# Patient Record
Sex: MALE | Race: BLACK OR AFRICAN AMERICAN | NOT HISPANIC OR LATINO | Employment: UNEMPLOYED | URBAN - METROPOLITAN AREA
[De-identification: names, ages, dates, MRNs, and addresses within clinical notes are randomized per-mention and may not be internally consistent; named-entity substitution may affect disease eponyms.]

---

## 2018-04-09 ENCOUNTER — HOSPITAL ENCOUNTER (EMERGENCY)
Facility: HOSPITAL | Age: 33
Discharge: HOME | End: 2018-04-09
Attending: EMERGENCY MEDICINE | Admitting: EMERGENCY MEDICINE
Payer: COMMERCIAL

## 2018-04-09 VITALS
WEIGHT: 201.5 LBS | TEMPERATURE: 97.5 F | SYSTOLIC BLOOD PRESSURE: 170 MMHG | DIASTOLIC BLOOD PRESSURE: 96 MMHG | HEART RATE: 76 BPM | OXYGEN SATURATION: 98 % | RESPIRATION RATE: 16 BRPM

## 2018-04-09 DIAGNOSIS — K08.89 PAIN, DENTAL: ICD-10-CM

## 2018-04-09 DIAGNOSIS — K02.9 DENTAL DECAY: Primary | ICD-10-CM

## 2018-04-09 PROCEDURE — 99282 EMERGENCY DEPT VISIT SF MDM: CPT

## 2018-04-09 RX ORDER — ACETAMINOPHEN AND CODEINE PHOSPHATE 300; 30 MG/1; MG/1
1 TABLET ORAL EVERY 6 HOURS PRN
Qty: 5 TABLET | Refills: 0 | Status: SHIPPED | OUTPATIENT
Start: 2018-04-09 | End: 2018-04-11

## 2018-04-09 RX ORDER — AMOXICILLIN 500 MG/1
500 CAPSULE ORAL 3 TIMES DAILY
Qty: 21 CAPSULE | Refills: 0 | Status: SHIPPED | OUTPATIENT
Start: 2018-04-09 | End: 2018-04-16

## 2018-04-09 NOTE — ED PROVIDER NOTES
History  Chief Complaint   Patient presents with    Dental Pain     Pt reports right lower sided dental pain that started 2 days ago  Pt took tylenol 3hrs ago and took motrin at 0000 without pain relief  This is a 35year old male who states is having right lower dental pain  He states he hasn't been to a dentist in a long time  States he has taken tylenol and motrin w/o relief  History provided by:  Patient and medical records   used: No        None       History reviewed  No pertinent past medical history  History reviewed  No pertinent surgical history  History reviewed  No pertinent family history  I have reviewed and agree with the history as documented  Social History   Substance Use Topics    Smoking status: Current Every Day Smoker    Smokeless tobacco: Not on file    Alcohol use No        Review of Systems   HENT: Positive for dental problem  All other systems reviewed and are negative  Physical Exam  ED Triage Vitals   Temperature Pulse Respirations Blood Pressure SpO2   04/09/18 0207 04/09/18 0207 04/09/18 0207 04/09/18 0207 04/09/18 0207   97 5 °F (36 4 °C) 74 18 (!) 178/113 98 %      Temp Source Heart Rate Source Patient Position - Orthostatic VS BP Location FiO2 (%)   04/09/18 0207 04/09/18 0207 04/09/18 0220 04/09/18 0207 --   Oral Monitor Sitting Left arm       Pain Score       --                  Orthostatic Vital Signs  Vitals:    04/09/18 0207 04/09/18 0220   BP: (!) 178/113 170/96   Pulse: 74 76   Patient Position - Orthostatic VS:  Sitting       Physical Exam   Constitutional: He appears well-developed and well-nourished  No distress  HENT:   Head: Normocephalic and atraumatic  Right Ear: External ear normal    Left Ear: External ear normal    Nose: Nose normal    Mouth/Throat: Oropharynx is clear and moist  No oropharyngeal exudate         Dental decay at #30  With part of the lateral side of the tooth missing   + mild edema of gum line   No facial swelling  Eyes: EOM are normal  Pupils are equal, round, and reactive to light  Neck: Normal range of motion  Neck supple  Musculoskeletal: Normal range of motion  Neurological: He is alert  Skin: Skin is warm and dry  Capillary refill takes less than 2 seconds  He is not diaphoretic  Psychiatric: He has a normal mood and affect  His behavior is normal  Judgment and thought content normal    Nursing note and vitals reviewed  ED Medications  Medications - No data to display    Diagnostic Studies  Results Reviewed     None                 No orders to display              Procedures  Procedures       Phone Contacts  ED Phone Contact    ED Course  ED Course                                MDM  Number of Diagnoses or Management Options  Dental decay:   Pain, dental:   Diagnosis management comments: Dental decay with tooth breakage    Amoxicillin  Tylenol #3  Motrin  See a dentist today    Pt verbalizes understanding of d/c instructions and follow up          Amount and/or Complexity of Data Reviewed  Review and summarize past medical records: yes      CritCare Time    Disposition  Final diagnoses:   Dental decay   Pain, dental     Time reflects when diagnosis was documented in both MDM as applicable and the Disposition within this note     Time User Action Codes Description Comment    4/9/2018  2:14 AM [place] Ink Add [K02 9] Dental decay     4/9/2018  2:15 AM [place] Ink Add [K08 89] Pain, dental       ED Disposition     None      Follow-up Information     Follow up With Specialties Details Why Contact Info Additional Information    [address]    [address] [id number] [phone]     [facility] 73 Adult and [address] [facility] 81 [id number]  [facility] 99    [address]  [facility]  [address] [facility] Oral Surgery   [address] Dental Oral Surgery   [address] [address]  [place] [address]  [address] Oral Surgery   [address] [address] [phone] [address] [facility] Emergency Department Emergency Medicine  If symptoms worsen [place]hin[place] [address]  [phone] [place] ED, [address] [facility] [place], [place], [address]        Patient's Medications   Discharge Prescriptions    ACETAMINOPHEN-CODEINE (TYLENOL #3) 300-30 MG PER TABLET    Take 1 tablet by mouth every 6 (six) hours as needed (severe pain) for up to 2 days       Start Date: 4/9/2018  End Date: 4/11/2018       Order Dose: 1 tablet       Quantity: 5 tablet    Refills: 0    AMOXICILLIN (AMOXIL) 500 MG CAPSULE    Take 1 capsule (500 mg total) by mouth 3 (three) times a day for 7 days       Start Date: 4/9/2018  End Date: 4/16/2018       Order Dose: 500 mg       Quantity: 21 capsule    Refills: 0     No discharge procedures on file      ED Provider  Electronically Signed by           Felipa Chávez  04/09/18 8736

## 2018-04-09 NOTE — DISCHARGE INSTRUCTIONS
You have a large part of your tooth missing due to dental decay  You have been prescribed Amoxicillin - take as prescribed  You have been prescribed tylenol with codeine for severe pain - do not drink alcohol or drive machinery with this medication  You are to call a dental clinic today for care  Dental Caries   WHAT YOU NEED TO KNOW:   Dental caries are also called cavities  Cavities are caused by bacteria  When the bacteria in tooth plaque (sticky film) mix with certain types of carbohydrate, this creates acid  The acid breaks down areas of enamel, which covers the outside of a tooth  This creates a small hole in the tooth called a cavity  DISCHARGE INSTRUCTIONS:   Return to the emergency department if:   · Your face, jaw, cheek, eye, or neck begin to swell  Contact your dentist if:   · You have a fever  · Your tooth pain gets worse  · You have questions or concerns about your condition or care  Follow up with your dentist as directed:  Write down your questions so you remember to ask them during your visits  Prevent dental caries:   · Brush your teeth at least 2 times a day with fluoride toothpaste  · Use dental floss to clean between your teeth at least once a day  · Rinse your mouth with water or mouthwash after meals and snacks  · Chew sugarless gum after meals and snacks  · See your dentist regularly for dental cleanings and oral exams  © 2017 [address] is for End User's use only and may not be sold, redistributed or otherwise used for commercial purposes  All illustrations and images included in CareNotes® are the copyrighted property of A D A M , Inc  or Tye Brantley  The above information is an  only  It is not intended as medical advice for individual conditions or treatments  Talk to your doctor, nurse or pharmacist before following any medical regimen to see if it is safe and effective for you      Toothache   WHAT YOU NEED TO KNOW:   A toothache is pain that is caused by irritation of the nerves in the center of your tooth  The irritation may be caused by several problems, such as a cavity, an infection, a cracked tooth, or gum disease  It is very important to follow up with your dentist so the cause of your toothache can be diagnosed and treated  This can help prevent more serious problems  DISCHARGE INSTRUCTIONS:   Medicines: You may  need any of the following:  · NSAIDs  decrease swelling and pain  This medicine can be bought with or without a doctor's order  This medicine can cause stomach bleeding or kidney problems in certain people  If you take blood thinner medicine, always ask your healthcare provider if NSAIDs are safe for you  Always read the medicine label and follow the directions on it before using this medicine  · Acetaminophen  decreases pain  It is available without a doctor's order  Ask how much to take and how often to take it  Follow directions  Acetaminophen can cause liver damage if not taken correctly  · Pain medicine  may be given as a pill or as medicine that you put directly on your tooth or gums  Do not wait until the pain is severe before you take this medicine  · Antibiotics  help fight or prevent an infection caused by bacteria  Take them as directed  · Take your medicine as directed  Contact your healthcare provider if you think your medicine is not helping or if you have side effects  Tell him of her if you are allergic to any medicine  Keep a list of the medicines, vitamins, and herbs you take  Include the amounts, and when and why you take them  Bring the list or the pill bottles to follow-up visits  Carry your medicine list with you in case of an emergency  Follow up with your dentist as directed: You may be referred to a dental surgeon  Write down your questions so you remember to ask them during your visits     Self-care:   · Rinse your mouth with warm salt water 4 times a day or as directed  · You may need to eat soft foods to help relieve pain caused by chewing  Contact your dentist if:   · You have questions or concerns about your condition or care  Return to the emergency department if:   · You have trouble breathing  · You have swelling in your face or neck  · You have a fever and chills  · You have trouble speaking or swallowing  · You have trouble opening or closing your mouth  © 2017 [address] Information is for End User's use only and may not be sold, redistributed or otherwise used for commercial purposes  All illustrations and images included in CareNotes® are the copyrighted property of A D A M , Inc  or Tye Brantley  The above information is an  only  It is not intended as medical advice for individual conditions or treatments  Talk to your doctor, nurse or pharmacist before following any medical regimen to see if it is safe and effective for you

## 2018-12-04 ENCOUNTER — HOSPITAL ENCOUNTER (EMERGENCY)
Facility: HOSPITAL | Age: 33
Discharge: HOME | End: 2018-12-04
Attending: EMERGENCY MEDICINE | Admitting: EMERGENCY MEDICINE
Payer: COMMERCIAL

## 2018-12-04 VITALS
OXYGEN SATURATION: 98 % | RESPIRATION RATE: 18 BRPM | HEART RATE: 99 BPM | WEIGHT: 183 LBS | TEMPERATURE: 98.1 F | DIASTOLIC BLOOD PRESSURE: 99 MMHG | SYSTOLIC BLOOD PRESSURE: 155 MMHG

## 2018-12-04 DIAGNOSIS — K08.89 DENTALGIA: Primary | ICD-10-CM

## 2018-12-04 PROCEDURE — 96372 THER/PROPH/DIAG INJ SC/IM: CPT

## 2018-12-04 PROCEDURE — 99282 EMERGENCY DEPT VISIT SF MDM: CPT

## 2018-12-04 RX ORDER — PENICILLIN V POTASSIUM 500 MG/1
500 TABLET ORAL 4 TIMES DAILY
Qty: 28 TABLET | Refills: 0 | Status: SHIPPED | OUTPATIENT
Start: 2018-12-04 | End: 2018-12-11

## 2018-12-04 RX ORDER — TRAMADOL HYDROCHLORIDE 50 MG/1
50 TABLET ORAL EVERY 6 HOURS PRN
Qty: 8 TABLET | Refills: 0 | Status: SHIPPED | OUTPATIENT
Start: 2018-12-04 | End: 2020-08-14 | Stop reason: HOSPADM

## 2018-12-04 RX ORDER — PENICILLIN V POTASSIUM 250 MG/1
500 TABLET ORAL ONCE
Status: COMPLETED | OUTPATIENT
Start: 2018-12-04 | End: 2018-12-04

## 2018-12-04 RX ORDER — NAPROXEN 500 MG/1
500 TABLET ORAL 2 TIMES DAILY WITH MEALS
Qty: 10 TABLET | Refills: 0 | Status: SHIPPED | OUTPATIENT
Start: 2018-12-04 | End: 2020-08-14 | Stop reason: HOSPADM

## 2018-12-04 RX ORDER — KETOROLAC TROMETHAMINE 30 MG/ML
15 INJECTION, SOLUTION INTRAMUSCULAR; INTRAVENOUS ONCE
Status: COMPLETED | OUTPATIENT
Start: 2018-12-04 | End: 2018-12-04

## 2018-12-04 RX ADMIN — KETOROLAC TROMETHAMINE 15 MG: 30 INJECTION, SOLUTION INTRAMUSCULAR at 00:45

## 2018-12-04 RX ADMIN — PENICILLIN V POTASIUM 500 MG: 250 TABLET ORAL at 00:45

## 2018-12-04 NOTE — DISCHARGE INSTRUCTIONS
Toothache   WHAT YOU NEED TO KNOW:   A toothache is pain that is caused by irritation of the nerves in the center of your tooth  The irritation may be caused by several problems, such as a cavity, an infection, a cracked tooth, or gum disease  It is very important to follow up with your dentist so the cause of your toothache can be diagnosed and treated  This can help prevent more serious problems  DISCHARGE INSTRUCTIONS:   Medicines: You may  need any of the following:  · NSAIDs  decrease swelling and pain  This medicine can be bought with or without a doctor's order  This medicine can cause stomach bleeding or kidney problems in certain people  If you take blood thinner medicine, always ask your healthcare provider if NSAIDs are safe for you  Always read the medicine label and follow the directions on it before using this medicine  · Acetaminophen  decreases pain  It is available without a doctor's order  Ask how much to take and how often to take it  Follow directions  Acetaminophen can cause liver damage if not taken correctly  · Pain medicine  may be given as a pill or as medicine that you put directly on your tooth or gums  Do not wait until the pain is severe before you take this medicine  · Antibiotics  help fight or prevent an infection caused by bacteria  Take them as directed  · Take your medicine as directed  Contact your healthcare provider if you think your medicine is not helping or if you have side effects  Tell him of her if you are allergic to any medicine  Keep a list of the medicines, vitamins, and herbs you take  Include the amounts, and when and why you take them  Bring the list or the pill bottles to follow-up visits  Carry your medicine list with you in case of an emergency  Follow up with your dentist as directed: You may be referred to a dental surgeon  Write down your questions so you remember to ask them during your visits     Self-care:   · Rinse your mouth with warm salt water 4 times a day or as directed  · You may need to eat soft foods to help relieve pain caused by chewing  Contact your dentist if:   · You have questions or concerns about your condition or care  Return to the emergency department if:   · You have trouble breathing  · You have swelling in your face or neck  · You have a fever and chills  · You have trouble speaking or swallowing  · You have trouble opening or closing your mouth  © 2017 [address] Information is for End User's use only and may not be sold, redistributed or otherwise used for commercial purposes  All illustrations and images included in CareNotes® are the copyrighted property of A D A M , Inc  or Tye Brantley  The above information is an  only  It is not intended as medical advice for individual conditions or treatments  Talk to your doctor, nurse or pharmacist before following any medical regimen to see if it is safe and effective for you

## 2018-12-04 NOTE — ED PROVIDER NOTES
History  Chief Complaint   Patient presents with    Dental Pain     Pt reports upper sided dental pain for 3 days  History provided by:  Patient   used: No    Dental Pain   Location:  Upper  Upper teeth location:  14/MOLINA 1st molar  Quality:  Aching  Duration:  3 days  Timing:  Constant  Progression:  Unchanged  Chronicity:  New  Relieved by:  None tried  Worsened by:  Nothing  Ineffective treatments:  Acetaminophen and NSAIDs  Associated symptoms: no difficulty swallowing, no drooling, no facial swelling, no fever, no gum swelling, no headaches, no neck pain, no neck swelling, no oral bleeding and no trismus    Risk factors: smoking        None       History reviewed  No pertinent past medical history  History reviewed  No pertinent surgical history  History reviewed  No pertinent family history  I have reviewed and agree with the history as documented  Social History   Substance Use Topics    Smoking status: Current Every Day Smoker    Smokeless tobacco: Not on file    Alcohol use No        Review of Systems   Constitutional: Negative for chills and fever  HENT: Positive for dental problem  Negative for drooling, facial swelling, sore throat, trouble swallowing and voice change  Respiratory: Negative for shortness of breath and stridor  Gastrointestinal: Negative for nausea and vomiting  Musculoskeletal: Negative for neck pain and neck stiffness  Skin: Negative for rash  Neurological: Negative for speech difficulty and headaches  All other systems reviewed and are negative  Physical Exam  Physical Exam   Constitutional: He is oriented to person, place, and time  Vital signs are normal  He appears well-developed and well-nourished  Non-toxic appearance  He does not have a sickly appearance  He does not appear ill  No distress  HENT:   Head: Normocephalic     Right Ear: External ear normal    Left Ear: External ear normal    Mouth/Throat: Uvula is midline, oropharynx is clear and moist and mucous membranes are normal  Mucous membranes are not pale and not dry  No trismus in the jaw  Abnormal dentition  Dental caries present  No dental abscesses or uvula swelling  No oropharyngeal exudate, posterior oropharyngeal edema, posterior oropharyngeal erythema or tonsillar abscesses  No stridor  No drooling  No facial swelling  No signs of Juan M's angina  No signs of peritonsillar abscess  No fullness or bulging of the posterior soft palate  No tenderness or swelling/elevation of floor of mouth  No swelling, induration, crepitus, or pain at submandibular area  Neck: Normal range of motion and phonation normal  Neck supple  No neck rigidity  No edema and no erythema present  No erythema overlying neck  No masses or swelling  Cardiovascular: Normal rate, regular rhythm, normal heart sounds and intact distal pulses  Pulmonary/Chest: Effort normal and breath sounds normal  No stridor  No respiratory distress  He has no wheezes  He has no rales  Lymphadenopathy:        Head (right side): No submental and no submandibular adenopathy present  Head (left side): No submental and no submandibular adenopathy present  He has no cervical adenopathy  Neurological: He is alert and oriented to person, place, and time  He is not disoriented  No cranial nerve deficit  Skin: Skin is warm, dry and intact  No rash noted  He is not diaphoretic  No erythema  Nursing note and vitals reviewed        Vital Signs  ED Triage Vitals [12/04/18 0025]   Temperature Pulse Respirations Blood Pressure SpO2   98 1 °F (36 7 °C) 99 18 155/99 98 %      Temp Source Heart Rate Source Patient Position - Orthostatic VS BP Location FiO2 (%)   Temporal Monitor -- Right arm --      Pain Score       --           Vitals:    12/04/18 0025   BP: 155/99   Pulse: 99       Visual Acuity      ED Medications  Medications   penicillin V potassium (VEETID) tablet 500 mg (not administered)   ketorolac (TORADOL) injection 15 mg (not administered)       Diagnostic Studies  Results Reviewed     None                 No orders to display              Procedures  Procedures       Phone Contacts  ED Phone Contact    ED Course                               MDM  CritCare Time    Disposition  Final diagnoses:   Delito Fontenot     Time reflects when diagnosis was documented in both MDM as applicable and the Disposition within this note     Time User Action Codes Description Comment    12/4/2018 12:34 AM [place] Cale 48 [K08 89] Delito Fontenot       ED Disposition     ED Disposition Condition Comment    Discharge  [place] discharge to home/self care  Condition at discharge: Good        Follow-up Information     Follow up With Specialties Details Why [address]  Schedule an appointment as soon as possible for a visit  [address]  Schedule an appointment as soon as possible for a visit  [address] [facility] [address]  [address]  [phone]          Patient's Medications   Discharge Prescriptions    NAPROXEN (NAPROSYN) 500 MG TABLET    Take 1 tablet (500 mg total) by mouth 2 (two) times a day with meals       Start Date: 12/4/2018 End Date: --       Order Dose: 500 mg       Quantity: 10 tablet    Refills: 0    PENICILLIN V POTASSIUM (VEETID) 500 MG TABLET    Take 1 tablet (500 mg total) by mouth 4 (four) times a day for 7 days       Start Date: 12/4/2018 End Date: 12/11/2018       Order Dose: 500 mg       Quantity: 28 tablet    Refills: 0    TRAMADOL (ULTRAM) 50 MG TABLET    Take 1 tablet (50 mg total) by mouth every 6 (six) hours as needed for severe pain       Start Date: 12/4/2018 End Date: --       Order Dose: 50 mg       Quantity: 8 tablet    Refills: 0     No discharge procedures on file      ED Provider  Electronically Signed by           Joy DO  12/04/18 0038

## 2020-01-31 ENCOUNTER — HOSPITAL ENCOUNTER (EMERGENCY)
Facility: HOSPITAL | Age: 35
Discharge: HOME | End: 2020-01-31
Attending: EMERGENCY MEDICINE
Payer: COMMERCIAL

## 2020-01-31 VITALS
OXYGEN SATURATION: 99 % | DIASTOLIC BLOOD PRESSURE: 84 MMHG | SYSTOLIC BLOOD PRESSURE: 139 MMHG | RESPIRATION RATE: 18 BRPM | TEMPERATURE: 98.5 F | HEART RATE: 84 BPM

## 2020-01-31 DIAGNOSIS — F32.A DEPRESSION: Primary | ICD-10-CM

## 2020-01-31 PROCEDURE — 99283 EMERGENCY DEPT VISIT LOW MDM: CPT

## 2020-01-31 PROCEDURE — 99284 EMERGENCY DEPT VISIT MOD MDM: CPT | Performed by: EMERGENCY MEDICINE

## 2020-01-31 RX ORDER — ARIPIPRAZOLE 10 MG/1
5 TABLET ORAL DAILY
COMMUNITY
End: 2020-08-14 | Stop reason: HOSPADM

## 2020-01-31 NOTE — ED PROVIDER NOTES
History  Chief Complaint   Patient presents with    Psychiatric Evaluation     Patient was at [place] [address] for treatment but wanted to leave  Patient reported to have had SI with plan to cut his wrist  Patient currently denies SI or HI      28-year-old male with past medical history of depression who is presenting for psychiatric evaluation  Patient reports that 2 days ago, he began to experience increasing depression related to conflict with his wife  He called his  and was reportedly crying  At some point, he apparently expressed suicidal ideation with a plan to cut his wrist   She was able to secure a place for him at [facility]  Patient stayed there yesterday but today he is stating that he feels significantly improved and wants to go home  Patient denies any suicidal ideation at this time  He has no thoughts of harming others or homicidal ideation  No auditory or visual hallucinations  Patient reports that he has been compliant with his psychiatric medications, including Abilify, Effexor, and Vyvanse  He has not been taking Klonopin for the past several days  Patient denies any drug use  He has no medical complaints at this time, including headache, vision changes, focal numbness or weakness, chest pain or pressure, shortness of breath, nausea, vomiting, abdominal pain, or any other complaints at this time  Prior to Admission Medications   Prescriptions Last Dose Informant Patient Reported? Taking? ARIPiprazole (ABILIFY) 10 mg tablet   Yes Yes   Sig: Take 10 mg by mouth daily   naproxen (NAPROSYN) 500 mg tablet   No No   Sig: Take 1 tablet (500 mg total) by mouth 2 (two) times a day with meals   traMADol (ULTRAM) 50 mg tablet   No No   Sig: Take 1 tablet (50 mg total) by mouth every 6 (six) hours as needed for severe pain      Facility-Administered Medications: None       Past Medical History:   Diagnosis Date    Anxiety     Depression        History reviewed   No pertinent surgical history  History reviewed  No pertinent family history  I have reviewed and agree with the history as documented  Social History     Tobacco Use    Smoking status: Current Every Day Smoker    Smokeless tobacco: Never Used   Substance Use Topics    Alcohol use: No    Drug use: No        Review of Systems   Constitutional: Negative for diaphoresis, fever and unexpected weight change  HENT: Negative for congestion, rhinorrhea and sore throat  Eyes: Negative for pain, discharge and visual disturbance  Respiratory: Negative for cough, shortness of breath and wheezing  Cardiovascular: Negative for chest pain, palpitations and leg swelling  Gastrointestinal: Negative for abdominal pain, blood in stool, constipation, diarrhea, nausea and vomiting  Genitourinary: Negative for dysuria, flank pain and hematuria  Musculoskeletal: Negative for arthralgias and myalgias  Skin: Negative for rash and wound  Allergic/Immunologic: Negative for environmental allergies and food allergies  Neurological: Negative for dizziness, seizures, weakness and numbness  Hematological: Negative for adenopathy  Psychiatric/Behavioral: Positive for dysphoric mood  Negative for confusion and hallucinations  Physical Exam  ED Triage Vitals [01/31/20 1331]   Temperature Pulse Respirations Blood Pressure SpO2   98 5 °F (36 9 °C) 84 18 139/84 99 %      Temp Source Heart Rate Source Patient Position - Orthostatic VS BP Location FiO2 (%)   Oral Monitor Sitting Right arm --      Pain Score       No Pain             Orthostatic Vital Signs  Vitals:    01/31/20 1331   BP: 139/84   Pulse: 84   Patient Position - Orthostatic VS: Sitting       Physical Exam   Constitutional: He is oriented to person, place, and time  He appears well-developed and well-nourished  HENT:   Head: Normocephalic and atraumatic     Right Ear: External ear normal    Left Ear: External ear normal    Nose: Nose normal    Eyes: Pupils are equal, round, and reactive to light  EOM are normal    Neck: Normal range of motion  Neck supple  No thyromegaly present  Cardiovascular: Normal rate, regular rhythm and normal heart sounds  No murmur heard  Pulmonary/Chest: Effort normal and breath sounds normal  No respiratory distress  He has no wheezes  He has no rales  Abdominal: Soft  Bowel sounds are normal  He exhibits no distension  There is no tenderness  There is no guarding  Musculoskeletal: Normal range of motion  He exhibits no deformity  Neurological: He is alert and oriented to person, place, and time  No gross motor deficits noted  Cranial nerves II-XII are intact  Speech is fluent without dysarthria or aphasia  Skin: Skin is warm and dry  Capillary refill takes less than 2 seconds  He is not diaphoretic  Psychiatric: He has a normal mood and affect  His behavior is normal    Patient is calm and cooperative  Speech is normal in rate and tone  Patient denies any suicidal ideation or homicidal ideation  No evidence of response to internal stimuli  Nursing note and vitals reviewed  ED Medications  Medications - No data to display    Diagnostic Studies  Results Reviewed     None                 No orders to display         Procedures  Procedures      ED Course                               MDM  Number of Diagnoses or Management Options  Depression: established and worsening  Diagnosis management comments:     As above, patient presented for psychiatric evaluation  He complained of worsening depression 2 days prior to arrival with reported suicidal ideation with plan  Patient was admitted to [facility]  He stated that he felt significantly improved the day after and expressed his desire to leave the facility  Patient denied suicidal ideation to myself and Dr Bettencourt Diss  He had no homicidal ideation or evidence of psychosis that would impair his ability to take care of himself  302 not indicated    He had a safe place to go; patient planned to stay with a friend  Patient has a  who will be available to assist him should he need it  Advised patient to return to the ER with suicidal ideation, worsening depression, psychosis, or any other concerning symptoms  Patient verbalized understanding  Amount and/or Complexity of Data Reviewed  Decide to obtain previous medical records or to obtain history from someone other than the patient: yes  Obtain history from someone other than the patient: yes  Review and summarize past medical records: yes    Risk of Complications, Morbidity, and/or Mortality  Presenting problems: low  Diagnostic procedures: minimal  Management options: minimal    Patient Progress  Patient progress: stable        Disposition  Final diagnoses:   Depression     Time reflects when diagnosis was documented in both MDM as applicable and the Disposition within this note     Time User Action Codes Description Comment    1/31/2020  2:24 PM Jairo Garcia [F32 9] Depression       ED Disposition     ED Disposition Condition Date/Time Comment    Discharge Good Fri Jan 31, 2020  2:24 PM Talya Beaulieu discharge to home/self care  Follow-up Information     Follow up With Specialties Details Why Contact Info Additional Information    Crystal Rankin MD Family Medicine Call today Please follow-up with your PCP for a recheck  Make an appointment for within 1 week  [address] [place] [address] Emergency Department Emergency Medicine Go to  As needed  [address] [place] [address], [address] [place] [address], [address] [phone]          Patient's Medications   Discharge Prescriptions    No medications on file     No discharge procedures on file  ED Provider  Attending physically available and evaluated Talya Beaulieu I managed the patient along with the ED Attending      Electronically Signed by         Ruba Adler MD  01/31/20 1962

## 2020-01-31 NOTE — ED ATTENDING ATTESTATION
1/31/2020  IBelinda DO, saw and evaluated the patient  I have discussed the patient with the resident/non-physician practitioner and agree with the resident's/non-physician practitioner's findings, Plan of Care, and MDM as documented in the resident's/non-physician practitioner's note, except where noted  All available labs and Radiology studies were reviewed  I was present for key portions of any procedure(s) performed by the resident/non-physician practitioner and I was immediately available to provide assistance  At this point I agree with the current assessment done in the Emergency Department  I have conducted an independent evaluation of this patient a history and physical is as follows:    22-year-old who is currently at a group presents for depression, presents for passive suicidal ideation  Patient states he had thoughts of hurting himself a few days ago but does not feel this way anymore  Would like to be discharged to go home  Denies homicidal or suicidal ideation     in the room agrees that there is no reason for 302 but just wants him evaluated    ED Course         Critical Care Time  Procedures

## 2020-08-10 ENCOUNTER — HOSPITAL ENCOUNTER (EMERGENCY)
Facility: HOSPITAL | Age: 35
End: 2020-08-11
Attending: EMERGENCY MEDICINE
Payer: COMMERCIAL

## 2020-08-10 DIAGNOSIS — R45.851 DEPRESSION WITH SUICIDAL IDEATION: ICD-10-CM

## 2020-08-10 DIAGNOSIS — N17.9 AKI (ACUTE KIDNEY INJURY) (HCC): ICD-10-CM

## 2020-08-10 DIAGNOSIS — E86.0 DEHYDRATION: ICD-10-CM

## 2020-08-10 DIAGNOSIS — R55 SYNCOPE: Primary | ICD-10-CM

## 2020-08-10 DIAGNOSIS — F32.A DEPRESSION WITH SUICIDAL IDEATION: ICD-10-CM

## 2020-08-10 LAB
BASOPHILS # BLD AUTO: 0.02 THOUSANDS/ΜL (ref 0–0.1)
BASOPHILS NFR BLD AUTO: 0 % (ref 0–1)
EOSINOPHIL # BLD AUTO: 0.07 THOUSAND/ΜL (ref 0–0.61)
EOSINOPHIL NFR BLD AUTO: 1 % (ref 0–6)
ERYTHROCYTE [DISTWIDTH] IN BLOOD BY AUTOMATED COUNT: 14.3 % (ref 11.6–15.1)
ETHANOL SERPL-MCNC: 3 MG/DL (ref 0–3)
GLUCOSE SERPL-MCNC: 107 MG/DL (ref 65–140)
HCT VFR BLD AUTO: 57.3 % (ref 36.5–49.3)
HGB BLD-MCNC: 18.5 G/DL (ref 12–17)
IMM GRANULOCYTES # BLD AUTO: 0.03 THOUSAND/UL (ref 0–0.2)
IMM GRANULOCYTES NFR BLD AUTO: 1 % (ref 0–2)
LYMPHOCYTES # BLD AUTO: 2.69 THOUSANDS/ΜL (ref 0.6–4.47)
LYMPHOCYTES NFR BLD AUTO: 42 % (ref 14–44)
MCH RBC QN AUTO: 27.9 PG (ref 26.8–34.3)
MCHC RBC AUTO-ENTMCNC: 32.3 G/DL (ref 31.4–37.4)
MCV RBC AUTO: 86 FL (ref 82–98)
MONOCYTES # BLD AUTO: 0.59 THOUSAND/ΜL (ref 0.17–1.22)
MONOCYTES NFR BLD AUTO: 9 % (ref 4–12)
NEUTROPHILS # BLD AUTO: 3.07 THOUSANDS/ΜL (ref 1.85–7.62)
NEUTS SEG NFR BLD AUTO: 47 % (ref 43–75)
NRBC BLD AUTO-RTO: 0 /100 WBCS
PLATELET # BLD AUTO: 337 THOUSANDS/UL (ref 149–390)
PMV BLD AUTO: 9.9 FL (ref 8.9–12.7)
RBC # BLD AUTO: 6.64 MILLION/UL (ref 3.88–5.62)
WBC # BLD AUTO: 6.47 THOUSAND/UL (ref 4.31–10.16)

## 2020-08-10 PROCEDURE — 87635 SARS-COV-2 COVID-19 AMP PRB: CPT | Performed by: EMERGENCY MEDICINE

## 2020-08-10 PROCEDURE — 80329 ANALGESICS NON-OPIOID 1 OR 2: CPT | Performed by: EMERGENCY MEDICINE

## 2020-08-10 PROCEDURE — 36415 COLL VENOUS BLD VENIPUNCTURE: CPT | Performed by: EMERGENCY MEDICINE

## 2020-08-10 PROCEDURE — 93005 ELECTROCARDIOGRAM TRACING: CPT

## 2020-08-10 PROCEDURE — 80053 COMPREHEN METABOLIC PANEL: CPT | Performed by: EMERGENCY MEDICINE

## 2020-08-10 PROCEDURE — 99291 CRITICAL CARE FIRST HOUR: CPT | Performed by: EMERGENCY MEDICINE

## 2020-08-10 PROCEDURE — 96360 HYDRATION IV INFUSION INIT: CPT

## 2020-08-10 PROCEDURE — 99285 EMERGENCY DEPT VISIT HI MDM: CPT

## 2020-08-10 PROCEDURE — 84443 ASSAY THYROID STIM HORMONE: CPT | Performed by: EMERGENCY MEDICINE

## 2020-08-10 PROCEDURE — 80320 DRUG SCREEN QUANTALCOHOLS: CPT | Performed by: EMERGENCY MEDICINE

## 2020-08-10 PROCEDURE — 84484 ASSAY OF TROPONIN QUANT: CPT | Performed by: EMERGENCY MEDICINE

## 2020-08-10 PROCEDURE — 85025 COMPLETE CBC W/AUTO DIFF WBC: CPT | Performed by: EMERGENCY MEDICINE

## 2020-08-10 PROCEDURE — 82948 REAGENT STRIP/BLOOD GLUCOSE: CPT

## 2020-08-10 RX ORDER — HYDROXYZINE HYDROCHLORIDE 25 MG/1
25 TABLET, FILM COATED ORAL EVERY 6 HOURS PRN
COMMUNITY
End: 2020-08-14 | Stop reason: HOSPADM

## 2020-08-10 RX ORDER — VENLAFAXINE HYDROCHLORIDE 75 MG/1
75 CAPSULE, EXTENDED RELEASE ORAL
COMMUNITY
Start: 2020-08-03 | End: 2020-08-14 | Stop reason: HOSPADM

## 2020-08-10 RX ADMIN — SODIUM CHLORIDE 1000 ML: 0.9 INJECTION, SOLUTION INTRAVENOUS at 23:36

## 2020-08-10 NOTE — LETTER
Section I - General Information    Name of Patient: Roderick Dancer                 : 1985    Medicare #:____________________  Transport Date: 20 (PCS is valid for round trips on this date and for all repetitive trips in the 60-day range as noted below )  Origin: [place] [id number]                                                         Destination:________________________________________________  Is the pt's stay covered under Medicare Part A (PPS/DRG)     (_) YES  (_) NO  Closest appropriate facility?  (_) YES  (_) NO  If no, why is transport to more distant facility required?________________________  If hosp-hosp transfer, describe services needed at 2nd facility not available at 1st facility? _________________________________  If hospice pt, is this transport related to pt's terminal illness? (_) YES (_) NO Describe____________________________________    Section II - Medical Necessity Questionnaire  Ambulance transportation is medically necessary only if other means of transport are contraindicated or would be potentially harmful to the patient  To meet this requirement, the patient must either be "bed confined" or suffer from a condition such that transport by means other than ambulance is contraindicated by the patient's condition   The following questions must be answered by the medical professional signing below for this form to be valid:    1)  Describe the MEDICAL CONDITION (physical and/or mental) of this patient AT [address] [place] [address] that requires the patient to be transported in an ambulance and why transport by other means is contraindicated by the patient's condition:__________________________________________________________________________________________________    2) Is the patient "bed confined" as defined below?     (_) YES  (_) NO  To be "be confined" the patient must satisfy all three of the following conditions: (1) unable to get up from bed without Assistance; AND (2) unable to ambulate; AND (3) unable to sit in a chair or wheelchair  3) Can this patient safely be transported by car or wheelchair [place] (i e , seated during transport without a medical attendant or monitoring)?   (_) YES  (_) NO    4) In addition to completing questions 1-3 above, please check any of the following conditions that apply*:  *Note: supporting documentation for any boxes checked must be maintained in the patient's medical records  (_)Contractures   (_)Non-Healed Fractures  (_)Patient is confused (_)Patient is comatose (_)Moderate/severe pain on movement (_)Danger to self/others  (_)IV meds/fluids required (_)Patient is combative(_)Need or possible need for restraints (_)DVT requires elevation of lower extremity  (_)Medical attendant required (_)Requires oxygen-unable to self administer (_)Special handling/isolation/infection control precautions required (_)Unable to tolerate seated position for time needed to transport (_)Hemodynamic monitoring required en route (_)Unable to sit in a chair or wheelchair due to decubitus ulcers or other wounds (_)Cardiac monitoring required en route (_)Morbid obesity requires additional personnel/equipment to safely handle patient (_)Orthopedic device (backboard, halo, pins, traction, brace, wedge, etc,) requiring special handling during transport (_)Other(specify)_______________________________________________    Section III - Signature of Physician or Healthcare Professional  I certify that the above information is true and correct based on my evaluation of this patient, and represent that the patient requires transport by ambulance and that other forms of transport are contraindicated   I understand that this information will be used by the Centers for Medicare and Medicaid Services (CMS) to support the determination of medical necessity for ambulance services, and I represent that I have personal knowledge of the patient's condition at time of transport  (_) If this box is checked, I also certify that the patient is physically or mentally incapable of signing the ambulance service's claim and that the institution with which I am affiliated has furnished care, services, or assistance to the patient  My signature below is made on behalf of the patient pursuant to 42 CFR §424 36(b)(4)  In accordance with 42 CFR §424 37, the specific reason(s) that the patient is physically or mentally incapable of signing the claim form is as follows: _________________________________________________________________________________________________________      Signature of Physician* or Healthcare Professional______________________________________________________________  Signature Date 08/11/20 (For scheduled repetitive transports, this form is not valid for transports performed more than 60 days after this date)    Printed Name & Credentials of Physician or Healthcare Professional (MD, DO, RN, etc )________________________________  *Form must be signed by patient's attending physician for scheduled, repetitive transports   For non-repetitive, unscheduled ambulance transports, if unable to obtain the signature of the attending physician, any of the following may sign (choose appropriate option below)  (_) Physician Assistant (_)  Clinical Nurse Specialist (_)  Registered Nurse  (_)  Nurse Practitioner  (_) Discharge Planner

## 2020-08-11 ENCOUNTER — HOSPITAL ENCOUNTER (INPATIENT)
Facility: HOSPITAL | Age: 35
LOS: 3 days | Discharge: HOME | DRG: 754 | End: 2020-08-14
Attending: PSYCHIATRY & NEUROLOGY | Admitting: PSYCHIATRY & NEUROLOGY
Payer: COMMERCIAL

## 2020-08-11 VITALS
WEIGHT: 182.98 LBS | OXYGEN SATURATION: 100 % | RESPIRATION RATE: 16 BRPM | TEMPERATURE: 98 F | DIASTOLIC BLOOD PRESSURE: 62 MMHG | HEART RATE: 66 BPM | SYSTOLIC BLOOD PRESSURE: 114 MMHG

## 2020-08-11 DIAGNOSIS — R45.851 DEPRESSION WITH SUICIDAL IDEATION: ICD-10-CM

## 2020-08-11 DIAGNOSIS — F32.9 MAJOR DEPRESSIVE DISORDER: Primary | ICD-10-CM

## 2020-08-11 DIAGNOSIS — F32.A DEPRESSION WITH SUICIDAL IDEATION: ICD-10-CM

## 2020-08-11 LAB
ALBUMIN SERPL BCP-MCNC: 4.2 G/DL (ref 3.5–5)
ALP SERPL-CCNC: 85 U/L (ref 46–116)
ALT SERPL W P-5'-P-CCNC: 36 U/L (ref 12–78)
AMPHETAMINES SERPL QL SCN: NEGATIVE
ANION GAP SERPL CALCULATED.3IONS-SCNC: 13 MMOL/L (ref 4–13)
APAP SERPL-MCNC: <2 UG/ML (ref 10–20)
AST SERPL W P-5'-P-CCNC: 22 U/L (ref 5–45)
ATRIAL RATE: 100 BPM
ATRIAL RATE: 99 BPM
BACTERIA UR QL AUTO: ABNORMAL /HPF
BARBITURATES UR QL: NEGATIVE
BENZODIAZ UR QL: NEGATIVE
BILIRUB SERPL-MCNC: 0.41 MG/DL (ref 0.2–1)
BILIRUB UR QL STRIP: NEGATIVE
BUN SERPL-MCNC: 17 MG/DL (ref 5–25)
CALCIUM SERPL-MCNC: 9.5 MG/DL (ref 8.3–10.1)
CAOX CRY URNS QL MICRO: ABNORMAL /HPF
CHLORIDE SERPL-SCNC: 104 MMOL/L (ref 100–108)
CLARITY UR: CLEAR
CO2 SERPL-SCNC: 27 MMOL/L (ref 21–32)
COCAINE UR QL: NEGATIVE
COLOR UR: YELLOW
CREAT SERPL-MCNC: 1.54 MG/DL (ref 0.6–1.3)
GFR SERPL CREATININE-BSD FRML MDRD: 67 ML/MIN/1.73SQ M
GLUCOSE SERPL-MCNC: 111 MG/DL (ref 65–140)
GLUCOSE UR STRIP-MCNC: NEGATIVE MG/DL
HGB UR QL STRIP.AUTO: NEGATIVE
HYALINE CASTS #/AREA URNS LPF: ABNORMAL /LPF
KETONES UR STRIP-MCNC: NEGATIVE MG/DL
LEUKOCYTE ESTERASE UR QL STRIP: NEGATIVE
METHADONE UR QL: NEGATIVE
MUCOUS THREADS UR QL AUTO: ABNORMAL
NITRITE UR QL STRIP: NEGATIVE
NON-SQ EPI CELLS URNS QL MICRO: ABNORMAL /HPF
OPIATES UR QL SCN: NEGATIVE
OXYCODONE+OXYMORPHONE UR QL SCN: NEGATIVE
P AXIS: 77 DEGREES
P AXIS: 80 DEGREES
PCP UR QL: NEGATIVE
PH UR STRIP.AUTO: 6 [PH] (ref 4.5–8)
POTASSIUM SERPL-SCNC: 4.1 MMOL/L (ref 3.5–5.3)
PR INTERVAL: 144 MS
PR INTERVAL: 144 MS
PROT SERPL-MCNC: 7.5 G/DL (ref 6.4–8.2)
PROT UR STRIP-MCNC: ABNORMAL MG/DL
QRS AXIS: 83 DEGREES
QRS AXIS: 84 DEGREES
QRSD INTERVAL: 80 MS
QRSD INTERVAL: 84 MS
QT INTERVAL: 312 MS
QT INTERVAL: 314 MS
QTC INTERVAL: 402 MS
QTC INTERVAL: 402 MS
RBC #/AREA URNS AUTO: ABNORMAL /HPF
SALICYLATES SERPL-MCNC: <3 MG/DL (ref 3–20)
SARS-COV-2 RNA RESP QL NAA+PROBE: NEGATIVE
SODIUM SERPL-SCNC: 144 MMOL/L (ref 136–145)
SP GR UR STRIP.AUTO: >=1.03 (ref 1–1.03)
T WAVE AXIS: 23 DEGREES
T WAVE AXIS: 39 DEGREES
THC UR QL: POSITIVE
TROPONIN I SERPL-MCNC: <0.02 NG/ML
TSH SERPL DL<=0.05 MIU/L-ACNC: 2.5 UIU/ML (ref 0.36–3.74)
UROBILINOGEN UR QL STRIP.AUTO: 0.2 E.U./DL
VENTRICULAR RATE: 100 BPM
VENTRICULAR RATE: 99 BPM
WBC #/AREA URNS AUTO: ABNORMAL /HPF

## 2020-08-11 PROCEDURE — 93010 ELECTROCARDIOGRAM REPORT: CPT | Performed by: INTERNAL MEDICINE

## 2020-08-11 PROCEDURE — 96361 HYDRATE IV INFUSION ADD-ON: CPT

## 2020-08-11 PROCEDURE — 81001 URINALYSIS AUTO W/SCOPE: CPT

## 2020-08-11 PROCEDURE — 80307 DRUG TEST PRSMV CHEM ANLYZR: CPT | Performed by: EMERGENCY MEDICINE

## 2020-08-11 RX ORDER — BENZTROPINE MESYLATE 1 MG/ML
1 INJECTION INTRAMUSCULAR; INTRAVENOUS EVERY 6 HOURS PRN
Status: DISCONTINUED | OUTPATIENT
Start: 2020-08-11 | End: 2020-08-14 | Stop reason: HOSPADM

## 2020-08-11 RX ORDER — IBUPROFEN 600 MG/1
600 TABLET ORAL EVERY 6 HOURS PRN
Status: CANCELLED | OUTPATIENT
Start: 2020-08-11

## 2020-08-11 RX ORDER — ACETAMINOPHEN 325 MG/1
650 TABLET ORAL EVERY 4 HOURS PRN
Status: CANCELLED | OUTPATIENT
Start: 2020-08-11

## 2020-08-11 RX ORDER — ARIPIPRAZOLE 10 MG/1
10 TABLET ORAL DAILY
Status: DISCONTINUED | OUTPATIENT
Start: 2020-08-11 | End: 2020-08-11 | Stop reason: HOSPADM

## 2020-08-11 RX ORDER — MAGNESIUM HYDROXIDE/ALUMINUM HYDROXICE/SIMETHICONE 120; 1200; 1200 MG/30ML; MG/30ML; MG/30ML
30 SUSPENSION ORAL EVERY 4 HOURS PRN
Status: CANCELLED | OUTPATIENT
Start: 2020-08-11

## 2020-08-11 RX ORDER — BENZTROPINE MESYLATE 1 MG/ML
1 INJECTION INTRAMUSCULAR; INTRAVENOUS EVERY 6 HOURS PRN
Status: CANCELLED | OUTPATIENT
Start: 2020-08-11

## 2020-08-11 RX ORDER — TRAZODONE HYDROCHLORIDE 50 MG/1
50 TABLET ORAL
Status: CANCELLED | OUTPATIENT
Start: 2020-08-11

## 2020-08-11 RX ORDER — ACETAMINOPHEN 325 MG/1
325 TABLET ORAL EVERY 6 HOURS PRN
Status: CANCELLED | OUTPATIENT
Start: 2020-08-11

## 2020-08-11 RX ORDER — LORAZEPAM 2 MG/ML
2 INJECTION INTRAMUSCULAR EVERY 6 HOURS PRN
Status: DISCONTINUED | OUTPATIENT
Start: 2020-08-11 | End: 2020-08-14 | Stop reason: HOSPADM

## 2020-08-11 RX ORDER — HALOPERIDOL 5 MG
5 TABLET ORAL EVERY 6 HOURS PRN
Status: CANCELLED | OUTPATIENT
Start: 2020-08-11

## 2020-08-11 RX ORDER — HYDROXYZINE 50 MG/1
50 TABLET, FILM COATED ORAL EVERY 6 HOURS PRN
Status: DISCONTINUED | OUTPATIENT
Start: 2020-08-11 | End: 2020-08-14 | Stop reason: HOSPADM

## 2020-08-11 RX ORDER — OLANZAPINE 10 MG/1
10 INJECTION, POWDER, LYOPHILIZED, FOR SOLUTION INTRAMUSCULAR EVERY 8 HOURS PRN
Status: CANCELLED | OUTPATIENT
Start: 2020-08-11

## 2020-08-11 RX ORDER — ARIPIPRAZOLE 10 MG/1
10 TABLET ORAL DAILY
Status: CANCELLED | OUTPATIENT
Start: 2020-08-12

## 2020-08-11 RX ORDER — IBUPROFEN 600 MG/1
600 TABLET ORAL EVERY 6 HOURS PRN
Status: DISCONTINUED | OUTPATIENT
Start: 2020-08-11 | End: 2020-08-14 | Stop reason: HOSPADM

## 2020-08-11 RX ORDER — OLANZAPINE 10 MG/1
10 INJECTION, POWDER, LYOPHILIZED, FOR SOLUTION INTRAMUSCULAR EVERY 8 HOURS PRN
Status: DISCONTINUED | OUTPATIENT
Start: 2020-08-11 | End: 2020-08-14 | Stop reason: HOSPADM

## 2020-08-11 RX ORDER — HYDROXYZINE HYDROCHLORIDE 25 MG/1
25 TABLET, FILM COATED ORAL EVERY 6 HOURS PRN
Status: CANCELLED | OUTPATIENT
Start: 2020-08-11

## 2020-08-11 RX ORDER — LORAZEPAM 2 MG/ML
2 INJECTION INTRAMUSCULAR EVERY 6 HOURS PRN
Status: CANCELLED | OUTPATIENT
Start: 2020-08-11

## 2020-08-11 RX ORDER — HYDROXYZINE HYDROCHLORIDE 25 MG/1
50 TABLET, FILM COATED ORAL EVERY 6 HOURS PRN
Status: CANCELLED | OUTPATIENT
Start: 2020-08-11

## 2020-08-11 RX ORDER — VENLAFAXINE HYDROCHLORIDE 75 MG/1
75 CAPSULE, EXTENDED RELEASE ORAL DAILY
Status: CANCELLED | OUTPATIENT
Start: 2020-08-12

## 2020-08-11 RX ORDER — ACETAMINOPHEN 325 MG/1
325 TABLET ORAL EVERY 6 HOURS PRN
Status: DISCONTINUED | OUTPATIENT
Start: 2020-08-11 | End: 2020-08-14 | Stop reason: HOSPADM

## 2020-08-11 RX ORDER — ACETAMINOPHEN 325 MG/1
650 TABLET ORAL EVERY 4 HOURS PRN
Status: DISCONTINUED | OUTPATIENT
Start: 2020-08-11 | End: 2020-08-14 | Stop reason: HOSPADM

## 2020-08-11 RX ORDER — RISPERIDONE 1 MG/1
1 TABLET, ORALLY DISINTEGRATING ORAL
Status: DISCONTINUED | OUTPATIENT
Start: 2020-08-11 | End: 2020-08-14 | Stop reason: HOSPADM

## 2020-08-11 RX ORDER — OLANZAPINE 10 MG/1
10 TABLET ORAL EVERY 8 HOURS PRN
Status: CANCELLED | OUTPATIENT
Start: 2020-08-11

## 2020-08-11 RX ORDER — MAGNESIUM HYDROXIDE/ALUMINUM HYDROXICE/SIMETHICONE 120; 1200; 1200 MG/30ML; MG/30ML; MG/30ML
30 SUSPENSION ORAL EVERY 4 HOURS PRN
Status: DISCONTINUED | OUTPATIENT
Start: 2020-08-11 | End: 2020-08-14 | Stop reason: HOSPADM

## 2020-08-11 RX ORDER — BENZTROPINE MESYLATE 1 MG/1
1 TABLET ORAL EVERY 6 HOURS PRN
Status: CANCELLED | OUTPATIENT
Start: 2020-08-11

## 2020-08-11 RX ORDER — HALOPERIDOL 5 MG/ML
5 INJECTION INTRAMUSCULAR EVERY 6 HOURS PRN
Status: CANCELLED | OUTPATIENT
Start: 2020-08-11

## 2020-08-11 RX ORDER — HYDROXYZINE HYDROCHLORIDE 25 MG/1
25 TABLET, FILM COATED ORAL EVERY 6 HOURS PRN
Status: DISCONTINUED | OUTPATIENT
Start: 2020-08-11 | End: 2020-08-14 | Stop reason: HOSPADM

## 2020-08-11 RX ORDER — TRAZODONE HYDROCHLORIDE 50 MG/1
50 TABLET ORAL
Status: DISCONTINUED | OUTPATIENT
Start: 2020-08-11 | End: 2020-08-14 | Stop reason: HOSPADM

## 2020-08-11 RX ORDER — LORAZEPAM 1 MG/1
1 TABLET ORAL EVERY 6 HOURS PRN
Status: CANCELLED | OUTPATIENT
Start: 2020-08-11

## 2020-08-11 RX ORDER — VENLAFAXINE HYDROCHLORIDE 75 MG/1
75 CAPSULE, EXTENDED RELEASE ORAL DAILY
Status: DISCONTINUED | OUTPATIENT
Start: 2020-08-11 | End: 2020-08-11 | Stop reason: HOSPADM

## 2020-08-11 RX ORDER — VENLAFAXINE HYDROCHLORIDE 75 MG/1
75 CAPSULE, EXTENDED RELEASE ORAL DAILY
Status: DISCONTINUED | OUTPATIENT
Start: 2020-08-12 | End: 2020-08-14 | Stop reason: HOSPADM

## 2020-08-11 RX ORDER — ARIPIPRAZOLE 10 MG/1
10 TABLET ORAL DAILY
Status: DISCONTINUED | OUTPATIENT
Start: 2020-08-12 | End: 2020-08-14 | Stop reason: HOSPADM

## 2020-08-11 RX ORDER — HALOPERIDOL 5 MG
5 TABLET ORAL EVERY 6 HOURS PRN
Status: DISCONTINUED | OUTPATIENT
Start: 2020-08-11 | End: 2020-08-14 | Stop reason: HOSPADM

## 2020-08-11 RX ORDER — RISPERIDONE 1 MG/1
1 TABLET, ORALLY DISINTEGRATING ORAL
Status: CANCELLED | OUTPATIENT
Start: 2020-08-11

## 2020-08-11 RX ORDER — BENZTROPINE MESYLATE 1 MG/1
1 TABLET ORAL EVERY 6 HOURS PRN
Status: DISCONTINUED | OUTPATIENT
Start: 2020-08-11 | End: 2020-08-14 | Stop reason: HOSPADM

## 2020-08-11 RX ORDER — LORAZEPAM 1 MG/1
1 TABLET ORAL EVERY 6 HOURS PRN
Status: DISCONTINUED | OUTPATIENT
Start: 2020-08-11 | End: 2020-08-14 | Stop reason: HOSPADM

## 2020-08-11 RX ORDER — HALOPERIDOL 5 MG/ML
5 INJECTION INTRAMUSCULAR EVERY 6 HOURS PRN
Status: DISCONTINUED | OUTPATIENT
Start: 2020-08-11 | End: 2020-08-14 | Stop reason: HOSPADM

## 2020-08-11 RX ORDER — OLANZAPINE 10 MG/1
10 TABLET ORAL EVERY 8 HOURS PRN
Status: DISCONTINUED | OUTPATIENT
Start: 2020-08-11 | End: 2020-08-14 | Stop reason: HOSPADM

## 2020-08-11 RX ADMIN — VENLAFAXINE HYDROCHLORIDE 75 MG: 75 CAPSULE, EXTENDED RELEASE ORAL at 12:06

## 2020-08-11 RX ADMIN — ARIPIPRAZOLE 10 MG: 10 TABLET ORAL at 12:06

## 2020-08-11 RX ADMIN — SODIUM CHLORIDE 1000 ML: 0.9 INJECTION, SOLUTION INTRAVENOUS at 01:05

## 2020-08-11 NOTE — ED NOTES
Patient finished everything except his dessert  Resting comfortably in his room at this time  Cooperative & calm       Leatha Hooks RN  08/11/20 8170

## 2020-08-11 NOTE — ED NOTES
Care assumed of patient at this time; patient sleeping in bed; exhibits no signs of acute distress; remains on 1-to-1 by ED Tech [address] Simons, MAGALYS  08/11/20 4473

## 2020-08-11 NOTE — ED NOTES
Patient is accepted at [facility]  Patient is accepted by Fabiola Stuart per [address] is arranged with [facility]  Transportation is scheduled for TBD    Nurse report is to be called to [phone]   prior to patient transfer

## 2020-08-11 NOTE — EMTALA/ACUTE CARE TRANSFER
[facility] [address]  [address] [facility] [address]  Dept: [phone]      EMTALA TRANSFER CONSENT    NAME Radha Leo                                         1985                              MRN [id number]    I have been informed of my rights regarding examination, treatment, and transfer   by Dr Nesha Buckley, *    Benefits: Continuity of care, Specialized equipment and/or services available at the receiving facility (Include comment)________________________    Risks: Potential for delay in receiving treatment, Increased discomfort during transfer      Consent for Transfer:  I acknowledge that my medical condition has been evaluated and explained to me by the emergency department physician or other qualified medical person and/or my attending physician, who has recommended that I be transferred to the service of  Accepting Physician: Deniz Cline at [address] Name, [address] : Promise Leger  The above potential benefits of such transfer, the potential risks associated with such transfer, and the probable risks of not being transferred have been explained to me, and I fully understand them  The doctor has explained that, in my case, the benefits of transfer outweigh the risks  I agree to be transferred  I authorize the performance of emergency medical procedures and treatments upon me in both transit and upon arrival at the receiving facility  Additionally, I authorize the release of any and all medical records to the receiving facility and request they be transported with me, if possible  I understand that the safest mode of transportation during a medical emergency is an ambulance and that the Hospital advocates the use of this mode of transport   Risks of traveling to the receiving facility by car, including absence of medical control, life sustaining equipment, such as oxygen, and medical personnel has been explained to me and I fully understand them  (ИРИНА CORRECT BOX BELOW)  [  ]  I consent to the stated transfer and to be transported by ambulance/helicopter  [  ]  I consent to the stated transfer, but refuse transportation by ambulance and accept full responsibility for my transportation by car  I understand the risks of non-ambulance transfers and I exonerate the Hospital and its staff from any deterioration in my condition that results from this refusal     X___________________________________________    DATE  20  TIME________  Signature of patient or legally responsible individual signing on patient behalf           RELATIONSHIP TO PATIENT_________________________          Provider Certification    NAME Dane Asencio                                         1985                              MRN [id number]    A medical screening exam was performed on the above named patient  Based on the examination:    Condition Necessitating Transfer The primary encounter diagnosis was Syncope  Diagnoses of Dehydration, Depression with suicidal ideation, and ALPA (acute kidney injury) ([facility] [address] ) were also pertinent to this visit      Patient Condition: The patient has been stabilized such that within reasonable medical probability, no material deterioration of the patient condition or the condition of the unborn child(lucero) is likely to result from the transfer    Reason for Transfer: Level of Care needed not available at this facility    Transfer Requirements: [facility] [address]   · Space available and qualified personnel available for treatment as acknowledged by Dereck Carrera  · Agreed to accept transfer and to provide appropriate medical treatment as acknowledged by       Fabiola Stuart  · Appropriate medical records of the examination and treatment of the patient are provided at the time of transfer   [address] _______  · Transfer will be performed by qualified personnel from [facility]  and appropriate transfer equipment as required, including the use of necessary and appropriate life support measures  Provider Certification: I have examined the patient and explained the following risks and benefits of being transferred/refusing transfer to the patient/family:  General risk, such as traffic hazards, adverse weather conditions, rough terrain or turbulence, possible failure of equipment (including vehicle or aircraft), or consequences of actions of persons outside the control of the transport personnel      Based on these reasonable risks and benefits to the patient and/or the unborn child(lucero), and based upon the information available at the time of the patients examination, I certify that the medical benefits reasonably to be expected from the provision of appropriate medical treatments at another medical facility outweigh the increasing risks, if any, to the individuals medical condition, and in the case of labor to the unborn child, from effecting the transfer      X____________________________________________ DATE 08/11/20        TIME_______      ORIGINAL - SEND TO MEDICAL RECORDS   COPY - SEND WITH PATIENT DURING TRANSFER

## 2020-08-11 NOTE — ED NOTES
Patient reports he is suicidal with a plan to cut his arms  He reports he had a previous attempt approximately 3 months ago where he cut his arms  He feels overwhelmed since becoming homeless 3 days ago  He was referred to the [address] [place] [address] in [facility] and was extremely anxious and was having panic attacks so he was not able to return  His stressors include being homeless and his deneen mother took his son away in April,  He is willing to sign himself in for treatment

## 2020-08-11 NOTE — ED NOTES
Assumed care for patient at this time  Patient sleeping in bed  No signs of distress noted  Behavioral Health Q15 safety checks continued at this time        Rosy Hall RN  08/11/20 9068

## 2020-08-11 NOTE — ED PROVIDER NOTES
History  Chief Complaint   Patient presents with    Syncope     Patient collapsed at registration window upon signing in for suicidal ideations in relation to homelessness  Recent dc from [facility]  Denies suicide attempt or ingestion of drugs prior to arrival aside from marijuana  Patient denies CP, SOB  Reports that he just became weak and collapsed  No head trauma or spine trauma  Patient reports donating plasma and then walking around in heat with backpack all day  Patientis noted for diaphoresis   Suicidal     Patient was presenting for a psychiatric evaluation  Was recently seen at [facility] 17 straight and discharge 12 hours ago  Patient states that he was not started on any new medications  There was a placement issue and patient was not able to be sent to the Behavioral Health Unit at [facility] meal eliza  Patient states that he did not get his normal medications over the past 3 days  Patient is homeless and was told he cannot return to the [facility] the other day because he left because of a panic attack and anxiety  Patient has no where to go now  He states that he was walking around throughout the day today, not eating or drinking much  He states that he donated plasma at around 4:30 p m  He does admit to smoking marijuana but states that he does is often  He had no issues after that  He states that he came in here tonight and while standing waiting to be triaged he became very diaphoretic and dizzy  During triage the pt had a near syncopal event  It was witnessed and there were no injuries that suffered  History provided by:  Patient   used: No    Syncope   Episode history:  Single  Most recent episode:   Today  Progression:  Resolved  Chronicity:  New  Context: dehydration and standing up    Witnessed: yes    Associated symptoms: diaphoresis and dizziness    Associated symptoms: no chest pain, no confusion, no fever, no headaches, no nausea, no palpitations, no recent fall, no shortness of breath and no vomiting    Psychiatric Evaluation   Presenting symptoms: depression and suicidal thoughts    Presenting symptoms: no hallucinations    Onset quality:  Gradual  Timing:  Constant  Progression:  Worsening  Chronicity:  Chronic  Context: not alcohol use and not drug abuse    Time since last psychoactive medication taken:  3 days  Associated symptoms: anxiety    Associated symptoms: no abdominal pain, no chest pain and no headaches    Risk factors: hx of mental illness and recent psychiatric admission        Prior to Admission Medications   Prescriptions Last Dose Informant Patient Reported? Taking? ARIPiprazole (ABILIFY) 10 mg tablet   Yes Yes   Sig: Take 5 mg by mouth daily    hydrOXYzine HCL (ATARAX) 25 mg tablet   Yes Yes   Sig: Take 25 mg by mouth every 6 (six) hours as needed for itching   naproxen (NAPROSYN) 500 mg tablet   No No   Sig: Take 1 tablet (500 mg total) by mouth 2 (two) times a day with meals   traMADol (ULTRAM) 50 mg tablet   No No   Sig: Take 1 tablet (50 mg total) by mouth every 6 (six) hours as needed for severe pain   venlafaxine (EFFEXOR-XR) 75 mg 24 hr capsule   Yes Yes   Sig: Take 75 mg by mouth      Facility-Administered Medications: None       Past Medical History:   Diagnosis Date    Anxiety     Depression        No past surgical history on file  No family history on file  I have reviewed and agree with the history as documented  E-Cigarette/Vaping     E-Cigarette/Vaping Substances     Social History     Tobacco Use    Smoking status: Current Every Day Smoker    Smokeless tobacco: Never Used   Substance Use Topics    Alcohol use: No    Drug use: Yes     Types: Marijuana       Review of Systems   Constitutional: Positive for diaphoresis  Negative for chills and fever  HENT: Negative for trouble swallowing  Eyes: Negative for pain, redness and visual disturbance     Respiratory: Negative for cough, chest tightness and shortness of breath  Cardiovascular: Positive for syncope  Negative for chest pain, palpitations and leg swelling  Gastrointestinal: Negative for abdominal pain, nausea and vomiting  Musculoskeletal: Negative for arthralgias, back pain, myalgias and neck pain  Skin: Negative for color change, pallor, rash and wound  Allergic/Immunologic: Negative for immunocompromised state  Neurological: Positive for dizziness and light-headedness  Negative for syncope and headaches  Psychiatric/Behavioral: Positive for suicidal ideas  Negative for confusion and hallucinations  The patient is nervous/anxious  All other systems reviewed and are negative  Physical Exam  Physical Exam  Vitals signs and nursing note reviewed  Constitutional:       General: He is not in acute distress  Appearance: He is well-developed  He is not ill-appearing, toxic-appearing or diaphoretic  HENT:      Head: Normocephalic and atraumatic  Mouth/Throat:      Mouth: Mucous membranes are dry  Eyes:      General: No scleral icterus  Right eye: No discharge  Left eye: No discharge  Conjunctiva/sclera: Conjunctivae normal       Pupils: Pupils are equal, round, and reactive to light  Neck:      Musculoskeletal: Normal range of motion and neck supple  Cardiovascular:      Rate and Rhythm: Normal rate and regular rhythm  Heart sounds: Normal heart sounds  No murmur  No friction rub  Pulmonary:      Effort: Pulmonary effort is normal  No respiratory distress  Breath sounds: Normal breath sounds  No stridor  No wheezing or rales  Abdominal:      General: There is no distension  Palpations: Abdomen is soft  Tenderness: There is no abdominal tenderness  There is no guarding or rebound  Musculoskeletal: Normal range of motion  General: No tenderness or deformity  Skin:     General: Skin is warm and dry     Neurological:      Mental Status: He is alert and oriented to person, place, and time  Psychiatric:         Attention and Perception: He is attentive  Speech: Speech normal          Behavior: Behavior normal          Thought Content: Thought content is not paranoid or delusional  Thought content includes suicidal ideation  Thought content does not include homicidal ideation  Thought content includes suicidal plan  Thought content does not include homicidal plan  Cognition and Memory: Memory is not impaired           Vital Signs  ED Triage Vitals   Temperature Pulse Respirations Blood Pressure SpO2   08/11/20 0307 08/10/20 2312 08/10/20 2312 08/10/20 2312 08/10/20 2312   97 6 °F (36 4 °C) 99 20 94/73 98 %      Temp Source Heart Rate Source Patient Position - Orthostatic VS BP Location FiO2 (%)   08/11/20 0307 08/10/20 2312 08/10/20 2312 08/10/20 2312 --   Oral Monitor Lying Right arm       Pain Score       08/10/20 2312       No Pain           Vitals:    08/11/20 0213 08/11/20 0230 08/11/20 0306 08/11/20 0400   BP: 99/63 102/64 109/71 114/66   Pulse: 87 86 86 84   Patient Position - Orthostatic VS: Lying  Sitting Sitting         Visual Acuity      ED Medications  Medications   sodium chloride 0 9 % bolus 1,000 mL (0 mL Intravenous Stopped 8/11/20 0055)   sodium chloride 0 9 % bolus 1,000 mL (0 mL Intravenous Stopped 8/11/20 0140)       Diagnostic Studies  Results Reviewed     Procedure Component Value Units Date/Time    Urine Microscopic [[id number]]  (Abnormal) Collected:  08/11/20 0257    Lab Status:  Final result Specimen:  Urine, Clean Catch Updated:  08/11/20 0324     RBC, UA None Seen /hpf      WBC, UA 0-1 /hpf      Epithelial Cells Occasional /hpf      Bacteria, UA Occasional /hpf      Hyaline Casts, UA 4-10 /lpf      Ca Oxalate Yessica, UA Occasional /hpf      MUCUS THREADS Moderate    Rapid drug screen, urine [[id number]]  (Abnormal) Collected:  08/11/20 0259    Lab Status:  Final result Specimen:  Urine, Clean Catch Updated: 08/11/20 0319     Amph/Meth UR Negative     Barbiturate Ur Negative     Benzodiazepine Urine Negative     Cocaine Urine Negative     Methadone Urine Negative     Opiate Urine Negative     PCP Ur Negative     THC Urine Positive     Oxycodone Urine Negative    Narrative:       Presumptive report  If requested, specimen will be sent to reference lab for confirmation  FOR MEDICAL PURPOSES ONLY  IF CONFIRMATION NEEDED PLEASE CONTACT THE LAB WITHIN 5 DAYS  Drug Screen Cutoff Levels:  AMPHETAMINE/METHAMPHETAMINES  1000 ng/mL  BARBITURATES     200 ng/mL  BENZODIAZEPINES     200 ng/mL  COCAINE      300 ng/mL  METHADONE      300 ng/mL  OPIATES      300 ng/mL  PHENCYCLIDINE     25 ng/mL  THC       50 ng/mL  OXYCODONE      100 ng/mL    POCT urinalysis dipstick [[id number]]  (Abnormal) Resulted:  08/11/20 0259    Lab Status:  Final result Specimen:  Urine Updated:  08/11/20 0259    Urine Macroscopic, POC [[id number]]  (Abnormal) Collected:  08/11/20 0257    Lab Status:  Final result Specimen:  Urine Updated:  08/11/20 0258     Color, UA Yellow     Clarity, UA Clear     pH, UA 6 0     Leukocytes, UA Negative     Nitrite, UA Negative     Protein, UA Trace mg/dl      Glucose, UA Negative mg/dl      Ketones, UA Negative mg/dl      Urobilinogen, UA 0 2 E U /dl      Bilirubin, UA Negative     Blood, UA Negative     Specific Gravity, UA >=1 030    Narrative:       CLINITEK RESULT    Novel Coronavirus [facility] [[id number]]  (Normal) Collected:  08/10/20 2337    Lab Status:  Final result Specimen:  Nares from Nose Updated:  08/11/20 0106     SARS-CoV-2 Negative    Narrative: The specimen collection materials, transport medium, and/or testing methodology utilized in the production of these test results have been proven to be reliable in a limited validation with an abbreviated program under the Emergency Utilization Authorization provided by the FDA    Testing reported as "Presumptive positive" will be confirmed with secondary testing with a reference laboratory to ensure result accuracy  Clinical caution and judgement should be used with the interpretation of these results with consideration of the clinical impression and other laboratory testing  Testing reported as "Positive" or "Negative" has been proven to be accurate according to standard laboratory validation requirements  All testing is performed with control materials showing appropriate reactivity at standard intervals  Acetaminophen level-If concentration is detectable, please discuss with medical  on call  [[id number]]  (Abnormal) Collected:  08/10/20 2311    Lab Status:  Final result Specimen:  Blood from Arm, Left Updated:  08/11/20 0034     Acetaminophen Level <2 ug/mL     TSH [[id number]]  (Normal) Collected:  08/10/20 2311    Lab Status:  Final result Specimen:  Blood from Arm, Left Updated:  08/11/20 0010     TSH 3RD GENERATON 2 499 uIU/mL     Narrative:       Patients undergoing fluorescein dye angiography may retain small amounts of fluorescein in the body for 48-72 hours post procedure  Samples containing fluorescein can produce falsely depressed TSH values  If the patient had this procedure,a specimen should be resubmitted post fluorescein clearance        Salicylate level [[id number]]  (Abnormal) Collected:  08/10/20 2311    Lab Status:  Final result Specimen:  Blood from Arm, Left Updated:  92/03/42 9444     Salicylate Lvl <3 mg/dL     Troponin I [[id number]]  (Normal) Collected:  08/10/20 2311    Lab Status:  Final result Specimen:  Blood from Arm, Left Updated:  08/11/20 0002     Troponin I <0 02 ng/mL     Comprehensive metabolic panel [[id number]]  (Abnormal) Collected:  08/10/20 2311    Lab Status:  Final result Specimen:  Blood from Arm, Left Updated:  08/11/20 0000     Sodium 144 mmol/L      Potassium 4 1 mmol/L      Chloride 104 mmol/L      CO2 27 mmol/L      ANION GAP 13 mmol/L      BUN 17 mg/dL      Creatinine 1 54 mg/dL Glucose 111 mg/dL      Calcium 9 5 mg/dL      AST 22 U/L      ALT 36 U/L      Alkaline Phosphatase 85 U/L      Total Protein 7 5 g/dL      Albumin 4 2 g/dL      Total Bilirubin 0 41 mg/dL      eGFR 67 ml/min/1 73sq m     Narrative:       [place] guidelines for Chronic Kidney Disease (CKD):     Stage 1 with normal or high GFR (GFR > 90 mL/min/1 73 square meters)    Stage 2 Mild CKD (GFR = 60-89 mL/min/1 73 square meters)    Stage 3A Moderate CKD (GFR = 45-59 mL/min/1 73 square meters)    Stage 3B Moderate CKD (GFR = 30-44 mL/min/1 73 square meters)    Stage 4 Severe CKD (GFR = 15-29 mL/min/1 73 square meters)    Stage 5 End Stage CKD (GFR <15 mL/min/1 73 square meters)  Note: GFR calculation is accurate only with a steady state creatinine    Ethanol [[id number]]  (Normal) Collected:  08/10/20 2311    Lab Status:  Final result Specimen:  Blood from Arm, Left Updated:  08/10/20 2355     Ethanol Lvl 3 mg/dL     CBC and differential [[id number]]  (Abnormal) Collected:  08/10/20 2311    Lab Status:  Final result Specimen:  Blood from Arm, Left Updated:  08/10/20 2344     WBC 6 47 Thousand/uL      RBC 6 64 Million/uL      Hemoglobin 18 5 g/dL      Hematocrit 57 3 %      MCV 86 fL      MCH 27 9 pg      MCHC 32 3 g/dL      RDW 14 3 %      MPV 9 9 fL      Platelets 676 Thousands/uL      nRBC 0 /100 WBCs      Neutrophils Relative 47 %      Immat GRANS % 1 %      Lymphocytes Relative 42 %      Monocytes Relative 9 %      Eosinophils Relative 1 %      Basophils Relative 0 %      Neutrophils Absolute 3 07 Thousands/µL      Immature Grans Absolute 0 03 Thousand/uL      Lymphocytes Absolute 2 69 Thousands/µL      Monocytes Absolute 0 59 Thousand/µL      Eosinophils Absolute 0 07 Thousand/µL      Basophils Absolute 0 02 Thousands/µL     Fingerstick Glucose (POCT) [[id number]]  (Normal) Collected:  08/10/20 2313    Lab Status:  Final result Updated:  08/10/20 2314     POC Glucose 107 mg/dl No orders to display              Procedures  ECG 12 Lead Documentation Only    Date/Time: 8/10/2020 11:13 PM  Performed by: Samm Funes DO  Authorized by: Samm Funes DO     Indications / Diagnosis:  Syncope  ECG reviewed by me, the ED Provider: yes    Patient location:  ED  Previous ECG:     Previous ECG:  Compared to current    Similarity:  No change  Interpretation:     Interpretation: normal    Rate:     ECG rate:  99    ECG rate assessment: normal    Rhythm:     Rhythm: sinus rhythm    Ectopy:     Ectopy: none    QRS:     QRS axis:  Normal    QRS intervals:  Normal  Conduction:     Conduction: normal    ST segments:     ST segments:  Normal  T waves:     T waves: normal      CriticalCare Time  Performed by: Samm Funes DO  Authorized by: Samm Funes DO     Critical care provider statement:     Critical care time (minutes):  45    Critical care time was exclusive of:  Separately billable procedures and treating other patients and teaching time    Critical care was necessary to treat or prevent imminent or life-threatening deterioration of the following conditions:  Dehydration    Critical care was time spent personally by me on the following activities:  Blood draw for specimens, obtaining history from patient or surrogate, development of treatment plan with patient or surrogate, discussions with consultants, evaluation of patient's response to treatment, examination of patient, interpretation of cardiac output measurements, ordering and performing treatments and interventions, ordering and review of laboratory studies, ordering and review of radiographic studies, review of old charts and re-evaluation of patient's condition    I assumed direction of critical care for this patient from another provider in my specialty: no               ED Course                                             MDM  Number of Diagnoses or Management Options  ALPA (acute kidney injury) [facility]): new and requires workup  Dehydration: new and requires workup  Depression with suicidal ideation: new and requires workup  Syncope: new and requires workup  Diagnosis management comments: Patient presents with 2 problems tonight  In regard to the syncope I will perform a medical workup, hydrate since his blood pressure is slightly low  This could be related to his dehydrated state from not eating and drinking much throughout the day from being homeless and also donating plasma  Will hydrate with IV fluids, obtain a cardiac workup, psychiatric labs and once medically cleared will have crisis evaluate in the morning  If unable to medically clear than will admit to Internal Medicine  12:31 AM  Labs noted  Consistent with dehydration with an ALPA compared to labs yesterday  Will continue with IV fluids, blood pressure monitoring and if blood pressure improves will medically clear for psychiatric evaluation  2:16 AM  Patient still mildly hypotensive but heart rate is improved  Patient still has not urinated  Will try to orally hydrate now that is 2nd L of IV fluids are done  3:04 AM  Patient much improved  Vital signs are normal   Patient drinking liquids without any symptoms  Will repeat vitals and 1 hour and if normal will medically clear for psychiatric evaluation  4:17 AM  Patient medically cleared for psychiatric evaluation         Amount and/or Complexity of Data Reviewed  Clinical lab tests: ordered and reviewed  Tests in the medicine section of CPT®: reviewed and ordered  Review and summarize past medical records: yes          Disposition  Final diagnoses:   Syncope   Dehydration   Depression with suicidal ideation   ALPA (acute kidney injury) ([facility] [address] )     Time reflects when diagnosis was documented in both MDM as applicable and the Disposition within this note     Time User Action Codes Description Comment    8/10/2020 11:40 PM Chinyere Adams Add [R55] Syncope     8/10/2020 11:40 PM Wisner Moulds Add [E86 0] Dehydration     8/10/2020 11:40 PM Carol Vincent Add [F32 9,  O37 076] Depression with suicidal ideation     8/11/2020 12:30 AM Carol Vincent Add [N17 9] ALPA (acute kidney injury) [facility])       ED Disposition     None      Follow-up Information    None         Patient's Medications   Discharge Prescriptions    No medications on file     No discharge procedures on file      PDMP Review     None          ED Provider  Electronically Signed by           Jourdan Conley DO  08/11/20 6745

## 2020-08-11 NOTE — ED NOTES
Assumed patient care at this time  Patient resting comfortably in stretcher  No distress, unlabored breathing  Per patient request, given ginger ale at this time  See ED Behavioral Health Observation for Q15 minute safety checks       Salbador Escalante RN  08/11/20 4301

## 2020-08-11 NOTE — ED NOTES
Per Dr Becca Foster, patient medically cleared  Moved to behavioral health room 13 at this time       Rubén Mccracken RN  08/11/20 9958

## 2020-08-11 NOTE — ED NOTES
Insurance Authorization:   Phone call placed to [facility]  Phone number: [phone]     Spoke to: Chris Fernandez approved-3  Level of care: Inpatient treatment  Review on 8/13/2020  Authorization # Facility to call on arrival      EVS (Eligibility Verification System) called [phone]    Automated system indicates: [facility]

## 2020-08-12 PROBLEM — Z00.8 MEDICAL CLEARANCE FOR PSYCHIATRIC ADMISSION: Status: ACTIVE | Noted: 2020-08-12

## 2020-08-12 PROBLEM — R82.5 POSITIVE URINE DRUG SCREEN: Status: ACTIVE | Noted: 2020-08-12

## 2020-08-12 PROBLEM — N17.9 AKI (ACUTE KIDNEY INJURY) (HCC): Status: ACTIVE | Noted: 2020-08-12

## 2020-08-12 PROBLEM — R55 SYNCOPE: Status: ACTIVE | Noted: 2020-08-12

## 2020-08-12 PROBLEM — F32.9 MAJOR DEPRESSIVE DISORDER: Status: ACTIVE | Noted: 2020-08-12

## 2020-08-12 PROBLEM — R73.03 PREDIABETES: Status: ACTIVE | Noted: 2020-08-12

## 2020-08-12 LAB
ANION GAP SERPL CALCULATED.3IONS-SCNC: 4 MMOL/L (ref 4–13)
BUN SERPL-MCNC: 14 MG/DL (ref 5–25)
CALCIUM SERPL-MCNC: 8.8 MG/DL (ref 8.3–10.1)
CHLORIDE SERPL-SCNC: 106 MMOL/L (ref 100–108)
CO2 SERPL-SCNC: 32 MMOL/L (ref 21–32)
CREAT SERPL-MCNC: 0.94 MG/DL (ref 0.6–1.3)
GFR SERPL CREATININE-BSD FRML MDRD: 121 ML/MIN/1.73SQ M
GLUCOSE SERPL-MCNC: 92 MG/DL (ref 65–140)
POTASSIUM SERPL-SCNC: 4.6 MMOL/L (ref 3.5–5.3)
SODIUM SERPL-SCNC: 142 MMOL/L (ref 136–145)

## 2020-08-12 PROCEDURE — 80048 BASIC METABOLIC PNL TOTAL CA: CPT | Performed by: PHYSICIAN ASSISTANT

## 2020-08-12 PROCEDURE — 99221 1ST HOSP IP/OBS SF/LOW 40: CPT | Performed by: STUDENT IN AN ORGANIZED HEALTH CARE EDUCATION/TRAINING PROGRAM

## 2020-08-12 PROCEDURE — 99253 IP/OBS CNSLTJ NEW/EST LOW 45: CPT | Performed by: PHYSICIAN ASSISTANT

## 2020-08-12 RX ADMIN — VENLAFAXINE HYDROCHLORIDE 75 MG: 75 CAPSULE, EXTENDED RELEASE ORAL at 10:11

## 2020-08-12 RX ADMIN — ARIPIPRAZOLE 10 MG: 10 TABLET ORAL at 10:11

## 2020-08-12 NOTE — ASSESSMENT & PLAN NOTE
· Most recent A1c from approximately 10 days ago 5 7  · Encourage dietary lifestyle modification  · Prior A1c are 5 7 and 6 1 respectively

## 2020-08-12 NOTE — PLAN OF CARE
Problem: INFECTION - ADULT  Goal: Absence or prevention of progression during hospitalization  Description: INTERVENTIONS:  - Assess and monitor for signs and symptoms of infection  - Monitor lab/diagnostic results  - Monitor all insertion sites, i e  indwelling lines, tubes, and drains  - Monitor endotracheal if appropriate and nasal secretions for changes in amount and color  - Palmer appropriate cooling/warming therapies per order  - Administer medications as ordered  - Instruct and encourage patient and family to use good hand hygiene technique  - Identify and instruct in appropriate isolation precautions for identified infection/condition  Outcome: Progressing

## 2020-08-12 NOTE — CASE MANAGEMENT
CM met with pt to discuss reasons for admission, dc planning and treatment goals  Pt is a 201 from [facility]\""  Pt reports feeling suicidal, depressed, hopeless and homeless  Pt states he was recently discharged from [facility] in early August  Pt states he was stepped down to [facility]  Pt was not able to identify what the plan was for pt to be at [facility]  Pt able to identify that the Replaced by [facility] funded this stay  Pt then went to the [facility] but states he anxiety was overwhelming and he felt he needed to be hospitalized  Pt reports that he is originally from [place]  It is unclear what led pt to this area  Pt reports he recently broke up with his wife in 2020  Pt states he has a 10 month old son who he has not seen since his wife left him  Pt reports that his parents are   Father  when he was 9 and mother  when he was 15  Pt then went to live with his Uncle who also   Pt states his life was very turbulent which is why he left [employer] in 11th grade  Pt states he was raised by his older sister who reside in [address] [place] [address]  This writer explored whether pt could live with sister at time of dc  Pt reports that sister's  recently  and she is looking after her autistic son  From notes in previous admission to [facility], sister is documented as wanting to help pt but pt is reluctant to receive help from sister  Sister alluded to the fact that pt felt her residence is too small and the mental health services in [place] are not good  Pt did not want to sign a SAVANNA for sister and was reluctant to share why he would not rely on someone who appears to want to help pt  Pt reports that he did not follow up with [facility] while inpatient and he did not follow up after he was discharged   It appears from [facility] notes that referral was placed to street medicine team and one of the team members was going to drop of a tent and basic supplies for pt to live in a tent in [facility]  This appears to have not happened with pt wanting to be discharged from [facility] before [address] could meet with pt  Pt reports that while he was at the rescue mission, he took his prescribed medications  Pt also reports that he is willing to go back [facility] [place] [facility] [place] and start the [facility] program upstairs  Pt has trauma hx involving the loss of both his parents at a young age  Pt denies access to weapons  Pt reports he smokes THC on a weekly basis to handle anxiety  Pt does not have a driving license and uses the bus to get around  Pt does not have any belongings anymore  He lost them when he lost his housing after his wife left him in March, 2020  Pt reports that he received about $350 in SSI  Pt states that the monthly amount was reduced 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

## 2020-08-12 NOTE — PROGRESS NOTES
Patient belongings upon arrival;  -(2) Jimrishefaliland  -(2) Shirts (one is ripped)  -(3) Pair socks  -Underwear  -Shoes  -Belt  -(2) Masks  -Hat  -Cellphone  -Earphones  -(3) Chargers  -1/2 Box Cigarettes  -Lighter  -Brush  -(2) Deodorant  -[address]  -Shaving razors  -Shaving cream  -Body wash  -Hair Gel  -Shampoo w/conditioner  -Hand   -Toothbrush  -Toothpaste  -Lotion  -Black wallet  -3333 [employer] (6187)  -[employer] Bus (5809)  -SSC (1894)  -Pa Id (2098)  -[facility] Plasma Mastercard (1461)  -Direct Express (7728)  -Pa Access (7703)  [place] Benefit card [phone])  -214 y 252 [phone] (4008)  -Visa Debit [phone] (0)  -[place] [phone] 60 61)  -$0 62  -Medication given to nurse    Pt belongings at bedside;  -Shirt  -Pants  -(2) Pair socks  -Underwear

## 2020-08-12 NOTE — ED NOTES
[facility]  08/11/20 2037

## 2020-08-12 NOTE — ASSESSMENT & PLAN NOTE
· Felt to be related to plasma donation and dehydration  · Status post 2 L of fluid in the emergency department  · Repeat BMP this morning pending  · EKG unremarkable

## 2020-08-12 NOTE — PROGRESS NOTES
28 yr old male adm to [place] [id number] increased depression, and anxiety has fleeting SI contracts for safety , previous adm to 2 other psychiatric hosp  pt had a syncopal episode earlier after donating plasma, pt is quiet, soft spoken , and pleasant is homeless at this time denies voices and paranoia

## 2020-08-12 NOTE — H&P
Psychiatric Evaluation - [address] [place] [address] 28 y o  male MRN: [id number]  Unit/Bed#: U 254-02 Encounter: [id number]    Assessment/Plan   Principal Problem:    Major depressive disorder  Active Problems:    Positive urine drug screen    Medical clearance for psychiatric admission    Prediabetes    ALPA (acute kidney injury) ([facility] [address] )    Syncope    Plan:     Start Abilify 10 mg Po Daily and Effexor 75 mg Po Daily  Check admission labs  Collaborate with family for baseline assessment and disposition planning  Case discussed with treatment team     Treatment options and alternatives were reviewed with the patient, who concurs with the above plan  Risks, benefits, and possible side effects of medications were explained to the patient, and he verbalizes understanding       -----------------------------------    Chief Complaint: "I am depressed"    History of Present Illness     Per ED provider  on 8/10 "Patient was presenting for a psychiatric evaluation  Was recently seen at [facility] straight and discharge 12 hours ago  Patient states that he was not started on any new medications  There was a placement issue and patient was not able to be sent to the Behavioral Health Unit at [facility] meal eliza  Patient states that he did not get his normal medications over the past 3 days  Patient is homeless and was told he cannot return to the [facility] the other day because he left because of a panic attack and anxiety  Patient has no where to go now  He states that he was walking around throughout the day today, not eating or drinking much  He states that he donated plasma at around 4:30 p m  He does admit to smoking marijuana but states that he does is often  He had no issues after that  He states that he came in here tonight and while standing waiting to be triaged he became very diaphoretic and dizzy  During triage the pt had a near syncopal event     It was witnessed and there were no injuries that suffered "      Per Crisis worker on 8/11 "Patient reports he is suicidal with a plan to cut his arms  He reports he had a previous attempt approximately 3 months ago where he cut his arms  He feels overwhelmed since becoming homeless 3 days ago  He was referred to the [address] [place] [address] in [facility] and was extremely anxious and was having panic attacks so he was not able to return  His stressors include being homeless and his deneen mother took his son away in April,  He is willing to sign himself in for treatment "      Beryl Kazmagdiel states "I am depressed"    This is 29 yo male with hx of depression/anxiety and borderline personality disorder admitted voluntarily for worsening depression and suicidal ideations in the context of psychosocial stressors, non compliance with treatment and substance use  Patient was living with sister in [place] for the past few months, moved to [place] few days ago and became homeless  He was not able to get his medications and became depressed  He went [facility] but had placement issues  He was walking down the streets as he had no place to go and had near syncopal episode in the ER  Patient says that he donated blood prior to hospital visit  Patient endorses depressed mood, hopelessness, anhedonia, amotivation, poor sleep and lack of appetite for couple of weeks and suicidal ideations to cut his wrist for the past few days  Patient report suicide attempt many years ago and cut his wrist few months ago  He was hospitalized at [facility] in March, was discharged on [address] and effexor  Patient appears drowsy, irritable, poor eye contact with soft speech  He endorses symptoms of depression  Denies any symptoms hattie or psychosis or PTSD or OCD  No SI/HI currently   Denies AH/VH    Historical Information     Psychiatric History:   Psychiatric medication trial: Multiple, patient is unsure which medications have been trialed previously  Inpatient hospitalizations: Multiple  Suicide attempts: One suicide attempt few years ago  Violent behavior: Denies  Outpatient treatment: No    Substance Abuse History:  Social History     Tobacco Use    Smoking status: Current Every Day Smoker     Types: Cigarettes    Smokeless tobacco: Never Used   Substance Use Topics    Alcohol Use     Frequency: Never     Binge frequency: Never    Drug use: Yes     Types: Marijuana      Patient denies current or previous substance use  How ever UTOX + THC  I have assessed this patient for substance use within the past 12 months  I spent time with Kimo Doherty in counseling and education on risk of substance abuse  I assessed motivation and encouraged him for treatment as appropriate  Family Psychiatric History:   Patient denies any known family history of psychiatric illness, suicide attempt, or substance abuse    Social History:    Living arrangement: Presently homeless  Occupational History: unemployed  Functioning Relationships: alone & isolated        Past Medical History:   Past Medical History:   Diagnosis Date    Anxiety     Depression     Panic attack     Psychiatric illness         -----------------------------------  Objective    Temp:  [95 7 °F (35 4 °C)-98 °F (36 7 °C)] 97 7 °F (36 5 °C)  HR:  [66-83] 83  Resp:  [16-17] 16  BP: (100-120)/(62-79) 118/66    Mental Status Evaluation:  Appearance:  drowsy, poor eye contact, appears stated age and appropriate grooming and hygiene   Behavior:  sitting comfortably, no abnormal movements, normal gait and balance and slow gait   Speech:  soft, coherent and slow   Mood:  depressed   Affect:  constricted, blunted and dysphoric   Thought Process:  organized, logical, coherent, linear, goal directed, normal rate of thoughts   Thought Content: no verbalized delusions, no overt paranoia, no obsessive thinking   Perceptual disturbances: no reported auditory hallucinations, no reported visual hallucinations and does not appear to be responding to internal stimuli at this time   Risk Potential: No active or passive suicidal or homicidal ideation   Cognition: oriented to person, place, time, and situation   Insight:  Limited   Judgment: Fair     Meds/Allergies   No Known Allergies  all current active meds have been reviewed, current meds:   Current Facility-Administered Medications   Medication Dose Route Frequency    acetaminophen (TYLENOL) tablet 325 mg  325 mg Oral Q6H PRN    acetaminophen (TYLENOL) tablet 650 mg  650 mg Oral Q4H PRN    aluminum-magnesium hydroxide-simethicone (MYLANTA) 200-200-20 mg/5 mL oral suspension 30 mL  30 mL Oral Q4H PRN    ARIPiprazole (ABILIFY) tablet 10 mg  10 mg Oral Daily    benztropine (COGENTIN) injection 1 mg  1 mg Intramuscular Q6H PRN    benztropine (COGENTIN) tablet 1 mg  1 mg Oral Q6H PRN    haloperidol (HALDOL) tablet 5 mg  5 mg Oral Q6H PRN    haloperidol lactate (HALDOL) injection 5 mg  5 mg Intramuscular Q6H PRN    hydrOXYzine HCL (ATARAX) tablet 25 mg  25 mg Oral Q6H PRN    hydrOXYzine HCL (ATARAX) tablet 50 mg  50 mg Oral Q6H PRN    ibuprofen (MOTRIN) tablet 600 mg  600 mg Oral Q6H PRN    LORazepam (ATIVAN) injection 2 mg  2 mg Intramuscular Q6H PRN    LORazepam (ATIVAN) tablet 1 mg  1 mg Oral Q6H PRN    magnesium hydroxide (MILK OF MAGNESIA) 400 mg/5 mL oral suspension 30 mL  30 mL Oral Daily PRN    nicotine polacrilex (NICORETTE) gum 2 mg  2 mg Oral Q2H PRN    OLANZapine (ZyPREXA) IM injection 10 mg  10 mg Intramuscular Q8H PRN    OLANZapine (ZyPREXA) tablet 10 mg  10 mg Oral Q8H PRN    risperiDONE (RisperDAL M-TABS) dispersible tablet 1 mg  1 mg Oral Q3H PRN    traZODone (DESYREL) tablet 50 mg  50 mg Oral HS PRN    venlafaxine (EFFEXOR-XR) 24 hr capsule 75 mg  75 mg Oral Daily    and PTA meds:   Prior to Admission Medications   Prescriptions Last Dose Informant Patient Reported? Taking?    ARIPiprazole (ABILIFY) 10 mg tablet   Yes No   Sig: Take 5 mg by mouth daily    hydrOXYzine HCL (ATARAX) 25 mg tablet   Yes No   Sig: Take 25 mg by mouth every 6 (six) hours as needed for itching   naproxen (NAPROSYN) 500 mg tablet   No No   Sig: Take 1 tablet (500 mg total) by mouth 2 (two) times a day with meals   traMADol (ULTRAM) 50 mg tablet   No No   Sig: Take 1 tablet (50 mg total) by mouth every 6 (six) hours as needed for severe pain   venlafaxine (EFFEXOR-XR) 75 mg 24 hr capsule   Yes No   Sig: Take 75 mg by mouth      Facility-Administered Medications: None       Behavioral Health Medications: all current active meds have been reviewed  Changes as above  Laboratory results:  I have personally reviewed all pertinent laboratory/tests results    Recent Results (from the past 48 hour(s))   ECG 12 lead    Collection Time: 08/10/20 11:08 PM   Result Value Ref Range    Ventricular Rate 100 BPM    Atrial Rate 100 BPM    VT Interval 144 ms    QRSD Interval 80 ms    QT Interval 312 ms    QTC Interval 402 ms    P Axis 80 degrees    QRS Axis 84 degrees    T Wave Axis 39 degrees   CBC and differential    Collection Time: 08/10/20 11:11 PM   Result Value Ref Range    WBC 6 47 4 31 - 10 16 Thousand/uL    RBC 6 64 (H) 3 88 - 5 62 Million/uL    Hemoglobin 18 5 (H) 12 0 - 17 0 g/dL    Hematocrit 57 3 (H) 36 5 - 49 3 %    MCV 86 82 - 98 fL    MCH 27 9 26 8 - 34 3 pg    MCHC 32 3 31 4 - 37 4 g/dL    RDW 14 3 11 6 - 15 1 %    MPV 9 9 8 9 - 12 7 fL    Platelets 010 787 - 346 Thousands/uL    nRBC 0 /100 WBCs    Neutrophils Relative 47 43 - 75 %    Immat GRANS % 1 0 - 2 %    Lymphocytes Relative 42 14 - 44 %    Monocytes Relative 9 4 - 12 %    Eosinophils Relative 1 0 - 6 %    Basophils Relative 0 0 - 1 %    Neutrophils Absolute 3 07 1 85 - 7 62 Thousands/µL    Immature Grans Absolute 0 03 0 00 - 0 20 Thousand/uL    Lymphocytes Absolute 2 69 0 60 - 4 47 Thousands/µL    Monocytes Absolute 0 59 0 17 - 1 22 Thousand/µL    Eosinophils Absolute 0 07 0 00 - 0 61 Thousand/µL    Basophils Absolute 0  02 0 00 - 0 10 Thousands/µL   Comprehensive metabolic panel    Collection Time: 08/10/20 11:11 PM   Result Value Ref Range    Sodium 144 136 - 145 mmol/L    Potassium 4 1 3 5 - 5 3 mmol/L    Chloride 104 100 - 108 mmol/L    CO2 27 21 - 32 mmol/L    ANION GAP 13 4 - 13 mmol/L    BUN 17 5 - 25 mg/dL    Creatinine 1 54 (H) 0 60 - 1 30 mg/dL    Glucose 111 65 - 140 mg/dL    Calcium 9 5 8 3 - 10 1 mg/dL    AST 22 5 - 45 U/L    ALT 36 12 - 78 U/L    Alkaline Phosphatase 85 46 - 116 U/L    Total Protein 7 5 6 4 - 8 2 g/dL    Albumin 4 2 3 5 - 5 0 g/dL    Total Bilirubin 0 41 0 20 - 1 00 mg/dL    eGFR 67 ml/min/1 73sq m   TSH    Collection Time: 08/10/20 11:11 PM   Result Value Ref Range    TSH 3RD GENERATON 2 499 0 358 - 3 740 uIU/mL   Troponin I    Collection Time: 08/10/20 11:11 PM   Result Value Ref Range    Troponin I <0 02 <=0 04 ng/mL   Ethanol    Collection Time: 08/10/20 11:11 PM   Result Value Ref Range    Ethanol Lvl 3 0 - 3 mg/dL   Salicylate level    Collection Time: 08/10/20 11:11 PM   Result Value Ref Range    Salicylate Lvl <3 (L) 3 - 20 mg/dL   Acetaminophen level-If concentration is detectable, please discuss with medical  on call      Collection Time: 08/10/20 11:11 PM   Result Value Ref Range    Acetaminophen Level <2 (L) 10 - 20 ug/mL   ECG 12 lead    Collection Time: 08/10/20 11:11 PM   Result Value Ref Range    Ventricular Rate 99 BPM    Atrial Rate 99 BPM    NH Interval 144 ms    QRSD Interval 84 ms    QT Interval 314 ms    QTC Interval 402 ms    P Axis 77 degrees    QRS Axis 83 degrees    T Wave Axis 23 degrees   Fingerstick Glucose (POCT)    Collection Time: 08/10/20 11:13 PM   Result Value Ref Range    POC Glucose 107 65 - 140 mg/dl   Novel Coronavirus (Covid-19),PCR [facility]    Collection Time: 08/10/20 11:37 PM    Specimen: Nose; Nares   Result Value Ref Range    SARS-CoV-2 Negative Negative   Urine Macroscopic, POC    Collection Time: 08/11/20  2:57 AM   Result Value Ref Range Color, UA Yellow     Clarity, UA Clear     pH, UA 6 0 4 5 - 8 0    Leukocytes, UA Negative Negative    Nitrite, UA Negative Negative    Protein, UA Trace (A) Negative mg/dl    Glucose, UA Negative Negative mg/dl    Ketones, UA Negative Negative mg/dl    Urobilinogen, UA 0 2 0 2, 1 0 E U /dl E U /dl    Bilirubin, UA Negative Negative    Blood, UA Negative Negative    Specific Gravity, UA >=1 030 1 003 - 1 030   Urine Microscopic    Collection Time: 08/11/20  2:57 AM   Result Value Ref Range    RBC, UA None Seen None Seen, 0-5 /hpf    WBC, UA 0-1 (A) None Seen, 0-5, 5-55, 5-65 /hpf    Epithelial Cells Occasional None Seen, Occasional /hpf    Bacteria, UA Occasional None Seen, Occasional /hpf    Hyaline Casts, UA 4-10 (A) (none) /lpf    Ca Oxalate Yessica, UA Occasional (A) None Seen /hpf    MUCUS THREADS Moderate (A) None Seen   Rapid drug screen, urine    Collection Time: 08/11/20  2:59 AM   Result Value Ref Range    Amph/Meth UR Negative Negative    Barbiturate Ur Negative Negative    Benzodiazepine Urine Negative Negative    Cocaine Urine Negative Negative    Methadone Urine Negative Negative    Opiate Urine Negative Negative    PCP Ur Negative Negative    THC Urine Positive (A) Negative    Oxycodone Urine Negative Negative   Basic metabolic panel    Collection Time: 08/12/20  5:53 AM   Result Value Ref Range    Sodium 142 136 - 145 mmol/L    Potassium 4 6 3 5 - 5 3 mmol/L    Chloride 106 100 - 108 mmol/L    CO2 32 21 - 32 mmol/L    ANION GAP 4 4 - 13 mmol/L    BUN 14 5 - 25 mg/dL    Creatinine 0 94 0 60 - 1 30 mg/dL    Glucose 92 65 - 140 mg/dL    Calcium 8 8 8 3 - 10 1 mg/dL    eGFR 121 ml/min/1 73sq m            -----------------------------------    Risks / Benefits of Treatment:     Risks, benefits, and possible side effects of medications explained to patient  The patient verbalizes understanding and agreement for treatment       Counseling / Coordination of Care:     Patient's presentation on admission and proposed treatment plan were discussed with the treatment team   Diagnosis, medication changes and treatment plan were reviewed with the patient  Recent stressors were discussed with the patient  Events leading to admission were reviewed with the patient  Importance of medication and treatment compliance was reviewed with the patient

## 2020-08-12 NOTE — PLAN OF CARE
Problem: Depression  Goal: Verbalize thoughts and feelings  Description: Interventions:  - Assess and re-assess patient's level of risk   - Engage patient in 1:1 interactions, daily, for a minimum of 15 minutes   - Encourage patient to express feelings, fears, frustrations, hopes   Outcome: Progressing  Goal: Attend and participate in unit activities, including therapeutic, recreational, and educational groups  Description: Interventions:  - Provide therapeutic and educational activities daily, encourage attendance and participation, and document same in the medical record   Outcome: Progressing     Problem: Anxiety  Goal: Anxiety is at manageable level  Description: Interventions:  - Assess and monitor patient's anxiety level  - Monitor for signs and symptoms (heart palpitations, chest pain, shortness of breath, headaches, nausea, feeling jumpy, restlessness, irritable, apprehensive)  - Collaborate with interdisciplinary team and initiate plan and interventions as ordered    - [place] patient to unit/surroundings  - Explain treatment plan  - Encourage participation in care  - Encourage verbalization of concerns/fears  - Identify coping mechanisms  - Assist in developing anxiety-reducing skills  - Administer/offer alternative therapies  - Limit or eliminate stimulants  Outcome: Progressing     Problem: Ineffective Coping  Goal: Participates in unit activities  Description: Interventions:  - Provide therapeutic environment   - Provide required programming   - Redirect inappropriate behaviors   Outcome: Progressing

## 2020-08-12 NOTE — CMS CERTIFICATION NOTE
Recertification: Based upon physical, mental and social evaluations, I certify that inpatient psychiatric services continue to be medically necessary for this patient for a duration of 7 midnights for the treatment of  Major depressive disorder   Available alternative community resources still do not meet the patient's mental health care needs  I further attest that an established written individualized plan of care has been updated and is outlined in the patient's medical records

## 2020-08-12 NOTE — CONSULTS
[address] Internal Medicine  Consult- Iván Aaron 1985, 28 y o  male MRN: [id number]  Unit/Bed#: [facility] 421-97 Encounter: [id number]  Primary Care Provider: Vianney Gee MD   Date and time admitted to hospital: 8/11/2020  9:33 PM    Inpatient consult for Medical Clearance for [facility] patient  Consult performed by: Skyler Sauceda PA-C  Consult ordered by: Brandon Kinney PA-C          Medical clearance for psychiatric admission  Assessment & Plan  · Laboratory studies reviewed  · Chart reviewed performed  · Vital signs reviewed  · EKG:  Normal sinus rhythm  99 beats per minute    · At this time appears medically stable to continue inpatient psychiatric management    ALPA (acute kidney injury) ([facility] [address] )  Assessment & Plan  · Baseline creatinine appears to be 1 1 currently 1 54 as of 8/10  · Did receive 2 L of fluids in the emergency department  · Likely related to dehydration  · Repeat BMP this morning pending  · Avoid NSAIDs  · Encourage oral hydration    Positive urine drug screen  Assessment & Plan  · Positive for THC  ·  cessation    Syncope  Assessment & Plan  · Felt to be related to plasma donation and dehydration  · Status post 2 L of fluid in the emergency department  · Repeat BMP this morning pending  · EKG unremarkable    Prediabetes  Assessment & Plan  · Most recent A1c from approximately 10 days ago 5 7  · Encourage dietary lifestyle modification  · Prior A1c are 5 7 and 6 1 respectively      Recommendations for Discharge:  · [facility] will continue to be available for any questions or concerns  · Follow up with PCP upon discharge  Counseling / Coordination of Care Time: 30 minutes  Greater than 50% of total time spent on patient counseling and coordination of care  Collaboration of Care:  Were Recommendations Directly Discussed with Primary Treatment Team? - Yes     History of Present Illness:    Iván Aaron is a 28 y o  male who is originally admitted to the psychiatric service due to SI  We are consulted for medical clearance for psychiatric hospitalization and medical management  The patient has had multiple recent admissions at [facility]   The patient is homeless and is noted to be residing at the [facility] however cannot return secondary to the fact that he had a panic attack  He reportedly donated plasma and then was noted to developed a week and collapse  Isn't be treated with IV hydration in the emergency department and was initially mildly hypotensive however after 2 L of fluids the patient's vital signs returned to normal with drinking liquids without symptoms  Patient reports that he has no further dizziness  He has no nausea or vomiting  No chest pain or shortness of breath  No back pain  Review of Systems:    Review of Systems   Constitutional: Negative for chills, diaphoresis, fatigue, fever and unexpected weight change  HENT: Negative for sore throat  Respiratory: Negative for cough, chest tightness and shortness of breath  Cardiovascular: Negative for chest pain, palpitations and leg swelling  Gastrointestinal: Negative for abdominal pain, diarrhea, nausea and vomiting  Genitourinary: Negative for dysuria  Musculoskeletal: Negative for myalgias  Neurological: Positive for dizziness, syncope and weakness  Negative for numbness and headaches  Psychiatric/Behavioral: Positive for suicidal ideas  Negative for confusion  The patient is nervous/anxious  All other systems reviewed and are negative  Past Medical and Surgical History:     Past Medical History:   Diagnosis Date    Anxiety     Depression     Panic attack     Psychiatric illness        No past surgical history on file  Meds/Allergies:    PTA meds:   Prior to Admission Medications   Prescriptions Last Dose Informant Patient Reported? Taking?    ARIPiprazole (ABILIFY) 10 mg tablet   Yes No   Sig: Take 5 mg by mouth daily    hydrOXYzine HCL (ATARAX) 25 mg tablet   Yes No   Sig: Take 25 mg by mouth every 6 (six) hours as needed for itching   naproxen (NAPROSYN) 500 mg tablet   No No   Sig: Take 1 tablet (500 mg total) by mouth 2 (two) times a day with meals   traMADol (ULTRAM) 50 mg tablet   No No   Sig: Take 1 tablet (50 mg total) by mouth every 6 (six) hours as needed for severe pain   venlafaxine (EFFEXOR-XR) 75 mg 24 hr capsule   Yes No   Sig: Take 75 mg by mouth      Facility-Administered Medications: None       Allergies: No Known Allergies    Social History:     Marital Status: Single    Substance Use History:   Social History     Substance and Sexual Activity   Alcohol Use    Frequency: Never    Binge frequency: Never     Social History     Tobacco Use   Smoking Status Current Every Day Smoker    Types: Cigarettes   Smokeless Tobacco Never Used     Social History     Substance and Sexual Activity   Drug Use Yes    Types: Marijuana       Family History:    History reviewed  No pertinent family history  Physical Exam:     Vitals:   Blood Pressure: 118/66 (08/12/20 0743)  Pulse: 83 (08/12/20 0743)  Temperature: 97 7 °F (36 5 °C) (08/12/20 0743)  Temp Source: Tympanic (08/12/20 0743)  Respirations: 16 (08/12/20 0743)  Height: 5' 9" (175 3 cm) (08/11/20 2142)  Weight - Scale: 81 7 kg (180 lb 3 5 oz) (08/11/20 2142)  SpO2: 99 % (08/11/20 2142)    Physical Exam  Vitals signs and nursing note reviewed  Constitutional:       General: He is not in acute distress  Appearance: Normal appearance  He is not diaphoretic  Comments: Evaluated in room   HENT:      Head: Normocephalic and atraumatic  Mouth/Throat:      Mouth: Mucous membranes are moist    Cardiovascular:      Rate and Rhythm: Normal rate and regular rhythm  Pulmonary:      Effort: Pulmonary effort is normal       Breath sounds: Normal breath sounds  No stridor  No wheezing, rhonchi or rales  Abdominal:      General: Bowel sounds are normal       Palpations: Abdomen is soft   There is no mass       Tenderness: There is no abdominal tenderness  There is no guarding  Musculoskeletal:      Right lower leg: No edema  Left lower leg: No edema  Skin:     General: Skin is warm and dry  Neurological:      Mental Status: He is alert  Comments: AAO  Follows commands  CN 2-12 intact  Speech clear and without dysarthria     Psychiatric:      Comments: Flat affect         Additional Data:     Lab Results: I have personally reviewed pertinent reports  Results from last 7 days   Lab Units 08/10/20  2311   WBC Thousand/uL 6 47   HEMOGLOBIN g/dL 18 5*   HEMATOCRIT % 57 3*   PLATELETS Thousands/uL 337   NEUTROS PCT % 47   LYMPHS PCT % 42   MONOS PCT % 9   EOS PCT % 1     Results from last 7 days   Lab Units 08/10/20  2311   SODIUM mmol/L 144   POTASSIUM mmol/L 4 1   CHLORIDE mmol/L 104   CO2 mmol/L 27   BUN mg/dL 17   CREATININE mg/dL 1 54*   ANION GAP mmol/L 13   CALCIUM mg/dL 9 5   ALBUMIN g/dL 4 2   TOTAL BILIRUBIN mg/dL 0 41   ALK PHOS U/L 85   ALT U/L 36   AST U/L 22   GLUCOSE RANDOM mg/dL 111         Results from last 7 days   Lab Units 08/10/20  2311   TROPONIN I ng/mL <0 02     Lab Results   Component Value Date/Time    HGBA1C 5 7 (H) 08/01/2020 06:02 AM    HGBA1C 5 7 (H) 01/31/2020 08:43 AM    HGBA1C 6 1 (H) 03/06/2019 04:12 PM     Results from last 7 days   Lab Units 08/10/20  2313   POC GLUCOSE mg/dl 107           Imaging: No recent imaging    EKG, Pathology, and Other Studies Reviewed on Admission:   · EKG:  Normal sinus rhythm  99 beats per minute      ** Please Note: This note has been constructed using a voice recognition system   **

## 2020-08-12 NOTE — ASSESSMENT & PLAN NOTE
· Laboratory studies reviewed  · Chart reviewed performed  · Vital signs reviewed  · EKG:  Normal sinus rhythm  99 beats per minute      · At this time appears medically stable to continue inpatient psychiatric management

## 2020-08-12 NOTE — PROGRESS NOTES
Pt is seclusive to his bed, except for meals and did come for am med's  He is scant and guarded in conversation, appears depressed, able to contract for safety but acknowledges hopelessness and SI prior to admission  Pt speech is mumbled at times, at times seems reluctant to answer questions  He did meet with CM  Pt disinterested in attending groups

## 2020-08-12 NOTE — TREATMENT TEAM
08/12/20 0806   Team Meeting   Meeting Type Daily Rounds   Team Members Present   Team Members Present Physician;Nurse;;Occupational Therapist   Physician Team Member Dr Raymundo Anders, Dr Cassidy Alegria PA   Nursing Team Member [facility]   Care Management Team Member , [place]   OT Team Member [facility]   AM rounds per report from previous shift: 201, fleeting SI, increased depression and anxiety, syncopable episode following donating plasma

## 2020-08-12 NOTE — TREATMENT PLAN
TREATMENT PLAN REVIEW - [address] Huang 28 y o  1985 male MRN: [id number]    [address] [place] [address] Room / Bed: Donna Willoughby [facility] 879-84 Encounter: [id number]          Admit Date/Time:  8/11/2020  9:33 PM    Treatment Team: Attending Provider: Steffany Salazar MD; Consulting Physician: Maddie Mancia MD; Registered Nurse: Soila Mclain, RN; Registered Nurse: Jenna Guerra RN (Inactive); Patient Care Technician: Cristian Russell; Patient Care Technician: Sonam Plunkett; : Davina Espinoza; Registered Nurse: Santana Ferraro, MAGALYS; Occupational Therapy Assistant: Ruthell Fleischer, COTA; Care Manager: Isaac Pino RN; Medications RN: Katie Stanford RN; Security: Jace Isadora;  Patient Care Technician: Alvarez Morton    Diagnosis: Principal Problem:    Major depressive disorder  Active Problems:    Positive urine drug screen    Medical clearance for psychiatric admission    Prediabetes    ALPA (acute kidney injury) ([facility] [address] )    Syncope      Patient Strengths/Assets: cooperative, good past treatment response, motivation for treatment/growth, patient is on a voluntary commitment    Patient Barriers/Limitations: homeless, lack of social/family support, noncompliant with medication, substance abuse    Short Term Goals: decrease in depressive symptoms, decrease in anxiety symptoms, decrease in suicidal thoughts    Long Term Goals: resolution of depressive symptoms, free of suicidal thoughts    Progress Towards Goals: starting psychiatric medications as prescribed    Recommended Treatment: medication management, patient medication education, group therapy, milieu therapy, continued Behavioral Health psychiatric evaluation/assessment process    Treatment Frequency: daily medication monitoring, group and milieu therapy daily, monitoring through interdisciplinary rounds, monitoring through weekly patient care conferences    Expected Discharge Date:  5-7 days    Discharge Plan: referral for outpatient medication management with a psychiatrist, referral for outpatient psychotherapy    Treatment Plan Created/Updated By: Kezia Moore MD

## 2020-08-12 NOTE — ASSESSMENT & PLAN NOTE
· Baseline creatinine appears to be 1 1 currently 1 54 as of 8/10  · Did receive 2 L of fluids in the emergency department  · Likely related to dehydration  · Repeat BMP this morning pending  · Avoid NSAIDs  · Encourage oral hydration

## 2020-08-13 PROCEDURE — 99232 SBSQ HOSP IP/OBS MODERATE 35: CPT | Performed by: STUDENT IN AN ORGANIZED HEALTH CARE EDUCATION/TRAINING PROGRAM

## 2020-08-13 RX ADMIN — ARIPIPRAZOLE 10 MG: 10 TABLET ORAL at 08:50

## 2020-08-13 RX ADMIN — VENLAFAXINE HYDROCHLORIDE 75 MG: 75 CAPSULE, EXTENDED RELEASE ORAL at 08:50

## 2020-08-13 NOTE — PROGRESS NOTES
Progress Note - [address] [facility] 28 y o  male MRN: [id number]  Unit/Bed#: [facility] 245-06 Encounter: [id number]    Patient was seen, chart reviewed and case discussed with team  As per report patient did not have any behavioural issues on the unit  Patient appears withdrawn, isolative, depressed and internally preoccupied  His affect is flat and slow to respond  Denies SI/HI  Denies AH/VH  Continues to endorse depressed mood and lack of interest  Sleep and appetite are OK  Assessment/Plan   Principal Problem:    Major depressive disorder  Active Problems:    Positive urine drug screen    Medical clearance for psychiatric admission    Prediabetes    ALPA (acute kidney injury) ([facility] [address] )    Syncope      Mental Status Evaluation:  Appearance:  casually dressed   Behavior:  guarded   Speech:  soft and slow   Mood:  depressed   Affect:  constricted and flat   Thought Process:  blocked and concrete   Thought Content:  normal   Perceptual Disturbances: None   Risk Potential: Suicidal Ideations none  Homicidal Ideations none  Potential for Aggression No   Sensorium:  person, place and time/date   Memory:  recent and remote memory grossly intact   Consciousness:  alert and awake    Attention: attention span appeared shorter than expected for age   Insight:  limited   Judgment: fair   Gait/Station: normal gait/station   Motor Activity: no abnormal movements     Progress Toward Goals: 5-7 days    Recommended Treatment: Continue with pharmacotherapy, group therapy, milieu therapy and occupational therapy  Risks, benefits and possible side effects of Medications:   Risks, benefits, and possible side effects of medications explained to patient and patient verbalizes understanding        Medications:   all current active meds have been reviewed, continue current psychiatric medications and current meds:   Current Facility-Administered Medications   Medication Dose Route Frequency    acetaminophen (TYLENOL) tablet 325 mg  325 mg Oral Q6H PRN    acetaminophen (TYLENOL) tablet 650 mg  650 mg Oral Q4H PRN    aluminum-magnesium hydroxide-simethicone (MYLANTA) 200-200-20 mg/5 mL oral suspension 30 mL  30 mL Oral Q4H PRN    ARIPiprazole (ABILIFY) tablet 10 mg  10 mg Oral Daily    benztropine (COGENTIN) injection 1 mg  1 mg Intramuscular Q6H PRN    benztropine (COGENTIN) tablet 1 mg  1 mg Oral Q6H PRN    haloperidol (HALDOL) tablet 5 mg  5 mg Oral Q6H PRN    haloperidol lactate (HALDOL) injection 5 mg  5 mg Intramuscular Q6H PRN    hydrOXYzine HCL (ATARAX) tablet 25 mg  25 mg Oral Q6H PRN    hydrOXYzine HCL (ATARAX) tablet 50 mg  50 mg Oral Q6H PRN    ibuprofen (MOTRIN) tablet 600 mg  600 mg Oral Q6H PRN    LORazepam (ATIVAN) injection 2 mg  2 mg Intramuscular Q6H PRN    LORazepam (ATIVAN) tablet 1 mg  1 mg Oral Q6H PRN    magnesium hydroxide (MILK OF MAGNESIA) 400 mg/5 mL oral suspension 30 mL  30 mL Oral Daily PRN    nicotine polacrilex (NICORETTE) gum 2 mg  2 mg Oral Q2H PRN    OLANZapine (ZyPREXA) IM injection 10 mg  10 mg Intramuscular Q8H PRN    OLANZapine (ZyPREXA) tablet 10 mg  10 mg Oral Q8H PRN    risperiDONE (RisperDAL M-TABS) dispersible tablet 1 mg  1 mg Oral Q3H PRN    traZODone (DESYREL) tablet 50 mg  50 mg Oral HS PRN    venlafaxine (EFFEXOR-XR) 24 hr capsule 75 mg  75 mg Oral Daily     Labs: I have personally reviewed all pertinent laboratory/tests results     Most Recent Labs:   Lab Results   Component Value Date    WBC 6 47 08/10/2020    RBC 6 64 (H) 08/10/2020    HGB 18 5 (H) 08/10/2020    HCT 57 3 (H) 08/10/2020     08/10/2020    RDW 14 3 08/10/2020    NEUTROABS 3 07 08/10/2020    SODIUM 142 08/12/2020    K 4 6 08/12/2020     08/12/2020    CO2 32 08/12/2020    BUN 14 08/12/2020    CREATININE 0 94 08/12/2020    GLUC 92 08/12/2020    CALCIUM 8 8 08/12/2020    AST 22 08/10/2020    ALT 36 08/10/2020    ALKPHOS 85 08/10/2020    TP 7 5 08/10/2020    ALB 4 2 08/10/2020    TBILI 0 41 08/10/2020    PWI0FTWDZUVL 2 499 08/10/2020    HGBA1C 5 7 (H) 08/01/2020     08/01/2020

## 2020-08-13 NOTE — PROGRESS NOTES
Pleasant during interaction  Smiled appropriately in conversation  Denies SI/HI, AH/VH  States he attended all groups  Currently sitting in dayroom watching television  Mild depression and elevated anxiety  Compliant with medications, denies side effects  States he is starting to feel that he is ready to leave, but will work with staff on discharge planning

## 2020-08-13 NOTE — PROGRESS NOTES
08/13/20 0753   Team Meeting   Meeting Type Daily Rounds   Team Members Present   Team Members Present Physician;Nurse;;Occupational Therapist   Physician Team Member Dr Sarath Tong PA-C   Nursing Team Member [facility] Management [facility]   OT Team Member [facility]   Patient/Family Present   Patient Present No   Patient's Family Present No     No PRN's  Pt denies SI  Guarded in conversation and slow to respond  CM shared pt's story, homeless  Recently discharged from [facility]  Pt was not able to return to Crisis residence as the [facility] did not want to further fund crisis residence  Pt declined resources from [address] [place] [address] due to wanting to be discharged  Pt then tried to return to the [address] [place] [address] Avenue but was not allowed to return due to leaving  Pt will have to wait 30 days  Pt is not interested in signing SAVANNA for sister who is main support  Pt was able to get his medications filled after last admission to [facility]  Pt now has active insurance and will be referred for mental health funding and possible CRR placement

## 2020-08-13 NOTE — PROGRESS NOTES
08/13/20 1000 08/13/20 1100 08/13/20 3225   Activity/Group Checklist   Group Community meeting  (motivational goal setting) Other (Comment)  (relaxation group ) Life Skills  (Effective Communication )   Attendance Did not attend Attended Attended   Attendance Duration (min)  --  16-30 46-60   Interactions  --  Did not interact Did not interact   Affect/Mood  --  Appropriate Other (Comment)  (indifferent)   Goals Achieved  --  Able to engage in interactions; Able to listen to others;Discussed coping strategies Able to listen to others  (responded to staff when declining to participate )   Caitlin Laird attended 2/3 groups today  He observes group and politely declines to participate when prompted

## 2020-08-13 NOTE — CASE MANAGEMENT
CM outreached to [facility] and spoke with Arlette Walsh- supervisor  She reports that pt can come to the program if he is serious about not smoking THC  CM advised her that UDS is positive for THC  Arlette Walsh reports that pt is still on the wait list, but he must come to the program with a negative drug test      CM outreached to [address] [facility] [address] program and left message requesting call back in regards to status of referral      CM also faxed ICM referral to [facility] ICM services C/O [place]  CM will continue to follow

## 2020-08-14 VITALS
SYSTOLIC BLOOD PRESSURE: 145 MMHG | HEIGHT: 69 IN | RESPIRATION RATE: 17 BRPM | TEMPERATURE: 98.1 F | BODY MASS INDEX: 26.69 KG/M2 | HEART RATE: 84 BPM | WEIGHT: 180.22 LBS | OXYGEN SATURATION: 99 % | DIASTOLIC BLOOD PRESSURE: 84 MMHG

## 2020-08-14 PROCEDURE — 99239 HOSP IP/OBS DSCHRG MGMT >30: CPT | Performed by: STUDENT IN AN ORGANIZED HEALTH CARE EDUCATION/TRAINING PROGRAM

## 2020-08-14 RX ORDER — VENLAFAXINE HYDROCHLORIDE 75 MG/1
75 CAPSULE, EXTENDED RELEASE ORAL DAILY
Qty: 30 CAPSULE | Refills: 0 | Status: SHIPPED | OUTPATIENT
Start: 2020-08-15 | End: 2020-09-01 | Stop reason: HOSPADM

## 2020-08-14 RX ORDER — ARIPIPRAZOLE 10 MG/1
10 TABLET ORAL DAILY
Qty: 30 TABLET | Refills: 0 | Status: SHIPPED | OUTPATIENT
Start: 2020-08-15 | End: 2020-09-01 | Stop reason: HOSPADM

## 2020-08-14 RX ORDER — NICOTINE 21 MG/24HR
1 PATCH, TRANSDERMAL 24 HOURS TRANSDERMAL DAILY
Status: DISCONTINUED | OUTPATIENT
Start: 2020-08-14 | End: 2020-08-14 | Stop reason: HOSPADM

## 2020-08-14 RX ADMIN — ARIPIPRAZOLE 10 MG: 10 TABLET ORAL at 09:14

## 2020-08-14 RX ADMIN — NICOTINE 1 PATCH: 14 PATCH TRANSDERMAL at 14:14

## 2020-08-14 RX ADMIN — NICOTINE POLACRILEX 2 MG: 2 GUM, CHEWING BUCCAL at 12:44

## 2020-08-14 RX ADMIN — VENLAFAXINE HYDROCHLORIDE 75 MG: 75 CAPSULE, EXTENDED RELEASE ORAL at 09:14

## 2020-08-14 NOTE — DISCHARGE SUMMARY
Discharge Summary - [facility] 28 y o  male MRN: [id number]  Unit/Bed#: Vinicius Ham 810-57 Encounter: [id number]     Admission Date:   Admission Orders (From admission, onward)     Ordered        08/11/20 2144  ED TO DIFFERENT CAMPUS IP [facility] UNIT or INPATIENT MEDICAL UNIT to [place] [address] (using Discharge Readmit Navigator) - Admit Patient to [address] [place] [address]  Once                         Discharge Date: No discharge date for patient encounter  Attending Psychiatrist: Mesha Wolff*    Reason for Admission/HPI:   History of Present Illness     Per ED provider  on 8/10 "Patient was presenting for a psychiatric evaluation   Was recently seen at [facility] 17 straight and discharge 12 hours ago  Deepa Grubbs states that he was not started on any new medications  Sadiq Vicente was a placement issue and patient was not able to be sent to the [address] at [address] [place] [address] states that he did not get his normal medications over the past 3 days   Patient is homeless and was told he cannot return to the [facility] the other day because he left because of a panic attack and anxiety   Patient has no where to go now  [facility] states that he was walking around throughout the day today, not eating or drinking much  [facility] states that he donated plasma at around 4:30 p m  Chioma Busch does admit to smoking marijuana but states that he does is often  [facility] had no issues after that  [facility] states that he came in here tonight and while standing waiting to be triaged he became very diaphoretic and dizzy   During triage the pt had a near syncopal event    It was witnessed and there were no injuries that suffered "        Per Crisis worker on 8/11 "Patient reports he is suicidal with a plan to cut his arms  [facility] reports he had a previous attempt approximately 3 months ago where he cut his arms  [facility] feels overwhelmed since becoming homeless 3 days ago  [facility] and was extremely anxious and was having panic attacks so he was not able to return  [facility] stressors include being homeless and his deneen mother took his son away in April, [place] is willing to sign himself in for treatment "        Mikie Thompson states "I am depressed"     This is 27 yo male with hx of depression/anxiety and borderline personality disorder admitted voluntarily for worsening depression and suicidal ideations in the context of psychosocial stressors, non compliance with treatment and substance use  Patient was living with sister in [place] for the past few months, moved to [place] few days ago and became homeless  He was not able to get his medications and became depressed  He went [facility] but had placement issues  He was walking down the streets as he had no place to go and had near syncopal episode in the ER  Patient says that he donated blood prior to hospital visit  Patient endorses depressed mood, hopelessness, anhedonia, amotivation, poor sleep and lack of appetite for couple of weeks and suicidal ideations to cut his wrist for the past few days  Patient report suicide attempt many years ago and cut his wrist few months ago  He was hospitalized at [facility] in March, was discharged on [address] [place] and effexor  Patient appears drowsy, irritable, poor eye contact with soft speech  He endorses symptoms of depression  Denies any symptoms hattie or psychosis or PTSD or OCD  No SI/HI currently  Denies [address] Course: On admission patient endorsed depressed mood, hopelessness, anhedonia, amotivation, poor sleep and lack of appetite for couple of weeks and suicidal ideations to cut his wrist for the past few days  Patient was withdrawn, isolative, scant and depressed  He was started on Effexor 75 mg and Abilify 10 mg PO  He improved on this regimen  He came out and socialized with peers  He participated in groups and activities   On the day of discharge he appeared  Calm, Ox3 and pleasant  Denied any symptoms of depression, hattie/hypomania or psychosis  Denies SI/HI  He agreed to follow with outpatient appointment  Mental Status at time of Discharge:     Appearance:  age appropriate   Behavior:  normal   Speech:  normal pitch and normal volume   Mood:  "good"   Affect:  normal   Thought Process:  goal directed and logical   Thought Content:  normal   Perceptual Disturbances: None   Risk Potential: Suicidal Ideations none, Homicidal Ideations none and Potential for Aggression No   Sensorium:  person, place, time/date, situation, day of week, month of year and year   Cognition:  recent and remote memory grossly intact   Consciousness:  alert and awake    Attention: attention span and concentration were age appropriate   Insight:  fair   Judgment: fair   Gait/Station: normal gait/station and normal balance   Motor Activity: no abnormal movements       Discharge Diagnosis:   Major depressive disorder    Resolved Problems  Date Reviewed: 8/14/2020    None              Discharge Medications:  See after visit summary for reconciled discharge medications provided to patient and family  Discharge instructions/Information to patient and family:   See after visit summary for information provided to patient and family  Provisions for Follow-Up Care:  See after visit summary for information related to follow-up care and any pertinent home health orders  Discharge Statement   I spent 33 minutes discharging the patient  This time was spent on the day of discharge  I had direct contact with the patient on the day of discharge  Additional documentation is required if more than 30 minutes were spent on discharge

## 2020-08-14 NOTE — PROGRESS NOTES
Pt calm and cooperative  Scant but polite in conversation  Reports feeling improved mood since admission  Pt states he called [facility] and is able to return there  Pt reports feeling comfortable with this discharge plan  Denies any questions or concerns at this time

## 2020-08-14 NOTE — NURSING NOTE
AVS reviewed with Pt  Pt verbalized understanding and had no further questions  Expressed readiness for discharge  Escorted from unit to front entrance by this writer

## 2020-08-14 NOTE — BH TRANSITION RECORD
Contact Information: If you have any questions, concerns, pended studies, tests and/or procedures, or emergencies regarding your inpatient behavioral health visit  Please contact [facility] behavioral health unit [phone] and ask to speak to a , nurse or physician  A contact is available 24 hours/ 7 days a week at this number  Summary of Procedures Performed During your Stay:  Below is a list of major procedures performed during your hospital stay and a summary of results:  - No major procedures performed  Pending Studies (From admission, onward)    None        If studies are pending at discharge, follow up with your PCP and/or referring provider

## 2020-08-14 NOTE — CASE MANAGEMENT
Pt would like to be discharged  MD is going to discharge him as pt does not have active community insurance  Pt is only covered for hospitalizations and is covered by [facility] due to Covid 19 and because pt's insurance recently termed  CM explained that pt will need to follow up with [facility] for support in reactivating insurance and for Gene Hoyt for mental health treatment  Pt reports that he called [facility] and he is able to return  Pt states he would like to go there at time of dc  CM will call [place]  Pt denies SI/HI  Pt provided with referral to [facility] for when his insurance is activated  Pt understands that acceptance to [facility] ([facility]) program is contingent on pt not using drugs  Pt understands this  Pt reports that he still has medication from his previous hospitalization and he will continue to take this  Pt also provided with one month of medicine in the form of a paper script  Pt signed [place] waiver

## 2020-08-14 NOTE — PROGRESS NOTES
08/14/20 0805   Team Meeting   Meeting Type Daily Rounds   Team Members Present   Team Members Present Physician;Nurse;;Occupational Therapist   Physician Team Member Dr Will Rodriguez, Dr Saeed Almaguer PAMaritzaC   Nursing Team Member [facility] Management Team Member Nicole/ [address]   OT Team Member [facility]   Patient/Family Present   Patient Present No   Patient's Family Present No     Pt asking about discharge  Possible dc on Monday or Tuesday  Referrals in place to [facility] ([facility]), Pt may have to call 211 number for homeless shelter  Pt yet to be set up with outpatient due to insurnace question taylor Rios

## 2020-08-14 NOTE — DISCHARGE INSTR - OTHER ORDERS
[facility]  [address], Sveta [address]shaw [address]  Tel: [phone]  Fax:([phone])    [facility] Crisis    Crisis Intervention: [address] is a confidential 7 days/week telephone support service manned by trained mental health consumers   [facility] operates daily but is not able to [address]   · [facility] provides support, a listening ear and can provide information about available services       · [facility] specializes in the concerns of mental health consumers, their families and friends   However, we are also here for anyone who has a mental health concern, is confused about or just doesn't know anything about mental health or where to get information       To reach [place], call [phone] accepts calls between 6:00 AM to 10:00 AM and from 4:00 PM to 76:29 AM or click on the link to view additional information  What you need to know aboutcoronavirus disease 2019 (COVID-19)     What is coronavirus disease 2019 (COVID-19)? Coronavirus disease 2019 (COVID-19) is a respiratory illness that can spread from person to person  The virus that causes COVID-19 is a novel coronavirus that was first identified during an investigation into an outbreak in [place], [place]  Can people in the [place]  get COVID-19? Yes  COVID-19 is spreading from person to person in parts of the [place]  Risk of infection with COVID-19 is higher for people who are close contacts of someone known to have COVID-19, for example healthcare workers, or household members  Other people at higher risk for infection are those who live in or have recently been in an area with ongoing spread of COVID-19  Learn more about places with ongoing spread at   Preview[employer] tn  html#geographic  Have there been cases of COVID-19 in the [place] ?   Yes  The first case of COVID-19 in the [place] was reported on January 21, 2020   The current count of cases of COVID-19 in the [place] is available on [employer] at [facility]  How does COVID-19 spread? The virus that causes COVID-19 probably emerged from an animal source, but is now spreading from person to person  The virus is thought to spread mainly between people who are in close contact with one another (within about 6 feet) through respiratory droplets produced when an infected person coughs or sneezes  It also may be possible that a person can get COVID-19 by touching a surface or object that has the virus on it and then touching their own mouth, nose, or possibly their eyes, but this is not thought to be the main way the virus spreads  Learn what is known about the spread of newly emerged coronaviruses at [facility]  What are the symptoms of COVID-19? Patients with COVID-19 have had mild to severe respiratory illness with symptoms of   fever   cough   shortness of breath  What are severe complications from this virus? Some patients have pneumonia in both lungs, multi-organ failure and in some cases death  How can I help protect myself? People can help protect themselves from respiratory illness with everyday preventive actions  Avoid close contact with people who are sick  Avoid touching your eyes, nose, and mouth withunwashed hands  Wash your hands often with soap and water for at least 20 seconds  Use an alcohol-based hand  that contains at least 60% alcohol if soap and water are not available  If you are sick, to keep from spreading respiratory illness to others, you should   Stay home when you are sick  Cover your cough or sneeze with a tissue, then throw the tissue in the trash  Clean and disinfect frequently touched objectsand surfaces  What should I do if I recently traveled from an area with ongoing spread of COVID-19?   If you have traveled from an affected area, there may be restrictions on your movements for up to 2 weeks  If you develop symptoms during that period (fever, cough, trouble breathing), seek medical advice  Call the office of your health care provider before you go, and tell them about your travel and your symptoms  They will give you instructions on how to get care without exposing other people to your illness  While sick, avoid contact with people, don't go out and delay any travel to reduce the possibility of spreading illness to others  Is there a vaccine? There is currently no vaccine to protect against COVID-19  The best way to prevent infection is to take everyday preventive actions, like avoiding close contact with people who are sick and washing your hands often  Is there a treatment? There is no specific antiviral treatment for COVID-19  People with COVID-19 can seek medical care to helprelieve symptoms  For more information: www cdc gov/KKAZB51SQ 019373-D 03/03/2020       What to do if you are sick withcoronavirus disease 2019 (COVID-19)     If you are sick with COVID-19 or suspect you are infected with the virus that causes COVID-19, follow the steps below to help prevent the disease from spreading to people in your home and community  Stay home except to get medical care   You should restrict activities outside your home, except for getting medical care  Do not go to work, school, or public areas  Avoid using public transportation, ride-sharing, or taxis  Separate yourself from other people and animals inyour home  People: As much as possible, you should stay in a specific room and away from other people in your home  Also, you should use a separate bathroom, if available  Animals: Do not handle pets or other animals while sick  See COVID-19 and Animals for more information    Call ahead before visiting your doctor   If you have a medical appointment, call the healthcare provider and tell them that you have or may have COVID-19  This will help the healthcare provider's office take steps to keep other people from getting infected or exposed  Wear a facemask  You should wear a facemask when you are around other people (e g , sharing a room or vehicle) or pets and before you enter a healthcare provider's office  If you are not able to wear a facemask (for example, because it causes trouble breathing), then people who live with you should not stay in the same room with you, or they should wear a facemask if they enteryour room  Cover your coughs and sneezes   Cover your mouth and nose with a tissue when you cough or sneeze  Throw used tissues in a lined trash can; immediately wash your hands with soap and water for at least 20 seconds or clean your hands with an alcohol-based hand  that contains at least 60 to 95% alcohol, covering all surfaces of your hands and rubbing them together until they feel dry  Soap and water should be used preferentially if hands are visibly dirty  Avoid sharing personal household items   You should not share dishes, drinking glasses, cups, eating utensils, towels, or bedding with other people or pets in your home  After using these items, they should be washed thoroughly with soap and water  Clean your hands often  Wash your hands often with soap and water for at least 20 seconds  If soap and water are not available, clean your hands with an alcohol-based hand  that contains at least 60% alcohol, covering all surfaces of your hands and rubbing them together until they feel dry  Soap and water should be used preferentially if hands are visibly dirty  Avoid touching your eyes, nose, and mouth with unwashed hands  Clean all "high-touch" surfaces every day  High touch surfaces include counters, tabletops, doorknobs, bathroom fixtures, toilets, phones, keyboards, tablets, and bedside tables  Also, clean any surfaces that may have blood, stool, or body fluids on them   Use a household cleaning spray or wipe, according to the label instructions  Labels contain instructions for safe and effective use of the cleaning product including precautions you should take when applying the product, such as wearing gloves and making sure you have good ventilation during use of the product  Monitor your symptoms  Seek prompt medical attention if your illness is worsening (e g , difficulty breathing)  Before seeking care, call your healthcare provider and tell them that you have, or are being evaluated for, COVID-19  Put on a facemask before you enter the facility  These steps will help the healthcare provider's office to keep other people in the office or waiting room from getting infectedor exposed  Ask your healthcare provider to call the local or state health department  Persons who are placed under active monitoring or facilitated self-monitoring should follow instructions provided by their local health department or occupational health professionals, as appropriate  If you have a medical emergency and need to call 911, notify the dispatch personnel that you have, or are being evaluated for COVID-19  If possible, put on a facemask before emergency medical services arrive  Discontinuing home isolation  Patients with confirmed COVID-19 should remain under home isolation precautions until the risk of secondary transmission to others is thought to be low  The decision to discontinue home isolation precautions should be made on a case-by-case basis, in consultation with healthcare providers and state and local health departments  For more information: www cdc gov/RJEUA36CC 779171-L 02/24/2020       Stay home when you are sick,except to get medical care  Wash your hands often with soap and water for at least 20 seconds  Cover your cough or sneeze with a tissue, then throw the tissue in the trash  Clean and disinfect frequently touched objects and surfaces  Avoid touching your eyes, nose, and mouth  STOP THE SPREAD OF GERMS  For more information: www cdc gov/COVID19 Avoid close contact with people who are sick  Help prevent the spread of respiratory diseases like COVID-19  TO APPLY FOR SHELTER IN THE [facility]: ([facility])    1  Obtain a housing voucher at the [address] [place] [address] [facility] [employer]  2  Photo identification will be required  3  Come to the [facility] [address] between 3:30 P  M  and 7:30 P M  (Dinner is served at 5:30 P M )    Para poder ser [employer] en esta MISSION tienes que hacer lo siguiente:    1  Tienes que obtener un boleto (voucher) de en la estacion de policia alvino [place]  2  Leftya Carlene con retrato es requerida    3  La entrada a la [place] es de 3:30 P M  Y 7:30 P M  (La comida sera servida a las 5:30 P M )

## 2020-08-14 NOTE — CASE MANAGEMENT
Pt can be accepted back to the [facility]  Pt likely to be discharged on Monday  MD still interested in pursuing Abilify Maintaina injection if pt has insurance  CM received information from  that pt is covered for hospitalizations but not for outpatient support  Pt will still need to go to [facility] for mental health funding  CM will provide number at time of dc

## 2020-08-21 ENCOUNTER — HOSPITAL ENCOUNTER (INPATIENT)
Facility: HOSPITAL | Age: 35
LOS: 10 days | Discharge: HOME | DRG: 751 | End: 2020-09-01
Attending: EMERGENCY MEDICINE | Admitting: PSYCHIATRY & NEUROLOGY
Payer: COMMERCIAL

## 2020-08-21 DIAGNOSIS — R45.851 DEPRESSION WITH SUICIDAL IDEATION: ICD-10-CM

## 2020-08-21 DIAGNOSIS — F41.9 ANXIETY: ICD-10-CM

## 2020-08-21 DIAGNOSIS — F17.200 SMOKING: ICD-10-CM

## 2020-08-21 DIAGNOSIS — G47.00 INSOMNIA: ICD-10-CM

## 2020-08-21 DIAGNOSIS — F32.A DEPRESSION WITH SUICIDAL IDEATION: ICD-10-CM

## 2020-08-21 DIAGNOSIS — F32.9 MAJOR DEPRESSIVE DISORDER: Primary | ICD-10-CM

## 2020-08-21 LAB
AMPHETAMINES SERPL QL SCN: NEGATIVE
BARBITURATES UR QL: NEGATIVE
BENZODIAZ UR QL: NEGATIVE
COCAINE UR QL: NEGATIVE
ETHANOL EXG-MCNC: 0 MG/DL
METHADONE UR QL: NEGATIVE
OPIATES UR QL SCN: NEGATIVE
OXYCODONE+OXYMORPHONE UR QL SCN: NEGATIVE
PCP UR QL: NEGATIVE
SARS-COV-2 RNA RESP QL NAA+PROBE: NEGATIVE
THC UR QL: POSITIVE

## 2020-08-21 PROCEDURE — 99285 EMERGENCY DEPT VISIT HI MDM: CPT

## 2020-08-21 PROCEDURE — 87635 SARS-COV-2 COVID-19 AMP PRB: CPT | Performed by: PHYSICIAN ASSISTANT

## 2020-08-21 PROCEDURE — 82075 ASSAY OF BREATH ETHANOL: CPT | Performed by: EMERGENCY MEDICINE

## 2020-08-21 PROCEDURE — 99285 EMERGENCY DEPT VISIT HI MDM: CPT | Performed by: PHYSICIAN ASSISTANT

## 2020-08-21 PROCEDURE — 80307 DRUG TEST PRSMV CHEM ANLYZR: CPT | Performed by: EMERGENCY MEDICINE

## 2020-08-21 RX ORDER — LORAZEPAM 0.5 MG/1
1 TABLET ORAL ONCE
Status: COMPLETED | OUTPATIENT
Start: 2020-08-21 | End: 2020-08-21

## 2020-08-21 RX ADMIN — LORAZEPAM 1 MG: 0.5 TABLET ORAL at 21:05

## 2020-08-21 NOTE — ED PROVIDER NOTES
History  Chief Complaint   Patient presents with    Psychiatric Evaluation     Patient is having S/I  Patient denies AH/VH/HI  Patient is depressed and homeless  Patient is reporting he has a plan to cut himself (wrists)  Patient is a 66-year-old male who presents today for psych evaluation  Patient reports suicidal ideations with a plan to cut his wrists bilaterally  Patient reports he does not have access to a knife or knives or cutting equipment reports he does not have access to any weapons of any kind  Patient reports he did have a recent suicide attempt approximately 3 weeks to 1 month ago reports he was recently admitted for behavioral health concerns to [facility] behavioral health with [address]  Patient reports he was discharged yesterday from that facility reports that he feels as though his medications are not working and he is depressed because he is homeless  Patient reports he would still feel this way even if he had a place to stay  Patient reports no homicidal ideation  Patient reports no hallucinations  Patient states he lost his insurance approximately 3 months ago and has not been able to see a therapist since  History provided by:  Patient   used: No    Psychiatric Evaluation   Presenting symptoms: homicidal ideas and suicidal thoughts    Degree of incapacity (severity): Moderate  Onset quality:  Gradual  Duration:  2 days  Timing:  Constant  Progression:  Unchanged  Chronicity:  New  Treatment compliance:  Some of the time  Relieved by:  None tried  Worsened by:  Nothing  Ineffective treatments:  None tried  Associated symptoms: no abdominal pain, no chest pain and no fatigue        Prior to Admission Medications   Prescriptions Last Dose Informant Patient Reported? Taking?    ARIPiprazole (ABILIFY) 10 mg tablet Not Taking at Unknown time  No No   Sig: Take 1 tablet (10 mg total) by mouth daily   Patient not taking: Reported on 8/22/2020   venlafaxine (EFFEXOR-XR) 75 mg 24 hr capsule Not Taking at Unknown time  No No   Sig: Take 1 capsule (75 mg total) by mouth daily   Patient not taking: Reported on 8/22/2020      Facility-Administered Medications: None       Past Medical History:   Diagnosis Date    Anxiety     Depression     Panic attack     Psychiatric illness     Self-injurious behavior     Suicide attempt [facility])        History reviewed  No pertinent surgical history  History reviewed  No pertinent family history  I have reviewed and agree with the history as documented  E-Cigarette/Vaping    E-Cigarette Use Never User      E-Cigarette/Vaping Substances    Nicotine No     CBD No     Flavoring No     Unknown Yes      Social History     Tobacco Use    Smoking status: Current Every Day Smoker     Packs/day: 1 00     Years: 20 00     Pack years: 20 00     Types: Cigarettes    Smokeless tobacco: Never Used   Substance Use Topics    Alcohol Use     Frequency: Never     Binge frequency: Never    Drug use: Yes     Frequency: 1 0 times per week     Types: Marijuana     Comment: once a week       Review of Systems   Constitutional: Negative for chills, fatigue and fever  HENT: Negative for congestion, ear pain, rhinorrhea and sore throat  Eyes: Negative for redness  Respiratory: Negative for chest tightness and shortness of breath  Cardiovascular: Negative for chest pain and palpitations  Gastrointestinal: Negative for abdominal pain, nausea and vomiting  Genitourinary: Negative for dysuria and hematuria  Musculoskeletal: Negative  Skin: Negative for rash  Neurological: Negative for dizziness, syncope, light-headedness and numbness  Psychiatric/Behavioral: Positive for homicidal ideas and suicidal ideas  Physical Exam  Physical Exam  Vitals signs and nursing note reviewed  Constitutional:       Appearance: He is well-developed  HENT:      Head: Normocephalic and atraumatic     Eyes: Pupils: Pupils are equal, round, and reactive to light  Neck:      Musculoskeletal: Normal range of motion  Cardiovascular:      Rate and Rhythm: Normal rate and regular rhythm  Heart sounds: Normal heart sounds  Pulmonary:      Effort: Pulmonary effort is normal       Breath sounds: Normal breath sounds  Abdominal:      Palpations: Abdomen is soft  Tenderness: There is no abdominal tenderness  There is no guarding  Lymphadenopathy:      Cervical: No cervical adenopathy  Skin:     General: Skin is warm and dry  Capillary Refill: Capillary refill takes less than 2 seconds  Neurological:      Mental Status: He is alert and oriented to person, place, and time  Psychiatric:         Mood and Affect: Mood is depressed  Affect is blunt and flat  Behavior: Behavior is slowed and withdrawn  Thought Content: Thought content includes suicidal ideation  Thought content includes suicidal plan           Vital Signs  ED Triage Vitals   Temperature Pulse Respirations Blood Pressure SpO2   08/21/20 1844 08/21/20 1844 08/21/20 1844 08/21/20 1844 08/21/20 1844   97 6 °F (36 4 °C) (!) 112 18 142/89 98 %      Temp Source Heart Rate Source Patient Position - Orthostatic VS BP Location FiO2 (%)   08/21/20 1844 08/21/20 1844 08/21/20 1844 08/21/20 1844 --   Tympanic Monitor Lying Left arm       Pain Score       08/22/20 0108       No Pain           Vitals:    08/22/20 0003 08/22/20 0107 08/22/20 0801 08/22/20 1100   BP: 115/59 118/82 119/83 127/71   Pulse: 76 68 69 72   Patient Position - Orthostatic VS:  Sitting Sitting Lying         Visual Acuity      ED Medications  Medications   LORazepam (ATIVAN) tablet 1 mg (has no administration in time range)   LORazepam (ATIVAN) injection 1 mg (has no administration in time range)   traZODone (DESYREL) tablet 50 mg (has no administration in time range)   nicotine (NICODERM CQ) 21 mg/24 hr TD 24 hr patch 1 patch (1 patch Transdermal Medication Applied 8/22/20 0906)   haloperidol (HALDOL) tablet 5 mg (has no administration in time range)   haloperidol lactate (HALDOL) injection 5 mg (has no administration in time range)   magnesium hydroxide (MILK OF MAGNESIA) 400 mg/5 mL oral suspension 30 mL (has no administration in time range)   aluminum-magnesium hydroxide-simethicone (MYLANTA) 200-200-20 mg/5 mL oral suspension 30 mL (has no administration in time range)   benztropine (COGENTIN) tablet 1 mg (has no administration in time range)   benztropine (COGENTIN) injection 1 mg (has no administration in time range)   acetaminophen (TYLENOL) tablet 650 mg (has no administration in time range)   ibuprofen (MOTRIN) tablet 600 mg (has no administration in time range)   traMADol (ULTRAM) tablet 50 mg (has no administration in time range)   LORazepam (ATIVAN) tablet 1 mg (1 mg Oral Given 8/21/20 2105)       Diagnostic Studies  Results Reviewed     Procedure Component Value Units Date/Time    Novel Coronavirus Roxi Serrato [facility] [[id number]]  (Normal) Collected:  08/21/20 1936    Lab Status:  Final result Specimen:  Nares from Nose Updated:  08/21/20 2035     SARS-CoV-2 Negative    Narrative: The specimen collection materials, transport medium, and/or testing methodology utilized in the production of these test results have been proven to be reliable in a limited validation with an abbreviated program under the Emergency Utilization Authorization provided by the FDA  Testing reported as "Presumptive positive" will be confirmed with secondary testing with a reference laboratory to ensure result accuracy  Clinical caution and judgement should be used with the interpretation of these results with consideration of the clinical impression and other laboratory testing  Testing reported as "Positive" or "Negative" has been proven to be accurate according to standard laboratory validation requirements    All testing is performed with control materials showing appropriate reactivity at standard intervals  Rapid drug screen, urine [[id number]]  (Abnormal) Collected:  08/21/20 1852    Lab Status:  Final result Specimen:  Urine, Clean Catch Updated:  08/21/20 1937     Amph/Meth UR Negative     Barbiturate Ur Negative     Benzodiazepine Urine Negative     Cocaine Urine Negative     Methadone Urine Negative     Opiate Urine Negative     PCP Ur Negative     THC Urine Positive     Oxycodone Urine Negative    Narrative:       Presumptive report  If requested, specimen will be sent to reference lab for confirmation  FOR MEDICAL PURPOSES ONLY  IF CONFIRMATION NEEDED PLEASE CONTACT THE LAB WITHIN 5 DAYS  Drug Screen Cutoff Levels:  AMPHETAMINE/METHAMPHETAMINES  1000 ng/mL  BARBITURATES     200 ng/mL  BENZODIAZEPINES     200 ng/mL  COCAINE      300 ng/mL  METHADONE      300 ng/mL  OPIATES      300 ng/mL  PHENCYCLIDINE     25 ng/mL  THC       50 ng/mL  OXYCODONE      100 ng/mL    POCT alcohol breath test [[id number]]  (Normal) Resulted:  08/21/20 1851    Lab Status:  Final result Updated:  08/21/20 1851     EXTBreath Alcohol 0 000                 No orders to display              Procedures  Procedures         ED Course  ED Course as of Aug 22 1259   Fri Aug 21, 2020   2055 Crisis worker saw and evaluated the patient will have the patient sign a 201 and will begin bed search  2141 Patient signed out pending bed search / placement  US AUDIT      Most Recent Value   Initial Alcohol Screen: US AUDIT-C    1  How often do you have a drink containing alcohol?  0 Filed at: 08/21/2020 1846   2  How many drinks containing alcohol do you have on a typical day you are drinking? 0 Filed at: 08/21/2020 1846   3a  Male UNDER 65: How often do you have five or more drinks on one occasion? 0 Filed at: 08/21/2020 1846   3b  FEMALE Any Age, or MALE 65+: How often do you have 4 or more drinks on one occassion?   0 Filed at: 08/21/2020 1846   Audit-C Score  0 Filed at: 08/21/2020 1846 LEATHA/DAST-10      Most Recent Value   How many times in the past year have you    Used an illegal drug or used a prescription medication for non-medical reasons? Never Filed at: 08/21/2020 1846                                MDM      Disposition  Final diagnoses:   Depression with suicidal ideation     Time reflects when diagnosis was documented in both MDM as applicable and the Disposition within this note     Time User Action Codes Description Comment    8/21/2020 11:27 PM Kathi Valdez Add [F32 9] Major depressive disorder     8/21/2020 11:27 PM Kathi Valdez Modify [F32 9] Major depressive disorder     8/22/2020 12:58 PM Victor Manuel Webster Add [F32 9,  R45 851] Depression with suicidal ideation       ED Disposition     ED Disposition Condition Date/Time Comment    Admit Stable Sat Aug 22, 2020 12:58 PM Patient admitted to behavioral health      Follow-up Information    None         Current Discharge Medication List      CONTINUE these medications which have NOT CHANGED    Details   ARIPiprazole (ABILIFY) 10 mg tablet Take 1 tablet (10 mg total) by mouth daily  Qty: 30 tablet, Refills: 0    Associated Diagnoses: Major depressive disorder      venlafaxine (EFFEXOR-XR) 75 mg 24 hr capsule Take 1 capsule (75 mg total) by mouth daily  Qty: 30 capsule, Refills: 0    Associated Diagnoses: Major depressive disorder           No discharge procedures on file      PDMP Review     None          ED Provider  Electronically Signed by           Jesse Olson PA-C  08/22/20 2268

## 2020-08-21 NOTE — ED NOTES
Pt is sitting up in bed as his belongings are being inventoried  Pt denies pain and discomfort  This nurse told pt that he would be reassessed and vital signs will be repeated through out his stay  Resp are even and unlabored  This nurse will continue to monitor  Pt is being watched by a safety sitter       Yossi Olivarez RN  08/21/20 1913

## 2020-08-22 PROBLEM — F33.2 MDD (MAJOR DEPRESSIVE DISORDER), RECURRENT SEVERE, WITHOUT PSYCHOSIS (HCC): Status: ACTIVE | Noted: 2020-08-12

## 2020-08-22 LAB
ALBUMIN SERPL BCP-MCNC: 3.7 G/DL (ref 3–5.2)
ALP SERPL-CCNC: 48 U/L (ref 43–122)
ALT SERPL W P-5'-P-CCNC: 44 U/L (ref 9–52)
ANION GAP SERPL CALCULATED.3IONS-SCNC: 3 MMOL/L (ref 5–14)
AST SERPL W P-5'-P-CCNC: 33 U/L (ref 17–59)
BASOPHILS # BLD AUTO: 0 THOUSANDS/ΜL (ref 0–0.1)
BASOPHILS NFR BLD AUTO: 0 % (ref 0–1)
BILIRUB SERPL-MCNC: 0.4 MG/DL
BUN SERPL-MCNC: 13 MG/DL (ref 5–25)
CALCIUM SERPL-MCNC: 9.3 MG/DL (ref 8.4–10.2)
CHLORIDE SERPL-SCNC: 99 MMOL/L (ref 97–108)
CO2 SERPL-SCNC: 33 MMOL/L (ref 22–30)
CREAT SERPL-MCNC: 0.94 MG/DL (ref 0.7–1.5)
EOSINOPHIL # BLD AUTO: 0.1 THOUSAND/ΜL (ref 0–0.4)
EOSINOPHIL NFR BLD AUTO: 2 % (ref 0–6)
ERYTHROCYTE [DISTWIDTH] IN BLOOD BY AUTOMATED COUNT: 15.5 %
GFR SERPL CREATININE-BSD FRML MDRD: 121 ML/MIN/1.73SQ M
GLUCOSE SERPL-MCNC: 94 MG/DL (ref 70–99)
HCT VFR BLD AUTO: 49.3 % (ref 41–53)
HGB BLD-MCNC: 16.1 G/DL (ref 13.5–17.5)
LYMPHOCYTES # BLD AUTO: 2 THOUSANDS/ΜL (ref 0.5–4)
LYMPHOCYTES NFR BLD AUTO: 40 % (ref 25–45)
MCH RBC QN AUTO: 27.6 PG (ref 26–34)
MCHC RBC AUTO-ENTMCNC: 32.7 G/DL (ref 31–36)
MCV RBC AUTO: 84 FL (ref 80–100)
MONOCYTES # BLD AUTO: 0.3 THOUSAND/ΜL (ref 0.2–0.9)
MONOCYTES NFR BLD AUTO: 7 % (ref 1–10)
NEUTROPHILS # BLD AUTO: 2.4 THOUSANDS/ΜL (ref 1.8–7.8)
NEUTS SEG NFR BLD AUTO: 50 % (ref 45–65)
PLATELET # BLD AUTO: 238 THOUSANDS/UL (ref 150–450)
PMV BLD AUTO: 8 FL (ref 8.9–12.7)
POTASSIUM SERPL-SCNC: 4.5 MMOL/L (ref 3.6–5)
PROT SERPL-MCNC: 6.1 G/DL (ref 5.9–8.4)
RBC # BLD AUTO: 5.84 MILLION/UL (ref 4.5–5.9)
SODIUM SERPL-SCNC: 135 MMOL/L (ref 137–147)
WBC # BLD AUTO: 4.9 THOUSAND/UL (ref 4.5–11)

## 2020-08-22 PROCEDURE — 99222 1ST HOSP IP/OBS MODERATE 55: CPT | Performed by: PSYCHIATRY & NEUROLOGY

## 2020-08-22 PROCEDURE — 99253 IP/OBS CNSLTJ NEW/EST LOW 45: CPT | Performed by: INTERNAL MEDICINE

## 2020-08-22 PROCEDURE — 80053 COMPREHEN METABOLIC PANEL: CPT | Performed by: PSYCHIATRY & NEUROLOGY

## 2020-08-22 PROCEDURE — 85025 COMPLETE CBC W/AUTO DIFF WBC: CPT | Performed by: PSYCHIATRY & NEUROLOGY

## 2020-08-22 RX ORDER — HALOPERIDOL 5 MG
5 TABLET ORAL EVERY 6 HOURS PRN
Status: DISCONTINUED | OUTPATIENT
Start: 2020-08-22 | End: 2020-09-01 | Stop reason: HOSPADM

## 2020-08-22 RX ORDER — ACETAMINOPHEN 325 MG/1
650 TABLET ORAL EVERY 6 HOURS PRN
Status: DISCONTINUED | OUTPATIENT
Start: 2020-08-22 | End: 2020-09-01 | Stop reason: HOSPADM

## 2020-08-22 RX ORDER — VENLAFAXINE HYDROCHLORIDE 150 MG/1
150 CAPSULE, EXTENDED RELEASE ORAL DAILY
Status: DISCONTINUED | OUTPATIENT
Start: 2020-08-22 | End: 2020-09-01 | Stop reason: HOSPADM

## 2020-08-22 RX ORDER — BENZTROPINE MESYLATE 1 MG/ML
1 INJECTION INTRAMUSCULAR; INTRAVENOUS EVERY 6 HOURS PRN
Status: DISCONTINUED | OUTPATIENT
Start: 2020-08-22 | End: 2020-09-01 | Stop reason: HOSPADM

## 2020-08-22 RX ORDER — LORAZEPAM 1 MG/1
1 TABLET ORAL EVERY 6 HOURS PRN
Status: DISCONTINUED | OUTPATIENT
Start: 2020-08-22 | End: 2020-08-26

## 2020-08-22 RX ORDER — ACETAMINOPHEN 325 MG/1
650 TABLET ORAL EVERY 6 HOURS PRN
Status: DISCONTINUED | OUTPATIENT
Start: 2020-08-22 | End: 2020-08-22

## 2020-08-22 RX ORDER — ARIPIPRAZOLE 15 MG/1
15 TABLET ORAL DAILY
Status: DISCONTINUED | OUTPATIENT
Start: 2020-08-22 | End: 2020-09-01 | Stop reason: HOSPADM

## 2020-08-22 RX ORDER — IBUPROFEN 600 MG/1
600 TABLET ORAL EVERY 8 HOURS PRN
Status: DISCONTINUED | OUTPATIENT
Start: 2020-08-22 | End: 2020-09-01 | Stop reason: HOSPADM

## 2020-08-22 RX ORDER — BENZTROPINE MESYLATE 1 MG/1
1 TABLET ORAL EVERY 6 HOURS PRN
Status: DISCONTINUED | OUTPATIENT
Start: 2020-08-22 | End: 2020-09-01 | Stop reason: HOSPADM

## 2020-08-22 RX ORDER — LORAZEPAM 2 MG/ML
1 INJECTION INTRAMUSCULAR EVERY 6 HOURS PRN
Status: DISCONTINUED | OUTPATIENT
Start: 2020-08-22 | End: 2020-08-26

## 2020-08-22 RX ORDER — NICOTINE 21 MG/24HR
1 PATCH, TRANSDERMAL 24 HOURS TRANSDERMAL DAILY
Status: DISCONTINUED | OUTPATIENT
Start: 2020-08-22 | End: 2020-09-01 | Stop reason: HOSPADM

## 2020-08-22 RX ORDER — TRAZODONE HYDROCHLORIDE 50 MG/1
50 TABLET ORAL
Status: DISCONTINUED | OUTPATIENT
Start: 2020-08-22 | End: 2020-09-01 | Stop reason: HOSPADM

## 2020-08-22 RX ORDER — MAGNESIUM HYDROXIDE/ALUMINUM HYDROXICE/SIMETHICONE 120; 1200; 1200 MG/30ML; MG/30ML; MG/30ML
30 SUSPENSION ORAL EVERY 4 HOURS PRN
Status: DISCONTINUED | OUTPATIENT
Start: 2020-08-22 | End: 2020-09-01 | Stop reason: HOSPADM

## 2020-08-22 RX ORDER — HALOPERIDOL 5 MG/ML
5 INJECTION INTRAMUSCULAR EVERY 6 HOURS PRN
Status: DISCONTINUED | OUTPATIENT
Start: 2020-08-22 | End: 2020-09-01 | Stop reason: HOSPADM

## 2020-08-22 RX ORDER — TRAMADOL HYDROCHLORIDE 50 MG/1
50 TABLET ORAL EVERY 6 HOURS PRN
Status: DISCONTINUED | OUTPATIENT
Start: 2020-08-22 | End: 2020-09-01 | Stop reason: HOSPADM

## 2020-08-22 RX ADMIN — ARIPIPRAZOLE 15 MG: 15 TABLET ORAL at 13:41

## 2020-08-22 RX ADMIN — VENLAFAXINE HYDROCHLORIDE 150 MG: 150 CAPSULE, EXTENDED RELEASE ORAL at 13:41

## 2020-08-22 RX ADMIN — NICOTINE 1 PATCH: 21 PATCH, EXTENDED RELEASE TRANSDERMAL at 09:06

## 2020-08-22 NOTE — CONSULTS
Consult- Mary Ann Ratliff 1985, 28 y o  male MRN: [id number]    Unit/Bed#: [facility] 297-65 Encounter: [id number]    Primary Care Provider: Fanny Moreira MD   Date and time admitted to hospital: 8/21/2020  6:41 PM      Inpatient consult for Medical Clearance for [facility] patient  Consult performed by: Arlene Curry MD  Consult ordered by: Claritza Kidd MD          Major depressive disorder  Assessment & Plan  Patient admitted to the inpatient psychiatric unit for depression  Plan per primary team    Prediabetes  Assessment & Plan  Hemoglobin A1c in August 2020 was 5 7 %  No need for further treatment at this time  Counseled weight loss and healthy diet  Medical clearance for psychiatric admission  Assessment & Plan  Patient is be stable for admission to the inpatient psychiatric unit  Counseling / Coordination of Care Time: 15 minutes  Greater than 50% of total time spent on patient counseling and coordination of care  Collaboration of Care: Were Recommendations Directly Discussed with Primary Treatment Team? - Yes     History of Present Illness:    Mary Ann Ratliff is a 28 y o  male who is originally admitted to the psychiatric service due to suicidal ideation and intent  We are consulted for medical management  Patient states currently well  Denies any shortness of breath, chest, lower extremity edema  He has been extremely anxious and depressed these last several days since his wife left him about a month ago and took her son  Patient currently denies any SI or HI  Review of Systems:    Review of Systems   Constitutional: Negative for activity change, chills and fever  Eyes: Negative for visual disturbance  Respiratory: Negative for cough, chest tightness and shortness of breath  Cardiovascular: Negative for chest pain and leg swelling  Gastrointestinal: Negative for abdominal pain, constipation, diarrhea, nausea and vomiting     Endocrine: Negative for cold intolerance and heat intolerance  Genitourinary: Negative for difficulty urinating  Musculoskeletal: Negative for gait problem  Skin: Negative for rash  Allergic/Immunologic: Negative  Neurological: Negative  Negative for dizziness, weakness and light-headedness  Hematological: Negative  Psychiatric/Behavioral: Negative  All other systems reviewed and are negative  Past Medical and Surgical History:     Past Medical History:   Diagnosis Date    Anxiety     Depression     Panic attack     Psychiatric illness     Self-injurious behavior     Suicide attempt [facility])        History reviewed  No pertinent surgical history  Meds/Allergies:    all medications and allergies reviewed    Allergies: No Known Allergies    Social History:     Marital Status: Single    Substance Use History:   Social History     Substance and Sexual Activity   Alcohol Use    Frequency: Never    Binge frequency: Never     Social History     Tobacco Use   Smoking Status Current Every Day Smoker    Packs/day: 1 00    Years: 20 00    Pack years: 20 00    Types: Cigarettes   Smokeless Tobacco Never Used     Social History     Substance and Sexual Activity   Drug Use Yes    Frequency: 1 0 times per week    Types: Marijuana    Comment: once a week       Family History:    History reviewed  No pertinent family history  Physical Exam:     Vitals:   Blood Pressure: 127/71 (08/22/20 1100)  Pulse: 72 (08/22/20 1100)  Temperature: 98 °F (36 7 °C) (08/22/20 0801)  Temp Source: Temporal (08/22/20 0801)  Respirations: 16 (08/22/20 1100)  Height: 5' 7" (170 2 cm) (08/22/20 0107)  Weight - Scale: 81 3 kg (179 lb 3 7 oz) (08/22/20 0801)  SpO2: 97 % (08/22/20 0107)    Physical Exam  Constitutional:       General: He is not in acute distress  Appearance: Normal appearance  He is normal weight  HENT:      Mouth/Throat:      Mouth: Mucous membranes are moist    Cardiovascular:      Rate and Rhythm: Normal rate and regular rhythm        Pulses: Normal pulses  Heart sounds: No murmur  Pulmonary:      Effort: Pulmonary effort is normal  No respiratory distress  Breath sounds: No wheezing  Musculoskeletal:         General: No swelling  Skin:     Capillary Refill: Capillary refill takes less than 2 seconds  Neurological:      General: No focal deficit present  Mental Status: He is alert and oriented to person, place, and time  Mental status is at baseline  Psychiatric:         Behavior: Behavior normal          Thought Content: Thought content normal              Additional Data:     Lab Results: I have personally reviewed pertinent reports  Results from last 7 days   Lab Units 08/22/20  0650   WBC Thousand/uL 4 90   HEMOGLOBIN g/dL 16 1   HEMATOCRIT % 49 3   PLATELETS Thousands/uL 238   NEUTROS PCT % 50   LYMPHS PCT % 40   MONOS PCT % 7   EOS PCT % 2     Results from last 7 days   Lab Units 08/22/20  0650   SODIUM mmol/L 135*   POTASSIUM mmol/L 4 5   CHLORIDE mmol/L 99   CO2 mmol/L 33*   BUN mg/dL 13   CREATININE mg/dL 0 94   ANION GAP mmol/L 3*   CALCIUM mg/dL 9 3   ALBUMIN g/dL 3 7   TOTAL BILIRUBIN mg/dL 0 40   ALK PHOS U/L 48   ALT U/L 44   AST U/L 33   GLUCOSE RANDOM mg/dL 94             Lab Results   Component Value Date/Time    HGBA1C 5 7 (H) 08/01/2020 06:02 AM    HGBA1C 5 7 (H) 01/31/2020 08:43 AM    HGBA1C 6 1 (H) 03/06/2019 04:12 PM             EKG, Pathology, and Other Studies Reviewed on Admission:   · EKG:  Last EKG was 8/12 normal sinus and qtc        ** Please Note: This note has been constructed using a voice recognition system   **

## 2020-08-22 NOTE — ED NOTES
Pt is being transferred to  with belongings via w/c with [facility] and [facility] to accompany pt to        Guicho Hoff RN  08/22/20 0003

## 2020-08-22 NOTE — PLAN OF CARE
Problem: Depression  Goal: Treatment Goal: Demonstrate behavioral control of depressive symptoms, verbalize feelings of improved mood/affect, and adopt new coping skills prior to discharge  Outcome: Progressing  Goal: Verbalize thoughts and feelings  Description: Interventions:  - Assess and re-assess patient's level of risk   - Engage patient in 1:1 interactions, daily, for a minimum of 15 minutes   - Encourage patient to express feelings, fears, frustrations, hopes   Outcome: Progressing     Problem: DEPRESSION  Goal: Will be euthymic at discharge  Description: INTERVENTIONS:  - Administer medication as ordered  - Provide emotional support via 1:1 interaction with staff  - Encourage involvement in milieu/groups/activities  - Monitor for social isolation  Outcome: Progressing

## 2020-08-22 NOTE — PROGRESS NOTES
Pt is guarded, scant, constricted on approach, cites ongoing depression that is unchanged since admit  Denies SI, HI and A/V hallucinations  Socially withdrawn, spending most of shift in bedroom  Appears anxious, but cooperative and polite with staff

## 2020-08-22 NOTE — TREATMENT PLAN
TREATMENT PLAN REVIEW - [address] [place] [address] Teboulbi 28 y o  1985 male MRN: [id number]    [address] [address] Room / Bed: Marcio Macedo [facility] Encounter: [id number]          Admit Date/Time:  8/21/2020  6:41 PM    Treatment Team: Attending Provider: Tito Milan MD; Consulting Physician: Sandoval Kiran DO; Patient Care Technician: Cayden Haile; Registered Nurse:  Andrei Wyatt RN; Patient Care Technician: Johan Burt; Patient Care Technician: Karissa Notice    Diagnosis: Principal Problem:    MDD (major depressive disorder), recurrent severe, without psychosis ([facility] )  Active Problems:    Medical clearance for psychiatric admission    Prediabetes      Patient Strengths/Assets: cooperative, negotiates basic needs, patient is on a voluntary commitment    Patient Barriers/Limitations: financial instability, homeless, limited support system    Short Term Goals: decrease in depressive symptoms, decrease in anxiety symptoms, decrease in suicidal thoughts    Long Term Goals: resolution of depressive symptoms, free of suicidal thoughts    Progress Towards Goals: continue psychiatric medications as prescribed    Recommended Treatment: medication management, patient medication education, group therapy, milieu therapy, continued Behavioral Health psychiatric evaluation/assessment process    Treatment Frequency: daily medication monitoring, group and milieu therapy daily, monitoring through interdisciplinary rounds, monitoring through weekly patient care conferences    Expected Discharge Date:  8/27/2020    Discharge Plan: referral for outpatient medication management with a psychiatrist, referral for outpatient psychotherapy    Treatment Plan Created/Updated By: Douglas Fan MD

## 2020-08-22 NOTE — ASSESSMENT & PLAN NOTE
Hemoglobin A1c in August 2020 was 5 7 %  No need for further treatment at this time  Counseled weight loss and healthy diet

## 2020-08-22 NOTE — PROGRESS NOTES
Patient admitted at [facility] from [facility] ED under 201 status with depression and reported SI to cut wrists  Upon arrival, patient was pleasant and cooperative with admission process  When asked about current SI, patient reports that he still has thoughts to cut himself but does not plan to act upon his thoughts and that he doesn't really want to die  Agreed to come to staff if having urges to self-harm before acting upon  He stated his last actual attempt to end his life was 6 years ago by cutting his wrists  Patient is currently homeless  When asked about his support system, patient reports that he currently has no one for support  Patient denies HI, pain, and A/V hallucinations  Rated his depression 7/10 and anxiety 7/10  Physical assessment findings WNL  Offered no complaints  Continue to monitor

## 2020-08-22 NOTE — ED NOTES
Pt resting supine in room with eyes closed  No signs of distress or discomfort  Resp even and unlabored  This nurse to continue monitoring  Kate reilly at bedside monitoring pt to maintain pt safety       Jarred Sauer RN  08/21/20 4661

## 2020-08-22 NOTE — ED NOTES
Patient presented today with increased depression plan to cut to take his life  Patient reports he cut himself 3 weeks ago because he was so depressed  Patient reports his wife left him about a month ago took their son and moved to [place] with her family  Patient reports he was unable to stay where they wre living because of his income and is now homeless  Patient reports no family or friends he could stay with  Patient reports he attempted to stay at the [address] [place] [address] but left because his anxiety was too strong being there and is unable to go back until Sept 15th  Patient reports he has recently been at [facility], [facility], and most recently [facility]  Patient reports he has been on the same medications for a long time and the only thing but was taken off clonidine  Patient reports feeling very anxious and needs medication adjustment  Patient reports he has referral for [facility] services but is not sure who it was set up with  Patient reports he has an appt with [facility] on Wednesday for first interview for housing  Patient will need assistance connecting with these services  Patient reviewed the importance of finding stable housing because long term (multipule) hospitalizations will not be affective for him  Patient denied homicidal ideations, auditory or visual hallucinations at this time  Patient reports he often feels paranoid that someone is after him  Patient stated he has been that way for awhile  Doctor in agreement with inpatient hospitalization at this time to assist patient with service connections appt already scheduled as well as medication adjustment for his anxiety/depression issues

## 2020-08-22 NOTE — ED NOTES
Pt is laying on stretcher resting with his arms folded over his chest  Resp even and unlabored  No signs of distress or discomfort  This nurse to continue monitoring        Katerin Cameron RN  08/21/20 0114

## 2020-08-22 NOTE — PROGRESS NOTES
Pt denies SI, HI, AH and VH  Pt is mainly isolative to room with brief intervals in the milieu  Pt has no complaints or concerns  Medication and meal compliant  Will monitor

## 2020-08-22 NOTE — ED NOTES
Manoj green at bedside with pt  Pt is resting comfortably  Verna reilly is sitting with pt one on one to maintain pt safety  This nurse will continue to monitor       Santos Martinez RN  08/21/20 2043

## 2020-08-22 NOTE — H&P
Psychiatric Evaluation - Behavioral Health     Identification Data:Mihir Christianson Bolds 28 y o  male MRN: [id number]  Unit/Bed#: [facility]ella [facility]y 958-84 Encounter: [id number]    Chief Complaint: depression and suicidal ideation    Source of Information:  Patient himself was brooke historian  His medical records in the [facility] was also reviewed  HPI:  This is a 80-year-old male,  but currently , has one 3year-old son  The patient currently is homeless  The patient had presented to ER with complaint of feeling depressed and having suicidal thoughts with plan to cut herself  The patient reports her current stressors includes being homeless and her wife leaving him and taking her son with her to [facility] in March this year  The patient reports history of mental health treatment since last 10 years  He reports following with psychiatrist and therapist over last 9-10 years  He last saw his psychiatrist 4 months back but reported that due to his insurance changing he con follow with him anymore  The patient reports having 9 psychiatric inpatient admission in the past   Review of his chart shows that the patient was discharge from [facility] on August 20th  He was admitted there for similar complaint  He was discharged on Abilify 15 mg daily and Effexor  mg daily  The patient though had initially again presented to their ER the same day complaining of depression and suicidal ideation in the setting of being homeless  The patient though was kept overnight under observation and discharged next day  The patient then presented to the ER at [facility]  The patient was seen in the unit today  He reported he has been depressed for last few weeks  Patient reported he also had been having suicidal thoughts  The patient reports he has a recurrent episodes of depression for many years    He described his symptoms of depression as feeling very hopeless nowadays, feeling sad, poor sleep, low energy level, poor concentration, and anhedonia  He also reported having suicidal ideation with plan to cut his wrist   Reports 1 history of suicide attempt around 6 years back when he had slit his wrist   Denies any homicidal ideation  The patient also reported he has been very anxious for last few months  Reports occasional panic attack where he will have significant anxiety, crying difficulty breathing and shakiness  The patient also reports he has significant anxiety around people  Review of its notes indicates that the patient had been diagnosed with social phobia in the past     Denies any auditory or visual hallucination but patient endorses paranoia  He reports he feels people are out due to get him  Did not endorse any delusional ideation  Denies any history of manic or hypomanic episode  Denies any history of eating disorder or any symptoms or obsessive-compulsive disorder  The patient also denies any history of any abuse or life-threatening trauma in the past             Past Psychiatric History: In [address] [place] [address] Since:  Last 10 years    Past Inpatient Psychiatric Treatment:   Multiple past inpatient psychiatric admissions  Past Outpatient Psychiatric Treatment:    Has never seen a psychiatrist prior to admission  Was in outpatient psychiatric treatment in the past with a therapist  Past Suicide Attempts: yes  Past Violent Behavior: no  Past Psychiatric Medication Trials: patient does not remember    Traumatic History:     Abuse: none  Other Traumatic Events: none        Substance Abuse History:    I have assessed this patient for substance use within the past 12 months    Alcohol use: denies use  Recreational drug use:   Cocaine:  denies use  Heroin:  denies use  Marijuana:  used 1 time per week  Other drugs: denies use   Longest clean time: not applicable  History of Inpatient/Outpatient rehabilitation program: no  Smoking history: 1 pack per day  Use of caffeine: denies use    Family Psychiatric History:     Psychiatric Illness:  patient denies  Substance Abuse:  patient denies  Suicide Attempts:  patient denies    Social History:  Born & Raised in :  [place]  Childhood Experiences:  [place]  Education: 11th grade  Learning Disabilities: none  Marital History:   Children: 1 son  Living Arrangement: is presently homeless  Occupational History: on permanent disability  Functioning Relationships: poor support system his previous notes indicate his sister had been a good support  Legal History: none   History: None      Past Medical History:    History of Seizures: no  History of Head injury with loss of consciousness: no    Past Medical History:   Diagnosis Date    Anxiety     Depression     Panic attack     Psychiatric illness     Self-injurious behavior     Suicide attempt ([address] )      History reviewed  No pertinent surgical history  Medical Review Of Systems:    A comprehensive review of systems was negative  Allergies:    No Known Allergies    Medications: All current active medications have been reviewed      OBJECTIVE:    Vital signs in last 24 hours:    Temp:  [97 5 °F (36 4 °C)-98 3 °F (36 8 °C)] 98 3 °F (36 8 °C)  HR:  [] 79  Resp:  [16-18] 18  BP: (115-142)/(59-89) 139/68    No intake or output data in the 24 hours ending 08/22/20 1644     Mental Status Evaluation:    Appearance:  casually dressed   Behavior:  psychomotor retardation   Speech:  delayed, soft   Mood:  depressed, anxious   Affect:  flat   Language: unable to assess   Thought Process:  decreased rate of thoughts, slowing of thoughts   Associations: thought blocking   Thought Content:  paranoid ideation   Perceptual Disturbances: denies auditory or visual hallucinations when asked   Risk Potential: Suicidal ideation - None  Homicidal ideation - None  Potential for aggression - No   Sensorium:  oriented to person, place and time/date   Memory:  recent and remote memory grossly intact   Consciousness:  alert and awake   Attention: attention span and concentration are age appropriate   Intellect: normal   Fund of Knowledge: awareness of current events: unable to assess   Insight:  fair   Judgment: fair   Muscle Strength Muscle Tone: Did not assessed  Unable to assess   Gait/Station: normal gait/station   Motor Activity: no abnormal movements       Laboratory Results:   I have personally reviewed all pertinent laboratory/tests results  Most Recent Labs:   Lab Results   Component Value Date    WBC 4 90 08/22/2020    RBC 5 84 08/22/2020    HGB 16 1 08/22/2020    HCT 49 3 08/22/2020     08/22/2020    RDW 15 5 (H) 08/22/2020    NEUTROABS 2 40 08/22/2020    SODIUM 135 (L) 08/22/2020    K 4 5 08/22/2020    CL 99 08/22/2020    CO2 33 (H) 08/22/2020    BUN 13 08/22/2020    CREATININE 0 94 08/22/2020    GLUC 94 08/22/2020    CALCIUM 9 3 08/22/2020    AST 33 08/22/2020    ALT 44 08/22/2020    ALKPHOS 48 08/22/2020    TP 6 1 08/22/2020    ALB 3 7 08/22/2020    TBILI 0 40 08/22/2020    [id number] 2 499 08/10/2020    HGBA1C 5 7 (H) 08/01/2020     08/01/2020       Imaging Studies: No results found  Code Status: Prior  Advance Directive and Living Will: <no information>    Assessment/Plan  from evaluation of the patient today and review of his past notes, I believe patient meets the criteria for major depressive disorder, recurrent currently severe without psychosis  The patient currently struggling with multiple stressors including homelessness, separation from wife and not having seen his son for some time  The patient also has a significant history of mental health treatment in the past with recent discharge from psychiatric inpatient unit at [facility]  The patient has very poor support at this time  Plan plan is to restart his Effexor  mg once a day for his depression and anxiety    I will also restart the patient on Abilify 15 mg 1 tablet daily  His Effexor XR dose was just increased at the previous admission few days back and gradually can be further increased over next few days while he is here in the unit  I will also add trazodone 50 mg 1 tablet at bedtime as needed for poor sleep  Patient was educated about his medication is verbalized understanding and agrees with the plan  Will continue monitor the patient while he is here in the unit for his mood, behavior, safety, sleep and response to medication  Principal Problem:    MDD (major depressive disorder), recurrent severe, without psychosis ([facility] )  Active Problems:    Medical clearance for psychiatric admission    Prediabetes        Treatment Plan:     Planned Treatment and Medication Changes:     All current active medications have been reviewed  Encourage group therapy, milieu therapy and occupational therapy  Behavioral Health checks every 7 minutes  Continue current medications:  Current Facility-Administered Medications   Medication Dose Route Frequency Provider Last Rate    acetaminophen  650 mg Oral Q6H PRN Forest Damon MD      aluminum-magnesium hydroxide-simethicone  30 mL Oral Q4H PRN Forest Damon MD      ARIPiprazole  15 mg Oral Daily Winsome Diaz MD      benztropine  1 mg Intramuscular Q6H PRN Forest Damon MD      benztropine  1 mg Oral Q6H PRN Forest Damon MD      haloperidol  5 mg Oral Q6H PRN Forest Damon MD      haloperidol lactate  5 mg Intramuscular Q6H PRN Forest Damon MD      ibuprofen  600 mg Oral Q8H PRN Forest Damon MD      LORazepam  1 mg Intramuscular Q6H PRN Forest Damon MD      LORazepam  1 mg Oral Q6H PRN Forest Damon MD      magnesium hydroxide  30 mL Oral Daily PRN Forest Damon MD      nicotine  1 patch Transdermal Daily Forest Damon MD      traMADol  50 mg Oral Q6H PRN Forest Damon MD      traZODone  50 mg Oral HS PRN Forest Damon MD      venlafaxine  150 mg Oral Daily Winsome Diaz MD         Risks / Benefits of Treatment:    Risks, benefits, and possible side effects of medications explained to patient and patient verbalizes understanding and agreement for treatment  Counseling / Coordination of Care:    Patient's presentation on admission and proposed treatment plan discussed with treatment team   Diagnosis, medication changes and treatment plan reviewed with patient      Inpatient Psychiatric Certification:    Estimated length of stay: 5 midnights    Based upon physical, mental and social evaluations, I certify that inpatient psychiatric services are medically necessary for this patient for a duration of 5 midnights for the treatment of MDD (major depressive disorder), recurrent severe, without psychosis ([facility] )      Gilma Bautista MD 08/22/20

## 2020-08-22 NOTE — ED NOTES
Pt resting in room with eyes closed  Safety sitter at bedside maintaining pt safety  No signs of distress or discomfort  This nurse to continue monitoring        Ilan Shultz RN  08/22/20 0001

## 2020-08-22 NOTE — ED NOTES
Pt is resting in room in stretcher  No signs of distress or discomfort  Resp even and unlabored  Safety sitter at the bedside  This nurse will continue to monitor        Kai Willoughby RN  08/21/20 2029

## 2020-08-22 NOTE — TREATMENT TEAM
08/22/20 1000   Team Meeting   Meeting Type Daily Rounds   Team Members Present   Team Members Present Physician;Nurse; Other (Discipline and Name)   Physician Team Member Lili Mi   Nursing Team Member Mihaela   Other (Discipline and Name) Preston Martinez Resident   Patient/Family Present   Patient Present No   Patient's Family Present No     New admission 201 placed under Dr Fabricio Todd services, being seen by Dr Lili Mi

## 2020-08-23 LAB — TSH SERPL DL<=0.05 MIU/L-ACNC: 1.52 UIU/ML (ref 0.47–4.68)

## 2020-08-23 PROCEDURE — 99232 SBSQ HOSP IP/OBS MODERATE 35: CPT | Performed by: PSYCHIATRY & NEUROLOGY

## 2020-08-23 PROCEDURE — 84443 ASSAY THYROID STIM HORMONE: CPT | Performed by: PSYCHIATRY & NEUROLOGY

## 2020-08-23 RX ADMIN — ARIPIPRAZOLE 15 MG: 15 TABLET ORAL at 08:26

## 2020-08-23 RX ADMIN — NICOTINE 1 PATCH: 21 PATCH, EXTENDED RELEASE TRANSDERMAL at 08:27

## 2020-08-23 RX ADMIN — VENLAFAXINE HYDROCHLORIDE 150 MG: 150 CAPSULE, EXTENDED RELEASE ORAL at 08:26

## 2020-08-23 NOTE — PROGRESS NOTES
Pt is guarded, scant, constricted on approach, denies psych symptoms including SI, HI and A/V hallucinations  Socially withdrawn, spending most of shift in bedroom  Appears flat on approach but cooperative

## 2020-08-23 NOTE — PROGRESS NOTES
Progress Note - [id number] [address] Tyron Laura 28 y o  male MRN: [id number]   Unit/Bed#: José Luis Goode 922-00 Encounter: [id number]      Subjective:  Patient's chart reviewed and case discussed with the nurse  The patient was seen in his room  He reports he is doing little better than yesterday  He rates his depression today as 5 on a scale of 0-10 with 10 being the worst   Reports anxiety still as high and rates it around 7 on a scale of 0-10  He denies any suicidal ideation or any urges to hurt other  Reports slept good last night  Reports appetite and energy level also has been fine  Denies any auditory or visual hallucination  Reports compliant with the medication and denies any side effect  As per the nurse the patient has been guarded and mostly withdrawn  He though has been cooperative and no behavior issues  He did not required any p r n  Medication  ROS: no complaints, all other systems are negative    Mental Status Evaluation:    Appearance:  casually dressed   Behavior:  calm   Speech:  normal rate and volume   Mood:  depressed, anxious   Affect:  flat   Thought Process:  logical   Associations: intact associations   Thought Content:  normal   Perceptual Disturbances: none   Risk Potential: Suicidal ideation - None  Homicidal ideation - None  Potential for aggression - No   Sensorium:  oriented to person, place and time/date   Memory:  recent and remote memory grossly intact   Consciousness:  alert and awake   Attention: attention span and concentration are age appropriate   Insight:  age appropriate   Judgment: age appropriate   Gait/Station: normal gait/station   Motor Activity: no abnormal movements     Vital signs in last 24 hours:    Temp:  [98 2 °F (36 8 °C)-98 3 °F (36 8 °C)] 98 2 °F (36 8 °C)  HR:  [79-94] 94  Resp:  [16-18] 16  BP: (139-147)/(68-82) 147/82    Laboratory results:   I have personally reviewed all pertinent laboratory/tests results    Most Recent Labs:   Lab Results   Component Value Date    WBC 4 90 08/22/2020    RBC 5 84 08/22/2020    HGB 16 1 08/22/2020    HCT 49 3 08/22/2020     08/22/2020    RDW 15 5 (H) 08/22/2020    NEUTROABS 2 40 08/22/2020    SODIUM 135 (L) 08/22/2020    K 4 5 08/22/2020    CL 99 08/22/2020    CO2 33 (H) 08/22/2020    BUN 13 08/22/2020    CREATININE 0 94 08/22/2020    GLUC 94 08/22/2020    CALCIUM 9 3 08/22/2020    AST 33 08/22/2020    ALT 44 08/22/2020    ALKPHOS 48 08/22/2020    TP 6 1 08/22/2020    ALB 3 7 08/22/2020    TBILI 0 40 08/22/2020    [id number] 1 520 08/23/2020    HGBA1C 5 7 (H) 08/01/2020     08/01/2020       Progress Toward Goals: improving    Assessment/Plan  patient currently doing better though still has moderate depression and anxiety  The patient major issue has been homelessness due to which the patient would get depressed and suicidal and will return back to the hospital   Currently feels better with no SI  Plan is to continue with his current medication with no change  Will continue monitor the patient for his mood, behavior, safety, sleep and response to meds  Principal Problem:    MDD (major depressive disorder), recurrent severe, without psychosis ([facility] )  Active Problems:    Medical clearance for psychiatric admission    Prediabetes    Recommended Treatment:     Planned medication and treatment changes:     All current active medications have been reviewed  Encourage group therapy, milieu therapy and occupational therapy  Behavioral Health checks every 7 minutes  Continue current medications:  Current Facility-Administered Medications   Medication Dose Route Frequency Provider Last Rate    acetaminophen  650 mg Oral Q6H PRN Nathaniel Castillo MD      aluminum-magnesium hydroxide-simethicone  30 mL Oral Q4H PRN Nathaniel Castillo MD      ARIPiprazole  15 mg Oral Daily Wilian Ornelas MD      benztropine  1 mg Intramuscular Q6H PRN Nathaniel Castillo MD      benztropine  1 mg Oral Q6H PRN MD Mariano Jacques haloperidol  5 mg Oral Q6H PRN Sami Nava MD      haloperidol lactate  5 mg Intramuscular Q6H PRN Sami Nava MD      ibuprofen  600 mg Oral Q8H PRN Sami Nava MD      LORazepam  1 mg Intramuscular Q6H PRN Sami Nava MD      LORazepam  1 mg Oral Q6H PRN Sami Nava MD      magnesium hydroxide  30 mL Oral Daily PRN Sami Nava MD      nicotine  1 patch Transdermal Daily Sami Nava MD      traMADol  50 mg Oral Q6H PRN Saim Nava MD      traZODone  50 mg Oral HS PRN Sami Nava MD      venlafaxine  150 mg Oral Daily Mary Ellen Yanez MD         Risks / Benefits of Treatment:    Risks, benefits, and possible side effects of medications explained to patient and patient verbalizes understanding and agreement for treatment  Counseling / Coordination of Care:    Patient's progress discussed with staff in treatment team meeting  Medications, treatment progress and treatment plan reviewed with patient      Mary Ellen Yanez MD 08/23/20

## 2020-08-23 NOTE — PROGRESS NOTES
Pt denies SI, HI, AH and VH  Pt is calm and cooperative  Pt is isolative and withdrawn to his room laying in bed  Pt is visible for meals and needs only  Pt has no complaints or concerns  Medication and meal compliant  Will monitor

## 2020-08-24 RX ORDER — TRAZODONE HYDROCHLORIDE 50 MG/1
50 TABLET ORAL
Status: DISCONTINUED | OUTPATIENT
Start: 2020-08-24 | End: 2020-08-28

## 2020-08-24 RX ADMIN — NICOTINE 1 PATCH: 21 PATCH, EXTENDED RELEASE TRANSDERMAL at 08:24

## 2020-08-24 RX ADMIN — ARIPIPRAZOLE 15 MG: 15 TABLET ORAL at 08:23

## 2020-08-24 RX ADMIN — TRAZODONE HYDROCHLORIDE 50 MG: 50 TABLET ORAL at 21:20

## 2020-08-24 RX ADMIN — VENLAFAXINE HYDROCHLORIDE 150 MG: 150 CAPSULE, EXTENDED RELEASE ORAL at 08:23

## 2020-08-24 NOTE — TREATMENT PLAN
TREATMENT PLAN REVIEW - [address] [place] [address] Teboul 28 y o  1985 male MRN: [id number]    [address] [address] Room / Bed: Sydnie Gomez [facility] Encounter: [id number]          Admit Date/Time:  8/21/2020  6:41 PM    Treatment Team: Attending Provider: Jae Werner MD; Consulting Physician: Crissy Tang DO; Care Manager: Charles León; Registered Nurse: Tomasz Sosa RN; Patient Care Assistant: Dominga Najera; : CAMILA Galvan; Patient Care Technician: Reynaldo Watkins; Patient Care Technician: Alfreda Wall    Diagnosis: Principal Problem:    MDD (major depressive disorder), recurrent severe, without psychosis ([facility]  Active Problems:    Medical clearance for psychiatric admission    Prediabetes      Patient Strengths/Assets: compliant with medication, patient is on a voluntary commitment    Patient Barriers/Limitations: financial instability, homeless, poor insight, poor interpersonal skills    Short Term Goals: decrease in depressive symptoms, decrease in anxiety symptoms    Long Term Goals: resolution of depressive symptoms, stabilization of mood, free of suicidal thoughts    Progress Towards Goals: starting psychiatric medications as prescribed    Recommended Treatment: medication management, patient medication education, group therapy, milieu therapy, supportive therapy, continued Behavioral Health psychiatric evaluation/assessment process    Treatment Frequency: daily medication monitoring, group and milieu therapy daily, monitoring through interdisciplinary rounds, monitoring through weekly patient care conferences, monitoring through periodic consultation with family, individual psychotherapy on the unit daily, monitoring medication levels as indicated    Expected Discharge Date:  5 days    Discharge Plan: discharge to a homeless shelter, return to previous living arrangement    Treatment Plan Created/Updated By: Ramos Merlos MD

## 2020-08-24 NOTE — PLAN OF CARE
Problem: DISCHARGE PLANNING  Goal: Discharge to home or other facility with appropriate resources  Description: INTERVENTIONS:  - Identify barriers to discharge w/patient and caregiver  - Arrange for needed discharge resources and transportation as appropriate  - Identify discharge learning needs (meds, wound care, etc )  - Arrange for interpretive services to assist at discharge as needed  - Refer to Case Management Department for coordinating discharge planning if the patient needs post-hospital services based on physician/advanced practitioner order or complex needs related to functional status, cognitive ability, or social support system  Outcome: Progressing     Problem: Depression  Goal: Treatment Goal: Demonstrate behavioral control of depressive symptoms, verbalize feelings of improved mood/affect, and adopt new coping skills prior to discharge  Outcome: Not Progressing  Goal: Verbalize thoughts and feelings  Description: Interventions:  - Assess and re-assess patient's level of risk   - Engage patient in 1:1 interactions, daily, for a minimum of 15 minutes   - Encourage patient to express feelings, fears, frustrations, hopes   Outcome: Not Progressing     Problem: DEPRESSION  Goal: Will be euthymic at discharge  Description: INTERVENTIONS:  - Administer medication as ordered  - Provide emotional support via 1:1 interaction with staff  - Encourage involvement in milieu/groups/activities  - Monitor for social isolation  Outcome: Not Progressing     Problem: Ineffective Coping  Goal: Participates in unit activities  Description: Interventions:  - Provide therapeutic environment   - Provide required programming   - Redirect inappropriate behaviors   Outcome: Not Progressing

## 2020-08-24 NOTE — ED ATTENDING ATTESTATION
I was the attending physician on duty at the time the patient visited the emergency department  The patient was evaluated and dispositioned by the APC  I was personally available for consultation  I am administratively signing the chart after the fact      Antone Ormond, MD

## 2020-08-24 NOTE — PLAN OF CARE
Problem: DISCHARGE PLANNING  Goal: Discharge to home or other facility with appropriate resources  Description: INTERVENTIONS:  - Identify barriers to discharge w/patient and caregiver  - Arrange for needed discharge resources and transportation as appropriate  - Identify discharge learning needs (meds, wound care, etc )  - Arrange for interpretive services to assist at discharge as needed  - Refer to Case Management Department for coordinating discharge planning if the patient needs post-hospital services based on physician/advanced practitioner order or complex needs related to functional status, cognitive ability, or social support system  Outcome: Progressing     Problem: SELF HARM/SUICIDALITY  Goal: Will have no self-injury during hospital stay  Description: INTERVENTIONS:  - Q 15 MINUTES: Routine safety checks  - Q WAKING SHIFT & PRN: Assess risk to determine if routine checks are adequate to maintain patient safety  - Encourage patient to participate actively in care by formulating a plan to combat response to suicidal ideation, identify supports and resources  Outcome: Progressing     Problem: Depression  Goal: Treatment Goal: Demonstrate behavioral control of depressive symptoms, verbalize feelings of improved mood/affect, and adopt new coping skills prior to discharge  Outcome: Not Progressing  Goal: Verbalize thoughts and feelings  Description: Interventions:  - Assess and re-assess patient's level of risk   - Engage patient in 1:1 interactions, daily, for a minimum of 15 minutes   - Encourage patient to express feelings, fears, frustrations, hopes   Outcome: Not Progressing     Problem: DEPRESSION  Goal: Will be euthymic at discharge  Description: INTERVENTIONS:  - Administer medication as ordered  - Provide emotional support via 1:1 interaction with staff  - Encourage involvement in milieu/groups/activities  - Monitor for social isolation  Outcome: Not Progressing     Problem: Ineffective Coping  Goal: Participates in unit activities  Description: Interventions:  - Provide therapeutic environment   - Provide required programming   - Redirect inappropriate behaviors   Outcome: Not Progressing

## 2020-08-24 NOTE — PROGRESS NOTES
08/24/20 1009   Team Meeting   Meeting Type Tx Team Meeting   Initial Conference Date 08/24/20   Next Conference Date 09/24/20   Team Members Present   Team Members Present Physician;;Nurse   Physician Team Member Keli Childress Team Member Adam   Social Work Team Member Mamie Moss   Patient/Family Present   Patient Present Yes   Pt agreed and signed treatment plan   Copy given to PT and placed in discharge folder

## 2020-08-24 NOTE — PROGRESS NOTES
08/24/20 1027   Team Meeting   Meeting Type Daily Rounds   Team Members Present   Team Members Present Physician;;;Nurse;Occupational Therapist   Physician Team Member Keli Childress Team Member Adam   Care Management Team Member Lax   Social Work Team Member Florence   OT Team Member    Patient/Family Present   Patient Present No   Patient's Family Present No   Pt reviewed due to being new to treatment team  Pt is homeless with several readmits to hospitals in the [facility] since January 2020  Abilify 15mg, Effexor XR 150mg   Ativan 1mg given 8/21

## 2020-08-24 NOTE — PROGRESS NOTES
Patient seclusive to his room  Scant during interaction and guarded  Denies SI and continues to report depression  States that he is currently homeless which is the cause of his depression  Does report being compliant with his medications prior to admission  Will monitor

## 2020-08-24 NOTE — CASE MANAGEMENT
[facility] returned call for full care interview could be wait list, moderate less care will be less of a wait  Pt has been there 2x before and Pt left on his own without being successful so interview will address this  Meeting 2:30 Wednesday

## 2020-08-24 NOTE — PROGRESS NOTES
08/24/20 [address]   Readmission Root Cause   Who directed you to return to the hospital? Self   Did you understand whom to contact if you had questions or problems? Yes   Did you get your prescriptions before you left the hospital? Yes   Were you able to get your prescriptions filled when you left the hospital? Yes   Did you take your medications as prescribed? Yes   Were you able to get to your follow-up appointments? No   Patient Information   Mental Status Alert  (Pt was alert but answers were delayed)   Primary Caregiver Self   Support System Immediate family  (sister in [place])   Protestant/Cultural Requests none   Legal Information   Tx Plan Signed Yes   Current Status: 201   Legal Issues denied   Advance Directives Status Discussed - Patient/Family [address] Proxy Appointed No   Activities of Daily Living Prior to Admission   Functional Status Independent   Assistive Device No device needed   Living Arrangement Homeless   Ambulation Independent   Access to Firearms   Access to Firearms No   Income [address] SSI/SSD  ($385 due to working in [place], usually $780)   Means of Transportation   [employer] Payment of Transport to App: Public Transportation - Bus     Pt has [employer]  Pt lived in [facility] for about 15 years, was  until March 2020 when he was  and became homeless  Pt stayed with a friend for a few months and then with his sister in [place] for a month before returning to [facility] homeless  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  Pt receives SSI but has not reported that he is not longer working so amount can be adjusted  Hx of MDD and social phobia diagnosed about 10 years ago  PT was first hospitalized when he was approximately 22  Hx of self harm- healed cuts on left arm  PT claims to have been taking medications when DC from recent hospitalizations  Major factor for readmit Pt stated homelessness   Pt has no friends he can stay with and his sister no longer has room  His wife is staying with her parents, they are  since March  Pharmacy is [facility]  Pt was staying at [address] and can not return until September 8th  Pt answered questions but there was a delay and before answering PT would close eyes and appear to be gathering his thoughts in order to speak  Pt made poor eye contact  Pt is willing to relocate if homeless shelters are available elsewhere  PT has appointment with [facility] Wednesday       SAVANNA signed for outpatient mental health, PCP, shelter/mission, [facility], [address]

## 2020-08-24 NOTE — PROGRESS NOTES
Progress Note - [address]  Sanchez 28 y o  male MRN: [id number]  Unit/Bed#: Chhaya Gold 951-35 Encounter: [id number]      Subjective  No overnight events  Patient is mostly seclusive to his room  Patient complaining of interrupted sleep; has a good appetite  Patient rates his depression as 6/10 and states is anxious, however he contracts for safety in the unit  Patient carries a poor to fair eye contact during our interview  Patient is scant and concrete in his answers, establishing long pauses, and answers with 1 word sentences only  Patient denies suicidal homicidal ideation  Patient denies visual /auditory hallucinations       Behavior over the last 24 hours: No change  Sleep: frequent awakenings  Appetite: normal  Medication side effects: none  ROS: no complaints    Mental Status Evaluation:  Appearance:  alert, Poor to fair eye contact, appears stated age, appropriate grooming and hygiene and disheveled   Behavior:  sitting comfortably, psychomotor retardation, no abnormal movements and normal gait and balance   Attitude:  pleasant, limited cooperativity with interview, suspicious and guarded   Speech:  delayed initiation, clear, decreased rate, normal volume, scant and coherent   Mood:  depressed   Affect:  mood-incongruent and Flat affect   Thought Process:  organized, concrete, linear, goal directed, decreased rate of thoughts   Thought Content: no verbalized delusions, no overt paranoia, no obsessive thinking   Perceptual disturbances: no reported auditory hallucinations, no reported visual hallucinations and does not appear to be responding to internal stimuli at this time   Risk Potential: No active or passive suicidal or homicidal ideation, Low potential for aggression based on previous behavior   Cognition: memory grossly intact, appears to be of average intelligence, age-appropriate attention span and concentration and cognition not formally tested   Insight:  Limited   Judgment: Limited     Medications:   current meds:   Current Facility-Administered Medications   Medication Dose Route Frequency    acetaminophen (TYLENOL) tablet 650 mg  650 mg Oral Q6H PRN    aluminum-magnesium hydroxide-simethicone (MYLANTA) 200-200-20 mg/5 mL oral suspension 30 mL  30 mL Oral Q4H PRN    ARIPiprazole (ABILIFY) tablet 15 mg  15 mg Oral Daily    benztropine (COGENTIN) injection 1 mg  1 mg Intramuscular Q6H PRN    benztropine (COGENTIN) tablet 1 mg  1 mg Oral Q6H PRN    haloperidol (HALDOL) tablet 5 mg  5 mg Oral Q6H PRN    haloperidol lactate (HALDOL) injection 5 mg  5 mg Intramuscular Q6H PRN    ibuprofen (MOTRIN) tablet 600 mg  600 mg Oral Q8H PRN    LORazepam (ATIVAN) injection 1 mg  1 mg Intramuscular Q6H PRN    LORazepam (ATIVAN) tablet 1 mg  1 mg Oral Q6H PRN    magnesium hydroxide (MILK OF MAGNESIA) 400 mg/5 mL oral suspension 30 mL  30 mL Oral Daily PRN    nicotine (NICODERM CQ) 21 mg/24 hr TD 24 hr patch 1 patch  1 patch Transdermal Daily    traMADol (ULTRAM) tablet 50 mg  50 mg Oral Q6H PRN    traZODone (DESYREL) tablet 50 mg  50 mg Oral HS PRN    venlafaxine (EFFEXOR-XR) 24 hr capsule 150 mg  150 mg Oral Daily       Risks, benefits and possible side effects of Medications:   Risks, benefits, and possible side effects of medications explained to patient and patient verbalizes understanding  Labs: I have personally reviewed all pertinent laboratory results  I have personally reviewed all pertinent laboratory/tests results    Most Recent Labs:   Lab Results   Component Value Date    WBC 4 90 08/22/2020    RBC 5 84 08/22/2020    HGB 16 1 08/22/2020    HCT 49 3 08/22/2020     08/22/2020    RDW 15 5 (H) 08/22/2020    NEUTROABS 2 40 08/22/2020    SODIUM 135 (L) 08/22/2020    K 4 5 08/22/2020    CL 99 08/22/2020    CO2 33 (H) 08/22/2020    BUN 13 08/22/2020    CREATININE 0 94 08/22/2020    GLUC 94 08/22/2020    CALCIUM 9 3 08/22/2020    AST 33 08/22/2020    ALT 44 08/22/2020 ALKPHOS 48 08/22/2020    TP 6 1 08/22/2020    ALB 3 7 08/22/2020    TBILI 0 40 08/22/2020    [id number] 1 520 08/23/2020    HGBA1C 5 7 (H) 08/01/2020     08/01/2020           Assessment/Plan  - patient currently on:  Venlafaxine 150 mg daily  Aripiprazole 15 mg daily  - patient seems mostly seclusive to his room, non participative, and was encouraged to participate in groups and milieu therapy  - patient denies side effects to current medical management  - patient was discussed with Dr Kenzie Crawford; will add Trazodone to aid with sleep and was ecouraged to be more visible and participate in groups  - Clarified with patient the events that led to this admission, as well as his current living situation and stressors  Patient reports he is recently  from wife, whom he is legally  to, and who will have custody of his child  Patient also clarified that he smokes cigarretes and uses marijuana weekly  He denies all other drugs    Ashley Romero MD

## 2020-08-25 RX ADMIN — TRAZODONE HYDROCHLORIDE 50 MG: 50 TABLET ORAL at 21:19

## 2020-08-25 RX ADMIN — ARIPIPRAZOLE 15 MG: 15 TABLET ORAL at 08:25

## 2020-08-25 RX ADMIN — VENLAFAXINE HYDROCHLORIDE 150 MG: 150 CAPSULE, EXTENDED RELEASE ORAL at 08:25

## 2020-08-25 RX ADMIN — LORAZEPAM 1 MG: 1 TABLET ORAL at 19:22

## 2020-08-25 RX ADMIN — NICOTINE 1 PATCH: 21 PATCH, EXTENDED RELEASE TRANSDERMAL at 08:25

## 2020-08-25 NOTE — PROGRESS NOTES
Patient withdrawn coming out into the milieu only for needs  He is scant and seems guarded  Mood is depressed

## 2020-08-25 NOTE — PROGRESS NOTES
08/25/20 1415   Team Meeting   Meeting Type Daily Rounds   Team Members Present   Team Members Present Physician;;Nurse;;Occupational Therapist   Physician Team Member Keila Moreno   Nursing Team Member Adam   Care Management Team Member [place], [address] Work Team Member Verna Culp   OT Team Member    Patient/Family Present   Patient Present No   Patient's Family Present No   Pt has phone interview with [facility] Wednesday  Continue with current medication and dose  Pt major stressors are homelessness due to losing job and losing his marriage  Pt seclusion to room, scant, and guarded

## 2020-08-25 NOTE — PROGRESS NOTES
Admission Self assessment    Homelessness is reported as a primary stressor "I lived with my Sister, but I had to leave because she does not have enough room"  When asked about supports stated that he has an ICM  No family supports at this time, although his sister will talk to him on the phone  Currently receives SSI <400  No SNAP  Stated had rep Payee but "didn't like it"  " I like to manage my own money " When asked [facility]  "I'd like to go to [facility]"  Reports having lived in a group home setting prior  [facility] interview is set up per case management note  OT Will work with patient to prepare for interview and address reasons that he left on his own accord prior  Will also work on simplifying plan to better manage when contingencies arise/facilitate problem solving  Is not currently attending groups  Encouraged to do so  Also encouraged to come out of room during day in lieu of isolating in room  Will benefit from individual intervention, additional time for task (extended session time) ,a concrete but simplified written post discharge plan,  and milieu therapy

## 2020-08-25 NOTE — PLAN OF CARE
Problem: Depression  Goal: Treatment Goal: Demonstrate behavioral control of depressive symptoms, verbalize feelings of improved mood/affect, and adopt new coping skills prior to discharge  Outcome: Progressing  Goal: Verbalize thoughts and feelings  Description: Interventions:  - Assess and re-assess patient's level of risk   - Engage patient in 1:1 interactions, daily, for a minimum of 15 minutes   - Encourage patient to express feelings, fears, frustrations, hopes   Outcome: Progressing     Problem: DEPRESSION  Goal: Will be euthymic at discharge  Description: INTERVENTIONS:  - Administer medication as ordered  - Provide emotional support via 1:1 interaction with staff  - Encourage involvement in milieu/groups/activities  - Monitor for social isolation  Outcome: Progressing     Problem: Ineffective Coping  Goal: Participates in unit activities  Description: Interventions:  - Provide therapeutic environment   - Provide required programming   - Redirect inappropriate behaviors   Outcome: Progressing     Problem: DISCHARGE PLANNING  Goal: Discharge to home or other facility with appropriate resources  Description: INTERVENTIONS:  - Identify barriers to discharge w/patient and caregiver  - Arrange for needed discharge resources and transportation as appropriate  - Identify discharge learning needs (meds, wound care, etc )  - Arrange for interpretive services to assist at discharge as needed  - Refer to Case Management Department for coordinating discharge planning if the patient needs post-hospital services based on physician/advanced practitioner order or complex needs related to functional status, cognitive ability, or social support system  Outcome: Progressing     Problem: SELF HARM/SUICIDALITY  Goal: Will have no self-injury during hospital stay  Description: INTERVENTIONS:  - Q 15 MINUTES: Routine safety checks  - Q WAKING SHIFT & PRN: Assess risk to determine if routine checks are adequate to maintain patient safety  - Encourage patient to participate actively in care by formulating a plan to combat response to suicidal ideation, identify supports and resources  Outcome: Progressing

## 2020-08-25 NOTE — PROGRESS NOTES
Security brought Patient's belongings that were sent to security upon admission in the ED  Item brought up from Security were  Cell Phone, keys, Wallet,flashdrive with  [place] photo ID, Social Sec card, [employer] [address] [place] [address], health benefits ID card ( white and Purple), [employer], (3) [employer], (6) debit cards [employer], [employer]  Pt also has a [place] Access card and an Health Ins card for another Family member  With belongings  All cards sent to security  Cell phone, wallet, flash drive,keys put in locker

## 2020-08-25 NOTE — PROGRESS NOTES
Patient has been seclusive to his room  Remains scant and guarded on approach  Continues to appear sad/depressed  Denies SI  Will monitor

## 2020-08-25 NOTE — PROGRESS NOTES
Progress Note - [address]  Sanchez 28 y o  male MRN: [id number]  Unit/Bed#: Arua Moreno 439-63 Encounter: [id number]      Subjective  No overnight events  Patient remains scan, guarded, depressed  He remains mostly seclusive to self in his room  Patient is seen for continued care  Patient reports insomnia and frequent awakenings  He has good appetite  Patient denies depression however endorses anxiety related to his living situation and poor support system  He also describes anxiety related to being around people  Patient denies visual or auditory hallucinations  Patient has passive death wishes      He has been medication compliant and denies side effects to current medical management     Behavior over the last 24 hours: No change  Sleep: insomnia and frequent awakenings  Appetite: normal  Medication side effects: none  ROS: no complaints    Mental Status Evaluation:  Appearance:  alert, good eye contact, dressed in hospital attire, appears stated age and disheveled   Behavior:  laying in bed, psychomotor retardation, no abnormal movements and normal gait and balance   Attitude:  pleasant, limited cooperativity with interview and guarded   Speech:  delayed initiation, clear, decreased rate, normal volume, scant and coherent   Mood:  depressed   Affect:  mood-congruent, constricted and flat affect   Thought Process:  organized, logical, concrete, linear, goal directed, normal rate of thoughts   Thought Content: no verbalized delusions, no overt paranoia, no obsessive thinking   Perceptual disturbances: no reported auditory hallucinations, no reported visual hallucinations and does not appear to be responding to internal stimuli at this time   Risk Potential: Passive death wishes, No active homicidal ideation, Low potential for aggression based on previous behavior   Cognition: memory grossly intact, appears to be of average intelligence, age-appropriate attention span and concentration and cognition not formally tested   Insight:  Limited   Judgment: Limited     Medications:   all current active meds have been reviewed, continue current psychiatric medications and current meds:   Current Facility-Administered Medications   Medication Dose Route Frequency    acetaminophen (TYLENOL) tablet 650 mg  650 mg Oral Q6H PRN    aluminum-magnesium hydroxide-simethicone (MYLANTA) 200-200-20 mg/5 mL oral suspension 30 mL  30 mL Oral Q4H PRN    ARIPiprazole (ABILIFY) tablet 15 mg  15 mg Oral Daily    benztropine (COGENTIN) injection 1 mg  1 mg Intramuscular Q6H PRN    benztropine (COGENTIN) tablet 1 mg  1 mg Oral Q6H PRN    haloperidol (HALDOL) tablet 5 mg  5 mg Oral Q6H PRN    haloperidol lactate (HALDOL) injection 5 mg  5 mg Intramuscular Q6H PRN    ibuprofen (MOTRIN) tablet 600 mg  600 mg Oral Q8H PRN    LORazepam (ATIVAN) injection 1 mg  1 mg Intramuscular Q6H PRN    LORazepam (ATIVAN) tablet 1 mg  1 mg Oral Q6H PRN    magnesium hydroxide (MILK OF MAGNESIA) 400 mg/5 mL oral suspension 30 mL  30 mL Oral Daily PRN    nicotine (NICODERM CQ) 21 mg/24 hr TD 24 hr patch 1 patch  1 patch Transdermal Daily    traMADol (ULTRAM) tablet 50 mg  50 mg Oral Q6H PRN    traZODone (DESYREL) tablet 50 mg  50 mg Oral HS PRN    traZODone (DESYREL) tablet 50 mg  50 mg Oral HS    venlafaxine (EFFEXOR-XR) 24 hr capsule 150 mg  150 mg Oral Daily       Risks, benefits and possible side effects of Medications:   Risks, benefits, and possible side effects of medications explained to patient and patient verbalizes understanding  Labs: I have personally reviewed all pertinent laboratory results  I have personally reviewed all pertinent laboratory/tests results    Most Recent Labs:   Lab Results   Component Value Date    WBC 4 90 08/22/2020    RBC 5 84 08/22/2020    HGB 16 1 08/22/2020    HCT 49 3 08/22/2020     08/22/2020    RDW 15 5 (H) 08/22/2020    NEUTROABS 2 40 08/22/2020    SODIUM 135 (L) 08/22/2020    K 4 5 08/22/2020    CL 99 08/22/2020    CO2 33 (H) 08/22/2020    BUN 13 08/22/2020    CREATININE 0 94 08/22/2020    GLUC 94 08/22/2020    CALCIUM 9 3 08/22/2020    AST 33 08/22/2020    ALT 44 08/22/2020    ALKPHOS 48 08/22/2020    TP 6 1 08/22/2020    ALB 3 7 08/22/2020    TBILI 0 40 08/22/2020    [id number] 1 520 08/23/2020    HGBA1C 5 7 (H) 08/01/2020     08/01/2020           Assessment/Plan  - patient currently on aripiprazole 15 mg daily  Trazodone 50 mg q h s  Venlafaxine 150 mg daily  - patient denies side effects to current medical management  - patient displaying any symptoms of hattie  - discussed at length with patient and encouraged patient to remain out of his room, social, and participating groups  - 's input much appreciated  Informed the patient cannot return to [facility] until September 8   - patient is scheduled for an intake appointment at [facility] tomorrow         Natalya Torres MD

## 2020-08-26 PROCEDURE — 86480 TB TEST CELL IMMUN MEASURE: CPT | Performed by: PSYCHIATRY & NEUROLOGY

## 2020-08-26 RX ORDER — HYDROXYZINE 50 MG/1
50 TABLET, FILM COATED ORAL EVERY 8 HOURS PRN
Status: DISCONTINUED | OUTPATIENT
Start: 2020-08-26 | End: 2020-09-01 | Stop reason: HOSPADM

## 2020-08-26 RX ORDER — HYDROXYZINE HYDROCHLORIDE 25 MG/1
25 TABLET, FILM COATED ORAL EVERY 8 HOURS PRN
Status: DISCONTINUED | OUTPATIENT
Start: 2020-08-26 | End: 2020-09-01 | Stop reason: HOSPADM

## 2020-08-26 RX ORDER — HYDROXYZINE HYDROCHLORIDE 10 MG/1
10 TABLET, FILM COATED ORAL EVERY 8 HOURS PRN
Status: DISCONTINUED | OUTPATIENT
Start: 2020-08-26 | End: 2020-09-01 | Stop reason: HOSPADM

## 2020-08-26 RX ADMIN — ARIPIPRAZOLE 15 MG: 15 TABLET ORAL at 08:13

## 2020-08-26 RX ADMIN — LORAZEPAM 1 MG: 1 TABLET ORAL at 11:44

## 2020-08-26 RX ADMIN — HYDROXYZINE HYDROCHLORIDE 50 MG: 50 TABLET, FILM COATED ORAL at 19:24

## 2020-08-26 RX ADMIN — NICOTINE 1 PATCH: 21 PATCH, EXTENDED RELEASE TRANSDERMAL at 08:13

## 2020-08-26 RX ADMIN — VENLAFAXINE HYDROCHLORIDE 150 MG: 150 CAPSULE, EXTENDED RELEASE ORAL at 08:13

## 2020-08-26 RX ADMIN — TRAZODONE HYDROCHLORIDE 50 MG: 50 TABLET ORAL at 21:18

## 2020-08-26 NOTE — PROGRESS NOTES
Patient has been more visible this evening  He remains scant  He complained of anxiety and requested prn mid evening - Ativan 1mg po given at 1922 with effect  He denies S  I

## 2020-08-26 NOTE — PROGRESS NOTES
Meeting to discuss coping skills, and prepare for meeting with [facility]  Reports leaving group home setting 2 times prior, both due to a female he had begun a relationship with  Able to set short/long term goals with minimal supervision and extra time for task  which include attaining work and "stability"  Verbal responses continue to have an approximate 10-15 second delay, and at times longer, however it is intermittent  Reports having to "think about some things for a long time" before answering  Answers which are provided are relevant  Admits feeling nervous about the meeting, but expectations were realistic and he hopes for the best      In addition to talk through in preparation for meeting, the following    Education Provided:  Breathing Techniques  Mindfulness  Guided Imagery  Slight decrease in anxiety reported after practicing these techniques  Able to demonstrate skills, with good carryover  Continues to benefit from milieu, group and individual therapy interventions to create just right challenges and develop communication skills with peers/staff

## 2020-08-26 NOTE — PLAN OF CARE
Meeting with [facility] at 2:30 today  Pt states he is anxious for  phone interview  SW explained Pt has been to [facility] in the past so this interview will be similar  Pt is nervous about not having place to live upon DC    Problem: Depression  Goal: Verbalize thoughts and feelings  Description: Interventions:  - Assess and re-assess patient's level of risk   - Engage patient in 1:1 interactions, daily, for a minimum of 15 minutes   - Encourage patient to express feelings, fears, frustrations, hopes   Outcome: Progressing     Problem: DEPRESSION  Goal: Will be euthymic at discharge  Description: INTERVENTIONS:  - Administer medication as ordered  - Provide emotional support via 1:1 interaction with staff  - Encourage involvement in milieu/groups/activities  - Monitor for social isolation  Outcome: Progressing     Problem: Ineffective Coping  Goal: Participates in unit activities  Description: Interventions:  - Provide therapeutic environment   - Provide required programming   - Redirect inappropriate behaviors   Outcome: Progressing     Problem: DISCHARGE PLANNING  Goal: Discharge to home or other facility with appropriate resources  Description: INTERVENTIONS:  - Identify barriers to discharge w/patient and caregiver  - Arrange for needed discharge resources and transportation as appropriate  - Identify discharge learning needs (meds, wound care, etc )  - Arrange for interpretive services to assist at discharge as needed  - Refer to Case Management Department for coordinating discharge planning if the patient needs post-hospital services based on physician/advanced practitioner order or complex needs related to functional status, cognitive ability, or social support system  Outcome: Progressing     Problem: SELF HARM/SUICIDALITY  Goal: Will have no self-injury during hospital stay  Description: INTERVENTIONS:  - Q 15 MINUTES: Routine safety checks  - Q WAKING SHIFT & PRN: Assess risk to determine if routine checks are adequate to maintain patient safety  - Encourage patient to participate actively in care by formulating a plan to combat response to suicidal ideation, identify supports and resources  Outcome: Progressing

## 2020-08-26 NOTE — PROGRESS NOTES
08/26/20 1100   Activity/Group Checklist   Group   (recovery group)   Attendance Attended   Attendance Duration (min) 31-45   Interactions Interacted appropriately   Affect/Mood Appropriate   Goals Achieved Able to listen to others; Able to engage in interactions

## 2020-08-26 NOTE — CASE MANAGEMENT
Interview completed with [facility]  Call to [facility] to report address change and discuss having redetermination due to Pt not living with his sister anymore  Pt would like to leave by Friday and go to [facility] if [facility] is not option   PT was at [facility] in April for two weeks and [facility] did not accept him then

## 2020-08-26 NOTE — PROGRESS NOTES
08/26/20 1108   Team Meeting   Meeting Type Daily Rounds   Team Members Present   Team Members Present Physician;Nurse;;; Occupational Therapist   Physician Team Member Lilliana Amos   Nursing Team Member Adam   Care Management Team Member [place], [facility] Work Team Member Florence   OT Team Member    Patient/Family Present   Patient Present No   Patient's Family Present No   Pt is med compliant  More visual on the unit but stays to self  Reports feeling less anxious and depressed than upon admit  [facility] interview today   Discuss DC pending [facility] interview

## 2020-08-26 NOTE — PROGRESS NOTES
Progress Note - Ascencion Payne [age] y o  male MRN: [id number]  Unit/Bed#: Fay Osler 531-07 Encounter: [id number]      Subjective  As per morning rounds patient required Ativan p o  For anxiety  Patient has been more visible in the unit and participated in groups  Patient was seen for continuing care  Patient slept well and has a good appetite  Patient went to 2 groups and followed our encouraged recommendations to be more visible in the unit  Patient is seen smiling, proud of being more visible and following directions  He rates his depression a 4/10 and has mild anxiety related to his upcoming phone interview intake with [facility]  Patient denies visual or auditory hallucinations  Patient denies any symptoms consistent with hattie  Patient has been medication compliant and denies any side effects related to his current medical regimen  Between the time that the patient was interviewed by me in this note dictated, is noted that patient required Ativan p o  For anxiety again       Behavior over the last 24 hours: Somewhat improved  Sleep: normal  Appetite: normal  Medication side effects: none  ROS: no complaints    Mental Status Evaluation:  Appearance:  alert, good eye contact, dressed in hospital attire, appears stated age, appropriate grooming and hygiene and smiling   Behavior:  sitting comfortably, no abnormal movements and normal gait and balance   Attitude:  pleasant and cooperative   Speech:  spontaneous, clear, normal rate, normal volume, scant and coherent   Mood:  euthymic   Affect:  mood-congruent and brighter than previous days   Thought Process:  organized, logical, concrete, coherent, linear, goal directed, normal rate of thoughts   Thought Content: no verbalized delusions, no overt paranoia, no obsessive thinking   Perceptual disturbances: no reported auditory hallucinations, no reported visual hallucinations and does not appear to be responding to internal stimuli at this time Risk Potential: No active or passive suicidal or homicidal ideation, Low potential for aggression based on previous behavior   Cognition: oriented to person, place, time, and situation, memory grossly intact, appears to be of average intelligence, age-appropriate attention span and concentration and cognition not formally tested   Insight:  Limited   Judgment: Limited     Medications:   all current active meds have been reviewed, continue current psychiatric medications and current meds:   Current Facility-Administered Medications   Medication Dose Route Frequency    acetaminophen (TYLENOL) tablet 650 mg  650 mg Oral Q6H PRN    aluminum-magnesium hydroxide-simethicone (MYLANTA) 200-200-20 mg/5 mL oral suspension 30 mL  30 mL Oral Q4H PRN    ARIPiprazole (ABILIFY) tablet 15 mg  15 mg Oral Daily    benztropine (COGENTIN) injection 1 mg  1 mg Intramuscular Q6H PRN    benztropine (COGENTIN) tablet 1 mg  1 mg Oral Q6H PRN    haloperidol (HALDOL) tablet 5 mg  5 mg Oral Q6H PRN    haloperidol lactate (HALDOL) injection 5 mg  5 mg Intramuscular Q6H PRN    hydrOXYzine HCL (ATARAX) tablet 10 mg  10 mg Oral Q8H PRN    hydrOXYzine HCL (ATARAX) tablet 25 mg  25 mg Oral Q8H PRN    hydrOXYzine HCL (ATARAX) tablet 50 mg  50 mg Oral Q8H PRN    ibuprofen (MOTRIN) tablet 600 mg  600 mg Oral Q8H PRN    magnesium hydroxide (MILK OF MAGNESIA) 400 mg/5 mL oral suspension 30 mL  30 mL Oral Daily PRN    nicotine (NICODERM CQ) 21 mg/24 hr TD 24 hr patch 1 patch  1 patch Transdermal Daily    traMADol (ULTRAM) tablet 50 mg  50 mg Oral Q6H PRN    traZODone (DESYREL) tablet 50 mg  50 mg Oral HS PRN    traZODone (DESYREL) tablet 50 mg  50 mg Oral HS    venlafaxine (EFFEXOR-XR) 24 hr capsule 150 mg  150 mg Oral Daily       Risks, benefits and possible side effects of Medications:   Risks, benefits, and possible side effects of medications explained to patient and patient verbalizes understanding  Labs:  I have personally reviewed all pertinent laboratory results  I have personally reviewed all pertinent laboratory/tests results  Most Recent Labs:   Lab Results   Component Value Date    WBC 4 90 08/22/2020    RBC 5 84 08/22/2020    HGB 16 1 08/22/2020    HCT 49 3 08/22/2020     08/22/2020    RDW 15 5 (H) 08/22/2020    NEUTROABS 2 40 08/22/2020    SODIUM 135 (L) 08/22/2020    K 4 5 08/22/2020    CL 99 08/22/2020    CO2 33 (H) 08/22/2020    BUN 13 08/22/2020    CREATININE 0 94 08/22/2020    GLUC 94 08/22/2020    CALCIUM 9 3 08/22/2020    AST 33 08/22/2020    ALT 44 08/22/2020    ALKPHOS 48 08/22/2020    TP 6 1 08/22/2020    ALB 3 7 08/22/2020    TBILI 0 40 08/22/2020    [id number] 1 520 08/23/2020    HGBA1C 5 7 (H) 08/01/2020     08/01/2020           Assessment/Plan  - case was discussed with the attending Dr Majo Narvaez   -patient has been compliant with current medical management, showing no side effects or signs consistent with hattie  -patient is currently on:  Aripiprazole 15 mg daily  Trazodone 50 mg q h s  Venlafaxine 150 mg daily  - in light of patient's anxiety and recurrent use of Ativan p r n , will discontinue parent dosages of Ativan and start patient on Atarax as follows 10 mg p r n  For mild anxiety; 25 mg p r n  For moderate anxiety; 50 mg p r n  Severe anxiety   - [facility] will require PPD testing/QuantiFERON TB testing to consider patient as positive candidate  Ordered QuantiFERON testing for tuberculosis status assessment      Desi Maurer MD

## 2020-08-27 RX ORDER — HYDROXYZINE HYDROCHLORIDE 25 MG/1
25 TABLET, FILM COATED ORAL DAILY
Status: DISCONTINUED | OUTPATIENT
Start: 2020-08-28 | End: 2020-09-01 | Stop reason: HOSPADM

## 2020-08-27 RX ADMIN — VENLAFAXINE HYDROCHLORIDE 150 MG: 150 CAPSULE, EXTENDED RELEASE ORAL at 08:10

## 2020-08-27 RX ADMIN — ARIPIPRAZOLE 15 MG: 15 TABLET ORAL at 08:10

## 2020-08-27 RX ADMIN — NICOTINE 1 PATCH: 21 PATCH, EXTENDED RELEASE TRANSDERMAL at 08:10

## 2020-08-27 NOTE — PROGRESS NOTES
Progress Note - [address]  Sanchez 28 y o  male MRN: [id number]  Unit/Bed#: Arabella Bey 294-18 Encounter: [id number]      Subjective  No overnight events  He had a positive phone call intake with [facility] yesterday  Patient was found in his room this a m  He was interviewed for continued care  He states he did not sleep well, and has a good appetite  He denies depression  He denies being anxious  He was asking about being discharged tomorrow and seemed upset when he was informed that he would tentatively be discharged on Monday as discussed in morning rounds  Patient denies suicidal thoughts or homicidal thoughts  Patient denies visual or auditory hallucinations  Patient denies any side effects of his current medical management, which he has been compliant with  Patient denies any symptoms consistent with hattie       Behavior over the last 24 hours: No change  Sleep: normal  Appetite: normal  Medication side effects: none  ROS: no complaints    Mental Status Evaluation:  Appearance:  alert, good eye contact, appears stated age and appropriate grooming and hygiene   Behavior:  sitting comfortably, no abnormal movements and normal gait and balance   Attitude:  pleasant and cooperative   Speech:  clear, normal rate, normal volume, scant and coherent   Mood:  euthymic   Affect:  mood-congruent   Thought Process:  organized, logical, coherent, linear, goal directed, normal rate of thoughts   Thought Content: no verbalized delusions, no overt paranoia, no obsessive thinking   Perceptual disturbances: no reported auditory hallucinations, no reported visual hallucinations and does not appear to be responding to internal stimuli at this time   Risk Potential: No active or passive suicidal or homicidal ideation, Low potential for aggression based on previous behavior   Cognition: oriented to person, place, time, and situation, memory grossly intact, appears to be of average intelligence, age-appropriate attention span and concentration and cognition not formally tested   Insight:  Fair   Judgment: Fair     Medications:   all current active meds have been reviewed, continue current psychiatric medications, current meds:   Current Facility-Administered Medications   Medication Dose Route Frequency    acetaminophen (TYLENOL) tablet 650 mg  650 mg Oral Q6H PRN    aluminum-magnesium hydroxide-simethicone (MYLANTA) 200-200-20 mg/5 mL oral suspension 30 mL  30 mL Oral Q4H PRN    ARIPiprazole (ABILIFY) tablet 15 mg  15 mg Oral Daily    benztropine (COGENTIN) injection 1 mg  1 mg Intramuscular Q6H PRN    benztropine (COGENTIN) tablet 1 mg  1 mg Oral Q6H PRN    haloperidol (HALDOL) tablet 5 mg  5 mg Oral Q6H PRN    haloperidol lactate (HALDOL) injection 5 mg  5 mg Intramuscular Q6H PRN    hydrOXYzine HCL (ATARAX) tablet 10 mg  10 mg Oral Q8H PRN    hydrOXYzine HCL (ATARAX) tablet 25 mg  25 mg Oral Q8H PRN    [START ON 8/28/2020] hydrOXYzine HCL (ATARAX) tablet 25 mg  25 mg Oral Daily    hydrOXYzine HCL (ATARAX) tablet 50 mg  50 mg Oral Q8H PRN    ibuprofen (MOTRIN) tablet 600 mg  600 mg Oral Q8H PRN    magnesium hydroxide (MILK OF MAGNESIA) 400 mg/5 mL oral suspension 30 mL  30 mL Oral Daily PRN    nicotine (NICODERM CQ) 21 mg/24 hr TD 24 hr patch 1 patch  1 patch Transdermal Daily    traMADol (ULTRAM) tablet 50 mg  50 mg Oral Q6H PRN    traZODone (DESYREL) tablet 50 mg  50 mg Oral HS PRN    traZODone (DESYREL) tablet 50 mg  50 mg Oral HS    venlafaxine (EFFEXOR-XR) 24 hr capsule 150 mg  150 mg Oral Daily    and planned medication changes: Will add Atarax 25 mg daily to scheduled medications    Risks, benefits and possible side effects of Medications:   Risks, benefits, and possible side effects of medications explained to patient and patient verbalizes understanding  Labs: I have personally reviewed all pertinent laboratory results     I have personally reviewed all pertinent laboratory/tests results  Most Recent Labs:   Lab Results   Component Value Date    WBC 4 90 08/22/2020    RBC 5 84 08/22/2020    HGB 16 1 08/22/2020    HCT 49 3 08/22/2020     08/22/2020    RDW 15 5 (H) 08/22/2020    NEUTROABS 2 40 08/22/2020    SODIUM 135 (L) 08/22/2020    K 4 5 08/22/2020    CL 99 08/22/2020    CO2 33 (H) 08/22/2020    BUN 13 08/22/2020    CREATININE 0 94 08/22/2020    GLUC 94 08/22/2020    CALCIUM 9 3 08/22/2020    AST 33 08/22/2020    ALT 44 08/22/2020    ALKPHOS 48 08/22/2020    TP 6 1 08/22/2020    ALB 3 7 08/22/2020    TBILI 0 40 08/22/2020    [id number] 1 520 08/23/2020    HGBA1C 5 7 (H) 08/01/2020     08/01/2020           Assessment/Plan  - patient has been more visible in the unit and remaining out of his bedroom  - patient does report disappointment when he inquired about being discharged tomorrow  - patient was discussed with the attending   - will add Atarax 25 mg daily to his current medical management  - patient's dispo is tentative discharge on Monday pending 's input      Marta Thompson MD

## 2020-08-27 NOTE — PROGRESS NOTES
Patient a bit more visible this morning  Sitting in day room alone putting a puzzle together  Continues to appear depressed, no complaints of anxiety as of this time  Denies SI  States that he did not sleep well last night  Hopeful that things will work at with [address]  Will monitor

## 2020-08-27 NOTE — PROGRESS NOTES
08/27/20 1100   Activity/Group Checklist   Group   (recovery group)   Attendance Attended   Attendance Duration (min) 31-45  (left early)   Interactions Did not interact   Affect/Mood Blunted/flat   Goals Achieved Able to listen to others

## 2020-08-27 NOTE — PLAN OF CARE
Pt was dressed and sitting in dining area  Pt was alone but was out of his room  Pt greeted SW and discussed yesterday [facility] interview, Pt is hopeful for placement and being DC early next week    Problem: Depression  Goal: Verbalize thoughts and feelings  Description: Interventions:  - Assess and re-assess patient's level of risk   - Engage patient in 1:1 interactions, daily, for a minimum of 15 minutes   - Encourage patient to express feelings, fears, frustrations, hopes   Outcome: Progressing     Problem: DEPRESSION  Goal: Will be euthymic at discharge  Description: INTERVENTIONS:  - Administer medication as ordered  - Provide emotional support via 1:1 interaction with staff  - Encourage involvement in milieu/groups/activities  - Monitor for social isolation  Outcome: Progressing     Problem: Ineffective Coping  Goal: Participates in unit activities  Description: Interventions:  - Provide therapeutic environment   - Provide required programming   - Redirect inappropriate behaviors   Outcome: Progressing     Problem: DISCHARGE PLANNING  Goal: Discharge to home or other facility with appropriate resources  Description: INTERVENTIONS:  - Identify barriers to discharge w/patient and caregiver  - Arrange for needed discharge resources and transportation as appropriate  - Identify discharge learning needs (meds, wound care, etc )  - Arrange for interpretive services to assist at discharge as needed  - Refer to Case Management Department for coordinating discharge planning if the patient needs post-hospital services based on physician/advanced practitioner order or complex needs related to functional status, cognitive ability, or social support system  Outcome: Progressing     Problem: SELF HARM/SUICIDALITY  Goal: Will have no self-injury during hospital stay  Description: INTERVENTIONS:  - Q 15 MINUTES: Routine safety checks  - Q WAKING SHIFT & PRN: Assess risk to determine if routine checks are adequate to maintain patient safety  - Encourage patient to participate actively in care by formulating a plan to combat response to suicidal ideation, identify supports and resources  Outcome: Progressing

## 2020-08-27 NOTE — PROGRESS NOTES
Progress Note - Ascencion Payne [age] y o  male MRN: [id number]  Unit/Bed#: [facility] 033-38 Encounter: [id number]    Assessment/Plan   Principal Problem:    MDD (major depressive disorder), recurrent severe, without psychosis ([facility] [address] )  Active Problems:    Medical clearance for psychiatric admission    Prediabetes      Subjective:Patient was seen today for continuation of care, records reviewed and  patient was discussed with the  with the attending and resident previously  Duque Brittanie presented calm, cooperative and pleasant upon approach  He reported his mood as "I am fine" which was mood congruent  He did report having trouble falling and staying asleep last night  He complained nightmares and when asked he is stated that he slept with the Nicotine patch on  He was educated on having this removed prior going to bed to avoid any adverse side effects  Reported having good appetite and has been attending groups  He is present these are to be discharged soon  Patient was smiling appropriately and presented with the sofa and organized speech  He requested to have Trazodone increased to 200 mg at HS as he was on this dose 1 year ago  We discussed increasing slowly to 100 mg for tonight and discussed with provider over the weekend if not effective for a possible titration  He denies endorsing any suicidal thoughts  Patient denies endorsing any suicidal or homicidal ideation  Continues to denies endorsing any auditory visual hallucinations Remains medication compliance  Denies any acute side effects from medications      Psychiatric Review of Systems:    Sleep: difficulty falling asleep, frequent awakenings, disturbing dreams  Appetite: normal  Medication side effects: No   ROS: no complaints, denies any headache, shortness of breath or chest pain, all other systems are negative    Vitals:  Vitals:    08/28/20 0737   BP: 119/81   Pulse: 76   Resp: 16   Temp: 98 3 °F (36 8 °C)   SpO2:        Mental Status Evaluation:    Appearance:  casually dressed, dressed appropriately, adequate grooming, tattooed   Behavior:  pleasant, cooperative, calm, good eye contact   Speech:  normal rate and volume, fluent, clear   Mood:  less depressed   Affect:  appropriate, reactive, mood-congruent   Thought Process:  organized, goal directed, linear   Associations: intact associations   Thought Content:  no overt delusions   Perceptual Disturbances: no auditory hallucinations, no visual hallucinations   Risk Potential: Suicidal ideation - None  Homicidal ideation - None  Potential for aggression - No   Sensorium:  oriented to person, place, time/date and situation   Memory:  recent and remote memory grossly intact   Consciousness:  alert and awake   Attention: attention span and concentration are age appropriate   Insight:  fair   Judgment: fair   Gait/Station: normal gait/station, normal balance   Motor Activity: no abnormal movements     Laboratory results:    I have personally reviewed all pertinent laboratory/tests results    Most Recent Labs:   Lab Results   Component Value Date    WBC 4 90 08/22/2020    RBC 5 84 08/22/2020    HGB 16 1 08/22/2020    HCT 49 3 08/22/2020     08/22/2020    RDW 15 5 (H) 08/22/2020    NEUTROABS 2 40 08/22/2020    SODIUM 135 (L) 08/22/2020    K 4 5 08/22/2020    CL 99 08/22/2020    CO2 33 (H) 08/22/2020    BUN 13 08/22/2020    CREATININE 0 94 08/22/2020    GLUC 94 08/22/2020    CALCIUM 9 3 08/22/2020    AST 33 08/22/2020    ALT 44 08/22/2020    ALKPHOS 48 08/22/2020    TP 6 1 08/22/2020    ALB 3 7 08/22/2020    TBILI 0 40 08/22/2020    [id number] 1 520 08/23/2020    HGBA1C 5 7 (H) 08/01/2020     08/01/2020       Progress Toward Goals:     Improving    Recommended Treatment:     All current active medications have been reviewed  Encourage group therapy, milieu therapy and occupational therapy  Behavioral Health checks every 7 minutes  Observe progress over the weekend  Discharge planned for Monday     Increase trazodone to 100 mg daily to alleviate insomnia and depressive sxs's  Titrate slowly over the weekend if needed (pt reports being 200 mg previously)  Added Melatonin 3 mg p r n  For insomnia        Continue all other medications:  Current Facility-Administered Medications   Medication Dose Route Frequency Provider Last Rate    acetaminophen  650 mg Oral Q6H PRN Rhjason Givens MD      aluminum-magnesium hydroxide-simethicone  30 mL Oral Q4H PRN Rhunette Colmarcela, MD      ARIPiprazole  15 mg Oral Daily Stefania Bernabe MD      benztropine  1 mg Intramuscular Q6H PRN Rhunette Cola, MD      benztropine  1 mg Oral Q6H PRN Rhunettwanda Colmarcela, MD      haloperidol  5 mg Oral Q6H PRN Sarbjitunette Colmarcela, MD      haloperidol lactate  5 mg Intramuscular Q6H PRN Sarbjitunette Colmarcela, MD      hydrOXYzine HCL  10 mg Oral Q8H PRN Ashley Romero MD      hydrOXYzine HCL  25 mg Oral Q8H PRN Ashley Romero MD      hydrOXYzine HCL  25 mg Oral Daily Ashley Romero MD      hydrOXYzine HCL  50 mg Oral Q8H PRN Ashley Romero MD      ibuprofen  600 mg Oral Q8H PRN Bibiana Givens MD      magnesium hydroxide  30 mL Oral Daily PRN Bibiana Givens MD      melatonin  3 mg Oral HS PRN PIPO Zamora      nicotine  1 patch Transdermal Daily Bibiana Givens MD      traMADol  50 mg Oral Q6H PRN Bibiana Givens MD      traZODone  100 mg Oral HS PIPO Zamora      traZODone  50 mg Oral HS PRN Bibiana Givens MD      venlafaxine  150 mg Oral Daily Stefania Bernabe MD         Risks / Benefits of Treatment:     Risks, benefits, and possible side effects of medications explained to patient and patient verbalizes understanding and agreement for treatment  Counseling / Coordination of Care:     Patient's progress reviewed with nursing staff  Medications, treatment progress and treatment plan reviewed with patient  Supportive counseling provided to the patient            Demario Schilling

## 2020-08-27 NOTE — PROGRESS NOTES
08/27/20 1215   Team Meeting   Meeting Type Daily Rounds   Team Members Present   Team Members Present Physician;Nurse;;; Occupational Therapist   Physician Team Member Berta Balbuena   Nursing Team Member Adam   Care Management Team Member [id number] Work Team Member Florence   OT Team Member    Patient/Family Present   Patient Present No   Patient's Family Present No   Pt denies SI  Interview with [facility] went well  PRN Atarax given   Possible DC early next week

## 2020-08-27 NOTE — PROGRESS NOTES
Patient asked to be given Ativan this evening and when he was asked why he needed Ativan he said he was anxious because of the [facility] interview  He was not given Ativan and he was informed it was no linger available to him   He was offered Atarax which he accepted and said afterwards it had little benefit,

## 2020-08-28 RX ORDER — LANOLIN ALCOHOL/MO/W.PET/CERES
3 CREAM (GRAM) TOPICAL
Status: DISCONTINUED | OUTPATIENT
Start: 2020-08-28 | End: 2020-09-01 | Stop reason: HOSPADM

## 2020-08-28 RX ORDER — TRAZODONE HYDROCHLORIDE 100 MG/1
100 TABLET ORAL
Status: DISCONTINUED | OUTPATIENT
Start: 2020-08-28 | End: 2020-08-29

## 2020-08-28 RX ADMIN — MELATONIN TAB 3 MG 3 MG: 3 TAB at 21:08

## 2020-08-28 RX ADMIN — ARIPIPRAZOLE 15 MG: 15 TABLET ORAL at 08:12

## 2020-08-28 RX ADMIN — NICOTINE 1 PATCH: 21 PATCH, EXTENDED RELEASE TRANSDERMAL at 08:12

## 2020-08-28 RX ADMIN — HYDROXYZINE HYDROCHLORIDE 50 MG: 50 TABLET, FILM COATED ORAL at 17:04

## 2020-08-28 RX ADMIN — TRAZODONE HYDROCHLORIDE 100 MG: 100 TABLET ORAL at 21:08

## 2020-08-28 RX ADMIN — HYDROXYZINE HYDROCHLORIDE 25 MG: 25 TABLET, FILM COATED ORAL at 08:12

## 2020-08-28 RX ADMIN — VENLAFAXINE HYDROCHLORIDE 150 MG: 150 CAPSULE, EXTENDED RELEASE ORAL at 08:12

## 2020-08-28 NOTE — PROGRESS NOTES
08/28/20 1100   Activity/Group Checklist   Group   (recovery group)   Attendance Attended   Attendance Duration (min) 46-60   Interactions Interacted appropriately   Affect/Mood Appropriate   Goals Achieved Discussed self-esteem issues; Able to listen to others; Able to engage in interactions; Able to self-disclose

## 2020-08-28 NOTE — CASE MANAGEMENT
SW spoke to Cami Fernandez from [facility] who will be PT [facility] and gave update on Pt status   [phone]/N [phone]

## 2020-08-28 NOTE — CASE MANAGEMENT
[facility] called and stated they can not take a person with a positive UDS for marijuana  They have referrals from [facility] in April   They have a wait list and Pt would be kept on list if and where they can take him into their program      SW faxed information to [facility]

## 2020-08-28 NOTE — PROGRESS NOTES
08/28/20 0935   Team Meeting   Meeting Type Daily Rounds   Team Members Present   Team Members Present Physician;Nurse;;   Physician Team Member Pebbles8 APRYL Gordon   Nursing Team Member Adam   Care Management Team Member Jeana Lo   Social Work Team Member Mickey   Patient/Family Present   Patient Present No   Patient's Family Present No   Pleasant, quiet out in milieu  Ativan DC, No PRN  States he is not sleeping but staff witnesses him sleeping through the night   Tentative DC early next week

## 2020-08-28 NOTE — CASE MANAGEMENT
Pt referred to [facility] in [facility] [address] [phone]

## 2020-08-28 NOTE — PROGRESS NOTES
Patient continue to remain a bit more visible but keeps to himself  Appears depressed  Denies SI  No complaints of anxiety  Patient continues to report that he is not sleeping well at night

## 2020-08-28 NOTE — PROGRESS NOTES
Patient has been quiet,withdrawn, not involved in milieu  No complaints of anxiety this evening, no requests for prn medication  He was asleep before scheduled Trazadone was due

## 2020-08-28 NOTE — PROGRESS NOTES
Patient completed his relapse prevention form and was given a copy  His response is delayed taking him time to reflect on each question

## 2020-08-28 NOTE — CASE MANAGEMENT
Carolee Moran from [facility] called and reported that [facility] is still looking at Pt for full care and there will be a wait list  [phone] fax AVS

## 2020-08-29 LAB
GAMMA INTERFERON BACKGROUND BLD IA-ACNC: 0.02 IU/ML
M TB IFN-G BLD-IMP: NEGATIVE
M TB IFN-G CD4+ BCKGRND COR BLD-ACNC: -0.01 IU/ML
M TB IFN-G CD4+ BCKGRND COR BLD-ACNC: 0 IU/ML
MITOGEN IGNF BCKGRD COR BLD-ACNC: >10 IU/ML

## 2020-08-29 PROCEDURE — 99231 SBSQ HOSP IP/OBS SF/LOW 25: CPT | Performed by: PSYCHIATRY & NEUROLOGY

## 2020-08-29 RX ADMIN — NICOTINE 1 PATCH: 21 PATCH, EXTENDED RELEASE TRANSDERMAL at 08:55

## 2020-08-29 RX ADMIN — HYDROXYZINE HYDROCHLORIDE 25 MG: 25 TABLET, FILM COATED ORAL at 08:53

## 2020-08-29 RX ADMIN — ARIPIPRAZOLE 15 MG: 15 TABLET ORAL at 08:53

## 2020-08-29 RX ADMIN — TRAZODONE HYDROCHLORIDE 150 MG: 100 TABLET ORAL at 21:06

## 2020-08-29 RX ADMIN — VENLAFAXINE HYDROCHLORIDE 150 MG: 150 CAPSULE, EXTENDED RELEASE ORAL at 08:53

## 2020-08-29 RX ADMIN — HYDROXYZINE HYDROCHLORIDE 50 MG: 50 TABLET, FILM COATED ORAL at 20:40

## 2020-08-29 NOTE — PROGRESS NOTES
Patient has been visible at times in the milieu this evening and has eaten in the milieu  He attended wrap up  He requested and received Atarax po prn 50mgs at 1704  He said he is worried about [facility] and what is going to happen  He requested another Atarax prn around 8 40pm but was not surprized it was not available since he realized that it was too close in time to previous dose  [place] He opted to wait until he could receive Trazadone at 2100  He verbalized some anxiety related to mentioning/thinking his son

## 2020-08-29 NOTE — PROGRESS NOTES
Progress Notes- Behavioral Health       Assessment/Plan  Principal Problem:  MDD (major depressive disorder), recurrent severe, without psychosis ([facility] [address] )    PLAN:   1) increase trazodone to 150 mg p o  q h s  for insomnia continue all other medications as before  2) Continue Group and individual therapy  3) Discharge planing  Patient is awaiting [facility] placement  4) discussed with staff  INTERVAL HISTORY:  Patient reported that he is doing well he is anxious because he is worried about his placement  He said overall is good anxieties use  He said that he is having sleep problems wanted past writer to increase his trazodone  Staff reported the patient is not a behavior issue on the unit  He is compliant with medication and meals  Patient denied any hallucinations or delusions  Denies any suicidal homicidal ideas    Scheduled Meds:  Scheduled Meds:  Current Facility-Administered Medications   Medication Dose Route Frequency Provider Last Rate    acetaminophen  650 mg Oral Q6H PRN Sami Nava MD      aluminum-magnesium hydroxide-simethicone  30 mL Oral Q4H PRN Sami Nava MD      ARIPiprazole  15 mg Oral Daily Mary Ellen Yanez MD      benztropine  1 mg Intramuscular Q6H PRN Sami Nava MD      benztropine  1 mg Oral Q6H PRN Sami Nava MD      haloperidol  5 mg Oral Q6H PRN Sami Nava MD      haloperidol lactate  5 mg Intramuscular Q6H PRN Sami Nava MD      hydrOXYzine HCL  10 mg Oral Q8H PRN Vertis Seats, MD      hydrOXYzine HCL  25 mg Oral Q8H PRN Vertis Seats, MD      hydrOXYzine HCL  25 mg Oral Daily Vertis Seats, MD      hydrOXYzine HCL  50 mg Oral Q8H PRN Vertis Seats, MD      ibuprofen  600 mg Oral Q8H PRN Sami Nava MD      magnesium hydroxide  30 mL Oral Daily PRN Sami Nava MD      melatonin  3 mg Oral HS PRN PIPO Felipe      nicotine  1 patch Transdermal Daily Sami Nava MD      traMADol  50 mg Oral Q6H PRN Sami Nava MD      traZODone  150 mg Oral HS Brown Parson MD      traZODone  50 mg Oral HS PRN Danitza Taylor MD      venlafaxine  150 mg Oral Daily Macey Saldivar MD       Continuous Infusions:   PRN Meds:   acetaminophen    aluminum-magnesium hydroxide-simethicone    benztropine    benztropine    haloperidol    haloperidol lactate    hydrOXYzine HCL    hydrOXYzine HCL    hydrOXYzine HCL    ibuprofen    magnesium hydroxide    melatonin    traMADol    traZODone    Allergies:  No Known Allergies  Review of systems:  Past Medical History:   Diagnosis Date    Anxiety     Depression     Panic attack     Psychiatric illness     Self-injurious behavior     Suicide attempt ([facility] [address] )      Vitals:    08/28/20 0737 08/28/20 1556 08/29/20 0757 08/29/20 1500   BP: 119/81 106/60 125/69 116/70   BP Location: Left arm Left arm Left arm Left arm   Pulse: 76 85 64 83   Resp: 16 16 16 16   Temp: 98 3 °F (36 8 °C) (!) 97 4 °F (36 3 °C) 97 8 °F (36 6 °C) 98 1 °F (36 7 °C)   TempSrc: Temporal Temporal Temporal Oral   SpO2:       Weight:   82 9 kg (182 lb 12 2 oz)    Height:           Mental Status Evaluation:    Patient appeared of his age  He is alert oriented x3  Speech is normal goal-directed  Thought process is logical and linear  He reports some anxiety  His mood is good  Affect is mood congruent  He denied any suicidal homicidal ideas  He denied any hallucinations or delusions  His gait is normal and steady  Normal psychomotor activity  Fair insight and judgment  Clear cognition memory intact  Lab Results: I have personally reviewed pertinent lab results  NOTE:  Total of  20  minutes were spent in talking to patient completing this medical record reviewing medical chart medical decision making    Brown Parson MD

## 2020-08-29 NOTE — PROGRESS NOTES
Pt is quiet and withdrawn with soft scant speech  Pt denies SI and HI and rates his anxiety at 3 and depression at 4  Pt isolates and out of his room for meals only  Patient has minimal interaction for with staff and peers  Pt states he is waiting for help with placement after discharge  Pt has had no behavioral issues today

## 2020-08-30 PROCEDURE — 99231 SBSQ HOSP IP/OBS SF/LOW 25: CPT | Performed by: PSYCHIATRY & NEUROLOGY

## 2020-08-30 RX ORDER — QUETIAPINE FUMARATE 100 MG/1
100 TABLET, FILM COATED ORAL
Status: DISCONTINUED | OUTPATIENT
Start: 2020-08-30 | End: 2020-09-01 | Stop reason: HOSPADM

## 2020-08-30 RX ADMIN — NICOTINE POLACRILEX 2 MG: 2 GUM, CHEWING ORAL at 16:03

## 2020-08-30 RX ADMIN — VENLAFAXINE HYDROCHLORIDE 150 MG: 150 CAPSULE, EXTENDED RELEASE ORAL at 08:40

## 2020-08-30 RX ADMIN — HYDROXYZINE HYDROCHLORIDE 25 MG: 25 TABLET, FILM COATED ORAL at 08:40

## 2020-08-30 RX ADMIN — NICOTINE 1 PATCH: 21 PATCH, EXTENDED RELEASE TRANSDERMAL at 08:43

## 2020-08-30 RX ADMIN — QUETIAPINE FUMARATE 100 MG: 100 TABLET ORAL at 20:51

## 2020-08-30 RX ADMIN — TRAZODONE HYDROCHLORIDE 50 MG: 50 TABLET ORAL at 20:50

## 2020-08-30 RX ADMIN — ARIPIPRAZOLE 15 MG: 15 TABLET ORAL at 08:40

## 2020-08-30 NOTE — PROGRESS NOTES
Pt is visible on unit for meal and needs and selectively socializes with staff and peers  Denies symptoms on assessment and has not requested PRN anxiety meds this evening  Calm, mainly quiet and to self walking halls  Compliant with unit routines and care  Will continue to monitor

## 2020-08-30 NOTE — PROGRESS NOTES
Pt visible on unit , isolative to self  His response is delayed   Pt med/meal compliant   Denies SI,Hi   Will continue to monitor

## 2020-08-30 NOTE — PLAN OF CARE
Problem: Depression  Goal: Verbalize thoughts and feelings  Description: Interventions:  - Assess and re-assess patient's level of risk   - Engage patient in 1:1 interactions, daily, for a minimum of 15 minutes   - Encourage patient to express feelings, fears, frustrations, hopes   Outcome: Progressing

## 2020-08-30 NOTE — PROGRESS NOTES
Progress Notes- Behavioral Health       Assessment/Plan  Principal Problem:    PLAN:   1) discontinue trazodone as patient reports is not helping  Start Seroquel 100 mg p o  Q nightly  Continue other medications as before  2) Continue Group and individual therapy  3) Discharge planing  4) discussed with staff  INTERVAL HISTORY:  Staff reported that patient has been compliant overall  He is taking his medications there is no change in his behavior  Patient also told this writer that he is doing well he did help her  However he said that he is not sleeping well and he feels that something different can be done about trazodone  He agreed on trying Seroquel  He denies any hallucinations or delusions  Denied any suicidal homicidal ideas into the unit    Scheduled Meds:  Scheduled Meds:  Current Facility-Administered Medications   Medication Dose Route Frequency Provider Last Rate    acetaminophen  650 mg Oral Q6H PRN Danitza Taylor MD      aluminum-magnesium hydroxide-simethicone  30 mL Oral Q4H PRN Danitza Taylor MD      ARIPiprazole  15 mg Oral Daily Macey Saldivar MD      benztropine  1 mg Intramuscular Q6H PRN Danitza Taylor MD      benztropine  1 mg Oral Q6H PRN Danitza Taylor MD      haloperidol  5 mg Oral Q6H PRN Danitza Taylor MD      haloperidol lactate  5 mg Intramuscular Q6H PRN Danitza Taylor MD      hydrOXYzine HCL  10 mg Oral Q8H PRN Nupur Comp, MD      hydrOXYzine HCL  25 mg Oral Q8H PRN Nupur Comp, MD      hydrOXYzine HCL  25 mg Oral Daily Nupur Comp, MD      hydrOXYzine HCL  50 mg Oral Q8H PRN Nupur Comp, MD      ibuprofen  600 mg Oral Q8H PRN Danitza Taylor MD      magnesium hydroxide  30 mL Oral Daily PRN Danitza Taylor MD      melatonin  3 mg Oral HS PRN PIPO Zamora      nicotine  1 patch Transdermal Daily Danitza Taylor MD      nicotine polacrilex  2 mg Oral Q2H PRN Nichol Lloyd MD      QUEtiapine  100 mg Oral HS Brown Parson MD      traMADol  50 mg Oral Q6H PRN Melony Wheeler MD      traZODone  50 mg Oral HS PRN Melony Wheeler MD      venlafaxine  150 mg Oral Daily Diogo Garcia MD       Continuous Infusions:   PRN Meds:   acetaminophen    aluminum-magnesium hydroxide-simethicone    benztropine    benztropine    haloperidol    haloperidol lactate    hydrOXYzine HCL    hydrOXYzine HCL    hydrOXYzine HCL    ibuprofen    magnesium hydroxide    melatonin    nicotine polacrilex    traMADol    traZODone    Allergies:  No Known Allergies  Review of systems:  Past Medical History:   Diagnosis Date    Anxiety     Depression     Panic attack     Psychiatric illness     Self-injurious behavior     Suicide attempt ([facility] [address] )    No current medical complaints  Vitals:    08/29/20 0757 08/29/20 1500 08/30/20 0839 08/30/20 1541   BP: 125/69 116/70 119/72 108/64   BP Location: Left arm Left arm Left arm Left arm   Pulse: 64 83 84 100   Resp: 16 16 16 16   Temp: 97 8 °F (36 6 °C) 98 1 °F (36 7 °C) 97 6 °F (36 4 °C) 98 1 °F (36 7 °C)   TempSrc: Temporal Oral Oral Oral   SpO2:       Weight: 82 9 kg (182 lb 12 2 oz)      Height:           Mental Status Evaluation:  Patient appeared of his age  He has gone cooperative  Speech is spontaneous normal goal-directed  Psychomotor activity normal   Thought process logical and linear  Denies any hallucinations delusions  Denied any suicidal homicidal ideas  Fair insight and judgment  Clear cognition  Alert awake and oriented x3  Lab Results: I have personally reviewed pertinent lab results  NOTE:  Total of  15 minutes were spent in talking to patient completing this medical record reviewing medical chart medical decision making    Nolberto Cheek MD

## 2020-08-30 NOTE — PROGRESS NOTES
Pt about unit at shift change  Quiet and to self, occasionally socializes with peers  Denies symptoms on assessment, appears depressed  Denies anxiety at this time  Compliant with unit routines and care  Will monitor

## 2020-08-31 PROCEDURE — 99232 SBSQ HOSP IP/OBS MODERATE 35: CPT | Performed by: PSYCHIATRY & NEUROLOGY

## 2020-08-31 RX ADMIN — NICOTINE 1 PATCH: 21 PATCH, EXTENDED RELEASE TRANSDERMAL at 08:05

## 2020-08-31 RX ADMIN — HYDROXYZINE HYDROCHLORIDE 25 MG: 25 TABLET, FILM COATED ORAL at 08:05

## 2020-08-31 RX ADMIN — NICOTINE POLACRILEX 2 MG: 2 GUM, CHEWING ORAL at 08:21

## 2020-08-31 RX ADMIN — NICOTINE POLACRILEX 2 MG: 2 GUM, CHEWING ORAL at 12:14

## 2020-08-31 RX ADMIN — ARIPIPRAZOLE 15 MG: 15 TABLET ORAL at 08:05

## 2020-08-31 RX ADMIN — NICOTINE POLACRILEX 2 MG: 2 GUM, CHEWING ORAL at 21:42

## 2020-08-31 RX ADMIN — VENLAFAXINE HYDROCHLORIDE 150 MG: 150 CAPSULE, EXTENDED RELEASE ORAL at 08:05

## 2020-08-31 RX ADMIN — NICOTINE POLACRILEX 2 MG: 2 GUM, CHEWING ORAL at 14:41

## 2020-08-31 RX ADMIN — MELATONIN TAB 3 MG 3 MG: 3 TAB at 21:29

## 2020-08-31 RX ADMIN — NICOTINE POLACRILEX 2 MG: 2 GUM, CHEWING ORAL at 18:00

## 2020-08-31 RX ADMIN — HYDROXYZINE HYDROCHLORIDE 50 MG: 50 TABLET, FILM COATED ORAL at 14:39

## 2020-08-31 RX ADMIN — QUETIAPINE FUMARATE 100 MG: 100 TABLET ORAL at 21:26

## 2020-08-31 RX ADMIN — TRAZODONE HYDROCHLORIDE 50 MG: 50 TABLET ORAL at 21:26

## 2020-08-31 NOTE — CASE MANAGEMENT
CAMILA called and left message for Daisy JENSEN emailed to inquire if [facility] [employer] in [facility] had any housing options open

## 2020-08-31 NOTE — PROGRESS NOTES
Patient had initial beneficial effect from Atarax given at 1439 however a short while ago he requested to be given it again and when he was informed there has to be 8 hours between doses he denied receiving the 1439 dose  With further information he remembered he had received Atarax previously  He is visible in the milieu on and off this evenng

## 2020-08-31 NOTE — PROGRESS NOTES
Progress Note - [address]  Sanchez 28 y o  male MRN: [id number]  Unit/Bed#: Donis Roman 653-00 Encounter: [id number]      Subjective  No overnight events  Patient has been medication compliant  Patient is seen for continuing care  Patient has been having insomnia and woke up intermittently  He states his mood is good  Patient denies depression and states he is anxious to be discharged, perseverating on being discharged today  Patient denies visual or auditory hallucinations  Patient denies suicidal or homicidal ideation, plan or intent  Patient tolerating current medical management and denies any side effects       Behavior over the last 24 hours: Improved  Sleep: insomnia and frequent awakenings  Appetite: normal  Medication side effects: none  ROS: no complaints    Mental Status Evaluation:  Appearance:  alert, good eye contact, appears stated age and appropriate grooming and hygiene   Behavior:  sitting comfortably, no abnormal movements and normal gait and balance   Attitude:  pleasant and cooperative   Speech:  spontaneous, clear, normal rate, normal volume, scant and coherent   Mood:  anxious   Affect:  mood-congruent   Thought Process:  organized, logical, concrete, coherent, linear, goal directed, perseverative, normal rate of thoughts   Thought Content: no verbalized delusions, no overt paranoia, no obsessive thinking   Perceptual disturbances: no reported auditory hallucinations, no reported visual hallucinations and does not appear to be responding to internal stimuli at this time   Risk Potential: No active or passive suicidal or homicidal ideation, Low potential for aggression based on previous behavior   Cognition: oriented to person, place, time, and situation, memory grossly intact, appears to be of average intelligence, age-appropriate attention span and concentration and cognition not formally tested   Insight:  Limited   Judgment: Limited     Medications: continue current psychiatric medications    Risks, benefits and possible side effects of Medications:   Risks, benefits, and possible side effects of medications explained to patient and patient verbalizes understanding  Labs: I have personally reviewed all pertinent laboratory results  I have personally reviewed all pertinent laboratory/tests results    Most Recent Labs:   Lab Results   Component Value Date    WBC 4 90 08/22/2020    RBC 5 84 08/22/2020    HGB 16 1 08/22/2020    HCT 49 3 08/22/2020     08/22/2020    RDW 15 5 (H) 08/22/2020    NEUTROABS 2 40 08/22/2020    SODIUM 135 (L) 08/22/2020    K 4 5 08/22/2020    CL 99 08/22/2020    CO2 33 (H) 08/22/2020    BUN 13 08/22/2020    CREATININE 0 94 08/22/2020    GLUC 94 08/22/2020    CALCIUM 9 3 08/22/2020    AST 33 08/22/2020    ALT 44 08/22/2020    ALKPHOS 48 08/22/2020    TP 6 1 08/22/2020    ALB 3 7 08/22/2020    TBILI 0 40 08/22/2020    CAY7MWVTQNWV 1 520 08/23/2020    HGBA1C 5 7 (H) 08/01/2020     08/01/2020           Assessment/Plan  - patient on aripiprazole 15 mg daily  Hydroxyzine 25 mg daily  Quetiapine 100 mg daily  Venlafaxine 150 mg daily  -plan to continue current medical management and for discharge tomorrow    Liana Burciaga MD

## 2020-08-31 NOTE — PROGRESS NOTES
Patient has been visible in the milieu most of the shift  Minimal interaction with select peers  Currently denying all symptoms  Requested the number for [employer] bus and says that he is planning on going [place] [facility] [place]

## 2020-08-31 NOTE — PROGRESS NOTES
08/31/20 1452   Team Meeting   Meeting Type Daily Rounds   Team Members Present   Team Members Present Physician;Nurse;;; Occupational Therapist   Physician Team Member Dr Ani Radford Management Team Member [address] [employer] Team Member [place]   OT Team Member    Patient/Family Present   Patient Present No   Patient's Family Present No   Pt will be DC by Wednesday to shelter   Pt is cooperative, medications are therapeutic level

## 2020-09-01 VITALS
HEIGHT: 67 IN | TEMPERATURE: 97.5 F | WEIGHT: 182.76 LBS | HEART RATE: 77 BPM | DIASTOLIC BLOOD PRESSURE: 71 MMHG | RESPIRATION RATE: 16 BRPM | SYSTOLIC BLOOD PRESSURE: 125 MMHG | BODY MASS INDEX: 28.69 KG/M2 | OXYGEN SATURATION: 97 %

## 2020-09-01 PROCEDURE — 99238 HOSP IP/OBS DSCHRG MGMT 30/<: CPT | Performed by: PSYCHIATRY & NEUROLOGY

## 2020-09-01 RX ORDER — ARIPIPRAZOLE 15 MG/1
15 TABLET ORAL DAILY
Qty: 30 TABLET | Refills: 0 | Status: SHIPPED | OUTPATIENT
Start: 2020-09-02 | End: 2020-09-01

## 2020-09-01 RX ORDER — HYDROXYZINE HYDROCHLORIDE 25 MG/1
25 TABLET, FILM COATED ORAL DAILY
Qty: 30 TABLET | Refills: 0 | Status: SHIPPED | OUTPATIENT
Start: 2020-09-02 | End: 2020-09-01

## 2020-09-01 RX ORDER — NICOTINE 21 MG/24HR
1 PATCH, TRANSDERMAL 24 HOURS TRANSDERMAL DAILY
Qty: 28 PATCH | Refills: 0 | Status: SHIPPED | OUTPATIENT
Start: 2020-09-02 | End: 2020-12-21 | Stop reason: ALTCHOICE

## 2020-09-01 RX ORDER — LANOLIN ALCOHOL/MO/W.PET/CERES
3 CREAM (GRAM) TOPICAL
Qty: 30 TABLET | Refills: 0 | Status: SHIPPED | OUTPATIENT
Start: 2020-09-01 | End: 2020-09-01

## 2020-09-01 RX ORDER — NICOTINE 21 MG/24HR
1 PATCH, TRANSDERMAL 24 HOURS TRANSDERMAL DAILY
Qty: 28 PATCH | Refills: 0 | Status: SHIPPED | OUTPATIENT
Start: 2020-09-02 | End: 2020-09-01

## 2020-09-01 RX ORDER — HYDROXYZINE HYDROCHLORIDE 25 MG/1
25 TABLET, FILM COATED ORAL DAILY
Qty: 30 TABLET | Refills: 0 | Status: SHIPPED | OUTPATIENT
Start: 2020-09-02 | End: 2020-12-21 | Stop reason: ALTCHOICE

## 2020-09-01 RX ORDER — VENLAFAXINE HYDROCHLORIDE 150 MG/1
150 CAPSULE, EXTENDED RELEASE ORAL DAILY
Qty: 30 CAPSULE | Refills: 0 | Status: SHIPPED | OUTPATIENT
Start: 2020-09-02 | End: 2020-09-01

## 2020-09-01 RX ORDER — LANOLIN ALCOHOL/MO/W.PET/CERES
3 CREAM (GRAM) TOPICAL
Qty: 30 TABLET | Refills: 0 | Status: SHIPPED | OUTPATIENT
Start: 2020-09-01 | End: 2020-10-01

## 2020-09-01 RX ORDER — ARIPIPRAZOLE 15 MG/1
15 TABLET ORAL DAILY
Qty: 30 TABLET | Refills: 0 | Status: SHIPPED | OUTPATIENT
Start: 2020-09-02 | End: 2021-05-13

## 2020-09-01 RX ORDER — VENLAFAXINE HYDROCHLORIDE 150 MG/1
150 CAPSULE, EXTENDED RELEASE ORAL DAILY
Qty: 30 CAPSULE | Refills: 0 | Status: ON HOLD | OUTPATIENT
Start: 2020-09-02 | End: 2021-09-28

## 2020-09-01 RX ADMIN — ARIPIPRAZOLE 15 MG: 15 TABLET ORAL at 08:27

## 2020-09-01 RX ADMIN — VENLAFAXINE HYDROCHLORIDE 150 MG: 150 CAPSULE, EXTENDED RELEASE ORAL at 08:28

## 2020-09-01 RX ADMIN — HYDROXYZINE HYDROCHLORIDE 25 MG: 25 TABLET, FILM COATED ORAL at 08:28

## 2020-09-01 RX ADMIN — NICOTINE 1 PATCH: 21 PATCH, EXTENDED RELEASE TRANSDERMAL at 08:28

## 2020-09-01 RX ADMIN — NICOTINE POLACRILEX 2 MG: 2 GUM, CHEWING ORAL at 11:56

## 2020-09-01 RX ADMIN — NICOTINE POLACRILEX 2 MG: 2 GUM, CHEWING ORAL at 08:28

## 2020-09-01 NOTE — CASE MANAGEMENT
Behavioral Health      [address] [place], [address]    Phone: [phone]     Next Steps: Call on 9/1/2020      Instructions: Your intake forms were completed and sent in  The therapist will contact you directly and set up an appointment with you weekly via telehealth  Make sure you answer the phone or call them to follow up       Olivia Burton MD  PCP - [address] [facility] [address]  [address]     Next Steps: Follow up      Instructions: Call if you have any questions or concerns  [facility]     Address: [address], [facility], [address]  Phone: [phone]      Website: [facility] org     Next Steps: Call      Instructions: This is a resource for you to change your PCP that is located in the area you are DC to      [address]    [phone]     Next Steps: Go on 9/1/2020      Instructions: You are being DC to Crisis residence in [address] [place] [address]  They are asking you bring your discharge information  They do not provide mental health services so referral was made to [facility]  SW and Pt completed intake for [facility], [facility], [facility], and [facility] for DC Wednesday  At last minute PT decided to go to crisis residence in [facility] for 10 days and states he will go live with is sister after that  Pt is walking to bus station and buying a bus ticket to [facility] [place]  SW was clear in that if PT does go back to [place] all of the services he applied for in [place] will be null and void, he will be taken off wait lists  Pt stated he is aware but going to [place] is the best plan  SW was clear about Pt several visits to ER and inpatient here and [facility] were all due to increased depression due to homelessness and PT is choosing not to participate in programs that could alleviate the issue of being homeless   Inpatient psychiatric admit can not be used for increased depression due to making choice to be homeless and PT needs to understand during his next visit to ER he will be screened for lethality and seriousness of any SI he is having  Psychosocia factors can not be sole reason for admit into pscyiatric facility  Also, Pt has not followed through with taking medications prescribed while in the community  SW explained that follow up is important and that lack of doing so will lead to increased symptoms of depression  Pt will be DC walking to bus station to leave for [facility] today   DC plan was to [facility] (pt was accepted), [facility] waitlist and [facility] waitlist would still be in place due to PT going to homeless program  [facility] was involved  Prognosis is poor   Pt is returning to situation that was not successful in the past

## 2020-09-01 NOTE — PLAN OF CARE
Problem: Depression  Goal: Treatment Goal: Demonstrate behavioral control of depressive symptoms, verbalize feelings of improved mood/affect, and adopt new coping skills prior to discharge  Outcome: Completed  Goal: Verbalize thoughts and feelings  Description: Interventions:  - Assess and re-assess patient's level of risk   - Engage patient in 1:1 interactions, daily, for a minimum of 15 minutes   - Encourage patient to express feelings, fears, frustrations, hopes   Outcome: Completed     Problem: DEPRESSION  Goal: Will be euthymic at discharge  Description: INTERVENTIONS:  - Administer medication as ordered  - Provide emotional support via 1:1 interaction with staff  - Encourage involvement in milieu/groups/activities  - Monitor for social isolation  Outcome: Completed     Problem: Ineffective Coping  Goal: Participates in unit activities  Description: Interventions:  - Provide therapeutic environment   - Provide required programming   - Redirect inappropriate behaviors   Outcome: Completed     Problem: DISCHARGE PLANNING  Goal: Discharge to home or other facility with appropriate resources  Description: INTERVENTIONS:  - Identify barriers to discharge w/patient and caregiver  - Arrange for needed discharge resources and transportation as appropriate  - Identify discharge learning needs (meds, wound care, etc )  - Arrange for interpretive services to assist at discharge as needed  - Refer to Case Management Department for coordinating discharge planning if the patient needs post-hospital services based on physician/advanced practitioner order or complex needs related to functional status, cognitive ability, or social support system  Outcome: Completed     Problem: SELF HARM/SUICIDALITY  Goal: Will have no self-injury during hospital stay  Description: INTERVENTIONS:  - Q 15 MINUTES: Routine safety checks  - Q WAKING SHIFT & PRN: Assess risk to determine if routine checks are adequate to maintain patient safety  - Encourage patient to participate actively in care by formulating a plan to combat response to suicidal ideation, identify supports and resources  Outcome: Completed

## 2020-09-01 NOTE — PROGRESS NOTES
09/01/20 1101   Discharge Planning   Living Arrangements Alone; Other (Comment)   Support Systems Family members   Type of Current Residence Homeless  (Going to stay in [address] [place] [address] at a 10 day crisis center and then allegedly go to sister's home)   [address] No   Other Referral/Resources/Interventions Provided:   Community Referrals Provided: Crisis Hotline;Lifeline / Emergency Response System; Lodging;Employment Services; Food Bank; Shelter; Therapist;Psychiatrist;[facility]   Government Services: ; Medical Assistance;Welfare;Disability Application;Housing   Discharge Communications   Discharge planning discussed with: Pt and treatment team   Freedom of Choice Yes   Discharge Notes: Pt is being discarged to [address] [place] [address] where his medical assistance is from and where he resides   Transportation at Discharge? Yes  (Pt walking to bus station)   Dispatcher Called No   Transfer Mode Self   Accompanied by Alone   CM Handoff Comments PT did not follow DC plan as discussed and chose to leave to [address] [place] [address]  This nullifies all referrals made to [facility], [facility], [facility], and [employer]  SW made this very clear to Pt and that if he becomes homeless due to his choice not to follow through with original plan he should go to local shelters for homeless not ER   Contacts   Patient Contacts Respite House   Realtionship to Patient: Treatment Provider   Contact Method Phone   Phone Number [phone]   Reason/Outcome Discharge Planning   [facility]   We would like to be able to fill any required prescriptions on discharge at our [address] [place] [address] and have them delivered to you at discharge in your room    Would you like to participate in this program?  No - Declined

## 2020-09-01 NOTE — DISCHARGE INSTR - LAB
If you smoke, use tobacco or nicotine, and/or are exposed to second hand smoke, you are encouraged to stop to improve your health    If you need help quitting, please talk to your health care provider or call:  · Angelica Scanlon ([phone])  · [facility] ([phone])   · [address] [place] [address] ([phone])

## 2020-09-01 NOTE — DISCHARGE INSTR - OTHER ORDERS
[facility]- hotline and mobile screening [facility] 24hrs Sami Louie mobile adult screening [phone]        Crisis Information  If you are experiencing a mental health emergency, you may call the [address] 24 hours a day, 7 days per week at [phone]  In [facility], call [phone]  When you need someone to listen, the Astudillo Formosa is available for 16 hours a day, 7 days a week, from the time of 7-10am and 2pm-2am   It is not available from the hours of 2am-6am and 10am-2pm  A representative can be reached at [phone]  The [facility] Family-to-Family Education Program is a free 12-week (2 1/2 hours/week) course for families of individuals with severe brain disorders (mental illnesses)  The classes are taught by trained family members  All course materials are furnished at no cost to you  Below are some details  To register, e-mail [email or web address] or call [phone]  The curriculum focuses on schizophrenia, bipolar disorder (manic depression), clinical depression, panic disorders and obsessive-compulsive disorder (OCD)  The course discusses the clinical treatment of these illnesses and teaches the knowledge and skills that family members need to cope more effectively    Topics Include:   Learning about feelings, learning about facts    Schizophrenia, major depression and hattie: diagnosis and dealing with critical periods    Subtypes of depression and bipolar disorder, panic disorder and OCD; diagnosis and causes; sharing our stories    The biology of the brain/new research    Problem solving workshops    Medication review    Empathy workshop  what its like to have a brain disorder    Communication skills workshop    Self-care and relative groups   [facility], services available    Advocacy: fighting stigma    Review and certification ceremony    Ahmj-mm-Qyez Education Course  The [employer] Education class is a ten week  two hours per week  experiential education course on the topic of recovery for any person with serious mental illness who is interested in establishing and maintaining wellness  The course uses a combination of lecture, interactive exercises, and structural group processes  The diversity of experience among participants affords for a lively dynamic that moves the course along  [facility]s Lyzp-hv-Akav Education class is offered free of charge to people who experience mental illness  You do not need to be a member of [facility] to take the course  Courses are taught by teams of trained mentors/peer-teachers who are themselves experienced at living well with mental illness  Below are some details  To register, call [phone] or e-mail [email or web address]  Sign up today! [address] group is for family members, caregivers, and loved ones of individuals living with the everyday challenges of mental illness  The leaders are family members in the same situation  Sessions take place in an intimate, confidential setting to allow families to share openly with each other  These support groups allow participants to learn from the experiences of other group members, share coping strategies, and offer each other encouragement and understanding  Haylee Harrington know that you are not alone  Drop inno registration is necessary  Here are the times and locations    [facility]  Monthly: 3rd Monday, 7:00-8:30 pm  Surjit Hoskins[address]  Monthly: 4th Tuesday, 7:00-8:30 pm  [facility]  Monthly: 1st Monday, 7:00-8:30 pm  [address], [address] [place] [address]         Monthly Support for Persons with Mental Illness  The Peer Support Group is a monthly meeting for individuals facing the challenges of recovering from severe and persistent mental illness  Depression, manic depression, schizophrenia, and general anxiety disorder are only a few of the diagnoses of individuals who have found a supportive place at our meetings  Our [place]  We are a fellowship of individuals who share a common goal of recovery and the ability to maintain mental and emotional stability  We help others and ourselves through sharing our experiences, strength and hope with each other  No matter how traumatic our past or how despairing our present may be, there is hope for a new day  Sessions take place in an intimate, confidential setting to allow individuals to share openly with each other  Nikunj Sender know that you are not alone  Drop inno registration is necessary  Here are the times and locations  [place]  Monthly: 1st Monday, 7:00-8:30 pm  [facility]  [address] [place], [place]   [place]  Monthly: 3rd Monday, 7:00-8:30 pm  [address], [address]:  If you or someone you know has a drug or alcohol problem, there is help:  Franky 44: [address]: [phone]  An assessment is the first step  In addition to those listed there are other programs available in the area but assessment is best to determine an appropriate level of care  If you DO NOT have Medical Assistance (MA) or [employer], an assessment can be scheduled at one of these providers:  [address] [place] [address] Colby Ros[address] 13, [address]  [phone]   101 [facility] 15 Shailesh Jennings , [place], [address]  3319 [facility] [address]   [facility] [id number] [place] [address]   Step by [address] [facility] [address] , [place], 98 [facility]  [phone]   Treatment Trends  [place]  1320 [facility] , [place], 98 [facility]  2000 [facility],[address] [facility] 47 [place] , [address], [place], [address] [facility] [phone]     If you [address], an assessment can be scheduled at one of these providers:  [place] [facility]  [address] [place] , [place], 98 [facility]  [address]  Colby Calcirelli 13, [address]  [address] [facility] Intake Unit 1001 [facility] [address] , 1st Floor, [place], [address] Adan Varma  [address], 300 [facility],6Th Floor, [place], [address] [place] [address] [address] , [place], [address]  [address], 122 [facility] 57 [facility], [place], [address] [place], [address]   Step by 8[address] [facility] [address] , [place], 98 [facility]  [phone]   Treatment Trends  [place]  1320 [facility] , [place], 98 [facility]  2000 [facility],[address] [place], [place] [address]ue , [address], [place], [address] [facility] [phone]     If you [address], an assessment can be scheduled at one of these providers  Please contact these Providers to determine if they are in your network plan:  [facility] D[facility]A Intake Unit  620 [facility] [address] , 1st Floor, [place], [address]  [address] , [place], [address]  [address]  [place], 122 [facility]  57 [facility], [place], [address] [place] [id number] [facility], 102 E [facility] [address] [facility] [address] , [address], [place], [address] [place] PINK Sant[address] 94 Dewayne Birmingham RN, our [place], will be calling you after your discharge, on the phone number that you provided  She will be available as an additional support, if needed  If you wish to speak with her, you may contact Lizzy Christiano at [phone]  What you need to know aboutcoronavirus disease 2019 (COVID-19)     What is coronavirus disease 2019 (COVID-19)? Coronavirus disease 2019 (COVID-19) is a respiratory illness that can spread from person to person  The virus that causes COVID-19 is a novel coronavirus that was first identified during an investigation into an outbreak in [place], [place]  Can people in the [place]  get COVID-19? Yes  COVID-19 is spreading from person to person in parts of the [place] [facility]  Risk of infection with COVID-19 is higher for people who are close contacts of someone known to have COVID-19, for example healthcare workers, or household members  Other people at higher risk for infection are those who live in or have recently been in an area with ongoing spread of COVID-19  Learn more about places with ongoing spread at   [facility] tn  html#geographic  Have there been cases of COVID-19 in the [place] ?   Yes  The first case of COVID-19 in the [place] was reported on January 21, 2020  The current count of cases of COVID-19 in the [place] is available on [employer] at [facility]  How does COVID-19 spread? The virus that causes COVID-19 probably emerged from an animal source, but is now spreading from person to person  The virus is thought to spread mainly between people who are in close contact with one another (within about 6 feet) through respiratory droplets produced when an infected person coughs or sneezes   It also may be possible that a person can get COVID-19 by touching a surface or object that has the virus on it and then touching their own mouth, nose, or possibly their eyes, but this is not thought to be the main way the virus spreads  Learn what is known about the spread of newly emerged coronaviruses at [facility]  What are the symptoms of COVID-19? Patients with COVID-19 have had mild to severe respiratory illness with symptoms of   fever   cough   shortness of breath  What are severe complications from this virus? Some patients have pneumonia in both lungs, multi-organ failure and in some cases death  How can I help protect myself? People can help protect themselves from respiratory illness with everyday preventive actions  Avoid close contact with people who are sick  Avoid touching your eyes, nose, and mouth withunwashed hands  Wash your hands often with soap and water for at least 20 seconds  Use an alcohol-based hand  that contains at least 60% alcohol if soap and water are not available  If you are sick, to keep from spreading respiratory illness to others, you should   Stay home when you are sick  Cover your cough or sneeze with a tissue, then throw the tissue in the trash  Clean and disinfect frequently touched objectsand surfaces  What should I do if I recently traveled from an area with ongoing spread of COVID-19? If you have traveled from an affected area, there may be restrictions on your movements for up to 2 weeks  If you develop symptoms during that period (fever, cough, trouble breathing), seek medical advice  Call the office of your health care provider before you go, and tell them about your travel and your symptoms  They will give you instructions on how to get care without exposing other people to your illness  While sick, avoid contact with people, don't go out and delay any travel to reduce the possibility of spreading illness to others  Is there a vaccine? There is currently no vaccine to protect against COVID-19   The best way to prevent infection is to take everyday preventive actions, like avoiding close contact with people who are sick and washing your hands often  Is there a treatment? There is no specific antiviral treatment for COVID-19  People with COVID-19 can seek medical care to helprelieve symptoms  For more information: www cdc gov/IIUHU78UN 434289-B 03/03/2020       What to do if you are sick withcoronavirus disease 2019 (COVID-19)     If you are sick with COVID-19 or suspect you are infected with the virus that causes COVID-19, follow the steps below to help prevent the disease from spreading to people in your home and community  Stay home except to get medical care   You should restrict activities outside your home, except for getting medical care  Do not go to work, school, or public areas  Avoid using public transportation, ride-sharing, or taxis  Separate yourself from other people and animals inyour home  People: As much as possible, you should stay in a specific room and away from other people in your home  Also, you should use a separate bathroom, if available  Animals: Do not handle pets or other animals while sick  See COVID-19 and Animals for more information  Call ahead before visiting your doctor   If you have a medical appointment, call the healthcare provider and tell them that you have or may have COVID-19  This will help the healthcare provider's office take steps to keep other people from getting infected or exposed  Wear a facemask  You should wear a facemask when you are around other people (e g , sharing a room or vehicle) or pets and before you enter a healthcare provider's office  If you are not able to wear a facemask (for example, because it causes trouble breathing), then people who live with you should not stay in the same room with you, or they should wear a facemask if they enteryour room  Cover your coughs and sneezes   Cover your mouth and nose with a tissue when you cough or sneeze   Throw used tissues in a lined trash can; immediately wash your hands with soap and water for at least 20 seconds or clean your hands with an alcohol-based hand  that contains at least 60 to 95% alcohol, covering all surfaces of your hands and rubbing them together until they feel dry  Soap and water should be used preferentially if hands are visibly dirty  Avoid sharing personal household items   You should not share dishes, drinking glasses, cups, eating utensils, towels, or bedding with other people or pets in your home  After using these items, they should be washed thoroughly with soap and water  Clean your hands often  Wash your hands often with soap and water for at least 20 seconds  If soap and water are not available, clean your hands with an alcohol-based hand  that contains at least 60% alcohol, covering all surfaces of your hands and rubbing them together until they feel dry  Soap and water should be used preferentially if hands are visibly dirty  Avoid touching your eyes, nose, and mouth with unwashed hands  Clean all "high-touch" surfaces every day  High touch surfaces include counters, tabletops, doorknobs, bathroom fixtures, toilets, phones, keyboards, tablets, and bedside tables  Also, clean any surfaces that may have blood, stool, or body fluids on them  Use a household cleaning spray or wipe, according to the label instructions  Labels contain instructions for safe and effective use of the cleaning product including precautions you should take when applying the product, such as wearing gloves and making sure you have good ventilation during use of the product  Monitor your symptoms  Seek prompt medical attention if your illness is worsening (e g , difficulty breathing)  Before seeking care, call your healthcare provider and tell them that you have, or are being evaluated for, COVID-19  Put on a facemask before you enter the facility   These steps will help the healthcare provider's office to keep other people in the office or waiting room from getting infectedor exposed  Ask your healthcare provider to call the local or state health department  Persons who are placed under active monitoring or facilitated self-monitoring should follow instructions provided by their local health department or occupational health professionals, as appropriate  If you have a medical emergency and need to call 911, notify the dispatch personnel that you have, or are being evaluated for COVID-19  If possible, put on a facemask before emergency medical services arrive  Discontinuing home isolation  Patients with confirmed COVID-19 should remain under home isolation precautions until the risk of secondary transmission to others is thought to be low  The decision to discontinue home isolation precautions should be made on a case-by-case basis, in consultation with healthcare providers and state and local health departments  For more information: www cdc gov/ERHCE64TW 629051-B 02/24/2020       Stay home when you are sick,except to get medical care  Wash your hands often with soap and water for at least 20 seconds  Cover your cough or sneeze with a tissue, then throw the tissue in the trash  Clean and disinfect frequently touched objects and surfaces  Avoid touching your eyes, nose, and mouth  STOP THE SPREAD OF GERMS  For more information: www cdc gov/COVID19 Avoid close contact with people who are sick  Help prevent the spread of respiratory diseases like COVID-19

## 2020-09-01 NOTE — DISCHARGE SUMMARY
Discharge Date:  September 1, 2020     Attending Psychiatrist: Dr Donna Osorio    Reason for Admission/HPI:   History of Present Illness     "This is a 77-year-old male,  but currently , has one 3year-old son  The patient currently is homeless  The patient had presented to ER with complaint of feeling depressed and having suicidal thoughts with plan to cut herself  The patient reports her current stressors includes being homeless and her wife leaving him and taking her son with her to [facility] in March this year  The patient reports history of mental health treatment since last 10 years  He reports following with psychiatrist and therapist over last 9-10 years  He last saw his psychiatrist 4 months back but reported that due to his insurance changing he con follow with him anymore  The patient reports having 9 psychiatric inpatient admission in the past   Review of his chart shows that the patient was discharge from [facility] [place] [place] on August 20th  He was admitted there for similar complaint  He was discharged on Abilify 15 mg daily and Effexor  mg daily  The patient though had initially again presented to their ER the same day complaining of depression and suicidal ideation in the setting of being homeless  The patient though was kept overnight under observation and discharged next day  The patient then presented to the ER at [facility] patient was seen in the unit today  He reported he has been depressed for last few weeks  Patient reported he also had been having suicidal thoughts  The patient reports he has a recurrent episodes of depression for many years  He described his symptoms of depression as feeling very hopeless nowadays, feeling sad, poor sleep, low energy level, poor concentration, and anhedonia    He also reported having suicidal ideation with plan to cut his wrist   Reports 1 history of suicide attempt around 6 years back when he had slit his wrist   Denies any homicidal ideation      The patient also reported he has been very anxious for last few months  Reports occasional panic attack where he will have significant anxiety, crying difficulty breathing and shakiness  The patient also reports he has significant anxiety around people  Review of its notes indicates that the patient had been diagnosed with social phobia in the past      Denies any auditory or visual hallucination but patient endorses paranoia  He reports he feels people are out due to get him  Did not endorse any delusional ideation  Denies any history of manic or hypomanic episode  Denies any history of eating disorder or any symptoms or obsessive-compulsive disorder  The patient also denies any history of any abuse or life-threatening trauma in the past "    Hospital Course: The patient was admitted to the inpatient psychiatric unit and started on every 15 minutes precautions  A treatment plan was formed with focus on pharmacotherapy and milieu therapy, group therapy and individual psychotherapy when indicated  Psychiatric medications were titrated over the hospital stay and milieu therapy was utilized  To address depressive symptoms, patient was started on venlafaxine 150 mg daily  Medication doses were titrated during the hospital course  To address anxiety symptoms, patient was started on Ativan p r n  However, patient was noted to ask for all his p r n  Dosages of Ativan  Ativan was switched to Atarax 25 mg daily standing doses  In addition patient was started on Seroquel 100 mg for sleep and Abilify 15 mg daily  The patient did not show any changes in his vital signs or symptoms suggestive of alcohol withdrawal   Patient gained more insight into drinking pattern and how it is affecting all aspects of  life  Patient's symptoms improved gradually over the hospital course  At the end of treatment the patient was doing well   Mood was stable at the time of discharge  The patient denied suicidal ideation, intent or plan at the time of discharge and denied homicidal ideation, plan or intent at the time of discharge  There was no overt psychosis at the time of discharge  There were no signs or symptoms of PTSD or panic attacks  Sleep and appetite were improved  The patient was tolerating medications and was not reporting any significant side effects at the time of discharge  Patient was discharged on:     current meds:   Current Facility-Administered Medications   Medication Dose Route Frequency    acetaminophen (TYLENOL) tablet 650 mg  650 mg Oral Q6H PRN    aluminum-magnesium hydroxide-simethicone (MYLANTA) 200-200-20 mg/5 mL oral suspension 30 mL  30 mL Oral Q4H PRN    ARIPiprazole (ABILIFY) tablet 15 mg  15 mg Oral Daily    benztropine (COGENTIN) injection 1 mg  1 mg Intramuscular Q6H PRN    benztropine (COGENTIN) tablet 1 mg  1 mg Oral Q6H PRN    haloperidol (HALDOL) tablet 5 mg  5 mg Oral Q6H PRN    haloperidol lactate (HALDOL) injection 5 mg  5 mg Intramuscular Q6H PRN    hydrOXYzine HCL (ATARAX) tablet 10 mg  10 mg Oral Q8H PRN    hydrOXYzine HCL (ATARAX) tablet 25 mg  25 mg Oral Q8H PRN    hydrOXYzine HCL (ATARAX) tablet 25 mg  25 mg Oral Daily    hydrOXYzine HCL (ATARAX) tablet 50 mg  50 mg Oral Q8H PRN    ibuprofen (MOTRIN) tablet 600 mg  600 mg Oral Q8H PRN    magnesium hydroxide (MILK OF MAGNESIA) 400 mg/5 mL oral suspension 30 mL  30 mL Oral Daily PRN    melatonin tablet 3 mg  3 mg Oral HS PRN    nicotine (NICODERM CQ) 21 mg/24 hr TD 24 hr patch 1 patch  1 patch Transdermal Daily    nicotine polacrilex (NICORETTE) gum 2 mg  2 mg Oral Q2H PRN    QUEtiapine (SEROquel) tablet 100 mg  100 mg Oral HS    traMADol (ULTRAM) tablet 50 mg  50 mg Oral Q6H PRN    traZODone (DESYREL) tablet 50 mg  50 mg Oral HS PRN    venlafaxine (EFFEXOR-XR) 24 hr capsule 150 mg  150 mg Oral Daily              Since the patient was doing well at the end of the hospitalization, treatment team felt that the patient had maximally benefitted from inpatient treatment and could be safely discharged to outpatient care  Patient was discharged on Abilify 15 mg daily, Atarax 25 mg daily, Seroquel 100 mg q h s , venlafaxine 150 mg daily  The outpatient follow up was arranged by the unit  upon discharge, however, patient decided to go to [place] where he has a sister  He will stay at [facility] for a few days and then go stay at his sister's  [facility] appointment  Mental Status at time of Discharge:     Appearance:  age appropriate, casually dressed and tattooed   Behavior:  psychomotor retardation and Cooperative and pleasant   Speech:  normal pitch and normal volume   Mood:  euthymic   Affect:  normal and mood congruent   Thought Process:  goal directed and [place] and coherent   Thought Content:  normal   Perceptual Disturbances: None   Risk Potential: Suicidal Ideations none, Homicidal Ideations none and Potential for Aggression No   Sensorium:  person, place, time/date and situation   Cognition:  recent and remote memory grossly intact   Consciousness:  alert and awake    Attention: attention span and concentration were age appropriate   Insight:  fair   Judgment: fair   Gait/Station: normal gait/station and normal balance   Motor Activity: no abnormal movements       Discharge Diagnosis:   Major depressive disorder recurrent, severe, without psychosis        Discharge Medications:  See after visit summary for reconciled discharge medications provided to patient and family  Discharge instructions/Information to patient and family:   See after visit summary for information provided to patient and family  Provisions for Follow-Up Care:  See after visit summary for information related to follow-up care and any pertinent home health orders  Discharge Statement   I spent 30 minutes discharging the patient  This time was spent on the day of discharge  I had direct contact with the patient on the day of discharge  Additional documentation is required if more than 30 minutes were spent on discharge

## 2020-09-01 NOTE — CASE MANAGEMENT
Domingo Closs <[email or web address]   Mon 8/31/2020 3:30 PM   To: [email or web address]  +3 others   Cc: [email or web address]  +1 other   Hello everyone,   I just emailed Deandre Felipe and made her aware that [facility] does think that Dominic Vale could be appropriate for our full care program, so we will be putting him on our waitlist for the full care and will plan to follow up with him when we may have an opening available  He will be 3rd on our current waiting list, and I am not sure when we will have an opening  We would want him to start D/A treatment prior to be admitted if possible since he has frequent recent usage  Thank you all for your updates and let me know if you have questions,   Shelley Estrada LPC  Clinical Coordinator of the [address] [place] [address]  Dept  of Psychiatry  [facility] Phone: [phone]    From: [email or web address]   Sent: Monday, August 31, 2020 1:30 PM  To: Tg Peguero <Marisabel  [email or web address]; [email or web address]; [email or web address]  Cc: [email or web address]; Domingo Closs <[email or web address]; [email or web address]  Subject: Re: Cardwell Ped Re: Minna Mclaughlin   CAUTION: This email originated from outside of the [facility] network  Do not click on any links or open attachments unless the sender is known, and the content is verified as safe  ? Please note that if you have received this message in error, you are hereby notified that any dissemination of this communication is strictly prohibited  Please notify me immediately by reply e-Mail and delete all copies of the original message  Arnold Glover 8/31/2020 1:30 PM   [facility] Warning: This is an external email  Please exercise caution   Esme Spence is still going to be in the hospital tomorrow, I can do a phone interview with him in the afternoon  If this is okay, just let me know what time  Bryce Elroybrie   HM   ? Shannan Jj   Fri 8/28/2020 3:46 PM   I faxed his latest H&P and medical exam  He was positive for [facility] upon admit  [facility] has no opening and he can't go to [facility] until mid-September  I'm expecting DC Tues or Wed  I did make a referral to [facility] and [facility]   [email or web address] Mtz   Fri 8/28/2020 3:41 PM   WARNING! This email came from outside the [address] [place] [address] Ave  DO NOT click on any links or open attachments from this email unless you know the sender & the content are [facility] Warning: This is an external email  Please exercise caution   I'm

## 2020-09-13 ENCOUNTER — HOSPITAL ENCOUNTER (EMERGENCY)
Facility: HOSPITAL | Age: 35
End: 2020-09-15
Attending: EMERGENCY MEDICINE
Payer: COMMERCIAL

## 2020-09-13 DIAGNOSIS — F41.9 ANXIETY: ICD-10-CM

## 2020-09-13 DIAGNOSIS — F32.A DEPRESSION WITH SUICIDAL IDEATION: Primary | ICD-10-CM

## 2020-09-13 DIAGNOSIS — R45.851 DEPRESSION WITH SUICIDAL IDEATION: Primary | ICD-10-CM

## 2020-09-13 PROCEDURE — 80307 DRUG TEST PRSMV CHEM ANLYZR: CPT | Performed by: EMERGENCY MEDICINE

## 2020-09-13 PROCEDURE — 87635 SARS-COV-2 COVID-19 AMP PRB: CPT | Performed by: EMERGENCY MEDICINE

## 2020-09-13 PROCEDURE — 82075 ASSAY OF BREATH ETHANOL: CPT | Performed by: EMERGENCY MEDICINE

## 2020-09-13 PROCEDURE — 99285 EMERGENCY DEPT VISIT HI MDM: CPT

## 2020-09-13 PROCEDURE — 99285 EMERGENCY DEPT VISIT HI MDM: CPT | Performed by: EMERGENCY MEDICINE

## 2020-09-13 NOTE — LETTER
[facility] EMERGENCY DEPARTMENT  [address] [facility] [address]  [phone]  Dept: [phone]      EMTALA TRANSFER CONSENT    NAME Kassandra CHACON 1985                              MRN [id number]    I have been informed of my rights regarding examination, treatment, and transfer   by Dr Yennifer Barton DO  2Benefits: Continuity of care    Risks: Potential for delay in receiving treatment      Consent for Transfer:  I acknowledge that my medical condition has been evaluated and explained to me by the emergency department physician or other qualified medical person and/or my attending physician, who has recommended that I be transferred to the service of  Accepting Physician: Dr Benjamin Handler at [address] [place] [address] Name, [address] : [facility], [address] [place] [address]  The above potential benefits of such transfer, the potential risks associated with such transfer, and the probable risks of not being transferred have been explained to me, and I fully understand them  The doctor has explained that, in my case, the benefits of transfer outweigh the risks  I agree to be transferred  I authorize the performance of emergency medical procedures and treatments upon me in both transit and upon arrival at the receiving facility  Additionally, I authorize the release of any and all medical records to the receiving facility and request they be transported with me, if possible  I understand that the safest mode of transportation during a medical emergency is an ambulance and that the Hospital advocates the use of this mode of transport  Risks of traveling to the receiving facility by car, including absence of medical control, life sustaining equipment, such as oxygen, and medical personnel has been explained to me and I fully understand them      (ИРИНА CORRECT BOX BELOW)  [  ]  I consent to the stated transfer and to be transported by ambulance/helicopter  [  ]  I consent to the stated transfer, but refuse transportation by ambulance and accept full responsibility for my transportation by car  I understand the risks of non-ambulance transfers and I exonerate the Hospital and its staff from any deterioration in my condition that results from this refusal     X___________________________________________    DATE  20  TIME________  Signature of patient or legally responsible individual signing on patient behalf           RELATIONSHIP TO PATIENT_________________________          Provider Certification    NAME Mary Ann Ratliff                                         1985                              MRN [id number]    A medical screening exam was performed on the above named patient  Based on the examination:    Condition Necessitating Transfer The primary encounter diagnosis was Depression with suicidal ideation  A diagnosis of Anxiety was also pertinent to this visit  Patient Condition: Other (Include comment)_________________________________________    Reason for Transfer: No bed available at level of patient's needs    Transfer Requirements: [address] 1950 [address]   · Space available and qualified personnel available for treatment as acknowledged by Maurizio Nayak ; [phone]  · Agreed to accept transfer and to provide appropriate medical treatment as acknowledged by       Dr Petty Saini  · Appropriate medical records of the examination and treatment of the patient are provided at the time of transfer   [facility], [address] _______  · Transfer will be performed by qualified personnel from [address] [place] [address]  and appropriate transfer equipment as required, including the use of necessary and appropriate life support measures      Provider Certification: I have examined the patient and explained the following risks and benefits of being transferred/refusing transfer to the patient/family:  General risk, such as traffic hazards, adverse weather conditions, rough terrain or turbulence, possible failure of equipment (including vehicle or aircraft), or consequences of actions of persons outside the control of the transport personnel      Based on these reasonable risks and benefits to the patient and/or the unborn child(lucero), and based upon the information available at the time of the patients examination, I certify that the medical benefits reasonably to be expected from the provision of appropriate medical treatments at another medical facility outweigh the increasing risks, if any, to the individuals medical condition, and in the case of labor to the unborn child, from effecting the transfer      X____________________________________________ DATE 09/14/20        TIME_______      ORIGINAL - SEND TO MEDICAL RECORDS   COPY - SEND WITH PATIENT DURING TRANSFER

## 2020-09-13 NOTE — ED NOTES
Pt given safety tray tolerating well  Pt made comfortable with no other needs @ this time  All safety precautions are in place including 1:1 @ bedside       Mickel Dakins A Milhouse  09/13/20 1934

## 2020-09-13 NOTE — ED PROVIDER NOTES
History  Chief Complaint   Patient presents with    Psychiatric Evaluation     pt c/o SI x2 weeks, no plan  pt takes abilify and effexor and has a psychiatrist, has not seen in a few months per pt     27 yo male with a history of an anxiety disorder and major depression presents to the ED complaining of suicidal ideation  The patient reports progressively worsening depression with occasional suicidal thoughts x 2 weeks  He says the SI has been becoming more frequent and he is scared that he will eventually act on the thoughts  No specific plan  He denies HI  No auditory of visual hallucinations  The patient has been compliant with his psychiatric medications but has not seen his psychiatrist recently  (+) Multiple recent life stressors  No recent alcohol or substance abuse  No other specific complaints  Prior to Admission Medications   Prescriptions Last Dose Informant Patient Reported? Taking? ARIPiprazole (ABILIFY) 15 mg tablet   No No   Sig: Take 1 tablet (15 mg total) by mouth daily   hydrOXYzine HCL (ATARAX) 25 mg tablet   No No   Sig: Take 1 tablet (25 mg total) by mouth daily   melatonin 3 mg   No No   Sig: Take 1 tablet (3 mg total) by mouth daily at bedtime as needed (insomnia)   nicotine (NICODERM CQ) 21 mg/24 hr TD 24 hr patch   No No   Sig: Place 1 patch on the skin daily   venlafaxine (EFFEXOR-XR) 150 mg 24 hr capsule   No No   Sig: Take 1 capsule (150 mg total) by mouth daily      Facility-Administered Medications: None       Past Medical History:   Diagnosis Date    Anxiety     Depression     Panic attack     Psychiatric illness     Self-injurious behavior     Suicide attempt ([facility] [address] )        History reviewed  No pertinent surgical history  History reviewed  No pertinent family history  I have reviewed and agree with the history as documented      E-Cigarette/Vaping    E-Cigarette Use Never User      E-Cigarette/Vaping Substances    Nicotine No     CBD No     Flavoring No     Unknown Yes      Social History     Tobacco Use    Smoking status: Current Every Day Smoker     Packs/day: 0 50     Years: 20 00     Pack years: 10 00     Types: Cigarettes    Smokeless tobacco: Never Used   Substance Use Topics    Alcohol Use     Frequency: Never     Binge frequency: Never    Drug use: Yes     Frequency: 1 0 times per week     Types: Marijuana     Comment: once a week       Review of Systems   Constitutional: Negative for chills and fever  HENT: Negative for sore throat  Respiratory: Negative for cough and shortness of breath  Cardiovascular: Negative for chest pain and palpitations  Gastrointestinal: Negative for abdominal pain, diarrhea, nausea and vomiting  Endocrine: Negative for cold intolerance and heat intolerance  Genitourinary: Negative for dysuria and flank pain  Musculoskeletal: Negative for back pain  Skin: Negative for rash  Allergic/Immunologic: Negative for immunocompromised state  Neurological: Negative for headaches  Hematological: Negative for adenopathy  Psychiatric/Behavioral: Positive for dysphoric mood and suicidal ideas  The patient is not nervous/anxious  Physical Exam  Physical Exam  Constitutional:       General: He is not in acute distress  Appearance: He is well-developed  HENT:      Head: Normocephalic and atraumatic  Eyes:      Pupils: Pupils are equal, round, and reactive to light  Neck:      Musculoskeletal: Normal range of motion and neck supple  Cardiovascular:      Rate and Rhythm: Normal rate and regular rhythm  Pulmonary:      Effort: Pulmonary effort is normal  No respiratory distress  Breath sounds: Normal breath sounds  Abdominal:      General: There is no distension  Palpations: Abdomen is soft  Tenderness: There is no abdominal tenderness  Musculoskeletal: Normal range of motion  Skin:     General: Skin is warm and dry     Neurological:      Mental Status: He is alert and oriented to person, place, and time  Psychiatric:         Attention and Perception: He does not perceive auditory or visual hallucinations  Thought Content: Thought content includes suicidal ideation  Thought content does not include homicidal ideation  Thought content does not include suicidal plan  Vital Signs  ED Triage Vitals [09/13/20 1813]   Temperature Pulse Respirations Blood Pressure SpO2   97 6 °F (36 4 °C) 88 18 128/93 100 %      Temp Source Heart Rate Source Patient Position - Orthostatic VS BP Location FiO2 (%)   Tympanic Monitor Sitting Left arm --      Pain Score       --           Vitals:    09/13/20 1813   BP: 128/93   Pulse: 88   Patient Position - Orthostatic VS: Sitting         Visual Acuity      ED Medications  Medications - No data to display    Diagnostic Studies  Results Reviewed     Procedure Component Value Units Date/Time    Novel Coronavirus [facility] [[id number]]  (Normal) Collected:  09/13/20 1853    Lab Status:  Final result Specimen:  Nares from Nose Updated:  09/13/20 2001     SARS-CoV-2 Negative    Narrative: The specimen collection materials, transport medium, and/or testing methodology utilized in the production of these test results have been proven to be reliable in a limited validation with an abbreviated program under the Emergency Utilization Authorization provided by the FDA  Testing reported as "Presumptive positive" will be confirmed with secondary testing with a reference laboratory to ensure result accuracy  Clinical caution and judgement should be used with the interpretation of these results with consideration of the clinical impression and other laboratory testing  Testing reported as "Positive" or "Negative" has been proven to be accurate according to standard laboratory validation requirements  All testing is performed with control materials showing appropriate reactivity at standard intervals        Rapid drug screen, urine [[id number]] (Abnormal) Collected:  09/13/20 1854    Lab Status:  Final result Specimen:  Urine, Clean Catch Updated:  09/13/20 1922     Amph/Meth UR Negative     Barbiturate Ur Negative     Benzodiazepine Urine Negative     Cocaine Urine Negative     Methadone Urine Negative     Opiate Urine Negative     PCP Ur Negative     THC Urine Positive     Oxycodone Urine Negative    Narrative:       Presumptive report  If requested, specimen will be sent to reference lab for confirmation  FOR MEDICAL PURPOSES ONLY  IF CONFIRMATION NEEDED PLEASE CONTACT THE LAB WITHIN 5 DAYS  Drug Screen Cutoff Levels:  AMPHETAMINE/METHAMPHETAMINES  1000 ng/mL  BARBITURATES     200 ng/mL  BENZODIAZEPINES     200 ng/mL  COCAINE      300 ng/mL  METHADONE      300 ng/mL  OPIATES      300 ng/mL  PHENCYCLIDINE     25 ng/mL  THC       50 ng/mL  OXYCODONE      100 ng/mL    POCT alcohol breath test [[id number]]  (Normal) Resulted:  09/13/20 1853    Lab Status:  Final result Updated:  09/13/20 1853     EXTBreath Alcohol 0 00                 No orders to display              Procedures  Procedures         ED Course                           SBIRT 20yo+      Most Recent Value   SBIRT (25 yo +)   In order to provide better care to our patients, we are screening all of our patients for alcohol and drug use  Would it be okay to ask you these screening questions? Yes Filed at: 09/13/2020 1854   Initial Alcohol Screen: US AUDIT-C    1  How often do you have a drink containing alcohol?  0 Filed at: 09/13/2020 1854   2  How many drinks containing alcohol do you have on a typical day you are drinking? 0 Filed at: 09/13/2020 1854   3a  Male UNDER 65: How often do you have five or more drinks on one occasion? 0 Filed at: 09/13/2020 1854   Audit-C Score  0 Filed at: 09/13/2020 1854   LEATHA: How many times in the past year have you    Used an illegal drug or used a prescription medication for non-medical reasons?   Weekly Filed at: 09/13/2020 1854   DAST-10: In the past 12 months      1  Have you used drugs other than those required for medical reasons? 0 Filed at: 09/13/2020 1854   2  Do you use more than one drug at a time? 0 Filed at: 09/13/2020 1854   3  Have you had medical problems as a result of your drug use (e g , memory loss, hepatitis, convulsions, bleeding, etc )? 0 Filed at: 09/13/2020 1854   4  Have you had "blackouts" or "flashbacks" as a result of drug use? YesNo  0 Filed at: 09/13/2020 1854   5  Do you ever feel bad or guilty about your drug use? 0 Filed at: 09/13/2020 1854   6  Does your spouse (or parent) ever complain about your involvement with drugs? 0 Filed at: 09/13/2020 1854   7  Have you neglected your family because of your use of drugs? 0 Filed at: 09/13/2020 1854   8  Have you engaged in illegal activities in order to obtain drugs? 0 Filed at: 09/13/2020 1854   9  Have you ever experienced withdrawal symptoms (felt sick) when you stopped taking drugs? 0 Filed at: 09/13/2020 1854   10  Are you always able to stop using drugs when you want to?  0 Filed at: 09/13/2020 1854   DAST-10 Score  0 Filed at: 09/13/2020 1854                  MDM  Number of Diagnoses or Management Options  Diagnosis management comments: The patient is well appearing with a benign exam and stable vital signs  He is pleasant and cooperative with the exam  Case discussed with Crisis --> they will come to the ED to evaluate the patient  Disposition per crisis worker  Amount and/or Complexity of Data Reviewed  Clinical lab tests: ordered and reviewed  Tests in the medicine section of CPT®: ordered and reviewed    Patient Progress  Patient progress: stable      Disposition  Final diagnoses:   None     ED Disposition     None      Follow-up Information    None         Patient's Medications   Discharge Prescriptions    No medications on file     No discharge procedures on file      PDMP Review       Value Time User    PDMP Reviewed  Yes 9/1/2020 11:10 AM Albaro Carson MD          ED Provider  Electronically Signed by           Addis Parson MD  09/13/20 0147

## 2020-09-14 ENCOUNTER — APPOINTMENT (EMERGENCY)
Dept: RADIOLOGY | Facility: HOSPITAL | Age: 35
End: 2020-09-14
Payer: COMMERCIAL

## 2020-09-14 LAB
ALBUMIN SERPL BCP-MCNC: 4 G/DL (ref 3–5.2)
ALP SERPL-CCNC: 61 U/L (ref 43–122)
ALT SERPL W P-5'-P-CCNC: 28 U/L (ref 9–52)
ANION GAP SERPL CALCULATED.3IONS-SCNC: 3 MMOL/L (ref 5–14)
AST SERPL W P-5'-P-CCNC: 36 U/L (ref 17–59)
ATRIAL RATE: 74 BPM
BASOPHILS # BLD AUTO: 0 THOUSANDS/ΜL (ref 0–0.1)
BASOPHILS NFR BLD AUTO: 0 % (ref 0–1)
BILIRUB SERPL-MCNC: 0.6 MG/DL
BILIRUB UR QL STRIP: NEGATIVE
BUN SERPL-MCNC: 14 MG/DL (ref 5–25)
CALCIUM SERPL-MCNC: 9.4 MG/DL (ref 8.4–10.2)
CHLORIDE SERPL-SCNC: 103 MMOL/L (ref 97–108)
CLARITY UR: CLEAR
CO2 SERPL-SCNC: 30 MMOL/L (ref 22–30)
COLOR UR: ABNORMAL
CREAT SERPL-MCNC: 0.8 MG/DL (ref 0.7–1.5)
EOSINOPHIL # BLD AUTO: 0.1 THOUSAND/ΜL (ref 0–0.4)
EOSINOPHIL NFR BLD AUTO: 3 % (ref 0–6)
ERYTHROCYTE [DISTWIDTH] IN BLOOD BY AUTOMATED COUNT: 14.8 %
GFR SERPL CREATININE-BSD FRML MDRD: 134 ML/MIN/1.73SQ M
GLUCOSE SERPL-MCNC: 115 MG/DL (ref 70–99)
GLUCOSE UR STRIP-MCNC: NEGATIVE MG/DL
HCT VFR BLD AUTO: 46 % (ref 41–53)
HGB BLD-MCNC: 15.7 G/DL (ref 13.5–17.5)
HGB UR QL STRIP.AUTO: NEGATIVE
KETONES UR STRIP-MCNC: NEGATIVE MG/DL
LEUKOCYTE ESTERASE UR QL STRIP: NEGATIVE
LYMPHOCYTES # BLD AUTO: 1.6 THOUSANDS/ΜL (ref 0.5–4)
LYMPHOCYTES NFR BLD AUTO: 42 % (ref 25–45)
MCH RBC QN AUTO: 29.4 PG (ref 26–34)
MCHC RBC AUTO-ENTMCNC: 34.1 G/DL (ref 31–36)
MCV RBC AUTO: 86 FL (ref 80–100)
MONOCYTES # BLD AUTO: 0.2 THOUSAND/ΜL (ref 0.2–0.9)
MONOCYTES NFR BLD AUTO: 6 % (ref 1–10)
NEUTROPHILS # BLD AUTO: 1.8 THOUSANDS/ΜL (ref 1.8–7.8)
NEUTS SEG NFR BLD AUTO: 49 % (ref 45–65)
NITRITE UR QL STRIP: NEGATIVE
P AXIS: 64 DEGREES
PH UR STRIP.AUTO: 6 [PH]
PLATELET # BLD AUTO: 267 THOUSANDS/UL (ref 150–450)
PMV BLD AUTO: 8 FL (ref 8.9–12.7)
POTASSIUM SERPL-SCNC: 4.1 MMOL/L (ref 3.6–5)
PR INTERVAL: 158 MS
PROT SERPL-MCNC: 7 G/DL (ref 5.9–8.4)
PROT UR STRIP-MCNC: NEGATIVE MG/DL
QRS AXIS: 54 DEGREES
QRSD INTERVAL: 86 MS
QT INTERVAL: 370 MS
QTC INTERVAL: 410 MS
RBC # BLD AUTO: 5.34 MILLION/UL (ref 4.5–5.9)
SODIUM SERPL-SCNC: 136 MMOL/L (ref 137–147)
SP GR UR STRIP.AUTO: 1.02 (ref 1–1.04)
T WAVE AXIS: 33 DEGREES
UROBILINOGEN UA: NEGATIVE MG/DL
VENTRICULAR RATE: 74 BPM
WBC # BLD AUTO: 3.7 THOUSAND/UL (ref 4.5–11)

## 2020-09-14 PROCEDURE — NC001 PR NO CHARGE: Performed by: EMERGENCY MEDICINE

## 2020-09-14 PROCEDURE — 85025 COMPLETE CBC W/AUTO DIFF WBC: CPT | Performed by: EMERGENCY MEDICINE

## 2020-09-14 PROCEDURE — 80053 COMPREHEN METABOLIC PANEL: CPT | Performed by: EMERGENCY MEDICINE

## 2020-09-14 PROCEDURE — 93005 ELECTROCARDIOGRAM TRACING: CPT

## 2020-09-14 PROCEDURE — 93010 ELECTROCARDIOGRAM REPORT: CPT | Performed by: INTERNAL MEDICINE

## 2020-09-14 PROCEDURE — 99283 EMERGENCY DEPT VISIT LOW MDM: CPT | Performed by: PSYCHIATRY & NEUROLOGY

## 2020-09-14 PROCEDURE — 36415 COLL VENOUS BLD VENIPUNCTURE: CPT | Performed by: EMERGENCY MEDICINE

## 2020-09-14 PROCEDURE — 71045 X-RAY EXAM CHEST 1 VIEW: CPT

## 2020-09-14 RX ORDER — NICOTINE 21 MG/24HR
21 PATCH, TRANSDERMAL 24 HOURS TRANSDERMAL DAILY
Status: DISCONTINUED | OUTPATIENT
Start: 2020-09-14 | End: 2020-09-15 | Stop reason: HOSPADM

## 2020-09-14 RX ORDER — HYDROXYZINE HYDROCHLORIDE 25 MG/1
25 TABLET, FILM COATED ORAL DAILY
Status: DISCONTINUED | OUTPATIENT
Start: 2020-09-14 | End: 2020-09-15 | Stop reason: HOSPADM

## 2020-09-14 RX ORDER — VENLAFAXINE HYDROCHLORIDE 150 MG/1
150 CAPSULE, EXTENDED RELEASE ORAL DAILY
Status: DISCONTINUED | OUTPATIENT
Start: 2020-09-14 | End: 2020-09-15 | Stop reason: HOSPADM

## 2020-09-14 RX ORDER — ARIPIPRAZOLE 15 MG/1
15 TABLET ORAL DAILY
Status: DISCONTINUED | OUTPATIENT
Start: 2020-09-14 | End: 2020-09-15 | Stop reason: HOSPADM

## 2020-09-14 RX ADMIN — HYDROXYZINE HYDROCHLORIDE 25 MG: 25 TABLET, FILM COATED ORAL at 08:34

## 2020-09-14 RX ADMIN — ARIPIPRAZOLE 15 MG: 15 TABLET ORAL at 08:34

## 2020-09-14 RX ADMIN — VENLAFAXINE HYDROCHLORIDE 150 MG: 150 CAPSULE, EXTENDED RELEASE ORAL at 08:35

## 2020-09-14 RX ADMIN — NICOTINE 21 MG: 21 PATCH, EXTENDED RELEASE TRANSDERMAL at 08:33

## 2020-09-14 NOTE — ED NOTES
Pt sleeping peacefully  No s/s of respiratory distress noted  1 to 1 continued  Alyssa PCA at bedside       Maricruz Villareal RN  09/14/20 6100

## 2020-09-14 NOTE — ED NOTES
Pt laying peacefully  No s/s of respiratory distress noted  1 to 1 continued, Tanika DEJESUS tech at bedside        Lennie Gold RN  09/14/20 6599

## 2020-09-14 NOTE — ED PROVIDER NOTES
Patient initially was seen and examined by prior physicians yesterday  Patient is 28years old camping in today with anxiety, depression and suicidal ideation  Her prior note, patient on no specific plans  He has significant life stressors  It was noted patient did have laboratory data completed  Patient has no evidence of end-organ damage in no abnormal labs  COVID negative  He was positive for THC only  Breath alcohol was negative  Chest x-ray was negative  Patient is medically stable for psychiatric evaluation and admission    Patient sleeping in bed  Patient remains on a one-to-one  She is in no distress    Portions of the record may have been created with voice recognition software  Occasional wrong word or "sound a like" substitutions may have occurred due to the inherent limitations of voice recognition software  Read the chart carefully and recognize, using context, where substitutions have occurred         Frederick Amor DO  09/14/20 1931

## 2020-09-14 NOTE — ED NOTES
Psychiatry in room speaking to pt  Pt is cooperative and pleasant  No s/s of respiratory distress noted  1 to 1 continued  Jenjoe Halo ED Tech at bedside       Antonia Bradford RN  09/14/20 8907

## 2020-09-14 NOTE — ED NOTES
Bed search:     Fax referral to [facility]    [facility]

## 2020-09-14 NOTE — ED NOTES
Patient sleeping quietly on litter, observation of chest rising and falling with breathing noted       Daniel Rush RN  09/14/20 8291

## 2020-09-14 NOTE — ED NOTES
Pt laying peacefully  Pt was cooperative with answering questions for  the behavioral health checks  No s/s of respiratory distress noted  1 to 1 continued  Alyssa PCA at bedside       Cory Feliciano RN  09/14/20 6882

## 2020-09-14 NOTE — ED NOTES
Patient sleeping quietly on the litter, observation of chest rising and falling with respirations noted       Marjan Gonzales RN  09/14/20 7774

## 2020-09-14 NOTE — ED NOTES
Pt laying peacefully  Pt was cooperative when drawing labs  No s/s of respiratory distress noted  1 to 1 continued  Jaleesa Portal ED Tech at bedside        Mario Brush RN  09/14/20 3556

## 2020-09-14 NOTE — ED CARE HANDOFF
Emergency Department Sign Out Note        Sign out and transfer of care from Dr Pati Hanks  See Separate Emergency Department note  The patient, Mohinder Israel, was evaluated by the previous provider for SI, Depression  Workup Completed:  Medical clearance, 201 signed    ED Course / Workup Pending (followup): Patient cooperative 28 yr old M  Signed 201 for depression and +SI  No acute events  Monitoring for safety until bed assignment can be found                             ED Course as of Sep 15 1123   Mon Sep 14, 2020   4564 Procedure Note: EKG  Date/Time: 09/14/20 2:09 PM   Performed by: Sanjiv Babb  Authorized by: Sanjiv Babb  ECG interpreted by me, the ED Provider: yes   The EKG demonstrates:  Rate 74  Rhythm sinus  QTc 410  No ST elevations/depressions            Procedures  MDM    Disposition  Final diagnoses:   Depression with suicidal ideation   Anxiety     Time reflects when diagnosis was documented in both MDM as applicable and the Disposition within this note     Time User Action Codes Description Comment    9/14/2020  2:16 AM Shira Dub Add [F32 9,  R45 851] Depression with suicidal ideation     9/14/2020  2:16 AM Shira Dub Add [F41 9] Anxiety       ED Disposition     ED Disposition Condition Date/Time Comment    Discharge  Tue Sep 15, 2020  9:23 AM Mohinder Israel discharge to [facility]          MD Documentation      Most Recent Value   Patient Condition  Other (Include comment)_________________________________________   Reason for Transfer  No bed available at level of patient's needs   Benefits of Transfer  Continuity of care   Risks of Transfer  Potential for delay in receiving treatment   Accepting Physician  Dr Liang Collins Name, [address] [facility] , [address] [place] [address]    (Name & Tel number)  [facility]  [phone]   Transported by (Company and Unit #)  [employer]   Sending MD Dr Donavan Schmitz Provider Certification  General risk, such as traffic hazards, adverse weather conditions, rough terrain or turbulence, possible failure of equipment (including vehicle or aircraft), or consequences of actions of persons outside the control of the transport personnel      RN Documentation      Most [address] [facility] [address] Name, [address] , [address]  [facility] , [place], [address] [place] [address]    (Name & Tel number)  John Hadley ,  [phone]   Transported by [employer] and Unit #)  [facility]      Follow-up Information    None       Discharge Medication List as of 9/15/2020  9:23 AM      CONTINUE these medications which have NOT CHANGED    Details   ARIPiprazole (ABILIFY) 15 mg tablet Take 1 tablet (15 mg total) by mouth daily, Starting Wed 9/2/2020, Until Fri 10/2/2020, Normal      hydrOXYzine HCL (ATARAX) 25 mg tablet Take 1 tablet (25 mg total) by mouth daily, Starting Wed 9/2/2020, Normal      melatonin 3 mg Take 1 tablet (3 mg total) by mouth daily at bedtime as needed (insomnia), Starting Tue 9/1/2020, Until Thu 10/1/2020, Normal      nicotine (NICODERM CQ) 21 mg/24 hr TD 24 hr patch Place 1 patch on the skin daily, Starting Wed 9/2/2020, Normal      venlafaxine (EFFEXOR-XR) 150 mg 24 hr capsule Take 1 capsule (150 mg total) by mouth daily, Starting Wed 9/2/2020, Until Fri 10/2/2020, Normal           No discharge procedures on file         ED Provider  Electronically Signed by     Elsa Patel DO  09/15/20 1123

## 2020-09-14 NOTE — ED NOTES
Patient sleeping quietly on litter, observation of chest rising and falling with breathing noted, patient is on 1:1 observation       Deana Peñaloza RN  09/14/20 5615

## 2020-09-14 NOTE — ED NOTES
Pt vitals taken and is currently resting in bed with no other needs @ this time  All safety precautions are in place including 1:1 @ bedside       Chaz Neri  09/13/20 5815

## 2020-09-14 NOTE — CONSULTS
Consultation - Ascencion Kerry [age] y o  male MRN: [id number]  Unit/Bed#: ED 08 Encounter: [id number]      Chief Complaint: "I'm having suicidal thoughts"    History of Present Illness   Physician Requesting Consult: Christi Gonsalves DO  Reason for Consult / Principal Problem: suicidal ideations    Conchita Manuel is a 28 y o  male with a history of Major Depressive episode and a past suicide attempt who presented on 09/13 to the ED with depressed mood and suicidal ideation which started 3 days ago  The patient has visible cuts on his L  Forearm, which he states was an attempted suicide yesterday via a shaving razor  He states he tried to cut deeper to die, but the shaving razor did not work  The pt also has healed cuts on his R  forearm from old cuttings, and admits to a suicide attempt years ago via cutting his forearm  The pt has poor recollection of the dates of his past suicide attempts  Per chart review, the pt reported to [address] [place] [address] on 9/12 with suicidal ideations  The patient was recently treated in-patient at [facility] on 8/12-8/14 and 8/22-9/1 for similar issues  The pt reports medication compliance, but admits he did not follow up with discharge plans  The patient and his wife  in March, and she moved to [address] [place] [address] to live with her parents  The patient admits his major stressor is not being able to see his 3 yr old son and being homeless  The patient states [facility]  The patient reports being alone and isolated, stating he has no friends and family in [place]  He has a sister who lives in [address] [place] [address], whom he lived with before and currently speaks to weekly, but states it is no longer an option  The pt admits to dailly marijuana use and his UDS in positive for THC  The patient reports good sleep and appetite with decreased energy and concentration  He reports feelings of hopelessness and "depressed" mood   The patient continues to endorse suicidal ideations without a plan today  He denies any paranoia, hallucinations or olya  Psychiatric Review Of Systems:  Problems with sleep: no  Appetite changes: no  Weight changes: no  Low energy/anergy: yes  Low interest/pleasure/anhedonia: yes  Somatic symptoms: no  Anxiety/panic: yes  Olya: no  Guilt/hopeless: denies guilt, reports feeling hopeless  Self injurious behavior/risky behavior: yes    Historical Information   Psychiatric medication trial: Multiple, currently on effexor and abilify  Per chart review hx of Abilify Maintena (3/4/20) while in-patient at [facility] with no follow up, saphris, risperdal, seroquel, latuda, cymbalta, remeron, zoloft, prozac, paxil, trazadone, ambien, ativan, klonopin (hx of selling his klonopin), buspar,    Inpatient hospitalizations: pt reports about 5, first at age 25, most recent at [facility] in August  At least 4 hospitalizations this yr  Suicide attempts: 1st 6-7 yrs ago via cutting forearm, most recent yesterday via cutting L forearm with a shaving razor  Violent behavior: Denies     Outpatient treatment: Denies    Substance Abuse History:    Social History     Tobacco Use    Smoking status: Current Every Day Smoker     Packs/day: 0 50     Years: 20 00     Pack years: 10 00     Types: Cigarettes    Smokeless tobacco: Never Used   Substance Use Topics    Alcohol Use     Frequency: Never     Binge frequency: Never    Drug use: Yes     Frequency: 1 0 times per week     Types: Marijuana     Comment: once a week        I have assessed this patient for substance use within the past 12 months  History of IP/OP rehabilitation program: Denies    Family Psychiatric History:   Patient denies any known family history of psychiatric illness, suicide attempt, or substance abuse    Social History  Education: 11th grade  Learning Disabilities: denies  Marital history: currently single, recently  from his wife in March  They share a 3 yr old son    Living arrangement: Presently homeless , [facility]  Occupational History: currently unemployed, 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 [employer]  Currently receives SSI  Functioning Relationships: alone & isolated and poor support system  Other Pertinent History: incarcerated at age 25 for receiving stolen property for 4 months  Traumatic History:   Abuse: denies  Other Traumatic Events: denies    Past Medical History:   Diagnosis Date    Anxiety     Depression     Panic attack     Psychiatric illness     Self-injurious behavior     Suicide attempt [facility])        Medical Review Of Systems:  Review of Systems - Negative except as noted in HPI    Meds/Allergies   all current active meds have been reviewed, current meds:   Current Facility-Administered Medications   Medication Dose Route Frequency    ARIPiprazole (ABILIFY) tablet 15 mg  15 mg Oral Daily    hydrOXYzine HCL (ATARAX) tablet 25 mg  25 mg Oral Daily    nicotine (NICODERM CQ) 21 mg/24 hr TD 24 hr patch 21 mg  21 mg Transdermal Daily    venlafaxine (EFFEXOR-XR) 24 hr capsule 150 mg  150 mg Oral Daily    and PTA meds:   Prior to Admission Medications   Prescriptions Last Dose Informant Patient Reported? Taking?    ARIPiprazole (ABILIFY) 15 mg tablet   No No   Sig: Take 1 tablet (15 mg total) by mouth daily   hydrOXYzine HCL (ATARAX) 25 mg tablet   No No   Sig: Take 1 tablet (25 mg total) by mouth daily   melatonin 3 mg   No No   Sig: Take 1 tablet (3 mg total) by mouth daily at bedtime as needed (insomnia)   nicotine (NICODERM CQ) 21 mg/24 hr TD 24 hr patch   No No   Sig: Place 1 patch on the skin daily   venlafaxine (EFFEXOR-XR) 150 mg 24 hr capsule   No No   Sig: Take 1 capsule (150 mg total) by mouth daily      Facility-Administered Medications: None     No Known Allergies    Objective   Vital signs in last 24 hours:  Temp:  [97 6 °F (36 4 °C)-98 4 °F (36 9 °C)] 98 4 °F (36 9 °C)  HR:  [74-88] 76  Resp:  [16-18] 18  BP: (191-140)/(6793) 121/77    Mental Status Evaluation:  Appearance:  poor eye contact, appears stated age and in hospital attire, cutting marks on bilateral forearms   Behavior:  calm, guarded, laying in bed and psychomotor retardation   Speech:  clear, slow, soft and coherent   Mood:  depressed   Affect:  mood-congruent and flat   Thought Process:  organized, goal directed and coherent, thought blocking   Thought Content: no verbalized delusions or overt paranoia, negative thoughts   Perceptual disturbances: no reported hallucinations and does not appear to be responding to internal stimuli at this time   Risk Potential: No active homicidal ideation, Suicidal Ideation without plan, Low potential for aggression based on previous behavior   Cognition: oriented to person, place, time, and situation, attention span appeared shorter than expected for age and cognition not formally tested   Insight:  Limited   Judgment: Limited     Laboratory results:  I have personally reviewed all pertinent laboratory/tests results  Assessment/Plan     Myron Mejia is a 28 y o  male with a history of Major Depressive Disorder who presents to the ED with suicidal ideations and a recent suicide attempt yesterday via cutting of his L  Forearm with a shaving razor  The patient has a pas suicide attempt via cutting his forearms  The patient stressors include separation from his wife and 3 yr old son in March and being homeless  He has had multiple recent ED visits and at least 4 admissions to in-patient behavioral units this year with similar complaints  Diagnosis: Major Depressive Disorder, recurrent severe, without psychosis  Plan:   - voluntarily admit the patient to the behavioral unit for treatment of his worsening depression and suicidal ideations  - maintain 1:1 observation as the pt continues to endorse suicidal ideations      Risks, benefits and possible side effects of Medications:   No medications given at this time       Anastasiya Mar MD

## 2020-09-14 NOTE — ED NOTES
Pt resting peacefully  No s/s of respiratory distress noted  1 to 1 continued  Layssa PCA at bedside        Elizabeth Jaeger RN  09/14/20 6312

## 2020-09-14 NOTE — ED CARE HANDOFF
Emergency Department Sign Out Note        Sign out and transfer of care from Dr Bryan Odonnell  See Separate Emergency Department note  The patient, Bj Patton, was evaluated by the previous provider for severe anxiety disorder with major depression  Patient has had worsening depression anxiety and occasional suicidal thoughts for 2 weeks       Workup Completed:   Psychiatric clearance labs  ED Course / Workup Pending (followup):    201 signed, bed assignment / search continuing  @ 07:00 am change in shift: AM meds written for, case signed out to Dr Gabriel Madera  ED Course as of Sep 14 0655   Mon Sep 14, 2020   3741 No active issues overnight  Patient will be signed out to day team         Procedures  MDM    Disposition  Final diagnoses:   Depression with suicidal ideation   Anxiety     Time reflects when diagnosis was documented in both MDM as applicable and the Disposition within this note     Time User Action Codes Description Comment    9/14/2020  2:16 AM Carol Ann Carpenter Add [F32 9,  D47 080] Depression with suicidal ideation     9/14/2020  2:16 AM Carol Ann Carpenter Add [F41 9] Anxiety       ED Disposition     None      Follow-up Information    None       Patient's Medications   Discharge Prescriptions    No medications on file     No discharge procedures on file         ED Provider  Electronically Signed by     Rosanne Montes III, DO  09/14/20 4760

## 2020-09-14 NOTE — ED NOTES
Patient presents at the ED as a walk in presenting with increased depression and anxiety  Patient also reports an increase in suicidal ideation with no plan at this time, but he recognizes that things are worsening  Patient reports that the thoughts have been worsening the past 3 days  He sights as stressors the fact that he is  from his wife and son since March and his current uncertainty as far as his housing situation  Patient has had a suicide attempt and today showed some cuts on his arm  Patient recognized the need to be in the hospital and is willing to sign a 201  The doctor is in agreement with this plan

## 2020-09-14 NOTE — ED NOTES
Insurance Authorization for admission:   Phone call placed to **  Phone number: **      Spoke to **      ** days approved  Level of care: **   Review on **  Authorization # **         EVS (Eligibility Verification System) called - [phone]  Automated system indicates: Per EVS patient is eligible with both physical and behavioral health managed by fee for service  Patient's recipient number is [id number]  Insurance Authorization for Transportation:    Phone call placed to **  Phone number **  Spoke to **     Authorization #: **

## 2020-09-14 NOTE — ED NOTES
Given tooth brush and tooth paste; offered other bathing items; declined at this time  1:1 continues at bedside       Evette Zamora RN  09/14/20 3727

## 2020-09-14 NOTE — ED NOTES
Pt laying peacefully  No s/s of respiratory distress noted  1 to 1 continued  Alyssa PCA at bedside        Ladarius Maloney RN  09/14/20 69 Desire Segura RN  09/14/20 1244

## 2020-09-14 NOTE — ED NOTES
Insurance is not in network with [facility]  No bed at [facility]  No bed at [facility]  No bed at [facility] is full  [facility] does not accept [facility]

## 2020-09-14 NOTE — ED NOTES
Patient's insurance is active with [facility] in [address] [facility] - no bed  [facility]  Left message  [facility] - faxed referral

## 2020-09-14 NOTE — ED NOTES
Pt sleeping  respirations unlabored and adequate  1:1 within view        Lillie Man RN  09/14/20 5612

## 2020-09-15 VITALS
TEMPERATURE: 97.9 F | DIASTOLIC BLOOD PRESSURE: 66 MMHG | RESPIRATION RATE: 18 BRPM | SYSTOLIC BLOOD PRESSURE: 116 MMHG | WEIGHT: 187.39 LBS | BODY MASS INDEX: 29.35 KG/M2 | OXYGEN SATURATION: 97 % | HEART RATE: 66 BPM

## 2020-09-15 RX ADMIN — ARIPIPRAZOLE 15 MG: 15 TABLET ORAL at 08:46

## 2020-09-15 RX ADMIN — HYDROXYZINE HYDROCHLORIDE 25 MG: 25 TABLET, FILM COATED ORAL at 08:46

## 2020-09-15 RX ADMIN — VENLAFAXINE HYDROCHLORIDE 150 MG: 150 CAPSULE, EXTENDED RELEASE ORAL at 08:47

## 2020-09-15 NOTE — ED NOTES
[facility] will transport pt to [facility] at 0900  Admissions made aware of precert and transportation information

## 2020-09-15 NOTE — ED NOTES
Spoke with Michael Sanchez from [facility]  He is working on arranging [facility] transport and will call back with a time  Precert information faxed to [facility] at [facility] admissions

## 2020-09-15 NOTE — ED NOTES
Pt vitals taken and made comfortable  Water and apple juice/snacks given   All safety precautions are in place including 1:1 @ bedside     [address] [facility]  09/14/20 2837

## 2020-09-15 NOTE — ED NOTES
Insurance Authorization for admission:   Phone call placed to [employer]  Phone number: [phone]  Spoke to Isabela Diaz  3 days approved  Level of care: inpatient voluntary  Review on 9/17  Authorization # [id number]    Concurrent reviewer: Dawit Burnham @ [phone]        Insurance Authorization for Transportation:  LESLI

## 2020-09-28 NOTE — ED NOTES
Patient is resting comfortably  1:1 remains in effect        Dulce Taveras RN  09/13/20 2047 VIRTUAL VISIT    DUE TO COVID-19 action plan, the patient's office visit was converted to a video visit      Patient: Emery Londono Date of Service: 2020   : 1944 MRN: [id number]     SUBJECTIVE:     Chief Complaint   Patient presents with   • Video Visit   • Hospital F/U     was seen at [facility] on  9/15/20 fro kidney failure and swelling of lower extremeties.       HISTORY OF PRESENT ILLNESS:  Emery Londono is a 76 year old male who is at home currently and verbally consented to Video encounter. Visit completed in 20 minutes. He is following up after hospitalization at [facility] for Acute renal failure and anemia with Hgb 4.9 from 09/15 to 2020. Feeling fatigued. Continues hemodialysis two days a week. Following up with Nephrologist and Urologist regularly.            PAST MEDICAL HISTORY:  Past Medical History:   Diagnosis Date   • Colon cancer (CMS/HCC)    • Diabetes mellitus (CMS/HCC)    • Essential (primary) hypertension    • Prostate CA (CMS/HCC)        MEDICATIONS:  Current Outpatient Medications   Medication Sig   • cefdinir (OMNICEF) 300 MG capsule TK 1 C PO Q 12 H FOR 7 DAYS   • Vitamin D, Ergocalciferol, 1.25 mg (50,000 units) capsule TK ONE C PO Q WEEK   • bumetanide (BUMEX) 2 MG tablet Take 2 mg by mouth 2 times daily.   • Magnesium 400 MG Cap    • cyanocobalamin (Vitamin B-12) 500 MCG tablet Take 500 mcg by mouth daily.   • Multiple Vitamins-Minerals (vitamin - therapeutic multivitamins w/minerals) tablet      No current facility-administered medications for this visit.        ALLERGIES:  ALLERGIES:   Allergen Reactions   • Accupril Angioedema   • Heparin Other (See Comments)   • Quinapril Angioedema       PAST SURGICAL HISTORY:  History reviewed. No pertinent surgical history.    FAMILY HISTORY:  History reviewed. No pertinent family history.    SOCIAL HISTORY:  Social History     Tobacco Use   Smoking Status Former Smoker   • Packs/day: 0.00   Smokeless Tobacco Never Used     Social History      Substance and Sexual Activity   Alcohol Use Yes   • Frequency: Monthly or less   • Drinks per session: 1 or 2   • Binge frequency: Never       Review of Systems   Constitutional: Positive for fatigue.   HENT: Negative for ear pain, sore throat and trouble swallowing.    Eyes: Negative for pain and visual disturbance.   Respiratory: Negative for cough and shortness of breath.    Cardiovascular: Negative for chest pain and palpitations.   Gastrointestinal: Negative for abdominal pain, constipation and diarrhea.   Genitourinary: Positive for frequency. Negative for dysuria.   Musculoskeletal: Positive for arthralgias.   Skin: Negative for rash.   Neurological: Negative for dizziness.   Psychiatric/Behavioral: Negative for dysphoric mood.         OBJECTIVE:     There were no vitals taken for this visit.    Physical Exam  Vitals signs and nursing note reviewed.   Constitutional:       General: He is not in acute distress.  HENT:      Head: Normocephalic and atraumatic.   Musculoskeletal:      Right lower leg: No edema.      Left lower leg: No edema.   Skin:     Findings: No rash.   Neurological:      Mental Status: He is alert and oriented to person, place, and time.   Psychiatric:         Mood and Affect: Mood normal.           DIAGNOSTIC STUDIES:   LAB RESULTS:    No visits with results within 1 Month(s) from this visit.   Latest known visit with results is:   Orders Only on 10/21/2019   Component Date Value Ref Range Status   • Glucose Bedside POC 10/21/2019 92  70 - 99 mg/dL Final             ASSESSMENT AND PLAN:   This is a 76 year old year-old male who presents with     1. Acute renal failure on dialysis (CMS/HCC)    2. Anemia due to chronic illness        Problem List Items Addressed This Visit        Urinary    Acute renal failure on dialysis (CMS/HCC) - Primary     Renal condition is improving with treatment.  Continue current treatment regimen.  Monitor daily weight.  Regular aerobic exercise.  Continue  current medications.  continue follow up with Nephrology   Continue HD on Tues/Saturday  Renal condition will be reassessed in 1 month.            Hematologic & Lymphatic    Anemia due to chronic illness     [facility]; Hgb improved from 4.9 to 7.5  - continue follow up with Hematology and Urology               Return in about 4 weeks (around 10/26/2020).        Per [facility] guidelines for deferring regularly scheduled outpatient appointments during the COVID 19 Pandemic outbreak, the patient was deemed clinically appropriate to defer their physical exam at this time. The patient was advised of and acknowledged the risk of deferring these procedures and chose to proceed with the virtual encounter. The patient was advised to contact the clinic should they have any change in their clinical status prior to their rescheduled appointment.     Patient has provided verbal understanding of the co-insurance/co-pay liability for the virtual visit services.     Patient was also advised of current CDC guidelines to implement strategies to minimize the risk of exposure to COVID-19.     Medication use,effects and side effects discussed in detail with patient.  The patient indicated understanding of the diagnosis and agreed with the plan of care.  Refer to orders.  Return to clinic as clinically indicated as discussed with patient who verbalized understanding of & agreement with the plan.    Entered by Gio Boland MD

## 2020-12-08 ENCOUNTER — HOSPITAL ENCOUNTER (EMERGENCY)
Facility: HOSPITAL | Age: 35
Discharge: HOME | End: 2020-12-08
Attending: EMERGENCY MEDICINE
Payer: COMMERCIAL

## 2020-12-08 VITALS
OXYGEN SATURATION: 99 % | DIASTOLIC BLOOD PRESSURE: 80 MMHG | WEIGHT: 191.2 LBS | BODY MASS INDEX: 29.95 KG/M2 | TEMPERATURE: 96.6 F | HEART RATE: 100 BPM | RESPIRATION RATE: 18 BRPM | SYSTOLIC BLOOD PRESSURE: 115 MMHG

## 2020-12-08 DIAGNOSIS — R55 SYNCOPE: Primary | ICD-10-CM

## 2020-12-08 PROCEDURE — 99284 EMERGENCY DEPT VISIT MOD MDM: CPT

## 2020-12-08 PROCEDURE — 93005 ELECTROCARDIOGRAM TRACING: CPT

## 2020-12-08 PROCEDURE — 99284 EMERGENCY DEPT VISIT MOD MDM: CPT | Performed by: EMERGENCY MEDICINE

## 2020-12-09 LAB
ATRIAL RATE: 87 BPM
P AXIS: 79 DEGREES
PR INTERVAL: 148 MS
QRS AXIS: 58 DEGREES
QRSD INTERVAL: 92 MS
QT INTERVAL: 344 MS
QTC INTERVAL: 413 MS
T WAVE AXIS: 23 DEGREES
VENTRICULAR RATE: 87 BPM

## 2020-12-09 PROCEDURE — 93010 ELECTROCARDIOGRAM REPORT: CPT | Performed by: INTERNAL MEDICINE

## 2020-12-12 ENCOUNTER — HOSPITAL ENCOUNTER (EMERGENCY)
Facility: HOSPITAL | Age: 35
End: 2020-12-13
Attending: EMERGENCY MEDICINE | Admitting: EMERGENCY MEDICINE
Payer: COMMERCIAL

## 2020-12-12 DIAGNOSIS — R45.851 DEPRESSION WITH SUICIDAL IDEATION: Primary | ICD-10-CM

## 2020-12-12 DIAGNOSIS — F32.A DEPRESSION WITH SUICIDAL IDEATION: Primary | ICD-10-CM

## 2020-12-12 LAB
ALBUMIN SERPL BCP-MCNC: 3.4 G/DL (ref 3.5–5)
ALP SERPL-CCNC: 54 U/L (ref 46–116)
ALT SERPL W P-5'-P-CCNC: 19 U/L (ref 12–78)
AMPHETAMINES SERPL QL SCN: NEGATIVE
ANION GAP SERPL CALCULATED.3IONS-SCNC: 4 MMOL/L (ref 4–13)
AST SERPL W P-5'-P-CCNC: 14 U/L (ref 5–45)
BARBITURATES UR QL: NEGATIVE
BASOPHILS # BLD AUTO: 0.02 THOUSANDS/ΜL (ref 0–0.1)
BASOPHILS NFR BLD AUTO: 1 % (ref 0–1)
BENZODIAZ UR QL: NEGATIVE
BILIRUB SERPL-MCNC: 0.33 MG/DL (ref 0.2–1)
BUN SERPL-MCNC: 9 MG/DL (ref 5–25)
CALCIUM ALBUM COR SERPL-MCNC: 9.6 MG/DL (ref 8.3–10.1)
CALCIUM SERPL-MCNC: 9.1 MG/DL (ref 8.3–10.1)
CHLORIDE SERPL-SCNC: 103 MMOL/L (ref 100–108)
CO2 SERPL-SCNC: 32 MMOL/L (ref 21–32)
COCAINE UR QL: NEGATIVE
CREAT SERPL-MCNC: 0.98 MG/DL (ref 0.6–1.3)
EOSINOPHIL # BLD AUTO: 0.18 THOUSAND/ΜL (ref 0–0.61)
EOSINOPHIL NFR BLD AUTO: 4 % (ref 0–6)
ERYTHROCYTE [DISTWIDTH] IN BLOOD BY AUTOMATED COUNT: 12.9 % (ref 11.6–15.1)
ETHANOL EXG-MCNC: 0 MG/DL
FLUAV RNA RESP QL NAA+PROBE: NEGATIVE
FLUBV RNA RESP QL NAA+PROBE: NEGATIVE
GFR SERPL CREATININE-BSD FRML MDRD: 115 ML/MIN/1.73SQ M
GLUCOSE SERPL-MCNC: 107 MG/DL (ref 65–140)
HCT VFR BLD AUTO: 47.4 % (ref 36.5–49.3)
HGB BLD-MCNC: 15.2 G/DL (ref 12–17)
IMM GRANULOCYTES # BLD AUTO: 0.01 THOUSAND/UL (ref 0–0.2)
IMM GRANULOCYTES NFR BLD AUTO: 0 % (ref 0–2)
LYMPHOCYTES # BLD AUTO: 1.8 THOUSANDS/ΜL (ref 0.6–4.47)
LYMPHOCYTES NFR BLD AUTO: 41 % (ref 14–44)
MCH RBC QN AUTO: 28.7 PG (ref 26.8–34.3)
MCHC RBC AUTO-ENTMCNC: 32.1 G/DL (ref 31.4–37.4)
MCV RBC AUTO: 89 FL (ref 82–98)
METHADONE UR QL: NEGATIVE
MONOCYTES # BLD AUTO: 0.35 THOUSAND/ΜL (ref 0.17–1.22)
MONOCYTES NFR BLD AUTO: 8 % (ref 4–12)
NEUTROPHILS # BLD AUTO: 2.05 THOUSANDS/ΜL (ref 1.85–7.62)
NEUTS SEG NFR BLD AUTO: 46 % (ref 43–75)
NRBC BLD AUTO-RTO: 0 /100 WBCS
OPIATES UR QL SCN: NEGATIVE
OXYCODONE+OXYMORPHONE UR QL SCN: NEGATIVE
PCP UR QL: NEGATIVE
PLATELET # BLD AUTO: 236 THOUSANDS/UL (ref 149–390)
PMV BLD AUTO: 9.5 FL (ref 8.9–12.7)
POTASSIUM SERPL-SCNC: 3.7 MMOL/L (ref 3.5–5.3)
PROT SERPL-MCNC: 6.4 G/DL (ref 6.4–8.2)
RBC # BLD AUTO: 5.3 MILLION/UL (ref 3.88–5.62)
RSV RNA RESP QL NAA+PROBE: NEGATIVE
SARS-COV-2 RNA RESP QL NAA+PROBE: NEGATIVE
SODIUM SERPL-SCNC: 139 MMOL/L (ref 136–145)
THC UR QL: POSITIVE
WBC # BLD AUTO: 4.41 THOUSAND/UL (ref 4.31–10.16)

## 2020-12-12 PROCEDURE — 0241U HB NFCT DS VIR RESP RNA 4 TRGT: CPT | Performed by: EMERGENCY MEDICINE

## 2020-12-12 PROCEDURE — 99285 EMERGENCY DEPT VISIT HI MDM: CPT | Performed by: EMERGENCY MEDICINE

## 2020-12-12 PROCEDURE — 82075 ASSAY OF BREATH ETHANOL: CPT | Performed by: EMERGENCY MEDICINE

## 2020-12-12 PROCEDURE — 85025 COMPLETE CBC W/AUTO DIFF WBC: CPT | Performed by: EMERGENCY MEDICINE

## 2020-12-12 PROCEDURE — 99285 EMERGENCY DEPT VISIT HI MDM: CPT

## 2020-12-12 PROCEDURE — 36415 COLL VENOUS BLD VENIPUNCTURE: CPT | Performed by: EMERGENCY MEDICINE

## 2020-12-12 PROCEDURE — 80307 DRUG TEST PRSMV CHEM ANLYZR: CPT | Performed by: EMERGENCY MEDICINE

## 2020-12-12 PROCEDURE — 80053 COMPREHEN METABOLIC PANEL: CPT | Performed by: EMERGENCY MEDICINE

## 2020-12-12 PROCEDURE — 93005 ELECTROCARDIOGRAM TRACING: CPT

## 2020-12-12 RX ORDER — BUPROPION HYDROCHLORIDE 75 MG/1
75 TABLET ORAL 2 TIMES DAILY
COMMUNITY
End: 2021-05-13

## 2020-12-13 VITALS
RESPIRATION RATE: 16 BRPM | TEMPERATURE: 97.5 F | HEART RATE: 79 BPM | OXYGEN SATURATION: 98 % | DIASTOLIC BLOOD PRESSURE: 79 MMHG | WEIGHT: 191.58 LBS | BODY MASS INDEX: 30.01 KG/M2 | SYSTOLIC BLOOD PRESSURE: 139 MMHG

## 2020-12-13 LAB
ATRIAL RATE: 66 BPM
P AXIS: 71 DEGREES
PR INTERVAL: 162 MS
QRS AXIS: 71 DEGREES
QRSD INTERVAL: 98 MS
QT INTERVAL: 386 MS
QTC INTERVAL: 404 MS
T WAVE AXIS: 31 DEGREES
VENTRICULAR RATE: 66 BPM

## 2020-12-13 PROCEDURE — 93010 ELECTROCARDIOGRAM REPORT: CPT

## 2020-12-21 ENCOUNTER — HOSPITAL ENCOUNTER (EMERGENCY)
Facility: HOSPITAL | Age: 35
End: 2020-12-23
Attending: EMERGENCY MEDICINE
Payer: COMMERCIAL

## 2020-12-21 DIAGNOSIS — R45.851 SUICIDAL IDEATIONS: ICD-10-CM

## 2020-12-21 DIAGNOSIS — F32.A DEPRESSION: Primary | ICD-10-CM

## 2020-12-21 LAB
AMPHETAMINES SERPL QL SCN: NEGATIVE
BARBITURATES UR QL: NEGATIVE
BENZODIAZ UR QL: NEGATIVE
COCAINE UR QL: NEGATIVE
ETHANOL EXG-MCNC: 0 MG/DL
FLUAV RNA RESP QL NAA+PROBE: NEGATIVE
FLUBV RNA RESP QL NAA+PROBE: NEGATIVE
METHADONE UR QL: NEGATIVE
OPIATES UR QL SCN: NEGATIVE
OXYCODONE+OXYMORPHONE UR QL SCN: NEGATIVE
PCP UR QL: NEGATIVE
RSV RNA RESP QL NAA+PROBE: NEGATIVE
SARS-COV-2 RNA RESP QL NAA+PROBE: NEGATIVE
THC UR QL: POSITIVE

## 2020-12-21 PROCEDURE — 82075 ASSAY OF BREATH ETHANOL: CPT | Performed by: EMERGENCY MEDICINE

## 2020-12-21 PROCEDURE — 80307 DRUG TEST PRSMV CHEM ANLYZR: CPT | Performed by: EMERGENCY MEDICINE

## 2020-12-21 PROCEDURE — 0241U HB NFCT DS VIR RESP RNA 4 TRGT: CPT | Performed by: EMERGENCY MEDICINE

## 2020-12-21 PROCEDURE — NC001 PR NO CHARGE: Performed by: EMERGENCY MEDICINE

## 2020-12-21 PROCEDURE — 99285 EMERGENCY DEPT VISIT HI MDM: CPT

## 2020-12-21 PROCEDURE — 81001 URINALYSIS AUTO W/SCOPE: CPT | Performed by: EMERGENCY MEDICINE

## 2020-12-21 PROCEDURE — 99285 EMERGENCY DEPT VISIT HI MDM: CPT | Performed by: EMERGENCY MEDICINE

## 2020-12-21 RX ORDER — CLONAZEPAM 0.5 MG/1
0.5 TABLET ORAL
COMMUNITY
Start: 2020-12-17 | End: 2021-09-28 | Stop reason: HOSPADM

## 2020-12-21 RX ORDER — ARIPIPRAZOLE 10 MG/1
10 TABLET ORAL
COMMUNITY
Start: 2020-12-17 | End: 2021-05-13

## 2020-12-22 ENCOUNTER — APPOINTMENT (EMERGENCY)
Dept: RADIOLOGY | Facility: HOSPITAL | Age: 35
End: 2020-12-22
Payer: COMMERCIAL

## 2020-12-22 LAB
ALBUMIN SERPL BCP-MCNC: 4 G/DL (ref 3–5.2)
ALP SERPL-CCNC: 56 U/L (ref 43–122)
ALT SERPL W P-5'-P-CCNC: 39 U/L (ref 9–52)
ANION GAP SERPL CALCULATED.3IONS-SCNC: 5 MMOL/L (ref 5–14)
AST SERPL W P-5'-P-CCNC: 45 U/L (ref 17–59)
BACTERIA UR QL AUTO: ABNORMAL /HPF
BASOPHILS # BLD AUTO: 0 THOUSANDS/ΜL (ref 0–0.1)
BASOPHILS NFR BLD AUTO: 0 % (ref 0–1)
BILIRUB SERPL-MCNC: 0.6 MG/DL
BILIRUB UR QL STRIP: NEGATIVE
BUN SERPL-MCNC: 17 MG/DL (ref 5–25)
CALCIUM SERPL-MCNC: 9.3 MG/DL (ref 8.4–10.2)
CHLORIDE SERPL-SCNC: 101 MMOL/L (ref 97–108)
CLARITY UR: CLEAR
CO2 SERPL-SCNC: 34 MMOL/L (ref 22–30)
COLOR UR: ABNORMAL
CREAT SERPL-MCNC: 0.87 MG/DL (ref 0.7–1.5)
EOSINOPHIL # BLD AUTO: 0.1 THOUSAND/ΜL (ref 0–0.4)
EOSINOPHIL NFR BLD AUTO: 3 % (ref 0–6)
ERYTHROCYTE [DISTWIDTH] IN BLOOD BY AUTOMATED COUNT: 13.3 %
GFR SERPL CREATININE-BSD FRML MDRD: 129 ML/MIN/1.73SQ M
GLUCOSE SERPL-MCNC: 100 MG/DL (ref 70–99)
GLUCOSE UR STRIP-MCNC: NEGATIVE MG/DL
HCT VFR BLD AUTO: 44.3 % (ref 41–53)
HGB BLD-MCNC: 14.7 G/DL (ref 13.5–17.5)
HGB UR QL STRIP.AUTO: NEGATIVE
KETONES UR STRIP-MCNC: ABNORMAL MG/DL
LEUKOCYTE ESTERASE UR QL STRIP: 25
LYMPHOCYTES # BLD AUTO: 1.6 THOUSANDS/ΜL (ref 0.5–4)
LYMPHOCYTES NFR BLD AUTO: 43 % (ref 25–45)
MCH RBC QN AUTO: 29.2 PG (ref 26–34)
MCHC RBC AUTO-ENTMCNC: 33.1 G/DL (ref 31–36)
MCV RBC AUTO: 88 FL (ref 80–100)
MONOCYTES # BLD AUTO: 0.4 THOUSAND/ΜL (ref 0.2–0.9)
MONOCYTES NFR BLD AUTO: 10 % (ref 1–10)
MUCOUS THREADS UR QL AUTO: ABNORMAL
NEUTROPHILS # BLD AUTO: 1.7 THOUSANDS/ΜL (ref 1.8–7.8)
NEUTS SEG NFR BLD AUTO: 45 % (ref 45–65)
NITRITE UR QL STRIP: NEGATIVE
NON-SQ EPI CELLS URNS QL MICRO: ABNORMAL /HPF
PH UR STRIP.AUTO: 7 [PH]
PLATELET # BLD AUTO: 283 THOUSANDS/UL (ref 150–450)
PMV BLD AUTO: 8.4 FL (ref 8.9–12.7)
POTASSIUM SERPL-SCNC: 3.8 MMOL/L (ref 3.6–5)
PROT SERPL-MCNC: 6.8 G/DL (ref 5.9–8.4)
PROT UR STRIP-MCNC: ABNORMAL MG/DL
RBC # BLD AUTO: 5.02 MILLION/UL (ref 4.5–5.9)
RBC #/AREA URNS AUTO: ABNORMAL /HPF
SODIUM SERPL-SCNC: 140 MMOL/L (ref 137–147)
SP GR UR STRIP.AUTO: 1.01 (ref 1–1.04)
UROBILINOGEN UA: 1 MG/DL
WBC # BLD AUTO: 3.7 THOUSAND/UL (ref 4.5–11)
WBC #/AREA URNS AUTO: ABNORMAL /HPF

## 2020-12-22 PROCEDURE — 71045 X-RAY EXAM CHEST 1 VIEW: CPT

## 2020-12-22 PROCEDURE — 36415 COLL VENOUS BLD VENIPUNCTURE: CPT | Performed by: EMERGENCY MEDICINE

## 2020-12-22 PROCEDURE — 93005 ELECTROCARDIOGRAM TRACING: CPT

## 2020-12-22 PROCEDURE — 85025 COMPLETE CBC W/AUTO DIFF WBC: CPT | Performed by: EMERGENCY MEDICINE

## 2020-12-22 PROCEDURE — 80346 BENZODIAZEPINES1-12: CPT | Performed by: PSYCHIATRY & NEUROLOGY

## 2020-12-22 PROCEDURE — 80053 COMPREHEN METABOLIC PANEL: CPT | Performed by: EMERGENCY MEDICINE

## 2020-12-22 PROCEDURE — 99243 OFF/OP CNSLTJ NEW/EST LOW 30: CPT | Performed by: PSYCHIATRY & NEUROLOGY

## 2020-12-22 RX ORDER — VENLAFAXINE HYDROCHLORIDE 75 MG/1
75 CAPSULE, EXTENDED RELEASE ORAL DAILY
Status: DISCONTINUED | OUTPATIENT
Start: 2020-12-22 | End: 2020-12-23 | Stop reason: HOSPADM

## 2020-12-22 RX ORDER — ARIPIPRAZOLE 10 MG/1
10 TABLET ORAL DAILY
Status: DISCONTINUED | OUTPATIENT
Start: 2020-12-22 | End: 2020-12-23 | Stop reason: HOSPADM

## 2020-12-22 RX ADMIN — VENLAFAXINE HYDROCHLORIDE 75 MG: 75 CAPSULE, EXTENDED RELEASE ORAL at 15:38

## 2020-12-22 RX ADMIN — ARIPIPRAZOLE 10 MG: 10 TABLET ORAL at 15:38

## 2020-12-23 VITALS
TEMPERATURE: 97.1 F | SYSTOLIC BLOOD PRESSURE: 117 MMHG | BODY MASS INDEX: 29.76 KG/M2 | DIASTOLIC BLOOD PRESSURE: 58 MMHG | OXYGEN SATURATION: 98 % | RESPIRATION RATE: 19 BRPM | HEART RATE: 69 BPM | WEIGHT: 190.04 LBS

## 2020-12-23 LAB
ATRIAL RATE: 80 BPM
P AXIS: 64 DEGREES
PR INTERVAL: 160 MS
QRS AXIS: 58 DEGREES
QRSD INTERVAL: 98 MS
QT INTERVAL: 370 MS
QTC INTERVAL: 426 MS
T WAVE AXIS: 26 DEGREES
VENTRICULAR RATE: 80 BPM

## 2020-12-23 PROCEDURE — 93010 ELECTROCARDIOGRAM REPORT: CPT | Performed by: INTERNAL MEDICINE

## 2020-12-23 RX ADMIN — ARIPIPRAZOLE 10 MG: 10 TABLET ORAL at 08:43

## 2020-12-23 RX ADMIN — VENLAFAXINE HYDROCHLORIDE 75 MG: 75 CAPSULE, EXTENDED RELEASE ORAL at 08:43

## 2020-12-28 LAB — CLONAZEPAM SERPL-MCNC: 6 NG/ML (ref 20–70)

## 2021-05-13 ENCOUNTER — HOSPITAL ENCOUNTER (EMERGENCY)
Facility: HOSPITAL | Age: 36
Discharge: HOME | End: 2021-05-13
Attending: EMERGENCY MEDICINE
Payer: COMMERCIAL

## 2021-05-13 VITALS
BODY MASS INDEX: 31.7 KG/M2 | DIASTOLIC BLOOD PRESSURE: 98 MMHG | SYSTOLIC BLOOD PRESSURE: 157 MMHG | RESPIRATION RATE: 16 BRPM | TEMPERATURE: 98.3 F | WEIGHT: 202.38 LBS | OXYGEN SATURATION: 98 % | HEART RATE: 87 BPM

## 2021-05-13 DIAGNOSIS — Z59.00 HOMELESSNESS: ICD-10-CM

## 2021-05-13 DIAGNOSIS — F32.A DEPRESSION: Primary | ICD-10-CM

## 2021-05-13 PROCEDURE — U0005 INFEC AGEN DETEC AMPLI PROBE: HCPCS | Performed by: EMERGENCY MEDICINE

## 2021-05-13 PROCEDURE — U0003 INFECTIOUS AGENT DETECTION BY NUCLEIC ACID (DNA OR RNA); SEVERE ACUTE RESPIRATORY SYNDROME CORONAVIRUS 2 (SARS-COV-2) (CORONAVIRUS DISEASE [COVID-19]), AMPLIFIED PROBE TECHNIQUE, MAKING USE OF HIGH THROUGHPUT TECHNOLOGIES AS DESCRIBED BY CMS-2020-01-R: HCPCS | Performed by: EMERGENCY MEDICINE

## 2021-05-13 PROCEDURE — 99284 EMERGENCY DEPT VISIT MOD MDM: CPT | Performed by: EMERGENCY MEDICINE

## 2021-05-13 PROCEDURE — 99285 EMERGENCY DEPT VISIT HI MDM: CPT

## 2021-05-13 PROCEDURE — 82075 ASSAY OF BREATH ETHANOL: CPT | Performed by: EMERGENCY MEDICINE

## 2021-05-13 PROCEDURE — 80307 DRUG TEST PRSMV CHEM ANLYZR: CPT | Performed by: EMERGENCY MEDICINE

## 2021-05-13 RX ORDER — GABAPENTIN 300 MG/1
300 CAPSULE ORAL DAILY
COMMUNITY
End: 2021-09-28 | Stop reason: HOSPADM

## 2021-05-13 RX ORDER — ARIPIPRAZOLE 5 MG/1
5 TABLET ORAL DAILY
Status: ON HOLD | COMMUNITY
End: 2021-09-28 | Stop reason: SDUPTHER

## 2021-05-13 SDOH — ECONOMIC STABILITY - HOUSING INSECURITY: HOMELESSNESS UNSPECIFIED: Z59.00

## 2021-05-14 NOTE — ED NOTES
The patient presented to the ED stating that he was suicidal but was unable to state why  He stated that he was increasingly depressed, however, the patient was discharged from [facility] 2 days ago after being at the [facility] for some time  The patient does not have a specific plan as to how he would kill himself  The patient stated that he had no idea why he was depressed  He stated that, after being discharged from [facility] 2 days ago, he went to live with a friend  Today his friend kicked him out and he is now homeless  The patient was unable to state why his friend kicked him out  He denied homicidal ideas

## 2021-05-14 NOTE — ED PROVIDER NOTES
History  Chief Complaint   Patient presents with    Depression     on meds taking them as Rx'd    Suicidal     no plan       History provided by:  Patient  Depression  Presenting symptoms: depression and suicidal thoughts    Degree of incapacity (severity):  Mild  Onset quality:  Gradual  Timing:  Constant  Progression:  Worsening  Chronicity:  Recurrent  Treatment compliance: All of the time  Relieved by:  Nothing  Worsened by:  Nothing  Ineffective treatments:  None tried  Associated symptoms: no abdominal pain, no chest pain and no headaches    Suicidal  Presenting symptoms: depression and suicidal thoughts    Associated symptoms: no abdominal pain, no chest pain and no headaches        Prior to Admission Medications   Prescriptions Last Dose Informant Patient Reported? Taking? ARIPiprazole (ABILIFY) 5 mg tablet   Yes Yes   Sig: Take 5 mg by mouth daily   VENLAFAXINE HCL PO   Yes Yes   Sig: Take 75 mg by mouth daily   clonazePAM (KlonoPIN) 0 5 mg tablet   Yes Yes   Sig: Take 0 5 mg by mouth   gabapentin (NEURONTIN) 300 mg capsule   Yes Yes   Sig: Take 300 mg by mouth daily   venlafaxine (EFFEXOR-XR) 150 mg 24 hr capsule   No Yes   Sig: Take 1 capsule (150 mg total) by mouth daily      Facility-Administered Medications: None       Past Medical History:   Diagnosis Date    ADD (attention deficit disorder)     Anxiety     Borderline personality disorder ([facility] [address] )     Depression     MDD (major depressive disorder)     Panic attack     Psychiatric illness     Self-injurious behavior     Social phobia     Suicide attempt [facility])        History reviewed  No pertinent surgical history  History reviewed  No pertinent family history  I have reviewed and agree with the history as documented      E-Cigarette/Vaping    E-Cigarette Use Never User      E-Cigarette/Vaping Substances    Nicotine No     CBD No     Flavoring No     Unknown Yes      Social History     Tobacco Use    Smoking status: Current Every Day Smoker     Packs/day: 0 50     Years: 20 00     Pack years: 10 00     Types: Cigarettes    Smokeless tobacco: Never Used   Substance Use Topics    Alcohol Use     Frequency: Never     Binge frequency: Never    Drug use: Yes     Frequency: 1 0 times per week     Types: Marijuana     Comment: once a week       Review of Systems   Constitutional: Negative for chills and fever  HENT: Negative for rhinorrhea, sore throat and trouble swallowing  Eyes: Negative for pain  Respiratory: Negative for cough, shortness of breath, wheezing and stridor  Cardiovascular: Negative for chest pain and leg swelling  Gastrointestinal: Negative for abdominal pain, diarrhea and nausea  Endocrine: Negative for polyuria  Genitourinary: Negative for dysuria, flank pain and urgency  Musculoskeletal: Negative for joint swelling, myalgias and neck stiffness  Skin: Negative for rash  Allergic/Immunologic: Negative for immunocompromised state  Neurological: Negative for dizziness, syncope, weakness, numbness and headaches  Psychiatric/Behavioral: Positive for depression and suicidal ideas  Negative for confusion  All other systems reviewed and are negative  Physical Exam  Physical Exam  Vitals signs and nursing note reviewed  Constitutional:       Appearance: He is well-developed  HENT:      Head: Normocephalic and atraumatic  Eyes:      Pupils: Pupils are equal, round, and reactive to light  Neck:      Musculoskeletal: Normal range of motion and neck supple  Cardiovascular:      Rate and Rhythm: Normal rate and regular rhythm  Heart sounds: Normal heart sounds  No murmur  No friction rub  Pulmonary:      Effort: Pulmonary effort is normal  No respiratory distress  Breath sounds: No wheezing or rales  Abdominal:      General: Bowel sounds are normal       Palpations: Abdomen is soft  There is no mass  Tenderness: There is no abdominal tenderness  There is no guarding     Skin: General: Skin is warm  Findings: No rash  Neurological:      Mental Status: He is alert and oriented to person, place, and time  Psychiatric:         Mood and Affect: Affect is flat  Vital Signs  ED Triage Vitals [05/13/21 1956]   Temperature Pulse Respirations Blood Pressure SpO2   98 3 °F (36 8 °C) 87 16 157/98 98 %      Temp Source Heart Rate Source Patient Position - Orthostatic VS BP Location FiO2 (%)   Tympanic Monitor Sitting Left arm --      Pain Score       No Pain           Vitals:    05/13/21 1956   BP: 157/98   Pulse: 87   Patient Position - Orthostatic VS: Sitting         Visual Acuity      ED Medications  Medications - No data to display    Diagnostic Studies  Results Reviewed     Procedure Component Value Units Date/Time    Novel Coronavirus (Covid-19),PCR [facility] - 2 hour stat [[id number]]  (Normal) Collected: 05/13/21 2014    Lab Status: Final result Specimen: Nares from Nose Updated: 05/13/21 2123     SARS-CoV-2 Negative    Narrative: The specimen collection materials, transport medium, and/or testing methodology utilized in the production of these test results have been proven to be reliable in a limited validation with an abbreviated program under the Emergency Utilization Authorization provided by the FDA  Testing reported as "Presumptive positive" will be confirmed with secondary testing to ensure result accuracy  Clinical caution and judgement should be used with the interpretation of these results with consideration of the clinical impression and other laboratory testing  Testing reported as "Positive" or "Negative" has been proven to be accurate according to standard laboratory validation requirements  All testing is performed with control materials showing appropriate reactivity at standard intervals        POCT alcohol breath test [[id number]]  (Normal) Resulted: 05/13/21 2051    Lab Status: Final result Updated: 05/13/21 2051     EXTBreath Alcohol 0 00    Rapid drug screen, urine [[id number]]  (Abnormal) Collected: 05/13/21 2014    Lab Status: Final result Specimen: Urine, Clean Catch Updated: 05/13/21 2044     Amph/Meth UR Negative     Barbiturate Ur Negative     Benzodiazepine Urine Negative     Cocaine Urine Negative     Methadone Urine Negative     Opiate Urine Negative     PCP Ur Negative     THC Urine Positive     Oxycodone Urine Negative    Narrative:      Presumptive report  If requested, specimen will be sent to reference lab for confirmation  FOR MEDICAL PURPOSES ONLY  IF CONFIRMATION NEEDED PLEASE CONTACT THE LAB WITHIN 5 DAYS  Drug Screen Cutoff Levels:  AMPHETAMINE/METHAMPHETAMINES  1000 ng/mL  BARBITURATES     200 ng/mL  BENZODIAZEPINES     200 ng/mL  COCAINE      300 ng/mL  METHADONE      300 ng/mL  OPIATES      300 ng/mL  PHENCYCLIDINE     25 ng/mL  THC       50 ng/mL  OXYCODONE      100 ng/mL                 No orders to display              Procedures  Procedures         ED Course  ED Course as of May 13 2219   Thu May 13, 2021   [facility] URINE(!): Positive   2214 No grounds for 302 commitment at this point time  Need to himself or others  The patient received recently discharged from the [facility] and was kicked his friends hotel room per       2215 Patient has resources for outpatient psychiatric treatment  He also has family members to stay with as necessary the plan will be for discharge  Patient has a phone to call those family members for housing  2216 Pt re-examined and evaluated after testing and treatment  Spoke with the patient and feeling improved and sxs have resolved  Will discharge home with close f/u with pcp and instructed to return to the ED if sxs worsen or continue  Pt agrees with the plan for discharge and feels comfortable to go home with proper f/u  Advised to return for worsening or additional problems  Diagnostic tests were reviewed and questions answered  Diagnosis, care plan and treatment options were discussed   The patient understand instructions and will follow up as directed  Counseling: I had a detailed discussion with the patient and/or guardian regarding: the historical points, exam findings, and any diagnostic results supporting the discharge diagnosis, lab results, radiology results, discharge instructions reviewed with patient and/or family/caregiver and understanding was verbalized  Instructions given to return to the emergency department if symptoms worsen or persist, or if there are any questions or concerns that arise at home  All labs reviewed and utilized in the medical decision making process    All radiology studies independently viewed by me and interpreted by the radiologist                                                     MDM  Number of Diagnoses or Management Options  Depression: new and requires workup  Homelessness: new and requires workup  Diagnosis management comments: 19-year-old male presents emergency department with noted symptoms of suicidal ideation and depression  Recent kicked out hope of some kicked out of his friend's apartment  No grounds for 302 commitment  Will discharge  Pt re-examined and evaluated after testing and treatment  Spoke with the patient and feeling improved and sxs have resolved  Will discharge home with close f/u with pcp and instructed to return to the ED if sxs worsen or continue  Pt agrees with the plan for discharge and feels comfortable to go home with proper f/u  Advised to return for worsening or additional problems  Diagnostic tests were reviewed and questions answered  Diagnosis, care plan and treatment options were discussed  The patient understand instructions and will follow up as directed      Counseling: I had a detailed discussion with the patient and/or guardian regarding: the historical points, exam findings, and any diagnostic results supporting the discharge diagnosis, lab results, radiology results, discharge instructions reviewed with patient and/or family/caregiver and understanding was verbalized  Instructions given to return to the emergency department if symptoms worsen or persist, or if there are any questions or concerns that arise at home  All labs reviewed and utilized in the medical decision making process    All radiology studies independently viewed by me and interpreted by the radiologist        Amount and/or Complexity of Data Reviewed  Clinical lab tests: ordered and reviewed        Disposition  Final diagnoses:   Depression   Homelessness     Time reflects when diagnosis was documented in both MDM as applicable and the Disposition within this note     Time User Action Codes Description Comment    5/13/2021 10:16 PM Lucio Shaw Add [F32 9] Depression     5/13/2021 10:16 PM uLcio Shaw Add [Z59 0] Homelessness       ED Disposition     ED Disposition Condition Date/Time Comment    Discharge Stable Thu May 13, 2021 10:16 PM Mary Ann 162 discharge to home/self care  Follow-up Information    None         Patient's Medications   Discharge Prescriptions    No medications on file     No discharge procedures on file      PDMP Review       Value Time User    PDMP Reviewed  Yes 12/22/2020  2:46 PM Ananda Gonzalez MD          ED Provider  Electronically Signed by           Adan Hood DO  05/13/21 4927

## 2021-07-10 ENCOUNTER — HOSPITAL ENCOUNTER (EMERGENCY)
Facility: HOSPITAL | Age: 36
End: 2021-07-11
Attending: EMERGENCY MEDICINE
Payer: COMMERCIAL

## 2021-07-10 VITALS
RESPIRATION RATE: 18 BRPM | HEART RATE: 97 BPM | SYSTOLIC BLOOD PRESSURE: 133 MMHG | TEMPERATURE: 97.8 F | HEIGHT: 69 IN | WEIGHT: 194 LBS | DIASTOLIC BLOOD PRESSURE: 73 MMHG | BODY MASS INDEX: 28.73 KG/M2 | OXYGEN SATURATION: 96 %

## 2021-07-10 DIAGNOSIS — R45.851 SUICIDAL IDEATION: Primary | ICD-10-CM

## 2021-07-10 LAB
AMPHETAMINES SERPL QL SCN: POSITIVE
BARBITURATES UR QL: NEGATIVE
BENZODIAZ UR QL: NEGATIVE
COCAINE UR QL: NEGATIVE
ETHANOL EXG-MCNC: 0.03 MG/DL
METHADONE UR QL: NEGATIVE
OPIATES UR QL SCN: NEGATIVE
OXYCODONE+OXYMORPHONE UR QL SCN: NEGATIVE
PCP UR QL: NEGATIVE
SARS-COV-2 RNA RESP QL NAA+PROBE: NEGATIVE
THC UR QL: POSITIVE

## 2021-07-10 PROCEDURE — 87635 SARS-COV-2 COVID-19 AMP PRB: CPT | Performed by: EMERGENCY MEDICINE

## 2021-07-10 PROCEDURE — 99285 EMERGENCY DEPT VISIT HI MDM: CPT | Performed by: EMERGENCY MEDICINE

## 2021-07-10 PROCEDURE — 99285 EMERGENCY DEPT VISIT HI MDM: CPT

## 2021-07-10 PROCEDURE — 82075 ASSAY OF BREATH ETHANOL: CPT | Performed by: EMERGENCY MEDICINE

## 2021-07-10 PROCEDURE — 80307 DRUG TEST PRSMV CHEM ANLYZR: CPT | Performed by: EMERGENCY MEDICINE

## 2021-07-11 PROCEDURE — NC001 PR NO CHARGE

## 2021-07-11 NOTE — ED NOTES
Bed search initiated:    [facility]- beds available    Clinical faxed to [address] [place] [address] for review  Bed search to continue if needed

## 2021-07-11 NOTE — EMTALA/ACUTE CARE TRANSFER
[facility] EMERGENCY DEPARTMENT  [address] [address]  Dept: [phone]      EMTALA TRANSFER CONSENT    NAME Rodrigue Olmstead                                         1985                              MRN [id number]    I have been informed of my rights regarding examination, treatment, and transfer   by Dr Arie Locke MD    Benefits:      Risks:        Transfer Request   I acknowledge that my medical condition has been evaluated and explained to me by the emergency department physician or other qualified medical person and/or my attending physician who has recommended and offered to me further medical examination and treatment  I understand the Hospital's obligation with respect to the treatment and stabilization of my emergency medical condition  I nevertheless request to be transferred  I release the Hospital, the doctor, and any other persons caring for me from all responsibility or liability for any injury or ill effects that may result from my transfer and agree to accept all responsibility for the consequences of my choice to transfer, rather than receive stabilizing treatment at the Hospital  I understand that because the transfer is my request, my insurance may not provide reimbursement for the services  The Hospital will assist and direct me and my family in how to make arrangements for transfer, but the hospital is not liable for any fees charged by the transport service  In spite of this understanding, I refuse to consent to further medical examination and treatment which has been offered to me, and request transfer to    I authorize the performance of emergency medical procedures and treatments upon me in both transit and upon arrival at the receiving facility  Additionally, I authorize the release of any and all medical records to the receiving facility and request they be transported with me, if possible      I authorize the performance of emergency medical procedures and treatments upon me in both transit and upon arrival at the receiving facility  Additionally, I authorize the release of any and all medical records to the receiving facility and request they be transported with me, if possible  I understand that the safest mode of transportation during a medical emergency is an ambulance and that the Hospital advocates the use of this mode of transport  Risks of traveling to the receiving facility by car, including absence of medical control, life sustaining equipment, such as oxygen, and medical personnel has been explained to me and I fully understand them  (ИРИНА CORRECT BOX BELOW)  [  ]  I consent to the stated transfer and to be transported by ambulance/helicopter  [  ]  I consent to the stated transfer, but refuse transportation by ambulance and accept full responsibility for my transportation by car  I understand the risks of non-ambulance transfers and I exonerate the Hospital and its staff from any deterioration in my condition that results from this refusal     X___________________________________________    DATE  21  TIME________  Signature of patient or legally responsible individual signing on patient behalf           RELATIONSHIP TO PATIENT_________________________          Provider Certification    NAME Mary Ann Ratliff                                        [facility] 1985                              MRN [id number]    A medical screening exam was performed on the above named patient  Based on the examination:    Condition Necessitating Transfer The encounter diagnosis was Suicidal ideation      Patient Condition:      Reason for Transfer:      Transfer Requirements: Facility     · Space available and qualified personnel available for treatment as acknowledged by    · Agreed to accept transfer and to provide appropriate medical treatment as acknowledged by          · Appropriate medical records of the examination and treatment of the patient are provided at the time of transfer   [address] [facility], [address] _______  · Transfer will be performed by qualified personnel from    and appropriate transfer equipment as required, including the use of necessary and appropriate life support measures  Provider Certification: I have examined the patient and explained the following risks and benefits of being transferred/refusing transfer to the patient/family:         Based on these reasonable risks and benefits to the patient and/or the unborn child(lucero), and based upon the information available at the time of the patients examination, I certify that the medical benefits reasonably to be expected from the provision of appropriate medical treatments at another medical facility outweigh the increasing risks, if any, to the individuals medical condition, and in the case of labor to the unborn child, from effecting the transfer      X____________________________________________ DATE 07/11/21        TIME_______      ORIGINAL - SEND TO MEDICAL RECORDS   COPY - SEND WITH PATIENT DURING TRANSFER

## 2021-07-11 NOTE — ED NOTES
Pt stated that he presented to the ED with increased thoughts of depression, and thoughts of SI  When asked how long he had thoughts of SI, he stated that it started today, and he would cut himself  Pt denied voices, visions or SI  Pt was released yesterday from [facility] clinic  Pt also was at [address] [place] [address] 321 earlier today for tendonitis and was discharged  Pt stated that he uses THC at least once a week  Pt stated that he sleeps about 6 hours a day  Patient denied any drastic weight changes  Pt stated that he has an intake scheduled at [facility] in [place] on the 28th  Patient stated that he is med compliant, with the medication given to him from the [facility] clinic  Pt was unable to identfiy any major stressors at the moment that brought on these thoughts of SI, or major depression  Pt stated that he believes that [facility] released him too soon  He also stated that he did not tell Marguerite this at his discharge meeting  Pt is requesting inpatient mental health treatment due to increased depression, thoughts of SI with a plan

## 2021-07-11 NOTE — ED NOTES
Once requested labs are completed/resulted, ED crisis/ED staff to send to [facility] at [phone]

## 2021-07-11 NOTE — ED NOTES
Patient is accepted at [facility]  Patient is accepted by Dr Manda Richey per admissions  Transportation is arranged with TBD  Transportation is scheduled for TBD  Patient may go to the floor between 8AM-12PM preferably  Nurse report is not needed prior to transfer  [place] ADMISSIONS TO BE UPDATED ON ETA [phone] ONCE ARRANGED WITH [place]

## 2021-07-11 NOTE — ED CARE HANDOFF
Emergency Department Sign Out Note        Sign out and transfer of care from Dr Renetta Ramirez  See Separate Emergency Department note  The patient, Jadon Aguilar, was evaluated by the previous provider for Depression suicidal ideation    Workup Completed:  Medically cleared prior to my arrival    ED Course / Workup Pending (followup): Patient accepted inpatient Psych awaiting transport  Procedures  MDM    Disposition  Final diagnoses:   Suicidal ideation     Time reflects when diagnosis was documented in both MDM as applicable and the Disposition within this note     Time User Action Codes Description Comment    7/10/2021  9:20 PM Skip Nevarez Add [T37 413] Suicidal ideation       ED Disposition     ED Disposition Condition Date/Time Comment    Transfer to [facility] Jul 10, 2021  9:20 PM Jadon Aguilar should be transferred out to and has been medically cleared  MD Documentation      Most Recent Value   Accepting Physician  Dr Tanner Comment Name, Britney Evans [facility])  CTS   Sending MD  Dr Lunette Lefort, MD      RN Documentation      Most Recent Value   Accepting Facility Name, Britney Evans [facility])  CTS      Follow-up Information    None       Patient's Medications   Discharge Prescriptions    No medications on file     No discharge procedures on file         ED Provider  Electronically Signed by     Jeannette De La O MD  07/11/21 3214

## 2021-07-11 NOTE — ED NOTES
Labs cancelled in chart  Per crisis, patient does not need them  Linette French is reviewing, and since patient was recently there, no labs needed        Yuri Amos RN  07/11/21 5869

## 2021-07-11 NOTE — ED NOTES
Spoke with Alisia Méndez at [facility] regarding transport  [facility] will follow up in [facility] admissions to be made aware of ETA

## 2021-07-11 NOTE — ED PROVIDER NOTES
History  Chief Complaint   Patient presents with    Suicidal     brought in by EMS for SI, increased depression and "thoughts of self harm", SI without a plan, previous SA 4 years ago by cutting wrists, denies HI/AH/VH  See my note attached to this chart            Prior to Admission Medications   Prescriptions Last Dose Informant Patient Reported? Taking? ARIPiprazole (ABILIFY) 5 mg tablet 7/9/2021 at Unknown time  Yes Yes   Sig: Take 5 mg by mouth daily   clonazePAM (KlonoPIN) 0 5 mg tablet 7/9/2021 at Unknown time  Yes Yes   Sig: Take 0 5 mg by mouth   gabapentin (NEURONTIN) 300 mg capsule 7/9/2021 at Unknown time  Yes Yes   Sig: Take 300 mg by mouth daily   venlafaxine (EFFEXOR-XR) 150 mg 24 hr capsule 7/9/2021 at Unknown time  No Yes   Sig: Take 1 capsule (150 mg total) by mouth daily      Facility-Administered Medications: None       Past Medical History:   Diagnosis Date    ADD (attention deficit disorder)     Anxiety     [facility]     Depression     MDD (major depressive disorder)     Panic attack     Psychiatric illness     Self-injurious behavior     Social phobia     Suicide attempt ([facility] )        History reviewed  No pertinent surgical history  History reviewed  No pertinent family history  I have reviewed and agree with the history as documented      E-Cigarette/Vaping    E-Cigarette Use Never User      E-Cigarette/Vaping Substances    Nicotine No     CBD No     Flavoring No     Unknown Yes      Social History     Tobacco Use    Smoking status: Current Every Day Smoker     Packs/day: 0 50     Years: 20 00     Pack years: 10 00     Types: Cigarettes    Smokeless tobacco: Never Used   Vaping Use    Vaping Use: Never used   Substance Use Topics    Alcohol use: Not Currently    Drug use: Yes     Frequency: 1 0 times per week     Types: Marijuana     Comment: once a week       Review of Systems    Physical Exam  Physical Exam    Vital Signs  ED Triage Vitals [07/10/21 1901]   Temperature Pulse Respirations Blood Pressure SpO2   97 8 °F (36 6 °C) 97 18 133/73 96 %      Temp Source Heart Rate Source Patient Position - Orthostatic VS BP Location FiO2 (%)   Oral Monitor Lying Right arm --      Pain Score       7           Vitals:    07/10/21 1901   BP: 133/73   Pulse: 97   Patient Position - Orthostatic VS: Lying         Visual Acuity      ED Medications  Medications - No data to display    Diagnostic Studies  Results Reviewed     Procedure Component Value Units Date/Time    Rapid drug screen, urine [[id number]]  (Abnormal) Collected: 07/10/21 2019    Lab Status: Final result Specimen: Urine, Clean Catch Updated: 07/10/21 2041     Amph/Meth UR Positive     Barbiturate Ur Negative     Benzodiazepine Urine Negative     Cocaine Urine Negative     Methadone Urine Negative     Opiate Urine Negative     PCP Ur Negative     THC Urine Positive     Oxycodone Urine Negative    Narrative:      Presumptive report  If requested, specimen will be sent to reference lab for confirmation  FOR MEDICAL PURPOSES ONLY  IF CONFIRMATION NEEDED PLEASE CONTACT THE LAB WITHIN 5 DAYS  Drug Screen Cutoff Levels:  AMPHETAMINE/METHAMPHETAMINES  1000 ng/mL  BARBITURATES     200 ng/mL  BENZODIAZEPINES     200 ng/mL  COCAINE      300 ng/mL  METHADONE      300 ng/mL  OPIATES      300 ng/mL  PHENCYCLIDINE     25 ng/mL  THC       50 ng/mL  OXYCODONE      100 ng/mL    Novel Coronavirus (Covid-19),PCR [facility] - 2 Hour Stat [[id number]]  (Normal) Collected: 07/10/21 1910    Lab Status: Final result Specimen: Nares from Nasopharyngeal Swab Updated: 07/10/21 2009     SARS-CoV-2 Negative    Narrative: The specimen collection materials, transport medium, and/or testing methodology utilized in the production of these test results have been proven to be reliable in a limited validation with an abbreviated program under the Emergency Utilization Authorization provided by the FDA    Testing reported as "Presumptive positive" will be confirmed with secondary testing to ensure result accuracy  Clinical caution and judgement should be used with the interpretation of these results with consideration of the clinical impression and other laboratory testing  Testing reported as "Positive" or "Negative" has been proven to be accurate according to standard laboratory validation requirements  All testing is performed with control materials showing appropriate reactivity at standard intervals  POCT alcohol breath test [[id number]]  (Normal) Resulted: 07/10/21 1914    Lab Status: Final result Updated: 07/10/21 1914     EXTBreath Alcohol 0 032                 No orders to display              Procedures  Procedures         ED Course                             SBIRT 22yo+      Most Recent Value   SBIRT (25 yo +)   In order to provide better care to our patients, we are screening all of our patients for alcohol and drug use  Would it be okay to ask you these screening questions? No Filed at: 07/10/2021 2042                    MDM    Disposition  Final diagnoses:   Suicidal ideation     Time reflects when diagnosis was documented in both MDM as applicable and the Disposition within this note     Time User Action Codes Description Comment    7/10/2021  9:20 PM Juliannajared Forman Add [G85 480] Suicidal ideation       ED Disposition     ED Disposition Condition Date/Time Comment    Transfer to [facility] Jul 10, 2021  9:20 PM Deysi Corona should be transferred out to and has been medically cleared          MD Documentation      Most Recent Value   Accepting Physician  Dr Elona Dubin Name, An Mariscal by [facility])  CTS   Sending MD  Dr Celina Zayas MD      RN Documentation      [facility] [address] [facility] Name, An Mariscal by [facility])  CTS      Follow-up Information    None         Discharge Medication List as of 7/11/2021  9:09 AM      CONTINUE these medications which have NOT CHANGED    Details   ARIPiprazole (ABILIFY) 5 mg tablet Take 5 mg by mouth daily, Historical Med      clonazePAM (KlonoPIN) 0 5 mg tablet Take 0 5 mg by mouth, Starting Thu 12/17/2020, Until Sat 7/10/2021 at 2359, Historical Med      gabapentin (NEURONTIN) 300 mg capsule Take 300 mg by mouth daily, Historical Med      venlafaxine (EFFEXOR-XR) 150 mg 24 hr capsule Take 1 capsule (150 mg total) by mouth daily, Starting Wed 9/2/2020, Until Sat 7/10/2021, Normal           No discharge procedures on file  PDMP Review       Value Time User    PDMP Reviewed  Yes 12/22/2020  2:46 PM Rosamaria Sanchez MD          ED Provider  Electronically Signed by  Patient care was taken to care over by myself at 7:00 a m  Disposition was already taken care of prior to my arrival patient was just pending transport and transfer to behavior health facility    Patient remained stable the entire time patient was transferred during my time of care         Zheng Green MD  07/11/21 0402

## 2021-07-11 NOTE — ED NOTES
Assumed care of patient at this time  Patient sleeping, respirations even and unlabored  Will continue to monitor  1:1 continues       Eleazar Lewis RN  07/11/21 6958

## 2021-07-11 NOTE — ED NOTES
Spoke with Gino Arevalo at [place] admissions  Psychiatrist is requesting CBC/CMP be added in order to continue considering for acceptance  [place] Text sent to ED provider and Charge RN requesting same  Crisis worker to follow up

## 2021-07-11 NOTE — ED NOTES
Patient sleeping soundly on stretcher, respirations even and unlabored, no apparent distress        Tian Rosado RN  07/10/21 9227

## 2021-08-30 ENCOUNTER — HOSPITAL ENCOUNTER (EMERGENCY)
Facility: HOSPITAL | Age: 36
End: 2021-08-31
Attending: EMERGENCY MEDICINE | Admitting: EMERGENCY MEDICINE
Payer: COMMERCIAL

## 2021-08-30 VITALS
HEART RATE: 64 BPM | OXYGEN SATURATION: 100 % | DIASTOLIC BLOOD PRESSURE: 74 MMHG | TEMPERATURE: 97.5 F | RESPIRATION RATE: 14 BRPM | SYSTOLIC BLOOD PRESSURE: 101 MMHG

## 2021-08-30 DIAGNOSIS — R45.851 SUICIDAL IDEATION: Primary | ICD-10-CM

## 2021-08-30 LAB
AMPHETAMINES SERPL QL SCN: POSITIVE
BARBITURATES UR QL: NEGATIVE
BENZODIAZ UR QL: NEGATIVE
COCAINE UR QL: NEGATIVE
ETHANOL EXG-MCNC: 0 MG/DL
METHADONE UR QL: NEGATIVE
OPIATES UR QL SCN: NEGATIVE
OXYCODONE+OXYMORPHONE UR QL SCN: NEGATIVE
PCP UR QL: NEGATIVE
SARS-COV-2 RNA RESP QL NAA+PROBE: NEGATIVE
THC UR QL: POSITIVE

## 2021-08-30 PROCEDURE — 99285 EMERGENCY DEPT VISIT HI MDM: CPT | Performed by: STUDENT IN AN ORGANIZED HEALTH CARE EDUCATION/TRAINING PROGRAM

## 2021-08-30 PROCEDURE — 82075 ASSAY OF BREATH ETHANOL: CPT | Performed by: STUDENT IN AN ORGANIZED HEALTH CARE EDUCATION/TRAINING PROGRAM

## 2021-08-30 PROCEDURE — 80307 DRUG TEST PRSMV CHEM ANLYZR: CPT | Performed by: STUDENT IN AN ORGANIZED HEALTH CARE EDUCATION/TRAINING PROGRAM

## 2021-08-30 PROCEDURE — 87635 SARS-COV-2 COVID-19 AMP PRB: CPT | Performed by: STUDENT IN AN ORGANIZED HEALTH CARE EDUCATION/TRAINING PROGRAM

## 2021-08-30 PROCEDURE — 99285 EMERGENCY DEPT VISIT HI MDM: CPT

## 2021-08-30 NOTE — ED NOTES
Bed search efforts:    No network beds available at present per [facility] 3 open male beds and a pending discharge that would open an additional male bed and 1 potential female bed per Talya Salter; referral faxed    [facility] stroud per Bo Mccormick; may call back later to inquire on discharges    [id number] - one male 201 bed and one female 302 bed available per Cris Co; referral faxed    [place] - per Olegario Rush, they can review; referral faxed    [place] - per [facility], they have one adult female dual bed available    [place] - per Nura Cavazos, they can review for tomorrow; referral faxed

## 2021-08-30 NOTE — ED PROVIDER NOTES
History  Chief Complaint   Patient presents with    Suicidal     Brought in by EMS for SI  Aura Lat is a 39year old male with a PMHx of anxiety, depression, SA by cutting himself, who presents to the emergency department for evaluation of worsening anxiety and depression with SI with plan to cut himself  Patient was recently seen here at [facility] (7/10/21) with SI, was transferred to [address] [place] [address], patient states was discharged about a month ago  He states he has been compliant with Abilify, klonopin, gabapentin, and Effexor as prescribed but states he feels they aren't helping  Patient denies alcohol and drug use this evening, reports 0 5 ppd cigarette use and occasional marijuana use  Patient denies any other complaints including HI, AH, VH, lightheadedness, syncope, falls, fever/chills, sore throat, dysphagia, SOB, chest pain, cough, sputum production, abdominal pain, n/v/d, urinary symptoms  Patient denies harming himself this evening  Patient states he received both doses of his COVID vaccine  Patient is calm and cooperative in ED  Consult crisis placed, alcohol breath test 0 000, UDS ordered, covid swab in process  Patient placed on 1:1  History provided by:  Patient and medical records   used: No    Suicidal  Presenting symptoms: suicidal thoughts    Presenting symptoms: no agitation, no hallucinations and no self-mutilation    Associated symptoms: anxiety    Associated symptoms: no abdominal pain and no chest pain        Prior to Admission Medications   Prescriptions Last Dose Informant Patient Reported? Taking?    ARIPiprazole (ABILIFY) 5 mg tablet   Yes No   Sig: Take 5 mg by mouth daily   clonazePAM (KlonoPIN) 0 5 mg tablet   Yes No   Sig: Take 0 5 mg by mouth   gabapentin (NEURONTIN) 300 mg capsule   Yes No   Sig: Take 300 mg by mouth daily   venlafaxine (EFFEXOR-XR) 150 mg 24 hr capsule   No No   Sig: Take 1 capsule (150 mg total) by mouth daily      Facility-Administered Medications: None       Past Medical History:   Diagnosis Date    ADD (attention deficit disorder)     Anxiety     Borderline personality disorder ([address] )     Depression     MDD (major depressive disorder)     Panic attack     Psychiatric illness     Self-injurious behavior     Social phobia     Suicide attempt [facility])        History reviewed  No pertinent surgical history  History reviewed  No pertinent family history  I have reviewed and agree with the history as documented  E-Cigarette/Vaping    E-Cigarette Use Never User      E-Cigarette/Vaping Substances    Nicotine No     CBD No     Flavoring No     Unknown Yes      Social History     Tobacco Use    Smoking status: Current Every Day Smoker     Packs/day: 0 50     Years: 20 00     Pack years: 10 00     Types: Cigarettes    Smokeless tobacco: Never Used   Vaping Use    Vaping Use: Never used   Substance Use Topics    Alcohol use: Not Currently    Drug use: Yes     Frequency: 1 0 times per week     Types: Marijuana     Comment: once a week       Review of Systems   Constitutional: Negative for chills and fever  HENT: Negative for ear pain, sore throat and trouble swallowing  Eyes: Negative for pain and visual disturbance  Respiratory: Negative for cough and shortness of breath  Cardiovascular: Negative for chest pain and palpitations  Gastrointestinal: Negative for abdominal pain, diarrhea, nausea and vomiting  Genitourinary: Negative for dysuria and frequency  Musculoskeletal: Negative for arthralgias and back pain  Skin: Negative for color change and rash  Neurological: Negative for syncope and light-headedness  Psychiatric/Behavioral: Positive for suicidal ideas  Negative for agitation, confusion, dysphoric mood, hallucinations and self-injury  The patient is nervous/anxious  All other systems reviewed and are negative  Physical Exam  Physical Exam  Vitals and nursing note reviewed     Constitutional: General: He is not in acute distress  Appearance: Normal appearance  He is well-developed  He is not ill-appearing  HENT:      Head: Normocephalic and atraumatic  Right Ear: External ear normal       Left Ear: External ear normal       Nose: Nose normal  No congestion or rhinorrhea  Mouth/Throat:      Mouth: Mucous membranes are moist    Eyes:      General:         Right eye: No discharge  Left eye: No discharge  Extraocular Movements: Extraocular movements intact  Conjunctiva/sclera: Conjunctivae normal    Cardiovascular:      Rate and Rhythm: Normal rate and regular rhythm  Heart sounds: No murmur heard  No friction rub  No gallop  Pulmonary:      Effort: Pulmonary effort is normal  No respiratory distress  Breath sounds: Normal breath sounds  No stridor  No wheezing, rhonchi or rales  Abdominal:      General: Bowel sounds are normal  There is no distension  Palpations: Abdomen is soft  Tenderness: There is no abdominal tenderness  There is no guarding or rebound  Musculoskeletal:         General: Normal range of motion  Cervical back: Neck supple  Skin:     General: Skin is warm and dry  Capillary Refill: Capillary refill takes less than 2 seconds  Findings: No rash  Comments: No wounds or evidence of self injury on exam   Neurological:      General: No focal deficit present  Mental Status: He is alert and oriented to person, place, and time           Vital Signs  ED Triage Vitals [08/30/21 0333]   Temperature Pulse Respirations Blood Pressure SpO2   98 7 °F (37 1 °C) 97 18 143/86 98 %      Temp Source Heart Rate Source Patient Position - Orthostatic VS BP Location FiO2 (%)   Oral Monitor Sitting Left arm --      Pain Score       --           Vitals:    08/30/21 0333   BP: 143/86   Pulse: 97   Patient Position - Orthostatic VS: Sitting         Visual Acuity      ED Medications  Medications - No data to display    Diagnostic Studies  Results Reviewed     Procedure Component Value Units Date/Time    Novel Coronavirus (Covid-19),PCR [facility] - 2 hour stat [[id number]]  (Normal) Collected: 08/30/21 0411    Lab Status: Final result Specimen: Nares from Nasopharyngeal Swab Updated: 08/30/21 0537     SARS-CoV-2 Negative    Narrative: The specimen collection materials, transport medium, and/or testing methodology utilized in the production of these test results have been proven to be reliable in a limited validation with an abbreviated program under the Emergency Utilization Authorization provided by the FDA  Testing reported as "Presumptive positive" will be confirmed with secondary testing to ensure result accuracy  Clinical caution and judgement should be used with the interpretation of these results with consideration of the clinical impression and other laboratory testing  Testing reported as "Positive" or "Negative" has been proven to be accurate according to standard laboratory validation requirements  All testing is performed with control materials showing appropriate reactivity at standard intervals  Rapid drug screen, urine [[id number]]  (Abnormal) Collected: 08/30/21 0411    Lab Status: Final result Specimen: Urine, Clean Catch Updated: 08/30/21 0451     Amph/Meth UR Positive     Barbiturate Ur Negative     Benzodiazepine Urine Negative     Cocaine Urine Negative     Methadone Urine Negative     Opiate Urine Negative     PCP Ur Negative     THC Urine Positive     Oxycodone Urine Negative    Narrative:      Presumptive report  If requested, specimen will be sent to reference lab for confirmation  FOR MEDICAL PURPOSES ONLY  IF CONFIRMATION NEEDED PLEASE CONTACT THE LAB WITHIN 5 DAYS      Drug Screen Cutoff Levels:  AMPHETAMINE/METHAMPHETAMINES  1000 ng/mL  BARBITURATES     200 ng/mL  BENZODIAZEPINES     200 ng/mL  COCAINE      300 ng/mL  METHADONE      300 ng/mL  OPIATES      300 ng/mL  PHENCYCLIDINE     25 ng/mL  THC       50 ng/mL  OXYCODONE      100 ng/mL    POCT alcohol breath test [[id number]]  (Normal) Resulted: 08/30/21 0412    Lab Status: Final result Updated: 08/30/21 0412     EXTBreath Alcohol 0 000                 No orders to display              Procedures  Procedures         ED Course                                           MDM  Number of Diagnoses or Management Options  Suicidal ideation  Diagnosis management comments: Daphne Ibrahim is a 39year old male with a PMHx of anxiety, depression, SA by cutting himself, who presents to the ED for worsening anxiety and depression with SI with plan to cut himself  He states he has been compliant with his medications but states he feels they aren't helping, denies alcohol and drug use this evening, reports occasional marijuana use  Patient denies any other complaints including HI, AH, VH, denies harming himself this morning  COVID negative, alcohol breath test 0 000, UDS positive for THC and methamphetamines  Patient calm and cooperative, on 1:1  Patient signed out to Dr Kal Payan, to see crisis this morning  Amount and/or Complexity of Data Reviewed  Clinical lab tests: ordered and reviewed    Patient Progress  Patient progress: stable      Disposition  Final diagnoses:   Suicidal ideation     Time reflects when diagnosis was documented in both MDM as applicable and the Disposition within this note     Time User Action Codes Description Comment    8/30/2021  4:54 AM Carlito Green Add [S32 288] Suicidal ideation       ED Disposition     None      Follow-up Information    None         Patient's Medications   Discharge Prescriptions    No medications on file     No discharge procedures on file      PDMP Review       Value Time User    PDMP Reviewed  Yes 12/22/2020  2:46 PM Kun Baum MD          ED Provider  Electronically Signed by           Rupinder Santa PA-C  08/30/21 8897

## 2021-08-30 NOTE — ED NOTES
Patient is accepted at [facility]  Patient is accepted by Dr Bean Herrmann per Sabina Green       Transportation is arranged with [facility]  Transportation is scheduled for 1000 on 8/31 with [facility]  Patient may go to the floor anytime after 1100 on 8/31        [facility] will complete precert and COB        Patient notified and voiced support and understanding  Intake notified of external transfer  Nurse report is deferred prior to patient transfer unless there is a significant change ([phone])

## 2021-08-30 NOTE — ED NOTES
Patient is a 39 yr old male who presented to the ED via EMS after calling 911 due to suicidal thoughts  Patient was medically cleared and assessed by Crisis  Patient is alert and oriented, but notably delayed  He denies any psychotic process, but was noted to stare and blink rapidly without responding to questions  When he did respond, he offered limited responses and at times, simply did not respond at all  Patient has a very flat affect with no response to humor  He appears very depressed and does endorse depression, but when asked to elaborate on symptoms, only states that he is hopeless  Endorses anxiety, but does not elaborate  Denies auditory and visual hallucinations  Denies delusions and paranoia  Admits to suicidal ideas, but denies current plan  Reports he previously attempted to kill himself by cutting his wrists, and confirmed that stitches were required  Patient has multiple marks on his arms indicative of self harm behavior  He does admit that the marks were from self-inflicted cuts over the course of many years  He reports he last cut himself 2 months ago  Patient denies homicidal ideas, plan, intent  Denies violence or aggression  Denies legal issues or access to weapons  Reports use of marijuana a few times per week  Patient reports he has been homeless and has been in a local shelter for the past month  Prior to that, he was staying with friends  Denies having involved family and lists no emergency contact  Denies current stressors  Reports he was prescribed medications while at [facility] last month and takes medications as prescribed, including Vyvanse, Zyprexa, Gabapentin, and Effexor  Recently had an intake appointment with Mily Cross, but does not have much information about providers as he only had one appointment  Patient reports fair sleep and appetite  Hygiene appears poor  He requests to sign a 201 for voluntary admission

## 2021-08-30 NOTE — EMTALA/ACUTE CARE TRANSFER
[facility]PART[facility]  [address] [facility] [address]  Dept: [phone]      EMTALA TRANSFER CONSENT    NAME Kishan CHACON 1985                              MRN [id number]    I have been informed of my rights regarding examination, treatment, and transfer   by Dr Edith Garnett MD    Benefits: Specialized equipment and/or services available at the receiving facility (Include comment)________________________, Continuity of care, Other benefits (Include comment)_______________________ (inpatient mental health 201)    Risks: Potential for delay in receiving treatment, Potential deterioration of medical condition, Possible worsening of condition or death during transfer, Increased discomfort during transfer      Consent for Transfer:  I acknowledge that my medical condition has been evaluated and explained to me by the emergency department physician or other qualified medical person and/or my attending physician, who has recommended that I be transferred to the service of  Accepting Physician: Dr Fernanda Merino at [address] Name, [address] : [facility]  The above potential benefits of such transfer, the potential risks associated with such transfer, and the probable risks of not being transferred have been explained to me, and I fully understand them  The doctor has explained that, in my case, the benefits of transfer outweigh the risks  I agree to be transferred  I authorize the performance of emergency medical procedures and treatments upon me in both transit and upon arrival at the receiving facility  Additionally, I authorize the release of any and all medical records to the receiving facility and request they be transported with me, if possible    I understand that the safest mode of transportation during a medical emergency is an ambulance and that the Hospital advocates the use of this mode of transport  Risks of traveling to the receiving facility by car, including absence of medical control, life sustaining equipment, such as oxygen, and medical personnel has been explained to me and I fully understand them  (ИРИНА CORRECT BOX BELOW)  [  ]  I consent to the stated transfer and to be transported by ambulance/helicopter  [  ]  I consent to the stated transfer, but refuse transportation by ambulance and accept full responsibility for my transportation by car  I understand the risks of non-ambulance transfers and I exonerate the Hospital and its staff from any deterioration in my condition that results from this refusal     X___________________________________________    DATE  21  TIME________  Signature of patient or legally responsible individual signing on patient behalf           RELATIONSHIP TO PATIENT_________________________          Provider Certification    NAME Mary Ann Ratliff                                        [facility] 1985                              MRN [id number]    A medical screening exam was performed on the above named patient  Based on the examination:    Condition Necessitating Transfer The encounter diagnosis was Suicidal ideation      Patient Condition: The patient has been stabilized such that within reasonable medical probability, no material deterioration of the patient condition or the condition of the unborn child(lucero) is likely to result from the transfer    Reason for Transfer: Level of Care needed not available at this facility, No bed available at level of patient's needs, Other (Include comment)____________________ (inpatient mental health 201)    Transfer Requirements: [address]   · Space available and qualified personnel available for treatment as acknowledged by Crisis [phone]  · Agreed to accept transfer and to provide appropriate medical treatment as acknowledged by       Dr Awa Lepe  · Appropriate medical records of the examination and treatment of the patient are provided at the time of transfer   [facility],[address] _______  · Transfer will be performed by qualified personnel from [facility]  and appropriate transfer equipment as required, including the use of necessary and appropriate life support measures  Provider Certification: I have examined the patient and explained the following risks and benefits of being transferred/refusing transfer to the patient/family:  General risk, such as traffic hazards, adverse weather conditions, rough terrain or turbulence, possible failure of equipment (including vehicle or aircraft), or consequences of actions of persons outside the control of the transport personnel, The possibility of a transport vehicle being unavailable, Unanticipated needs of medical equipment and personnel during transport, Risk of worsening condition, The patient is stable for psychiatric transfer because they are medically stable, and is protected from harming him/herself or others during transport      Based on these reasonable risks and benefits to the patient and/or the unborn child(lucero), and based upon the information available at the time of the patients examination, I certify that the medical benefits reasonably to be expected from the provision of appropriate medical treatments at another medical facility outweigh the increasing risks, if any, to the individuals medical condition, and in the case of labor to the unborn child, from effecting the transfer      X____________________________________________ DATE 08/30/21        TIME_______      ORIGINAL - SEND TO MEDICAL RECORDS   COPY - SEND WITH PATIENT DURING TRANSFER

## 2021-08-31 NOTE — ED NOTES
Assumed care of patient at this time  Patient sleeping, respirations even and unlabored  Will continue to monitor        Yuri Amos RN  08/31/21 0727

## 2021-08-31 NOTE — ED NOTES
Pt ambulated to bathroom, upon return to room provided with a warm blanket  1:1 at bedside, will continue to monitor        Louisa Mejia  08/31/21 9621

## 2021-08-31 NOTE — ED NOTES
Patient sleeping soundly on stretcher, respirations even and unlabored       Belkis Greenberg RN  08/30/21 4291

## 2021-08-31 NOTE — ED NOTES
At this time pt is requesting to go home  Explained to pt that a 201 has been signed so at this point he cannot go home and has to wait for his transfer to [facility] at 1000 today  1:1 at bedside, will continue to monitor        Luella Dakin  08/31/21 0865

## 2021-09-23 ENCOUNTER — HOSPITAL ENCOUNTER (EMERGENCY)
Facility: HOSPITAL | Age: 36
End: 2021-09-24
Attending: EMERGENCY MEDICINE | Admitting: EMERGENCY MEDICINE
Payer: COMMERCIAL

## 2021-09-23 VITALS
HEIGHT: 69 IN | TEMPERATURE: 98.9 F | DIASTOLIC BLOOD PRESSURE: 68 MMHG | SYSTOLIC BLOOD PRESSURE: 151 MMHG | OXYGEN SATURATION: 97 % | BODY MASS INDEX: 31.1 KG/M2 | HEART RATE: 88 BPM | WEIGHT: 210 LBS | RESPIRATION RATE: 18 BRPM

## 2021-09-23 DIAGNOSIS — F32.A DEPRESSION WITH SUICIDAL IDEATION: ICD-10-CM

## 2021-09-23 DIAGNOSIS — R45.851 DEPRESSION WITH SUICIDAL IDEATION: ICD-10-CM

## 2021-09-23 DIAGNOSIS — F33.2 MDD (MAJOR DEPRESSIVE DISORDER), RECURRENT SEVERE, WITHOUT PSYCHOSIS (HCC): Primary | ICD-10-CM

## 2021-09-23 PROCEDURE — 82075 ASSAY OF BREATH ETHANOL: CPT | Performed by: EMERGENCY MEDICINE

## 2021-09-23 PROCEDURE — 99285 EMERGENCY DEPT VISIT HI MDM: CPT | Performed by: EMERGENCY MEDICINE

## 2021-09-23 PROCEDURE — 87635 SARS-COV-2 COVID-19 AMP PRB: CPT | Performed by: EMERGENCY MEDICINE

## 2021-09-23 PROCEDURE — 99285 EMERGENCY DEPT VISIT HI MDM: CPT

## 2021-09-23 PROCEDURE — 80307 DRUG TEST PRSMV CHEM ANLYZR: CPT | Performed by: EMERGENCY MEDICINE

## 2021-09-24 ENCOUNTER — HOSPITAL ENCOUNTER (INPATIENT)
Facility: HOSPITAL | Age: 36
LOS: 4 days | Discharge: HOME | DRG: 751 | End: 2021-09-28
Attending: STUDENT IN AN ORGANIZED HEALTH CARE EDUCATION/TRAINING PROGRAM | Admitting: INTERNAL MEDICINE
Payer: COMMERCIAL

## 2021-09-24 DIAGNOSIS — F17.200 NICOTINE DEPENDENCE: ICD-10-CM

## 2021-09-24 DIAGNOSIS — F41.0 PANIC ATTACK: ICD-10-CM

## 2021-09-24 DIAGNOSIS — F33.2 MDD (MAJOR DEPRESSIVE DISORDER), RECURRENT SEVERE, WITHOUT PSYCHOSIS (HCC): Primary | ICD-10-CM

## 2021-09-24 DIAGNOSIS — F32.9 MAJOR DEPRESSIVE DISORDER: ICD-10-CM

## 2021-09-24 PROBLEM — F32.A DEPRESSION WITH SUICIDAL IDEATION: Status: ACTIVE | Noted: 2021-09-24

## 2021-09-24 PROBLEM — R45.851 DEPRESSION WITH SUICIDAL IDEATION: Status: ACTIVE | Noted: 2021-09-24

## 2021-09-24 RX ORDER — BENZTROPINE MESYLATE 1 MG/1
1 TABLET ORAL
Status: DISCONTINUED | OUTPATIENT
Start: 2021-09-24 | End: 2021-09-28 | Stop reason: HOSPADM

## 2021-09-24 RX ORDER — LORAZEPAM 2 MG/ML
2 INJECTION INTRAMUSCULAR
Status: DISCONTINUED | OUTPATIENT
Start: 2021-09-24 | End: 2021-09-28 | Stop reason: HOSPADM

## 2021-09-24 RX ORDER — VENLAFAXINE HYDROCHLORIDE 150 MG/1
150 CAPSULE, EXTENDED RELEASE ORAL DAILY
Status: DISCONTINUED | OUTPATIENT
Start: 2021-09-24 | End: 2021-09-24 | Stop reason: HOSPADM

## 2021-09-24 RX ORDER — LORAZEPAM 2 MG/ML
1 INJECTION INTRAMUSCULAR
Status: DISCONTINUED | OUTPATIENT
Start: 2021-09-24 | End: 2021-09-28 | Stop reason: HOSPADM

## 2021-09-24 RX ORDER — BENZTROPINE MESYLATE 1 MG/ML
1 INJECTION INTRAMUSCULAR; INTRAVENOUS
Status: DISCONTINUED | OUTPATIENT
Start: 2021-09-24 | End: 2021-09-28 | Stop reason: HOSPADM

## 2021-09-24 RX ORDER — RISPERIDONE 1 MG/1
1 TABLET, ORALLY DISINTEGRATING ORAL
Status: CANCELLED | OUTPATIENT
Start: 2021-09-24

## 2021-09-24 RX ORDER — IBUPROFEN 600 MG/1
600 TABLET ORAL EVERY 6 HOURS PRN
Status: CANCELLED | OUTPATIENT
Start: 2021-09-24

## 2021-09-24 RX ORDER — LORAZEPAM 2 MG/ML
1 INJECTION INTRAMUSCULAR
Status: CANCELLED | OUTPATIENT
Start: 2021-09-24

## 2021-09-24 RX ORDER — HALOPERIDOL 5 MG/ML
5 INJECTION INTRAMUSCULAR
Status: DISCONTINUED | OUTPATIENT
Start: 2021-09-24 | End: 2021-09-28 | Stop reason: HOSPADM

## 2021-09-24 RX ORDER — GABAPENTIN 300 MG/1
300 CAPSULE ORAL DAILY
Status: DISCONTINUED | OUTPATIENT
Start: 2021-09-24 | End: 2021-09-24 | Stop reason: HOSPADM

## 2021-09-24 RX ORDER — BENZTROPINE MESYLATE 1 MG/ML
1 INJECTION INTRAMUSCULAR; INTRAVENOUS
Status: CANCELLED | OUTPATIENT
Start: 2021-09-24

## 2021-09-24 RX ORDER — IBUPROFEN 400 MG/1
800 TABLET ORAL EVERY 8 HOURS PRN
Status: CANCELLED | OUTPATIENT
Start: 2021-09-24

## 2021-09-24 RX ORDER — BENZTROPINE MESYLATE 1 MG/1
1 TABLET ORAL
Status: CANCELLED | OUTPATIENT
Start: 2021-09-24

## 2021-09-24 RX ORDER — RISPERIDONE 2 MG/1
2 TABLET, ORALLY DISINTEGRATING ORAL
Status: DISCONTINUED | OUTPATIENT
Start: 2021-09-24 | End: 2021-09-28 | Stop reason: HOSPADM

## 2021-09-24 RX ORDER — RISPERIDONE 1 MG/1
1 TABLET, ORALLY DISINTEGRATING ORAL
Status: DISCONTINUED | OUTPATIENT
Start: 2021-09-24 | End: 2021-09-28 | Stop reason: HOSPADM

## 2021-09-24 RX ORDER — RISPERIDONE 1 MG/1
2 TABLET, ORALLY DISINTEGRATING ORAL
Status: CANCELLED | OUTPATIENT
Start: 2021-09-24

## 2021-09-24 RX ORDER — HALOPERIDOL 5 MG/ML
2.5 INJECTION INTRAMUSCULAR
Status: DISCONTINUED | OUTPATIENT
Start: 2021-09-24 | End: 2021-09-28 | Stop reason: HOSPADM

## 2021-09-24 RX ORDER — ARIPIPRAZOLE 5 MG/1
5 TABLET ORAL DAILY
Status: DISCONTINUED | OUTPATIENT
Start: 2021-09-24 | End: 2021-09-24 | Stop reason: HOSPADM

## 2021-09-24 RX ORDER — POLYETHYLENE GLYCOL 3350 17 G/17G
17 POWDER, FOR SOLUTION ORAL DAILY PRN
Status: CANCELLED | OUTPATIENT
Start: 2021-09-24

## 2021-09-24 RX ORDER — RISPERIDONE 0.5 MG/1
0.5 TABLET, ORALLY DISINTEGRATING ORAL
Status: DISCONTINUED | OUTPATIENT
Start: 2021-09-24 | End: 2021-09-28 | Stop reason: HOSPADM

## 2021-09-24 RX ORDER — HALOPERIDOL 5 MG/ML
2.5 INJECTION INTRAMUSCULAR
Status: CANCELLED | OUTPATIENT
Start: 2021-09-24

## 2021-09-24 RX ORDER — CLONAZEPAM 0.5 MG/1
0.5 TABLET ORAL 2 TIMES DAILY
Status: DISCONTINUED | OUTPATIENT
Start: 2021-09-24 | End: 2021-09-24 | Stop reason: HOSPADM

## 2021-09-24 RX ORDER — BENZTROPINE MESYLATE 1 MG/ML
0.5 INJECTION INTRAMUSCULAR; INTRAVENOUS
Status: CANCELLED | OUTPATIENT
Start: 2021-09-24

## 2021-09-24 RX ORDER — HALOPERIDOL 5 MG/ML
5 INJECTION INTRAMUSCULAR
Status: CANCELLED | OUTPATIENT
Start: 2021-09-24

## 2021-09-24 RX ORDER — LORAZEPAM 2 MG/ML
2 INJECTION INTRAMUSCULAR
Status: CANCELLED | OUTPATIENT
Start: 2021-09-24

## 2021-09-24 RX ORDER — IBUPROFEN 800 MG/1
800 TABLET ORAL EVERY 8 HOURS PRN
Status: DISCONTINUED | OUTPATIENT
Start: 2021-09-24 | End: 2021-09-28 | Stop reason: HOSPADM

## 2021-09-24 RX ORDER — POLYETHYLENE GLYCOL 3350 17 G/17G
17 POWDER, FOR SOLUTION ORAL DAILY PRN
Status: DISCONTINUED | OUTPATIENT
Start: 2021-09-24 | End: 2021-09-28 | Stop reason: HOSPADM

## 2021-09-24 RX ORDER — IBUPROFEN 400 MG/1
400 TABLET ORAL EVERY 4 HOURS PRN
Status: CANCELLED | OUTPATIENT
Start: 2021-09-24

## 2021-09-24 RX ORDER — RISPERIDONE 0.5 MG/1
0.5 TABLET, ORALLY DISINTEGRATING ORAL
Status: CANCELLED | OUTPATIENT
Start: 2021-09-24

## 2021-09-24 RX ORDER — IBUPROFEN 600 MG/1
600 TABLET ORAL EVERY 6 HOURS PRN
Status: DISCONTINUED | OUTPATIENT
Start: 2021-09-24 | End: 2021-09-28 | Stop reason: HOSPADM

## 2021-09-24 RX ORDER — BENZTROPINE MESYLATE 1 MG/ML
0.5 INJECTION INTRAMUSCULAR; INTRAVENOUS
Status: DISCONTINUED | OUTPATIENT
Start: 2021-09-24 | End: 2021-09-28 | Stop reason: HOSPADM

## 2021-09-24 RX ORDER — IBUPROFEN 400 MG/1
400 TABLET ORAL EVERY 4 HOURS PRN
Status: DISCONTINUED | OUTPATIENT
Start: 2021-09-24 | End: 2021-09-28 | Stop reason: HOSPADM

## 2021-09-24 RX ADMIN — CLONAZEPAM 0.5 MG: 0.5 TABLET ORAL at 08:10

## 2021-09-24 RX ADMIN — VENLAFAXINE HYDROCHLORIDE 150 MG: 150 CAPSULE, EXTENDED RELEASE ORAL at 09:09

## 2021-09-24 RX ADMIN — GABAPENTIN 300 MG: 300 CAPSULE ORAL at 08:10

## 2021-09-24 RX ADMIN — ARIPIPRAZOLE 5 MG: 5 TABLET ORAL at 08:10

## 2021-09-24 NOTE — EMTALA/ACUTE CARE TRANSFER
[facility] EMERGENCY DEPARTMENT  [address] [facility] [address]  Dept: [phone]      EMTALA TRANSFER CONSENT    NAME Kristine Mijares                                         1985                              MRN [id number]    I have been informed of my rights regarding examination, treatment, and transfer   by Dr Vijay Figueroa MD    Benefits: Specialized equipment and/or services available at the receiving facility (Include comment)________________________[facility]    Risks: Potential for delay in receiving treatment, Possible worsening of condition or death during transfer, Potential deterioration of medical condition, Increased discomfort during transfer      Consent for Transfer:  I acknowledge that my medical condition has been evaluated and explained to me by the emergency department physician or other qualified medical person and/or my attending physician, who has recommended that I be transferred to the service of  Accepting Physician: Dr Joan Granda at [address] Name, [address] : [employer]  The above potential benefits of such transfer, the potential risks associated with such transfer, and the probable risks of not being transferred have been explained to me, and I fully understand them  The doctor has explained that, in my case, the benefits of transfer outweigh the risks  I agree to be transferred  I authorize the performance of emergency medical procedures and treatments upon me in both transit and upon arrival at the receiving facility  Additionally, I authorize the release of any and all medical records to the receiving facility and request they be transported with me, if possible  I understand that the safest mode of transportation during a medical emergency is an ambulance and that the Hospital advocates the use of this mode of transport   Risks of traveling to the receiving facility by car, including absence of medical control, life sustaining equipment, such as oxygen, and medical personnel has been explained to me and I fully understand them  (ИРИНА CORRECT BOX BELOW)  [ x ]  I consent to the stated transfer and to be transported by ambulance/helicopter  [  ]  I consent to the stated transfer, but refuse transportation by ambulance and accept full responsibility for my transportation by car  I understand the risks of non-ambulance transfers and I exonerate the Hospital and its staff from any deterioration in my condition that results from this refusal     X___________________________________________    DATE  21  TIME________  Signature of patient or legally responsible individual signing on patient behalf           RELATIONSHIP TO PATIENT_________________________          Provider Certification    NAME Mary Ann Ratliff                                         1985                              MRN [id number]    A medical screening exam was performed on the above named patient  Based on the examination:    Condition Necessitating Transfer The primary encounter diagnosis was MDD (major depressive disorder), recurrent severe, without psychosis ([facility] [address] )  A diagnosis of Depression with suicidal ideation was also pertinent to this visit      Patient Condition: The patient has been stabilized such that within reasonable medical probability, no material deterioration of the patient condition or the condition of the unborn child(lucero) is likely to result from the transfer    Reason for Transfer: Level of Care needed not available at this facility    Transfer Requirements: [address]   · Space available and qualified personnel available for treatment as acknowledged by David Bowers [phone]  · Agreed to accept transfer and to provide appropriate medical treatment as acknowledged by       Dr Deneen Cano  · Appropriate medical records of the examination and treatment of the patient are provided at the time of transfer   STAFF INITIAL WHEN COMPLETED _______  · Transfer will be performed by qualified personnel from [facility]  and appropriate transfer equipment as required, including the use of necessary and appropriate life support measures  Provider Certification: I have examined the patient and explained the following risks and benefits of being transferred/refusing transfer to the patient/family:  General risk, such as traffic hazards, adverse weather conditions, rough terrain or turbulence, possible failure of equipment (including vehicle or aircraft), or consequences of actions of persons outside the control of the transport personnel, Unanticipated needs of medical equipment and personnel during transport, Risk of worsening condition, The possibility of a transport vehicle being unavailable      Based on these reasonable risks and benefits to the patient and/or the unborn child(lucero), and based upon the information available at the time of the patients examination, I certify that the medical benefits reasonably to be expected from the provision of appropriate medical treatments at another medical facility outweigh the increasing risks, if any, to the individuals medical condition, and in the case of labor to the unborn child, from effecting the transfer      X____________________________________________ DATE 09/24/21        TIME_______      ORIGINAL - SEND TO MEDICAL RECORDS   COPY - SEND WITH PATIENT DURING TRANSFER

## 2021-09-24 NOTE — ED NOTES
Insurance Authorization for admission:   Phone call placed to [facility] number: [phone] to [facility]  3 days approved  Level of care: [facility]; 201  Review on TBD  Authorization # to be obtained by [facility] on patient's arrival    Flores stated pt has active ICM with [facility]  Srinivasa Jeff will refer pt to their case management team      Cresencio Love (Eligibility Verification System) called - [phone]  Automated system indicates: Active with [address] for Transportation:    [facility] is transporting pt and Rehan Javon is not required

## 2021-09-24 NOTE — ED NOTES
Paper charting performed on patient from [phone], EMR charting to resume at this time  Patient remains under 1:1 observation and is currently lying in bed in no obvious distress        May Callum  09/24/21 2859

## 2021-09-24 NOTE — ED PROVIDER NOTES
History  Chief Complaint   Patient presents with    Suicidal     Pt was just released from psych facility, pt given voucher to go to shelter but pt has 'social anxiety' and did not want to go  Pt came back here [facility] and [facility] not allowed [facility]  Pt then stated he would cut his wrists with a   This is a 39year old male with a hx of depression, borderline personality, ADD, anxiety, suicide attempt by cutting that presented to the ED this evening by EMS  He reports that he feeling "very hopeless" and has suicidal ideations to cut himself again as he had in the past    He as had numerous inpatient U admissions and ED visits    Denies any aggravating or precipitating events    Denies pain, shortness of breath, chest pain, fever or chills          Prior to Admission Medications   Prescriptions Last Dose Informant Patient Reported? Taking? ARIPiprazole (ABILIFY) 5 mg tablet   Yes No   Sig: Take 5 mg by mouth daily   clonazePAM (KlonoPIN) 0 5 mg tablet   Yes No   Sig: Take 0 5 mg by mouth   gabapentin (NEURONTIN) 300 mg capsule   Yes No   Sig: Take 300 mg by mouth daily   venlafaxine (EFFEXOR-XR) 150 mg 24 hr capsule   No No   Sig: Take 1 capsule (150 mg total) by mouth daily      Facility-Administered Medications: None       Past Medical History:   Diagnosis Date    ADD (attention deficit disorder)     Anxiety     Borderline personality disorder ([facility] [address] [facility]     Depression     MDD (major depressive disorder)     Panic attack     Psychiatric illness     Self-injurious behavior     Social phobia     Suicide attempt ([facility] )        No past surgical history on file  No family history on file  I have reviewed and agree with the history as documented      E-Cigarette/Vaping    E-Cigarette Use Never User      E-Cigarette/Vaping Substances    Nicotine No     CBD No     Flavoring No     Unknown Yes      Social History     Tobacco Use    Smoking status: Current Every Day Smoker Packs/day: 0 50     Years: 20 00     Pack years: 10 00     Types: Cigarettes    Smokeless tobacco: Never Used   Vaping Use    Vaping Use: Never used   Substance Use Topics    Alcohol use: Not Currently    Drug use: Yes     Frequency: 1 0 times per week     Types: Marijuana     Comment: once a week       Review of Systems   Constitutional: Negative for activity change, appetite change, chills, diaphoresis, fatigue, fever and unexpected weight change  HENT: Negative for congestion, ear discharge, ear pain, mouth sores, sinus pressure, sinus pain, sneezing, sore throat, trouble swallowing and voice change  Eyes: Negative for photophobia, pain, discharge, redness, itching and visual disturbance  Respiratory: Negative for cough, chest tightness and shortness of breath  Cardiovascular: Negative for chest pain, palpitations and leg swelling  Gastrointestinal: Negative for abdominal pain, constipation, nausea and vomiting  Endocrine: Negative for cold intolerance, heat intolerance, polydipsia, polyphagia and polyuria  Genitourinary: Negative for decreased urine volume, difficulty urinating, dysuria, frequency, hematuria and urgency  Musculoskeletal: Negative for arthralgias, back pain, gait problem, joint swelling, myalgias, neck pain and neck stiffness  Skin: Negative for color change and rash  Allergic/Immunologic: Negative for immunocompromised state  Neurological: Negative for dizziness, tremors, seizures, syncope, speech difficulty, weakness, light-headedness, numbness and headaches  Hematological: Does not bruise/bleed easily  Psychiatric/Behavioral: Positive for dysphoric mood and suicidal ideas  Negative for behavioral problems  Physical Exam  Physical Exam  Vitals and nursing note reviewed  Constitutional:       General: He is not in acute distress  Appearance: Normal appearance  He is well-developed and normal weight   He is not ill-appearing, toxic-appearing or diaphoretic  HENT:      Head: Normocephalic and atraumatic  Right Ear: Tympanic membrane, ear canal and external ear normal  There is no impacted cerumen  Left Ear: Tympanic membrane, ear canal and external ear normal  There is no impacted cerumen  Nose: Nose normal  No congestion or rhinorrhea  Mouth/Throat:      Mouth: Mucous membranes are moist       Pharynx: Oropharynx is clear  No oropharyngeal exudate or posterior oropharyngeal erythema  Eyes:      General: No scleral icterus  Right eye: No discharge  Left eye: No discharge  Extraocular Movements: Extraocular movements intact  Conjunctiva/sclera: Conjunctivae normal       Pupils: Pupils are equal, round, and reactive to light  Neck:      Vascular: No JVD  Trachea: No tracheal deviation  Cardiovascular:      Rate and Rhythm: Normal rate and regular rhythm  Heart sounds: Normal heart sounds  No murmur heard  Pulmonary:      Effort: Pulmonary effort is normal  No respiratory distress  Breath sounds: Normal breath sounds  No wheezing or rales  Chest:      Chest wall: No tenderness  Abdominal:      General: Bowel sounds are normal       Palpations: Abdomen is soft  There is no mass  Tenderness: There is no abdominal tenderness  There is no right CVA tenderness, left CVA tenderness or guarding  Hernia: No hernia is present  Musculoskeletal:         General: No swelling, tenderness, deformity or signs of injury  Normal range of motion  Cervical back: Normal range of motion and neck supple  No rigidity  No muscular tenderness  Right lower leg: No edema  Left lower leg: No edema  Lymphadenopathy:      Cervical: No cervical adenopathy  Skin:     General: Skin is warm and dry  Capillary Refill: Capillary refill takes less than 2 seconds  Findings: No bruising, erythema or rash  Neurological:      General: No focal deficit present        Mental Status: He is alert and oriented to person, place, and time  Mental status is at baseline  Cranial Nerves: No cranial nerve deficit  Sensory: No sensory deficit  Motor: No weakness or abnormal muscle tone  Coordination: Coordination normal       Gait: Gait normal       Deep Tendon Reflexes: Reflexes normal    Psychiatric:         Behavior: Behavior normal          Judgment: Judgment normal       Comments: Depression with SI and plan         Vital Signs  ED Triage Vitals   Temperature Pulse Respirations Blood Pressure SpO2   09/23/21 2347 09/23/21 2122 09/23/21 2122 09/23/21 2122 09/23/21 2122   98 9 °F (37 2 °C) (!) 115 18 (!) 129/107 98 %      Temp Source Heart Rate Source Patient Position - Orthostatic VS BP Location FiO2 (%)   09/23/21 2347 09/23/21 2347 09/23/21 2347 09/23/21 2347 --   Oral Monitor Lying Left arm       Pain Score       --                  Vitals:    09/23/21 2122 09/23/21 2347   BP: (!) 129/107 151/68   Pulse: (!) 115 88   Patient Position - Orthostatic VS:  Lying         Visual Acuity      ED Medications  Medications   venlafaxine (EFFEXOR-XR) 24 hr capsule 150 mg (has no administration in time range)   clonazePAM (KlonoPIN) tablet 0 5 mg (has no administration in time range)   ARIPiprazole (ABILIFY) tablet 5 mg (has no administration in time range)   gabapentin (NEURONTIN) capsule 300 mg (has no administration in time range)       Diagnostic Studies  Results Reviewed     Procedure Component Value Units Date/Time    Novel Coronavirus (Covid-19),PCR [facility] - 2 hour stat [[id number]]  (Normal) Collected: 09/23/21 2157    Lab Status: Final result Specimen: Nares from Nose Updated: 09/23/21 2323     SARS-CoV-2 Negative    Narrative:      FOR PEDIATRIC PATIENTS - copy/paste COVID Guidelines URL to browser: [email or web address] [email or web address]  [employer]    The specimen collection materials, transport medium, and/or testing methodology utilized in the production of these test results have been proven to be reliable in a limited validation with an abbreviated program under the Emergency Utilization Authorization provided by the FDA  Testing reported as "Presumptive positive" will be confirmed with secondary testing to ensure result accuracy  Clinical caution and judgement should be used with the interpretation of these results with consideration of the clinical impression and other laboratory testing  Testing reported as "Positive" or "Negative" has been proven to be accurate according to standard laboratory validation requirements  All testing is performed with control materials showing appropriate reactivity at standard intervals  Rapid drug screen, urine [[id number]]  (Abnormal) Collected: 09/23/21 2217    Lab Status: Final result Specimen: Urine, Clean Catch Updated: 09/23/21 2247     Amph/Meth UR Positive     Barbiturate Ur Negative     Benzodiazepine Urine Negative     Cocaine Urine Negative     Methadone Urine Negative     Opiate Urine Negative     PCP Ur Negative     THC Urine Positive     Oxycodone Urine Negative    Narrative:      Presumptive report  If requested, specimen will be sent to reference lab for confirmation  FOR MEDICAL PURPOSES ONLY  IF CONFIRMATION NEEDED PLEASE CONTACT THE LAB WITHIN 5 DAYS      Drug Screen Cutoff Levels:  AMPHETAMINE/METHAMPHETAMINES  1000 ng/mL  BARBITURATES     200 ng/mL  BENZODIAZEPINES     200 ng/mL  COCAINE      300 ng/mL  METHADONE      300 ng/mL  OPIATES      300 ng/mL  PHENCYCLIDINE     25 ng/mL  THC       50 ng/mL  OXYCODONE      100 ng/mL    POCT alcohol breath test [[id number]]  (Normal) Resulted: 09/23/21 2159    Lab Status: Final result Updated: 09/23/21 2159     EXTBreath Alcohol 0 000                 No orders to display              Procedures  Procedures         ED Course                             SBIRT 20yo+      Most Recent Value   SBIRT (25 yo +)   In order to provide better care to our patients, we are screening all of our patients for alcohol and drug use  Would it be okay to ask you these screening questions? No Filed at: 09/23/2021 8944                    MDM  Number of Diagnoses or Management Options  Depression with suicidal ideation: established and worsening  Diagnosis management comments: This 60-year-old male with a history of depression and suicidal attempts   Patient presents the emergency department this evening stating that his depression is increasing and he is again having suicidal thoughts and does have a plan to cut his wrist   He has attempted suicide by cutting his wrist in the past     Patient is medically cleared  Alcohol was negative  Patient was evaluated by crisis the patient voluntarily signed 12  Patient was accepted at [address] [facility] [address]  Will be transferred in the morning  Patient is in agreement with the transfer and plan     Remained on constant 1:1 observation    Portions of the record may have been created with voice recognition software  Occasional wrong word or "sound a like" substitutions may have occurred due to the inherent limitations of voice recognition software  Read the chart carefully and recognize, using context, where substitutions have occurred         Amount and/or Complexity of Data Reviewed  Clinical lab tests: ordered and reviewed  Review and summarize past medical records: yes  Discuss the patient with other providers: yes (Crisis worker)    Risk of Complications, Morbidity, and/or Mortality  Presenting problems: high  Diagnostic procedures: moderate  Management options: moderate    Patient Progress  Patient progress: stable      Disposition  Final diagnoses:   Depression with suicidal ideation     Time reflects when diagnosis was documented in both MDM as applicable and the Disposition within this note     Time User Action Codes Description Comment    9/24/2021 12:41 AM Sabino Galloway Add [F33 2] MDD (major depressive disorder), recurrent severe, without psychosis ([facility] [address] )     9/24/2021  3:39 AM Jasmina  Add [F32 9, N86 155] Depression with suicidal ideation       ED Disposition     ED Disposition Condition Date/Time Comment    Transfer to [facility] Sep 24, 2021  3:39 AM Rafael Body should be transferred out to [facility] and has been medically cleared          MD Documentation      Most Recent Value   Patient Condition  The patient has been stabilized such that within reasonable medical probability, no material deterioration of the patient condition or the condition of the unborn child(lucero) is likely to result from the transfer   Reason for Transfer  Level of Care needed not available at this facility   Benefits of Transfer  Specialized equipment and/or services available at the receiving facility (Include comment)________________________   Risks of Transfer  Potential for delay in receiving treatment, Possible worsening of condition or death during transfer, Potential deterioration of medical condition, Increased discomfort during transfer   Accepting Physician  Dr Riya Gore [facility] Name, Chris Rosales    (Name & Tel number)  John Patterson [phone]   Transported by [facility] and Unit #)  CTS   Sending MD  Dr Angelica Steel MD   Provider Certification  General risk, such as traffic hazards, adverse weather conditions, rough terrain or turbulence, possible failure of equipment (including vehicle or aircraft), or consequences of actions of persons outside the control of the transport personnel, Unanticipated needs of medical equipment and personnel during transport, Risk of worsening condition, The possibility of a transport vehicle being unavailable      RN Documentation      [address] Name, Chris Rosales    (Name & Tel number)  John Patterson [phone]   Transported by [facility] and Unit #)  CTS      Follow-up Information    None         Patient's Medications   Discharge Prescriptions    No medications on file     No discharge procedures on file      PDMP Review       Value Time User    PDMP Reviewed  Yes 12/22/2020  2:46 PM Sylvester Valdez MD          ED Provider  Electronically Signed by           Deepa Ramirez MD  09/24/21 8689

## 2021-09-24 NOTE — ED NOTES
[email or web address] to [email or web address]  Pt and charge Rn aware         Nurse report is to be called to [phone] prior to patient transfer

## 2021-09-24 NOTE — ED NOTES
Patient is accepted at [facility]  Patient is accepted by Dr Bobby Hobson is arranged with C[facility]  Transportation is scheduled for (pending)  Patient may go to the floor after 9/24 after 9am        Nurse report is to be called to [phone] prior to patient transfer

## 2021-09-24 NOTE — ED NOTES
Patient requested food, provided snacks and water        Marino John  09/24/21 803 ANA Gordon  09/24/21 5437

## 2021-09-24 NOTE — ED NOTES
Pt presented to the ED today on his own  Pt stated that he has had increased depression, and thoughts of SI  His plan is to cut himself until he dies  Pt denied HI, voices or visions  Pt stated that he gets about 7 hours of sleep  Pt denied any drastic weight changes  Pt presents with a very flat affect, and very depressed and dispairing attitude  Pt is requesting inpatient [address] treatment  Pt stated that he is currently compliant with his mental health medication, perscribed to him from [facility] clinic  Pt stated that he has an intake scheduled at [facility] in [place] on 10/5/21  Pt willing to sign a 201

## 2021-09-25 LAB
ALBUMIN SERPL BCP-MCNC: 3.4 G/DL (ref 3.5–5)
ALP SERPL-CCNC: 89 U/L (ref 46–116)
ALT SERPL W P-5'-P-CCNC: 78 U/L (ref 12–78)
ANION GAP SERPL CALCULATED.3IONS-SCNC: 2 MMOL/L (ref 4–13)
AST SERPL W P-5'-P-CCNC: 38 U/L (ref 5–45)
BACTERIA UR QL AUTO: NORMAL /HPF
BASOPHILS # BLD AUTO: 0.02 THOUSANDS/ΜL (ref 0–0.1)
BASOPHILS NFR BLD AUTO: 1 % (ref 0–1)
BILIRUB SERPL-MCNC: 0.3 MG/DL (ref 0.2–1)
BILIRUB UR QL STRIP: NEGATIVE
BUN SERPL-MCNC: 15 MG/DL (ref 5–25)
CALCIUM ALBUM COR SERPL-MCNC: 8.9 MG/DL (ref 8.3–10.1)
CALCIUM SERPL-MCNC: 8.4 MG/DL (ref 8.3–10.1)
CHLORIDE SERPL-SCNC: 103 MMOL/L (ref 100–108)
CLARITY UR: CLEAR
CO2 SERPL-SCNC: 30 MMOL/L (ref 21–32)
COLOR UR: YELLOW
CREAT SERPL-MCNC: 0.92 MG/DL (ref 0.6–1.3)
EOSINOPHIL # BLD AUTO: 0.23 THOUSAND/ΜL (ref 0–0.61)
EOSINOPHIL NFR BLD AUTO: 6 % (ref 0–6)
ERYTHROCYTE [DISTWIDTH] IN BLOOD BY AUTOMATED COUNT: 12.7 % (ref 11.6–15.1)
GFR SERPL CREATININE-BSD FRML MDRD: 123 ML/MIN/1.73SQ M
GLUCOSE P FAST SERPL-MCNC: 91 MG/DL (ref 65–99)
GLUCOSE SERPL-MCNC: 91 MG/DL (ref 65–140)
GLUCOSE UR STRIP-MCNC: NEGATIVE MG/DL
HCT VFR BLD AUTO: 46.2 % (ref 36.5–49.3)
HGB BLD-MCNC: 14.8 G/DL (ref 12–17)
HGB UR QL STRIP.AUTO: NEGATIVE
IMM GRANULOCYTES # BLD AUTO: 0.02 THOUSAND/UL (ref 0–0.2)
IMM GRANULOCYTES NFR BLD AUTO: 1 % (ref 0–2)
KETONES UR STRIP-MCNC: NEGATIVE MG/DL
LEUKOCYTE ESTERASE UR QL STRIP: NEGATIVE
LYMPHOCYTES # BLD AUTO: 1.5 THOUSANDS/ΜL (ref 0.6–4.47)
LYMPHOCYTES NFR BLD AUTO: 40 % (ref 14–44)
MCH RBC QN AUTO: 28.7 PG (ref 26.8–34.3)
MCHC RBC AUTO-ENTMCNC: 32 G/DL (ref 31.4–37.4)
MCV RBC AUTO: 90 FL (ref 82–98)
MONOCYTES # BLD AUTO: 0.4 THOUSAND/ΜL (ref 0.17–1.22)
MONOCYTES NFR BLD AUTO: 11 % (ref 4–12)
NEUTROPHILS # BLD AUTO: 1.6 THOUSANDS/ΜL (ref 1.85–7.62)
NEUTS SEG NFR BLD AUTO: 41 % (ref 43–75)
NITRITE UR QL STRIP: NEGATIVE
NON-SQ EPI CELLS URNS QL MICRO: NORMAL /HPF
NRBC BLD AUTO-RTO: 0 /100 WBCS
PH UR STRIP.AUTO: 6.5 [PH]
PLATELET # BLD AUTO: 275 THOUSANDS/UL (ref 149–390)
PMV BLD AUTO: 10 FL (ref 8.9–12.7)
POTASSIUM SERPL-SCNC: 3.8 MMOL/L (ref 3.5–5.3)
PROT SERPL-MCNC: 6.7 G/DL (ref 6.4–8.2)
PROT UR STRIP-MCNC: NEGATIVE MG/DL
RBC # BLD AUTO: 5.16 MILLION/UL (ref 3.88–5.62)
RBC #/AREA URNS AUTO: NORMAL /HPF
SODIUM SERPL-SCNC: 135 MMOL/L (ref 136–145)
SP GR UR STRIP.AUTO: 1.02 (ref 1–1.03)
TSH SERPL DL<=0.05 MIU/L-ACNC: 0.8 UIU/ML (ref 0.36–3.74)
UROBILINOGEN UR QL STRIP.AUTO: 0.2 E.U./DL
WBC # BLD AUTO: 3.77 THOUSAND/UL (ref 4.31–10.16)
WBC #/AREA URNS AUTO: NORMAL /HPF

## 2021-09-25 PROCEDURE — 86592 SYPHILIS TEST NON-TREP QUAL: CPT | Performed by: PSYCHIATRY & NEUROLOGY

## 2021-09-25 PROCEDURE — 99222 1ST HOSP IP/OBS MODERATE 55: CPT | Performed by: PSYCHIATRY & NEUROLOGY

## 2021-09-25 PROCEDURE — 85025 COMPLETE CBC W/AUTO DIFF WBC: CPT | Performed by: PSYCHIATRY & NEUROLOGY

## 2021-09-25 PROCEDURE — 99254 IP/OBS CNSLTJ NEW/EST MOD 60: CPT | Performed by: PHYSICIAN ASSISTANT

## 2021-09-25 PROCEDURE — 83036 HEMOGLOBIN GLYCOSYLATED A1C: CPT | Performed by: PHYSICIAN ASSISTANT

## 2021-09-25 PROCEDURE — 80053 COMPREHEN METABOLIC PANEL: CPT | Performed by: PSYCHIATRY & NEUROLOGY

## 2021-09-25 PROCEDURE — 84443 ASSAY THYROID STIM HORMONE: CPT | Performed by: PSYCHIATRY & NEUROLOGY

## 2021-09-25 PROCEDURE — 81001 URINALYSIS AUTO W/SCOPE: CPT | Performed by: PSYCHIATRY & NEUROLOGY

## 2021-09-25 RX ORDER — HYDROXYZINE 50 MG/1
50 TABLET, FILM COATED ORAL 3 TIMES DAILY PRN
Status: DISCONTINUED | OUTPATIENT
Start: 2021-09-25 | End: 2021-09-28 | Stop reason: HOSPADM

## 2021-09-25 RX ORDER — CLONAZEPAM 0.5 MG/1
0.5 TABLET ORAL
Status: DISCONTINUED | OUTPATIENT
Start: 2021-09-25 | End: 2021-09-28 | Stop reason: HOSPADM

## 2021-09-25 RX ORDER — LORAZEPAM 0.5 MG/1
0.5 TABLET ORAL EVERY 8 HOURS PRN
Status: DISCONTINUED | OUTPATIENT
Start: 2021-09-25 | End: 2021-09-28 | Stop reason: HOSPADM

## 2021-09-25 RX ORDER — VENLAFAXINE HYDROCHLORIDE 150 MG/1
150 CAPSULE, EXTENDED RELEASE ORAL DAILY
Status: DISCONTINUED | OUTPATIENT
Start: 2021-09-25 | End: 2021-09-28 | Stop reason: HOSPADM

## 2021-09-25 RX ORDER — HYDROXYZINE HYDROCHLORIDE 25 MG/1
25 TABLET, FILM COATED ORAL 3 TIMES DAILY PRN
Status: DISCONTINUED | OUTPATIENT
Start: 2021-09-25 | End: 2021-09-28 | Stop reason: HOSPADM

## 2021-09-25 RX ORDER — ARIPIPRAZOLE 5 MG/1
5 TABLET ORAL DAILY
Status: DISCONTINUED | OUTPATIENT
Start: 2021-09-25 | End: 2021-09-28 | Stop reason: HOSPADM

## 2021-09-25 RX ADMIN — CLONAZEPAM 0.5 MG: 0.5 TABLET ORAL at 21:29

## 2021-09-25 RX ADMIN — HYDROXYZINE HYDROCHLORIDE 50 MG: 50 TABLET, FILM COATED ORAL at 11:37

## 2021-09-25 RX ADMIN — ARIPIPRAZOLE 5 MG: 5 TABLET ORAL at 12:20

## 2021-09-25 RX ADMIN — VENLAFAXINE HYDROCHLORIDE 150 MG: 150 CAPSULE, EXTENDED RELEASE ORAL at 12:20

## 2021-09-26 LAB
EST. AVERAGE GLUCOSE BLD GHB EST-MCNC: 117 MG/DL
HBA1C MFR BLD: 5.7 %

## 2021-09-26 PROCEDURE — 99232 SBSQ HOSP IP/OBS MODERATE 35: CPT | Performed by: PSYCHIATRY & NEUROLOGY

## 2021-09-26 RX ADMIN — HYDROXYZINE HYDROCHLORIDE 50 MG: 50 TABLET, FILM COATED ORAL at 18:44

## 2021-09-26 RX ADMIN — ARIPIPRAZOLE 5 MG: 5 TABLET ORAL at 09:24

## 2021-09-26 RX ADMIN — VENLAFAXINE HYDROCHLORIDE 150 MG: 150 CAPSULE, EXTENDED RELEASE ORAL at 09:24

## 2021-09-26 RX ADMIN — CLONAZEPAM 0.5 MG: 0.5 TABLET ORAL at 21:44

## 2021-09-27 LAB — RPR SER QL: NORMAL

## 2021-09-27 PROCEDURE — 99232 SBSQ HOSP IP/OBS MODERATE 35: CPT | Performed by: STUDENT IN AN ORGANIZED HEALTH CARE EDUCATION/TRAINING PROGRAM

## 2021-09-27 RX ADMIN — ARIPIPRAZOLE 5 MG: 5 TABLET ORAL at 08:09

## 2021-09-27 RX ADMIN — CLONAZEPAM 0.5 MG: 0.5 TABLET ORAL at 21:25

## 2021-09-27 RX ADMIN — VENLAFAXINE HYDROCHLORIDE 150 MG: 150 CAPSULE, EXTENDED RELEASE ORAL at 08:09

## 2021-09-28 VITALS
BODY MASS INDEX: 30.63 KG/M2 | HEIGHT: 69 IN | HEART RATE: 104 BPM | DIASTOLIC BLOOD PRESSURE: 79 MMHG | SYSTOLIC BLOOD PRESSURE: 169 MMHG | RESPIRATION RATE: 18 BRPM | TEMPERATURE: 97.5 F | WEIGHT: 206.79 LBS | OXYGEN SATURATION: 98 %

## 2021-09-28 PROBLEM — Z00.8 MEDICAL CLEARANCE FOR PSYCHIATRIC ADMISSION: Status: RESOLVED | Noted: 2020-08-12 | Resolved: 2021-09-28

## 2021-09-28 PROCEDURE — 99238 HOSP IP/OBS DSCHRG MGMT 30/<: CPT | Performed by: PHYSICIAN ASSISTANT

## 2021-09-28 RX ORDER — VENLAFAXINE HYDROCHLORIDE 150 MG/1
150 CAPSULE, EXTENDED RELEASE ORAL DAILY
Qty: 30 CAPSULE | Refills: 1 | Status: SHIPPED | OUTPATIENT
Start: 2021-09-28 | End: 2021-09-28

## 2021-09-28 RX ORDER — ARIPIPRAZOLE 5 MG/1
5 TABLET ORAL DAILY
Qty: 30 TABLET | Refills: 1 | Status: SHIPPED | OUTPATIENT
Start: 2021-09-28 | End: 2021-09-28 | Stop reason: SDUPTHER

## 2021-09-28 RX ORDER — NICOTINE 21 MG/24HR
1 PATCH, TRANSDERMAL 24 HOURS TRANSDERMAL DAILY
Qty: 28 PATCH | Refills: 0 | Status: SHIPPED | OUTPATIENT
Start: 2021-09-28 | End: 2021-10-26

## 2021-09-28 RX ORDER — NICOTINE 21 MG/24HR
1 PATCH, TRANSDERMAL 24 HOURS TRANSDERMAL DAILY
Qty: 28 PATCH | Refills: 0 | Status: SHIPPED | OUTPATIENT
Start: 2021-09-28 | End: 2021-09-28 | Stop reason: SDUPTHER

## 2021-09-28 RX ORDER — CLONAZEPAM 0.5 MG/1
0.5 TABLET ORAL
Qty: 10 TABLET | Refills: 0 | Status: SHIPPED | OUTPATIENT
Start: 2021-09-28 | End: 2021-09-28

## 2021-09-28 RX ORDER — CLONAZEPAM 0.5 MG/1
0.5 TABLET ORAL
Qty: 10 TABLET | Refills: 0 | Status: SHIPPED | OUTPATIENT
Start: 2021-09-28 | End: 2022-04-01

## 2021-09-28 RX ORDER — VENLAFAXINE HYDROCHLORIDE 150 MG/1
150 CAPSULE, EXTENDED RELEASE ORAL DAILY
Qty: 30 CAPSULE | Refills: 1 | Status: SHIPPED | OUTPATIENT
Start: 2021-09-28 | End: 2021-11-27

## 2021-09-28 RX ORDER — ARIPIPRAZOLE 5 MG/1
5 TABLET ORAL DAILY
Qty: 30 TABLET | Refills: 1 | Status: SHIPPED | OUTPATIENT
Start: 2021-09-28 | End: 2022-04-01

## 2021-09-28 RX ADMIN — ARIPIPRAZOLE 5 MG: 5 TABLET ORAL at 09:05

## 2021-09-28 RX ADMIN — VENLAFAXINE HYDROCHLORIDE 150 MG: 150 CAPSULE, EXTENDED RELEASE ORAL at 09:05

## 2021-09-29 ENCOUNTER — HOSPITAL ENCOUNTER (EMERGENCY)
Facility: HOSPITAL | Age: 36
Discharge: HOME | End: 2021-09-30
Attending: EMERGENCY MEDICINE | Admitting: EMERGENCY MEDICINE
Payer: COMMERCIAL

## 2021-09-29 DIAGNOSIS — Z72.0 TOBACCO ABUSE: ICD-10-CM

## 2021-09-29 DIAGNOSIS — F12.10 MARIJUANA ABUSE: ICD-10-CM

## 2021-09-29 DIAGNOSIS — R45.851 SUICIDAL IDEATION: Primary | ICD-10-CM

## 2021-09-29 LAB
AMPHETAMINES SERPL QL SCN: NEGATIVE
BARBITURATES UR QL: NEGATIVE
BENZODIAZ UR QL: NEGATIVE
COCAINE UR QL: NEGATIVE
ETHANOL EXG-MCNC: 0 MG/DL
METHADONE UR QL: NEGATIVE
OPIATES UR QL SCN: NEGATIVE
OXYCODONE+OXYMORPHONE UR QL SCN: NEGATIVE
PCP UR QL: NEGATIVE
THC UR QL: POSITIVE

## 2021-09-29 PROCEDURE — 99285 EMERGENCY DEPT VISIT HI MDM: CPT

## 2021-09-29 PROCEDURE — 99285 EMERGENCY DEPT VISIT HI MDM: CPT | Performed by: EMERGENCY MEDICINE

## 2021-09-29 PROCEDURE — 82075 ASSAY OF BREATH ETHANOL: CPT | Performed by: EMERGENCY MEDICINE

## 2021-09-29 PROCEDURE — 80307 DRUG TEST PRSMV CHEM ANLYZR: CPT | Performed by: EMERGENCY MEDICINE

## 2021-09-29 RX ORDER — CLONAZEPAM 0.5 MG/1
0.5 TABLET ORAL
Status: DISCONTINUED | OUTPATIENT
Start: 2021-09-29 | End: 2021-09-30 | Stop reason: HOSPADM

## 2021-09-29 RX ORDER — VENLAFAXINE HYDROCHLORIDE 150 MG/1
150 CAPSULE, EXTENDED RELEASE ORAL DAILY
Status: DISCONTINUED | OUTPATIENT
Start: 2021-09-30 | End: 2021-09-30 | Stop reason: HOSPADM

## 2021-09-29 RX ORDER — ARIPIPRAZOLE 5 MG/1
5 TABLET ORAL DAILY
Status: DISCONTINUED | OUTPATIENT
Start: 2021-09-30 | End: 2021-09-30 | Stop reason: HOSPADM

## 2021-09-29 RX ORDER — CLONAZEPAM 0.5 MG/1
0.5 TABLET ORAL 2 TIMES DAILY PRN
Status: DISCONTINUED | OUTPATIENT
Start: 2021-09-29 | End: 2021-09-30 | Stop reason: HOSPADM

## 2021-09-29 RX ORDER — IBUPROFEN 400 MG/1
400 TABLET ORAL EVERY 8 HOURS PRN
Status: DISCONTINUED | OUTPATIENT
Start: 2021-09-29 | End: 2021-09-30 | Stop reason: HOSPADM

## 2021-09-29 RX ORDER — ACETAMINOPHEN 325 MG/1
650 TABLET ORAL EVERY 8 HOURS PRN
Status: DISCONTINUED | OUTPATIENT
Start: 2021-09-29 | End: 2021-09-30 | Stop reason: HOSPADM

## 2021-09-29 RX ADMIN — CLONAZEPAM 0.5 MG: 0.5 TABLET ORAL at 22:14

## 2021-09-30 VITALS
BODY MASS INDEX: 30.66 KG/M2 | HEART RATE: 69 BPM | WEIGHT: 207.6 LBS | SYSTOLIC BLOOD PRESSURE: 140 MMHG | DIASTOLIC BLOOD PRESSURE: 81 MMHG | RESPIRATION RATE: 15 BRPM | OXYGEN SATURATION: 100 % | TEMPERATURE: 98.3 F

## 2021-09-30 LAB — SARS-COV-2 RNA RESP QL NAA+PROBE: NEGATIVE

## 2021-09-30 PROCEDURE — U0003 INFECTIOUS AGENT DETECTION BY NUCLEIC ACID (DNA OR RNA); SEVERE ACUTE RESPIRATORY SYNDROME CORONAVIRUS 2 (SARS-COV-2) (CORONAVIRUS DISEASE [COVID-19]), AMPLIFIED PROBE TECHNIQUE, MAKING USE OF HIGH THROUGHPUT TECHNOLOGIES AS DESCRIBED BY CMS-2020-01-R: HCPCS | Performed by: EMERGENCY MEDICINE

## 2021-09-30 PROCEDURE — U0005 INFEC AGEN DETEC AMPLI PROBE: HCPCS | Performed by: EMERGENCY MEDICINE

## 2021-09-30 RX ORDER — NICOTINE 21 MG/24HR
21 PATCH, TRANSDERMAL 24 HOURS TRANSDERMAL ONCE
Status: DISCONTINUED | OUTPATIENT
Start: 2021-09-30 | End: 2021-09-30 | Stop reason: HOSPADM

## 2021-09-30 RX ADMIN — VENLAFAXINE HYDROCHLORIDE 150 MG: 150 CAPSULE, EXTENDED RELEASE ORAL at 08:49

## 2021-09-30 RX ADMIN — ARIPIPRAZOLE 5 MG: 5 TABLET ORAL at 08:49

## 2021-10-12 ENCOUNTER — HOSPITAL ENCOUNTER (EMERGENCY)
Facility: HOSPITAL | Age: 36
Discharge: HOME | End: 2021-10-13
Attending: EMERGENCY MEDICINE
Payer: COMMERCIAL

## 2021-10-12 DIAGNOSIS — F32.A DEPRESSION WITH SUICIDAL IDEATION: Primary | ICD-10-CM

## 2021-10-12 DIAGNOSIS — R45.851 DEPRESSION WITH SUICIDAL IDEATION: Primary | ICD-10-CM

## 2021-10-12 PROCEDURE — 99284 EMERGENCY DEPT VISIT MOD MDM: CPT

## 2021-10-12 PROCEDURE — 82075 ASSAY OF BREATH ETHANOL: CPT | Performed by: EMERGENCY MEDICINE

## 2021-10-12 PROCEDURE — 80307 DRUG TEST PRSMV CHEM ANLYZR: CPT | Performed by: EMERGENCY MEDICINE

## 2021-10-12 PROCEDURE — 0241U HB NFCT DS VIR RESP RNA 4 TRGT: CPT | Performed by: EMERGENCY MEDICINE

## 2021-10-12 PROCEDURE — 99285 EMERGENCY DEPT VISIT HI MDM: CPT | Performed by: EMERGENCY MEDICINE

## 2021-10-13 VITALS
HEART RATE: 79 BPM | BODY MASS INDEX: 31.58 KG/M2 | WEIGHT: 213.85 LBS | RESPIRATION RATE: 15 BRPM | OXYGEN SATURATION: 99 % | DIASTOLIC BLOOD PRESSURE: 99 MMHG | SYSTOLIC BLOOD PRESSURE: 149 MMHG | TEMPERATURE: 97.3 F

## 2021-10-20 ENCOUNTER — HOSPITAL ENCOUNTER (EMERGENCY)
Facility: HOSPITAL | Age: 36
End: 2021-10-21
Attending: EMERGENCY MEDICINE | Admitting: EMERGENCY MEDICINE
Payer: COMMERCIAL

## 2021-10-20 VITALS
OXYGEN SATURATION: 98 % | HEART RATE: 74 BPM | WEIGHT: 207.9 LBS | SYSTOLIC BLOOD PRESSURE: 132 MMHG | RESPIRATION RATE: 18 BRPM | DIASTOLIC BLOOD PRESSURE: 77 MMHG | TEMPERATURE: 96.3 F | BODY MASS INDEX: 30.7 KG/M2

## 2021-10-20 DIAGNOSIS — R45.851 DEPRESSION WITH SUICIDAL IDEATION: Primary | ICD-10-CM

## 2021-10-20 DIAGNOSIS — F32.A DEPRESSION WITH SUICIDAL IDEATION: Primary | ICD-10-CM

## 2021-10-20 LAB — ETHANOL EXG-MCNC: 0 MG/DL

## 2021-10-20 PROCEDURE — 99285 EMERGENCY DEPT VISIT HI MDM: CPT

## 2021-10-20 PROCEDURE — 99285 EMERGENCY DEPT VISIT HI MDM: CPT | Performed by: EMERGENCY MEDICINE

## 2021-10-20 PROCEDURE — 80307 DRUG TEST PRSMV CHEM ANLYZR: CPT | Performed by: EMERGENCY MEDICINE

## 2021-10-20 PROCEDURE — 82075 ASSAY OF BREATH ETHANOL: CPT | Performed by: EMERGENCY MEDICINE

## 2021-10-21 LAB
AMPHETAMINES SERPL QL SCN: NEGATIVE
BARBITURATES UR QL: NEGATIVE
BENZODIAZ UR QL: NEGATIVE
COCAINE UR QL: NEGATIVE
METHADONE UR QL: NEGATIVE
OPIATES UR QL SCN: NEGATIVE
OXYCODONE+OXYMORPHONE UR QL SCN: NEGATIVE
PCP UR QL: NEGATIVE
THC UR QL: POSITIVE

## 2021-11-05 ENCOUNTER — HOSPITAL ENCOUNTER (EMERGENCY)
Facility: HOSPITAL | Age: 36
End: 2021-11-05
Attending: EMERGENCY MEDICINE
Payer: COMMERCIAL

## 2021-11-05 VITALS
OXYGEN SATURATION: 98 % | DIASTOLIC BLOOD PRESSURE: 87 MMHG | HEIGHT: 69 IN | RESPIRATION RATE: 16 BRPM | WEIGHT: 217 LBS | SYSTOLIC BLOOD PRESSURE: 153 MMHG | TEMPERATURE: 98.1 F | HEART RATE: 96 BPM | BODY MASS INDEX: 32.14 KG/M2

## 2021-11-05 DIAGNOSIS — R45.851 SUICIDAL IDEATION: Primary | ICD-10-CM

## 2021-11-05 PROCEDURE — 99285 EMERGENCY DEPT VISIT HI MDM: CPT | Performed by: EMERGENCY MEDICINE

## 2021-11-05 PROCEDURE — 99284 EMERGENCY DEPT VISIT MOD MDM: CPT

## 2021-11-05 PROCEDURE — 80307 DRUG TEST PRSMV CHEM ANLYZR: CPT | Performed by: EMERGENCY MEDICINE

## 2021-11-05 PROCEDURE — 82075 ASSAY OF BREATH ETHANOL: CPT | Performed by: EMERGENCY MEDICINE

## 2021-11-05 PROCEDURE — 0241U HB NFCT DS VIR RESP RNA 4 TRGT: CPT | Performed by: EMERGENCY MEDICINE

## 2022-04-01 ENCOUNTER — HOSPITAL ENCOUNTER (EMERGENCY)
Facility: HOSPITAL | Age: 37
Discharge: HOME | End: 2022-04-01
Attending: EMERGENCY MEDICINE
Payer: MEDICARE

## 2022-04-01 VITALS
HEART RATE: 113 BPM | SYSTOLIC BLOOD PRESSURE: 159 MMHG | DIASTOLIC BLOOD PRESSURE: 89 MMHG | TEMPERATURE: 98.2 F | BODY MASS INDEX: 30.93 KG/M2 | OXYGEN SATURATION: 100 % | RESPIRATION RATE: 16 BRPM | WEIGHT: 209.44 LBS

## 2022-04-01 DIAGNOSIS — B34.9 VIRAL SYNDROME: Primary | ICD-10-CM

## 2022-04-01 LAB — S PYO DNA THROAT QL NAA+PROBE: NOT DETECTED

## 2022-04-01 PROCEDURE — 99283 EMERGENCY DEPT VISIT LOW MDM: CPT

## 2022-04-01 PROCEDURE — 87636 SARSCOV2 & INF A&B AMP PRB: CPT | Performed by: PHYSICIAN ASSISTANT

## 2022-04-01 PROCEDURE — 87651 STREP A DNA AMP PROBE: CPT | Performed by: PHYSICIAN ASSISTANT

## 2022-04-01 PROCEDURE — 99284 EMERGENCY DEPT VISIT MOD MDM: CPT | Performed by: PHYSICIAN ASSISTANT

## 2022-04-01 RX ORDER — NAPROXEN 500 MG/1
250 TABLET ORAL ONCE
Status: COMPLETED | OUTPATIENT
Start: 2022-04-01 | End: 2022-04-01

## 2022-04-01 RX ORDER — BUSPIRONE HYDROCHLORIDE 5 MG/1
5 TABLET ORAL 3 TIMES DAILY
COMMUNITY

## 2022-04-01 RX ORDER — BUPROPION HYDROCHLORIDE 150 MG/1
150 TABLET ORAL
COMMUNITY
End: 2022-04-01

## 2022-04-01 RX ORDER — SENNOSIDES 8.6 MG
650 CAPSULE ORAL EVERY 8 HOURS PRN
Qty: 30 TABLET | Refills: 0 | Status: SHIPPED | OUTPATIENT
Start: 2022-04-01

## 2022-04-01 RX ORDER — NAPROXEN 500 MG/1
500 TABLET ORAL 2 TIMES DAILY WITH MEALS
Qty: 30 TABLET | Refills: 0 | Status: SHIPPED | OUTPATIENT
Start: 2022-04-01

## 2022-04-01 RX ORDER — ACETAMINOPHEN 325 MG/1
650 TABLET ORAL ONCE
Status: COMPLETED | OUTPATIENT
Start: 2022-04-01 | End: 2022-04-01

## 2022-04-01 RX ORDER — OLANZAPINE 10 MG/1
10 TABLET ORAL
COMMUNITY

## 2022-04-01 RX ADMIN — DEXAMETHASONE SODIUM PHOSPHATE 10 MG: 10 INJECTION, SOLUTION INTRAMUSCULAR; INTRAVENOUS at 20:37

## 2022-04-01 RX ADMIN — ACETAMINOPHEN 325MG 650 MG: 325 TABLET ORAL at 20:38

## 2022-04-01 RX ADMIN — NAPROXEN 250 MG: 500 TABLET ORAL at 20:38

## 2022-04-02 LAB
FLUAV RNA RESP QL NAA+PROBE: NEGATIVE
FLUBV RNA RESP QL NAA+PROBE: NEGATIVE
SARS-COV-2 RNA RESP QL NAA+PROBE: NEGATIVE

## 2022-04-02 NOTE — ED PROVIDER NOTES
History  Chief Complaint   Patient presents with    Sore Throat     Sore throat, "It hurts when I swallow, it started 3 days ago "     Patient presents for an evaluation of 3 days of sore throat, cough, congestion  Denies any known sick contacts, chest pain, shortness of breath, abdominal pain, nausea, vomiting  Has not taken anything for symptoms prior to arrival  States he is vaccinated against COVID  Prior to Admission Medications   Prescriptions Last Dose Informant Patient Reported? Taking? OLANZapine (ZyPREXA) 10 mg tablet   Yes No   Sig: Take 10 mg by mouth   busPIRone (BUSPAR) 5 mg tablet   Yes No   Sig: Take 5 mg by mouth Three times a day   venlafaxine (EFFEXOR-XR) 150 mg 24 hr capsule   No No   Sig: Take 1 capsule (150 mg total) by mouth daily      Facility-Administered Medications: None       Past Medical History:   Diagnosis Date    ADD (attention deficit disorder)     Addiction to drug ([place] [address] )     Anxiety     Borderline personality disorder ([place] [address] )     Chronic kidney disease     Depression     MDD (major depressive disorder)     Panic attack     Psychiatric illness     Self-injurious behavior     Social phobia     Substance abuse ([place] [address] )     Suicide attempt ([place] [address] )        History reviewed  No pertinent surgical history  History reviewed  No pertinent family history  I have reviewed and agree with the history as documented      E-Cigarette/Vaping    E-Cigarette Use Never User      E-Cigarette/Vaping Substances    Nicotine No     THC Yes     CBD No     Flavoring No     Other No     Unknown Yes      Social History     Tobacco Use    Smoking status: Current Every Day Smoker     Packs/day: 0 50     Years: 20 00     Pack years: 10 00     Types: Cigarettes    Smokeless tobacco: Never Used   Vaping Use    Vaping Use: Never used   Substance Use Topics    Alcohol use: Not Currently    Drug use: Yes     Frequency: 1 0 times per week     Types: Marijuana     Comment: once a week Review of Systems   Constitutional: Negative for chills and fever  HENT: Positive for congestion and sore throat  Negative for ear pain  Eyes: Negative for pain  Respiratory: Positive for cough  Negative for shortness of breath  Cardiovascular: Negative for chest pain  Gastrointestinal: Negative for abdominal pain, nausea and vomiting  Genitourinary: Negative for dysuria  Musculoskeletal: Negative for back pain  Skin: Negative for rash  Neurological: Negative for dizziness, weakness and numbness  Psychiatric/Behavioral: Negative for suicidal ideas  All other systems reviewed and are negative  Physical Exam  Physical Exam  Vitals reviewed  Constitutional:       General: He is not in acute distress  Appearance: He is well-developed  He is not diaphoretic  HENT:      Head: Normocephalic and atraumatic  Right Ear: Tympanic membrane and external ear normal       Left Ear: Tympanic membrane and external ear normal       Nose: Nose normal       Mouth/Throat:      Mouth: Mucous membranes are moist       Pharynx: Oropharynx is clear  Posterior oropharyngeal erythema present  Tonsils: 1+ on the right  1+ on the left  Eyes:      Pupils: Pupils are equal, round, and reactive to light  Cardiovascular:      Rate and Rhythm: Normal rate and regular rhythm  Heart sounds: Normal heart sounds  Pulmonary:      Effort: Pulmonary effort is normal       Breath sounds: Normal breath sounds  Abdominal:      General: Bowel sounds are normal       Palpations: Abdomen is soft  Tenderness: There is no abdominal tenderness  Musculoskeletal:         General: Normal range of motion  Cervical back: Normal range of motion and neck supple  Skin:     General: Skin is warm and dry  Neurological:      Mental Status: He is alert and oriented to person, place, and time           Vital Signs  ED Triage Vitals   Temperature Pulse Respirations Blood Pressure SpO2   04/01/22 2022 04/01/22 2022 04/01/22 2022 04/01/22 2022 04/01/22 2022   98 2 °F (36 8 °C) (!) 113 16 159/89 100 %      Temp Source Heart Rate Source Patient Position - Orthostatic VS BP Location FiO2 (%)   04/01/22 2022 04/01/22 2022 04/01/22 2022 04/01/22 2022 --   Oral Monitor Sitting Left arm       Pain Score       04/01/22 2038       7           Vitals:    04/01/22 2022   BP: 159/89   Pulse: (!) 113   Patient Position - Orthostatic VS: Sitting         Visual Acuity      ED Medications  Medications   dexamethasone oral liquid 10 mg 1 mL (10 mg Oral Given 4/1/22 2037)   naproxen (NAPROSYN) tablet 250 mg (250 mg Oral Given 4/1/22 2038)   acetaminophen (TYLENOL) tablet 650 mg (650 mg Oral Given 4/1/22 2038)       Diagnostic Studies  Results Reviewed     Procedure Component Value Units Date/Time    Strep A PCR [[id number]]  (Normal) Collected: 04/01/22 2033    Lab Status: Final result Specimen: Throat Updated: 04/01/22 2105     STREP A PCR Not Detected    Covid19 and INFLUENZA A/B PCR [[id number]] Collected: 04/01/22 2033    Lab Status: In process Specimen: Nares from Nasopharyngeal Swab Updated: 04/01/22 2036                 No orders to display              Procedures  Procedures         ED Course                                             MDM    Disposition  Final diagnoses:   Viral syndrome     Time reflects when diagnosis was documented in both MDM as applicable and the Disposition within this note     Time User Action Codes Description Comment    4/1/2022  9:08 PM Chelly Cuevas Add [B34 9] Viral syndrome       ED Disposition     ED Disposition Condition Date/Time Comment    Discharge Stable Fri Apr 1, 2022  9:08 PM Mary Ann 162 discharge to home/self care              Follow-up Information     Follow up With Specialties Details Why Contact Sergo Camacho [address]  [address]  [facility] [facility] [address]  [phone]            Patient's Medications   Discharge Prescriptions    ACETAMINOPHEN (TYLENOL) 650 MG CR TABLET    Take 1 tablet (650 mg total) by mouth every 8 (eight) hours as needed for mild pain       Start Date: 4/1/2022  End Date: --       Order Dose: 650 mg       Quantity: 30 tablet    Refills: 0    NAPROXEN (NAPROSYN) 500 MG TABLET    Take 1 tablet (500 mg total) by mouth 2 (two) times a day with meals       Start Date: 4/1/2022  End Date: --       Order Dose: 500 mg       Quantity: 30 tablet    Refills: 0       No discharge procedures on file      PDMP Review       Value Time User    PDMP Reviewed  Yes 9/28/2021  8:17 AM Tobias Schneider PA-C          ED Provider  Electronically Signed by           Tres Valencia PA-C  04/01/22 9620

## 2022-04-02 NOTE — RESULT ENCOUNTER NOTE
I called and verified patient by name and birth date and let him know that his flu/COVID-19 swab was negative  No further questions at this time

## 2022-06-08 ENCOUNTER — HOSPITAL ENCOUNTER (EMERGENCY)
Facility: HOSPITAL | Age: 37
Discharge: HOME | End: 2022-06-08
Attending: EMERGENCY MEDICINE
Payer: MEDICARE

## 2022-06-08 VITALS
OXYGEN SATURATION: 98 % | DIASTOLIC BLOOD PRESSURE: 75 MMHG | SYSTOLIC BLOOD PRESSURE: 152 MMHG | BODY MASS INDEX: 30.33 KG/M2 | RESPIRATION RATE: 18 BRPM | WEIGHT: 205.4 LBS | TEMPERATURE: 97.4 F | HEART RATE: 78 BPM

## 2022-06-08 DIAGNOSIS — H10.33 ACUTE CONJUNCTIVITIS OF BOTH EYES, UNSPECIFIED ACUTE CONJUNCTIVITIS TYPE: Primary | ICD-10-CM

## 2022-06-08 PROCEDURE — 99283 EMERGENCY DEPT VISIT LOW MDM: CPT

## 2022-06-08 PROCEDURE — 96372 THER/PROPH/DIAG INJ SC/IM: CPT

## 2022-06-08 PROCEDURE — 99284 EMERGENCY DEPT VISIT MOD MDM: CPT | Performed by: PHYSICIAN ASSISTANT

## 2022-06-08 RX ORDER — NAPROXEN 500 MG/1
500 TABLET ORAL 2 TIMES DAILY WITH MEALS
Qty: 20 TABLET | Refills: 0 | Status: SHIPPED | OUTPATIENT
Start: 2022-06-08 | End: 2023-06-08

## 2022-06-08 RX ORDER — KETOROLAC TROMETHAMINE 30 MG/ML
15 INJECTION, SOLUTION INTRAMUSCULAR; INTRAVENOUS ONCE
Status: COMPLETED | OUTPATIENT
Start: 2022-06-08 | End: 2022-06-08

## 2022-06-08 RX ORDER — ERYTHROMYCIN 5 MG/G
0.5 OINTMENT OPHTHALMIC EVERY 6 HOURS
Qty: 3.5 G | Refills: 0 | Status: SHIPPED | OUTPATIENT
Start: 2022-06-08 | End: 2022-06-18

## 2022-06-08 RX ORDER — TETRACAINE HYDROCHLORIDE 5 MG/ML
1 SOLUTION OPHTHALMIC ONCE
Status: COMPLETED | OUTPATIENT
Start: 2022-06-08 | End: 2022-06-08

## 2022-06-08 RX ADMIN — TETRACAINE HYDROCHLORIDE 1 DROP: 5 SOLUTION OPHTHALMIC at 11:46

## 2022-06-08 RX ADMIN — KETOROLAC TROMETHAMINE 15 MG: 30 INJECTION, SOLUTION INTRAMUSCULAR; INTRAVENOUS at 12:09

## 2022-06-08 RX ADMIN — FLUORESCEIN SODIUM 1 STRIP: 1 STRIP OPHTHALMIC at 11:46

## 2022-06-08 NOTE — ED PROVIDER NOTES
History  Chief Complaint   Patient presents with    Burning Eyes     Pt states that he was given a trial pair of contacts by his eye doctor, "and yesterday I I got rain in my eyes and it washed the contacts out of my eyes, and they have been burning since then  This morning they hurt even worse " Reports no changes in vision, "just difficulty keeping my eyes open"     40-year-old male presents for evaluation of bilateral eye burning  Patient reports he was given an trial of contacts by his eye doctor states he placed him in yesterday however reports in the rain the contacts got washed away  Patient reports he has burning to bilateral eyes  Patient denies any crusting or discharge, pasted shut eyelid this morning, eyelid swelling or pain  Patient reports no alleviating factors reports opening his eyelids as an exacerbating factor  Patient denies any vision changes  Patient denies any blurry, double or loss of vision  History provided by:  Patient  Eye Pain  Location:  Bilateral eyes  Quality:  Burning  Severity:  Moderate  Onset quality:  Gradual  Duration:  1 day  Timing:  Constant  Progression:  Unchanged  Chronicity:  New  Context:  New contact  Relieved by:  None  Worsened by:  Opening eyes  Ineffective treatments:  None tried  Associated symptoms: no abdominal pain, no chest pain, no congestion, no ear pain, no fatigue, no fever, no nausea, no rash, no rhinorrhea, no shortness of breath, no sore throat and no vomiting        Prior to Admission Medications   Prescriptions Last Dose Informant Patient Reported? Taking?    OLANZapine (ZyPREXA) 10 mg tablet   Yes No   Sig: Take 10 mg by mouth   acetaminophen (TYLENOL) 650 mg CR tablet   No No   Sig: Take 1 tablet (650 mg total) by mouth every 8 (eight) hours as needed for mild pain   busPIRone (BUSPAR) 5 mg tablet   Yes No   Sig: Take 5 mg by mouth Three times a day   naproxen (Naprosyn) 500 mg tablet   No No   Sig: Take 1 tablet (500 mg total) by mouth 2 (two) times a day with meals   venlafaxine (EFFEXOR-XR) 150 mg 24 hr capsule   No No   Sig: Take 1 capsule (150 mg total) by mouth daily      Facility-Administered Medications: None       Past Medical History:   Diagnosis Date    ADD (attention deficit disorder)     Addiction to drug ([place] [address] )     Anxiety     Borderline personality disorder ([place] [address] )     Chronic kidney disease     Depression     MDD (major depressive disorder)     Panic attack     Psychiatric illness     Self-injurious behavior     Social phobia     Substance abuse ([place] [address] )     Suicide attempt ([place] [address] )        History reviewed  No pertinent surgical history  History reviewed  No pertinent family history  I have reviewed and agree with the history as documented  E-Cigarette/Vaping    E-Cigarette Use Never User      E-Cigarette/Vaping Substances    Nicotine No     THC Yes     CBD No     Flavoring No     Other No     Unknown Yes      Social History     Tobacco Use    Smoking status: Current Every Day Smoker     Packs/day: 0 50     Years: 20 00     Pack years: 10 00     Types: Cigarettes    Smokeless tobacco: Never Used   Vaping Use    Vaping Use: Never used   Substance Use Topics    Alcohol use: Not Currently    Drug use: Yes     Frequency: 1 0 times per week     Types: Marijuana     Comment: once a week       Review of Systems   Constitutional: Negative for chills, fatigue and fever  HENT: Negative for congestion, ear pain, rhinorrhea and sore throat  Eyes: Positive for photophobia, pain and redness  Respiratory: Negative for chest tightness and shortness of breath  Cardiovascular: Negative for chest pain and palpitations  Gastrointestinal: Negative for abdominal pain, nausea and vomiting  Genitourinary: Negative for dysuria and hematuria  Musculoskeletal: Negative  Skin: Negative for rash  Neurological: Negative for dizziness, syncope, light-headedness and numbness         Physical Exam  Physical Exam  Vitals and nursing note reviewed  Constitutional:       Appearance: Normal appearance  He is well-developed  HENT:      Head: Normocephalic and atraumatic  Eyes:      General: No scleral icterus  Pupils: Pupils are equal, round, and reactive to light  Comments: Bilateral scleral injection  Pupils are equal and round reactive to light extraocular movements are normal and intact with no pain on movement  No surrounding eyelid erythema to suggest periorbital or orbital cellulitis  Fluorescein staining is negative for any uptake or corneal abrasion  Cardiovascular:      Rate and Rhythm: Normal rate and regular rhythm  Pulses: Normal pulses  Pulmonary:      Effort: Pulmonary effort is normal  No respiratory distress  Breath sounds: No stridor  Abdominal:      General: There is no distension  Palpations: There is no mass  Musculoskeletal:      Cervical back: Normal range of motion  Skin:     General: Skin is warm and dry  Capillary Refill: Capillary refill takes less than 2 seconds  Coloration: Skin is not jaundiced  Neurological:      Mental Status: He is alert and oriented to person, place, and time        Gait: Gait normal    Psychiatric:         Mood and Affect: Mood normal          Vital Signs  ED Triage Vitals [06/08/22 1128]   Temperature Pulse Respirations Blood Pressure SpO2   (!) 97 4 °F (36 3 °C) 78 18 152/75 98 %      Temp Source Heart Rate Source Patient Position - Orthostatic VS BP Location FiO2 (%)   Tympanic Monitor Sitting Left arm --      Pain Score       --           Vitals:    06/08/22 1128   BP: 152/75   Pulse: 78   Patient Position - Orthostatic VS: Sitting         Visual Acuity      ED Medications  Medications   ketorolac (TORADOL) injection 15 mg (has no administration in time range)   fluorescein sodium sterile ophthalmic strip 1 strip (1 strip Both Eyes Given 6/8/22 1146)   tetracaine 0 5 % ophthalmic solution 1 drop (1 drop Both Eyes Given 6/8/22 1146) Diagnostic Studies  Results Reviewed     None                 No orders to display              Procedures  Procedures         ED Course                               SBIRT 22yo+    Flowsheet Row Most Recent Value   SBIRT (23 yo +)    In order to provide better care to our patients, we are screening all of our patients for alcohol and drug use  Would it be okay to ask you these screening questions? No Filed at: 06/08/2022 1150                    MDM  Number of Diagnoses or Management Options  Acute conjunctivitis of both eyes, unspecified acute conjunctivitis type  Diagnosis management comments: 51-year-old male presenting for evaluation of bilateral eye burning and irritation after wearing new contact lenses yesterday and reporting the lenses washed out in the rain  Fluorescein staining was negative for corneal abrasion or uptake or corneal ulcer patient reports the contact lenses were removed and states he is not wearing them at this time  Suspect conjunctivitis due to acute irritant will prescribe erythromycin and provide Toradol  Patient had significant improvement in symptoms with topical tetracaine  Patient states he has an appointment at 4:00 p m  With his eye doctor  Patient was advised to keep this appointment unsure he follows up so it any further  concerns can be addressed by appropriate specialist     Strict return to ED precautions discussed  Patient and/or family members verbalizes understanding and agrees with plan  Patient is stable for discharge     Portions of the record may have been created with voice recognition software  Occasional wrong word or "sound a like" substitutions may have occurred due to the inherent limitations of voice recognition software  Read the chart carefully and recognize, using context, where substitutions have occurred          Disposition  Final diagnoses:   Acute conjunctivitis of both eyes, unspecified acute conjunctivitis type     Time reflects when diagnosis was documented in both MDM as applicable and the Disposition within this note     Time User Action Codes Description Comment    6/8/2022 12:03 PM Skyla Ordoñez Add [H10 33] Acute conjunctivitis of both eyes, unspecified acute conjunctivitis type       ED Disposition     ED Disposition   Discharge    Condition   Stable    Date/Time   Wed Jun 8, 2022 12:01 PM    Comment   Mary Ann [address] discharge to home/self care  Follow-up Information     Follow up With Specialties Details Why Contact Info    YOUR EYE DOCTOR APPOINTMENT AT 4 TODAY  Go to       [address] Ophthalmology Schedule an appointment as soon as possible for a visit in 2 days As needed [id number]            Patient's Medications   Discharge Prescriptions    ERYTHROMYCIN (ILOTYCIN) OPHTHALMIC OINTMENT    Administer 0 5 inches to both eyes every 6 (six) hours for 10 days       Start Date: 6/8/2022  End Date: 6/18/2022       Order Dose: 0 5 inches       Quantity: 3 5 g    Refills: 0    NAPROXEN (EC NAPROSYN) 500 MG EC TABLET    Take 1 tablet (500 mg total) by mouth 2 (two) times a day with meals       Start Date: 6/8/2022  End Date: 6/8/2023       Order Dose: 500 mg       Quantity: 20 tablet    Refills: 0       No discharge procedures on file      PDMP Review       Value Time User    PDMP Reviewed  Yes 9/28/2021  8:17 AM Dayana Fuentes PA-C          ED Provider  Electronically Signed by           Scottie Good PA-C  06/08/22 4369

## 2022-06-08 NOTE — Clinical Note
Donna Meraz was seen and treated in our emergency department on 6/8/2022  Diagnosis:     Kristine Every  may return to work on return date  He may return on this date: 06/10/2022         If you have any questions or concerns, please don't hesitate to call        Ashley Bowman PA-C    ______________________________           _______________          _______________  Hospital Representative                              Date                                Time

## 2022-11-11 NOTE — ED PROVIDER NOTES
History  Chief Complaint   Patient presents with    Suicidal     brought in by EMS for SI, increased depression and "thoughts of self harm", SI without a plan, previous SA 4 years ago by cutting wrists, denies HI/AH/VH  To er with incerased depression and SI with a plan, he was going to cut self  Hx of SA by cutting wrist  etoh this AM  Dc from in [facility] facility yesterday, he feels he left to early and would like to be replaced  No recent trauma, drug use, fever, chills  Prior to Admission Medications   Prescriptions Last Dose Informant Patient Reported? Taking? ARIPiprazole (ABILIFY) 5 mg tablet 7/9/2021 at Unknown time  Yes Yes   Sig: Take 5 mg by mouth daily   clonazePAM (KlonoPIN) 0 5 mg tablet 7/9/2021 at Unknown time  Yes Yes   Sig: Take 0 5 mg by mouth   gabapentin (NEURONTIN) 300 mg capsule 7/9/2021 at Unknown time  Yes Yes   Sig: Take 300 mg by mouth daily   venlafaxine (EFFEXOR-XR) 150 mg 24 hr capsule 7/9/2021 at Unknown time  No Yes   Sig: Take 1 capsule (150 mg total) by mouth daily      Facility-Administered Medications: None       Past Medical History:   Diagnosis Date    ADD (attention deficit disorder)     Anxiety     Borderline [facility] disorder ([facility] [address] )     Depression     MDD (major depressive disorder)     Panic attack     Psychiatric illness     Self-injurious behavior     Social phobia     Suicide attempt ([facility] )        History reviewed  No pertinent surgical history  History reviewed  No pertinent family history  I have reviewed and agree with the history as documented      E-Cigarette/Vaping    E-Cigarette Use Never User      E-Cigarette/Vaping Substances    Nicotine No     CBD No     Flavoring No     Unknown Yes      Social History     Tobacco Use    Smoking status: Current Every Day Smoker     Packs/day: 0 50     Years: 20 00     Pack years: 10 00     Types: Cigarettes    Smokeless tobacco: Never Used   Vaping Use    Vaping Use: Never used   Substance Use Topics    Alcohol use: Not Currently    Drug use: Yes     Frequency: 1 0 times per week     Types: Marijuana     Comment: once a week       Review of Systems   All other systems reviewed and are negative  Physical Exam  Physical Exam  Constitutional:       General: He is not in acute distress  Appearance: Normal appearance  He is not ill-appearing, toxic-appearing or diaphoretic  HENT:      Head: Normocephalic and atraumatic  Right Ear: External ear normal       Left Ear: External ear normal       Nose: Nose normal       Mouth/Throat:      Mouth: Mucous membranes are moist       Pharynx: No oropharyngeal exudate  Eyes:      General:         Right eye: No discharge  Left eye: No discharge  Conjunctiva/sclera: Conjunctivae normal       Pupils: Pupils are equal, round, and reactive to light  Neck:      Vascular: No JVD  Trachea: No tracheal deviation  Cardiovascular:      Rate and Rhythm: Normal rate and regular rhythm  Pulses: Normal pulses  Heart sounds: Normal heart sounds  No murmur heard  No friction rub  No gallop  Pulmonary:      Effort: Pulmonary effort is normal  No respiratory distress  Breath sounds: Normal breath sounds  No stridor  No wheezing, rhonchi or rales  Chest:      Chest wall: No tenderness  Abdominal:      General: Abdomen is flat  Bowel sounds are normal  There is no distension  Palpations: Abdomen is soft  There is no mass  Tenderness: There is no abdominal tenderness  There is no guarding or rebound  Hernia: No hernia is present  Musculoskeletal:         General: No swelling, tenderness, deformity or signs of injury  Normal range of motion  Cervical back: Normal range of motion and neck supple  Right lower leg: No edema  Left lower leg: No edema  Skin:     General: Skin is warm and dry  Capillary Refill: Capillary refill takes less than 2 seconds        Coloration: Skin is not jaundiced or pale  Findings: No bruising, erythema, lesion or rash  Neurological:      General: No focal deficit present  Mental Status: He is alert and oriented to person, place, and time  Mental status is at baseline  Cranial Nerves: No cranial nerve deficit  Sensory: No sensory deficit  Motor: No abnormal muscle tone  Deep Tendon Reflexes: Reflexes normal    Psychiatric:      Comments: Appears depressed  SI         Vital Signs  ED Triage Vitals [07/10/21 1901]   Temperature Pulse Respirations Blood Pressure SpO2   97 8 °F (36 6 °C) 97 18 133/73 96 %      Temp Source Heart Rate Source Patient Position - Orthostatic VS BP Location FiO2 (%)   Oral Monitor Lying Right arm --      Pain Score       7           Vitals:    07/10/21 1901   BP: 133/73   Pulse: 97   Patient Position - Orthostatic VS: Lying         Visual Acuity      ED Medications  Medications - No data to display    Diagnostic Studies  Results Reviewed     Procedure Component Value Units Date/Time    Rapid drug screen, urine [[id number]]  (Abnormal) Collected: 07/10/21 2019    Lab Status: Final result Specimen: Urine, Clean Catch Updated: 07/10/21 2041     Amph/Meth UR Positive     Barbiturate Ur Negative     Benzodiazepine Urine Negative     Cocaine Urine Negative     Methadone Urine Negative     Opiate Urine Negative     PCP Ur Negative     THC Urine Positive     Oxycodone Urine Negative    Narrative:      Presumptive report  If requested, specimen will be sent to reference lab for confirmation  FOR MEDICAL PURPOSES ONLY  IF CONFIRMATION NEEDED PLEASE CONTACT THE LAB WITHIN 5 DAYS      Drug Screen Cutoff Levels:  AMPHETAMINE/METHAMPHETAMINES  1000 ng/mL  BARBITURATES     200 ng/mL  BENZODIAZEPINES     200 ng/mL  COCAINE      300 ng/mL  METHADONE      300 ng/mL  OPIATES      300 ng/mL  PHENCYCLIDINE     25 ng/mL  THC       50 ng/mL  OXYCODONE      100 ng/mL    Novel Coronavirus (Covid-19),PCR [facility] - 2 Hour Stat [[id number]] (Normal) Collected: 07/10/21 1910    Lab Status: Final result Specimen: Nares from Nasopharyngeal Swab Updated: 07/10/21 2009     SARS-CoV-2 Negative    Narrative: The specimen collection materials, transport medium, and/or testing methodology utilized in the production of these test results have been proven to be reliable in a limited validation with an abbreviated program under the Emergency Utilization Authorization provided by the FDA  Testing reported as "Presumptive positive" will be confirmed with secondary testing to ensure result accuracy  Clinical caution and judgement should be used with the interpretation of these results with consideration of the clinical impression and other laboratory testing  Testing reported as "Positive" or "Negative" has been proven to be accurate according to standard laboratory validation requirements  All testing is performed with control materials showing appropriate reactivity at standard intervals  POCT alcohol breath test [[id number]]  (Normal) Resulted: 07/10/21 1914    Lab Status: Final result Updated: 07/10/21 1914     EXTBreath Alcohol 0 032                 No orders to display              Procedures  Procedures         ED Course                             SBIRT 22yo+      Most Recent Value   SBIRT (25 yo +)   In order to provide better care to our patients, we are screening all of our patients for alcohol and drug use  Would it be okay to ask you these screening questions? No Filed at: 07/10/2021 2042                    MDM  Number of Diagnoses or Management Options  Suicidal ideation  Diagnosis management comments: To er with thoughts of self harm, he was going to cut self with knife  Hx of sa in past 2 /2 cutting  Recent in pt admission, he states he was let out ot early, request in pt psych  Will 201  Bed placement pending  accepted to [facility] under dr Annika Duncan         Disposition  Final diagnoses:   Suicidal ideation     Time reflects when diagnosis was documented in both MDM as applicable and the Disposition within this note     Time User Action Codes Description Comment    7/10/2021  9:20 PM Mary Calles Add [C28 033] Suicidal ideation       ED Disposition     ED Disposition Condition Date/Time Comment    Transfer to [facility] Jul 10, 2021  9:20 PM Mary Ann Ratliff should be transferred out to and has been medically cleared  MD Documentation      Most Recent Value   Accepting Physician  Dr Theodore Cody Name, Zander Bryan by [employer] and Unit #)  CTS   Sending MD  Dr Manny Emmanuel MD      RN Documentation      [facility] Name, Zander Bryan by [employer] and Unit #)  CTS      Follow-up Information    None         Discharge Medication List as of 7/11/2021  9:09 AM      CONTINUE these medications which have NOT CHANGED    Details   ARIPiprazole (ABILIFY) 5 mg tablet Take 5 mg by mouth daily, Historical Med      clonazePAM (KlonoPIN) 0 5 mg tablet Take 0 5 mg by mouth, Starting Thu 12/17/2020, Until Sat 7/10/2021 at 2359, Historical Med      gabapentin (NEURONTIN) 300 mg capsule Take 300 mg by mouth daily, Historical Med      venlafaxine (EFFEXOR-XR) 150 mg 24 hr capsule Take 1 capsule (150 mg total) by mouth daily, Starting Wed 9/2/2020, Until Sat 7/10/2021, Normal           No discharge procedures on file      PDMP Review       Value Time User    PDMP Reviewed  Yes 12/22/2020  2:46 PM Gordy Centeno MD          ED Provider  Electronically Signed by           Manny Emmanuel MD  07/11/21 1949 Topical Retinoid counseling:  Patient advised to apply a pea-sized amount only at bedtime and wait 30 minutes after washing their face before applying.  If too drying, patient may add a non-comedogenic moisturizer. The patient verbalized understanding of the proper use and possible adverse effects of retinoids.  All of the patient's questions and concerns were addressed. Sarecycline Pregnancy And Lactation Text: This medication is Pregnancy Category D and not consider safe during pregnancy. It is also excreted in breast milk. Tazorac Pregnancy And Lactation Text: This medication is not safe during pregnancy. It is unknown if this medication is excreted in breast milk. Doxycycline Counseling:  Patient counseled regarding possible photosensitivity and increased risk for sunburn.  Patient instructed to avoid sunlight, if possible.  When exposed to sunlight, patients should wear protective clothing, sunglasses, and sunscreen.  The patient was instructed to call the office immediately if the following severe adverse effects occur:  hearing changes, easy bruising/bleeding, severe headache, or vision changes.  The patient verbalized understanding of the proper use and possible adverse effects of doxycycline.  All of the patient's questions and concerns were addressed. Azithromycin Counseling:  I discussed with the patient the risks of azithromycin including but not limited to GI upset, allergic reaction, drug rash, diarrhea, and yeast infections. Bactrim Pregnancy And Lactation Text: This medication is Pregnancy Category D and is known to cause fetal risk.  It is also excreted in breast milk. Bactrim Counseling:  I discussed with the patient the risks of sulfa antibiotics including but not limited to GI upset, allergic reaction, drug rash, diarrhea, dizziness, photosensitivity, and yeast infections.  Rarely, more serious reactions can occur including but not limited to aplastic anemia, agranulocytosis, methemoglobinemia, blood dyscrasias, liver or kidney failure, lung infiltrates or desquamative/blistering drug rashes. High Dose Vitamin A Pregnancy And Lactation Text: High dose vitamin A therapy is contraindicated during pregnancy and breast feeding. Azelaic Acid Pregnancy And Lactation Text: This medication is considered safe during pregnancy and breast feeding. Minocycline Counseling: Patient advised regarding possible photosensitivity and discoloration of the teeth, skin, lips, tongue and gums.  Patient instructed to avoid sunlight, if possible.  When exposed to sunlight, patients should wear protective clothing, sunglasses, and sunscreen.  The patient was instructed to call the office immediately if the following severe adverse effects occur:  hearing changes, easy bruising/bleeding, severe headache, or vision changes.  The patient verbalized understanding of the proper use and possible adverse effects of minocycline.  All of the patient's questions and concerns were addressed. Spironolactone Pregnancy And Lactation Text: This medication can cause feminization of the male fetus and should be avoided during pregnancy. The active metabolite is also found in breast milk. Erythromycin Pregnancy And Lactation Text: This medication is Pregnancy Category B and is considered safe during pregnancy. It is also excreted in breast milk. Aklief counseling:  Patient advised to apply a pea-sized amount only at bedtime and wait 30 minutes after washing their face before applying.  If too drying, patient may add a non-comedogenic moisturizer.  The most commonly reported side effects including irritation, redness, scaling, dryness, stinging, burning, itching, and increased risk of sunburn.  The patient verbalized understanding of the proper use and possible adverse effects of retinoids.  All of the patient's questions and concerns were addressed. Dapsone Counseling: I discussed with the patient the risks of dapsone including but not limited to hemolytic anemia, agranulocytosis, rashes, methemoglobinemia, kidney failure, peripheral neuropathy, headaches, GI upset, and liver toxicity.  Patients who start dapsone require monitoring including baseline LFTs and weekly CBCs for the first month, then every month thereafter.  The patient verbalized understanding of the proper use and possible adverse effects of dapsone.  All of the patient's questions and concerns were addressed. Topical Clindamycin Pregnancy And Lactation Text: This medication is Pregnancy Category B and is considered safe during pregnancy. It is unknown if it is excreted in breast milk. Spironolactone Counseling: Patient advised regarding risks of diarrhea, abdominal pain, hyperkalemia, birth defects (for female patients), liver toxicity and renal toxicity. The patient may need blood work to monitor liver and kidney function and potassium levels while on therapy. The patient verbalized understanding of the proper use and possible adverse effects of spironolactone.  All of the patient's questions and concerns were addressed. Birth Control Pills Pregnancy And Lactation Text: This medication should be avoided if pregnant and for the first 30 days post-partum. Topical Sulfur Applications Pregnancy And Lactation Text: This medication is Pregnancy Category C and has an unknown safety profile during pregnancy. It is unknown if this topical medication is excreted in breast milk. Sarecycline Counseling: Patient advised regarding possible photosensitivity and discoloration of the teeth, skin, lips, tongue and gums.  Patient instructed to avoid sunlight, if possible.  When exposed to sunlight, patients should wear protective clothing, sunglasses, and sunscreen.  The patient was instructed to call the office immediately if the following severe adverse effects occur:  hearing changes, easy bruising/bleeding, severe headache, or vision changes.  The patient verbalized understanding of the proper use and possible adverse effects of sarecycline.  All of the patient's questions and concerns were addressed. Azithromycin Pregnancy And Lactation Text: This medication is considered safe during pregnancy and is also secreted in breast milk. Isotretinoin Pregnancy And Lactation Text: This medication is Pregnancy Category X and is considered extremely dangerous during pregnancy. It is unknown if it is excreted in breast milk. Winlevi Pregnancy And Lactation Text: This medication is considered safe during pregnancy and breastfeeding. Dapsone Pregnancy And Lactation Text: This medication is Pregnancy Category C and is not considered safe during pregnancy or breast feeding. Winlevi Counseling:  I discussed with the patient the risks of topical clascoterone including but not limited to erythema, scaling, itching, and stinging. Patient voiced their understanding. Topical Clindamycin Counseling: Patient counseled that this medication may cause skin irritation or allergic reactions.  In the event of skin irritation, the patient was advised to reduce the amount of the drug applied or use it less frequently.   The patient verbalized understanding of the proper use and possible adverse effects of clindamycin.  All of the patient's questions and concerns were addressed. Topical Sulfur Applications Counseling: Topical Sulfur Counseling: Patient counseled that this medication may cause skin irritation or allergic reactions.  In the event of skin irritation, the patient was advised to reduce the amount of the drug applied or use it less frequently.   The patient verbalized understanding of the proper use and possible adverse effects of topical sulfur application.  All of the patient's questions and concerns were addressed. Birth Control Pills Counseling: Birth Control Pill Counseling: I discussed with the patient the potential side effects of OCPs including but not limited to increased risk of stroke, heart attack, thrombophlebitis, deep venous thrombosis, hepatic adenomas, breast changes, GI upset, headaches, and depression.  The patient verbalized understanding of the proper use and possible adverse effects of OCPs. All of the patient's questions and concerns were addressed. Benzoyl Peroxide Counseling: Patient counseled that medicine may cause skin irritation and bleach clothing.  In the event of skin irritation, the patient was advised to reduce the amount of the drug applied or use it less frequently.   The patient verbalized understanding of the proper use and possible adverse effects of benzoyl peroxide.  All of the patient's questions and concerns were addressed. Use Enhanced Medication Counseling?: No Tetracycline Counseling: Patient counseled regarding possible photosensitivity and increased risk for sunburn.  Patient instructed to avoid sunlight, if possible.  When exposed to sunlight, patients should wear protective clothing, sunglasses, and sunscreen.  The patient was instructed to call the office immediately if the following severe adverse effects occur:  hearing changes, easy bruising/bleeding, severe headache, or vision changes.  The patient verbalized understanding of the proper use and possible adverse effects of tetracycline.  All of the patient's questions and concerns were addressed. Patient understands to avoid pregnancy while on therapy due to potential birth defects. Aklief Pregnancy And Lactation Text: It is unknown if this medication is safe to use during pregnancy.  It is unknown if this medication is excreted in breast milk.  Breastfeeding women should use the topical cream on the smallest area of the skin for the shortest time needed while breastfeeding.  Do not apply to nipple and areola. Erythromycin Counseling:  I discussed with the patient the risks of erythromycin including but not limited to GI upset, allergic reaction, drug rash, diarrhea, increase in liver enzymes, and yeast infections. Isotretinoin Counseling: Patient should get monthly blood tests, not donate blood, not drive at night if vision affected, not share medication, and not undergo elective surgery for 6 months after tx completed. Side effects reviewed, pt to contact office should one occur. Tazorac Counseling:  Patient advised that medication is irritating and drying.  Patient may need to apply sparingly and wash off after an hour before eventually leaving it on overnight.  The patient verbalized understanding of the proper use and possible adverse effects of tazorac.  All of the patient's questions and concerns were addressed. Benzoyl Peroxide Pregnancy And Lactation Text: This medication is Pregnancy Category C. It is unknown if benzoyl peroxide is excreted in breast milk. Detail Level: Zone Topical Retinoid Pregnancy And Lactation Text: This medication is Pregnancy Category C. It is unknown if this medication is excreted in breast milk. Doxycycline Pregnancy And Lactation Text: This medication is Pregnancy Category D and not consider safe during pregnancy. It is also excreted in breast milk but is considered safe for shorter treatment courses. High Dose Vitamin A Counseling: Side effects reviewed, pt to contact office should one occur. Azelaic Acid Counseling: Patient counseled that medicine may cause skin irritation and to avoid applying near the eyes.  In the event of skin irritation, the patient was advised to reduce the amount of the drug applied or use it less frequently.   The patient verbalized understanding of the proper use and possible adverse effects of azelaic acid.  All of the patient's questions and concerns were addressed.

## 2023-05-27 ENCOUNTER — HOSPITAL ENCOUNTER (EMERGENCY)
Facility: HOSPITAL | Age: 38
End: 2023-05-28
Attending: EMERGENCY MEDICINE | Admitting: EMERGENCY MEDICINE

## 2023-05-27 DIAGNOSIS — F32.A DEPRESSION: Primary | ICD-10-CM

## 2023-05-27 DIAGNOSIS — R45.851 SUICIDAL IDEATIONS: ICD-10-CM

## 2023-05-27 RX ORDER — LORAZEPAM 0.5 MG/1
0.5 TABLET ORAL ONCE
Status: COMPLETED | OUTPATIENT
Start: 2023-05-27 | End: 2023-05-27

## 2023-05-27 RX ORDER — NICOTINE 21 MG/24HR
14 PATCH, TRANSDERMAL 24 HOURS TRANSDERMAL ONCE
Status: DISCONTINUED | OUTPATIENT
Start: 2023-05-27 | End: 2023-05-28 | Stop reason: HOSPADM

## 2023-05-27 RX ADMIN — NICOTINE 14 MG: 14 PATCH, EXTENDED RELEASE TRANSDERMAL at 22:59

## 2023-05-27 RX ADMIN — LORAZEPAM 0.5 MG: 0.5 TABLET ORAL at 22:59

## 2023-05-28 VITALS
OXYGEN SATURATION: 99 % | HEART RATE: 78 BPM | RESPIRATION RATE: 18 BRPM | SYSTOLIC BLOOD PRESSURE: 129 MMHG | DIASTOLIC BLOOD PRESSURE: 79 MMHG
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
## 2023-05-28 NOTE — ED NOTES
Crisis Intervention Specialist (CIS) called [facility] and spoke to Mildred who stated that they have a bed and will review Pt  CIS then faxed 201, facehseet, and chart to [facility] for review

## 2023-05-28 NOTE — EMTALA/ACUTE CARE TRANSFER
[address] [facility] [address]  Dept: [phone]      EMTALA TRANSFER CONSENT    NAME Leah Avalos                                         1985                              MRN [id number]    I have been informed of my rights regarding examination, treatment, and transfer   by Dr Becca Pulliam MD    Benefits: Specialized equipment and/or services available at the receiving facility (Include comment)________________________ (inpatient mental health treatment)    Risks: Other: (Include comment)__________________________ (decompensation)      Transfer Request   I acknowledge that my medical condition has been evaluated and explained to me by the emergency department physician or other qualified medical person and/or my attending physician who has recommended and offered to me further medical examination and treatment  I understand the Hospital's obligation with respect to the treatment and stabilization of my emergency medical condition  I nevertheless request to be transferred  I release the Hospital, the doctor, and any other persons caring for me from all responsibility or liability for any injury or ill effects that may result from my transfer and agree to accept all responsibility for the consequences of my choice to transfer, rather than receive stabilizing treatment at the Hospital  I understand that because the transfer is my request, my insurance may not provide reimbursement for the services  The Hospital will assist and direct me and my family in how to make arrangements for transfer, but the hospital is not liable for any fees charged by the transport service  In spite of this understanding, I refuse to consent to further medical examination and treatment which has been offered to me, and request transfer to  [facility] [address] [address] [facility] [place]   I authorize the performance of emergency medical procedures and treatments upon me in both transit and upon arrival at the receiving facility  Additionally, I authorize the release of any and all medical records to the receiving facility and request they be transported with me, if possible  I authorize the performance of emergency medical procedures and treatments upon me in both transit and upon arrival at the receiving facility  Additionally, I authorize the release of any and all medical records to the receiving facility and request they be transported with me, if possible  I understand that the safest mode of transportation during a medical emergency is an ambulance and that the Hospital advocates the use of this mode of transport  Risks of traveling to the receiving facility by car, including absence of medical control, life sustaining equipment, such as oxygen, and medical personnel has been explained to me and I fully understand them  (ИРИНА CORRECT BOX BELOW)  [  ]  I consent to the stated transfer and to be transported by ambulance/helicopter  [  ]  I consent to the stated transfer, but refuse transportation by ambulance and accept full responsibility for my transportation by car  I understand the risks of non-ambulance transfers and I exonerate the Hospital and its staff from any deterioration in my condition that results from this refusal     X___________________________________________    DATE  23  TIME________  Signature of patient or legally responsible individual signing on patient behalf           RELATIONSHIP TO PATIENT_________________________          Provider Certification    NAME Mary Ann Ratliff                                         1985                              MRN [id number]    A medical screening exam was performed on the above named patient  Based on the examination:    Condition Necessitating Transfer The primary encounter diagnosis was Depression   A diagnosis of Suicidal ideations was also pertinent to this visit  Patient Condition: The patient has been stabilized such that within reasonable medical probability, no material deterioration of the patient condition or the condition of the unborn child(lucero) is likely to result from the transfer    Reason for Transfer: Level of Care needed not available at this facility    Transfer Requirements: [facility] [place], [place]   · Space available and qualified personnel available for treatment as acknowledged by Alfred Galeas  · Agreed to accept transfer and to provide appropriate medical treatment as acknowledged by       Dr Thor Walter  · Appropriate medical records of the examination and treatment of the patient are provided at the time of transfer   [facility], [address] _______  · Transfer will be performed by qualified personnel from    and appropriate transfer equipment as required, including the use of necessary and appropriate life support measures  Provider Certification: I have examined the patient and explained the following risks and benefits of being transferred/refusing transfer to the patient/family:         Based on these reasonable risks and benefits to the patient and/or the unborn child(lucero), and based upon the information available at the time of the patient’s examination, I certify that the medical benefits reasonably to be expected from the provision of appropriate medical treatments at another medical facility outweigh the increasing risks, if any, to the individual’s medical condition, and in the case of labor to the unborn child, from effecting the transfer      X____________________________________________ DATE 05/28/23        TIME_______      ORIGINAL - SEND TO MEDICAL RECORDS   COPY - SEND WITH PATIENT DURING TRANSFER

## 2023-05-28 NOTE — ED NOTES
Insurance Authorization for admission: pre-certification  Phone call placed to: [employer] number: [phone]     Spoke to Kisha Saini    5 days approved    Level of care: [facility]  Review on 6/1/2023  Authorization # pending arrival

## 2023-05-28 NOTE — ED NOTES
CIS let Pt know he was accepted to [facility] and is setting up transport there, Pt was thankful and accepting

## 2023-05-28 NOTE — ED NOTES
Roundtrip scheduled transport for 11am with [facility]  CIS let Pt know transport time  CIS contacted [facility] and spoke to Payton and let him know ETA for Pt

## 2023-05-28 NOTE — ED PROVIDER NOTES
Psychiatry Reassessment Note 05/28/23    Subjective  Patient has no complaints      Objective  Vitals:    05/28/23 0106   BP: 129/79   BP Location: Right arm   Pulse: 78   Resp: 18   SpO2: 99%       GENERAL APPEARANCE: NAD  NEURO: GCS 15, neuro grossly intact  HEENT: MMM  CV: RRR  LUNGS: CTAB  GI: soft, NT, ND  MSK: moves all extremities  SKIN: intact      Assessment/Plan  -Patient remains medically cleared for inpatient psychiatry or inpatient behavioral health  -Crisis following the patient  -Bed search pending         Gabby [address]  05/28/23 7059

## 2023-05-28 NOTE — ED PROVIDER NOTES
History  Chief Complaint   Patient presents with   • Depression     Pt reports having depression, with SI  No plan  Denies HI     Pt is a 43yo M who presents for suicidal ideation  Patient reports he has a long history of depression and self-harm  Patient reports history of cutting but states that he has not cut in the past 2 weeks  Patient states over the past 3 days he has had increased stressors at home with his relationship as well as his roommate  He states that due to this he has had increased depression as well as increased thoughts of self-harm and suicide  Patient states he has not harmed himself but feels as though he might  Patient denies plan of suicide  Patient reports 1 previous suicide attempt years ago where he attempted to cut himself but denies any other previous suicide attempts  Patient states no attempt at self-harm in the past several days with his increased thoughts  Patient also reporting that he has been anxious  He states that due to these increased thoughts that he would like to sign himself in  Patient reports he has been inpatient psych previously  Patient denies any HI, AH, VH  Prior to Admission Medications   Prescriptions Last Dose Informant Patient Reported? Taking?    OLANZapine (ZyPREXA) 10 mg tablet   Yes No   Sig: Take 10 mg by mouth   acetaminophen (TYLENOL) 650 mg CR tablet   No No   Sig: Take 1 tablet (650 mg total) by mouth every 8 (eight) hours as needed for mild pain   busPIRone (BUSPAR) 5 mg tablet   Yes No   Sig: Take 5 mg by mouth Three times a day   naproxen (EC NAPROSYN) 500 MG EC tablet   No No   Sig: Take 1 tablet (500 mg total) by mouth 2 (two) times a day with meals   naproxen (Naprosyn) 500 mg tablet   No No   Sig: Take 1 tablet (500 mg total) by mouth 2 (two) times a day with meals   venlafaxine (EFFEXOR-XR) 150 mg 24 hr capsule   No No   Sig: Take 1 capsule (150 mg total) by mouth daily      Facility-Administered Medications: None       Past Medical History:   Diagnosis Date   • ADD (attention deficit disorder)    • Addiction to drug [facility])    • Anxiety    • Borderline personality disorder ([place] [address] )    • Chronic kidney disease    • Depression    • MDD (major depressive disorder)    • Panic attack    • Psychiatric illness    • Self-injurious behavior    • Social phobia    • Substance abuse ([place] [address] )    • Suicide attempt ([place] [address] )        History reviewed  No pertinent surgical history  History reviewed  No pertinent family history  I have reviewed and agree with the history as documented  E-Cigarette/Vaping   • E-Cigarette Use Never User      E-Cigarette/Vaping Substances   • Nicotine No    • THC Yes    • CBD No    • Flavoring No    • Other No    • Unknown Yes      Social History     Tobacco Use   • Smoking status: Every Day     Packs/day: 0 50     Years: 20 00     Total pack years: 10 00     Types: Cigarettes   • Smokeless tobacco: Never   Vaping Use   • Vaping Use: Never used   Substance Use Topics   • Alcohol use: Not Currently   • Drug use: Yes     Frequency: 1 0 times per week     Types: Marijuana     Comment: once a week        Review of Systems   Psychiatric/Behavioral: Positive for dysphoric mood and suicidal ideas  The patient is nervous/anxious  All other systems reviewed and are negative  Physical Exam  ED Triage Vitals [05/28/23 0106]   Temp Pulse Respirations Blood Pressure SpO2   -- 78 18 129/79 99 %      Temp src Heart Rate Source Patient Position - Orthostatic VS BP Location FiO2 (%)   -- Monitor Lying Right arm --      Pain Score       --             Orthostatic Vital Signs  Vitals:    05/28/23 0106   BP: 129/79   Pulse: 78   Patient Position - Orthostatic VS: Lying       Physical Exam  Vitals reviewed  Constitutional:       General: He is not in acute distress  Appearance: He is well-developed  He is not toxic-appearing or diaphoretic  HENT:      Head: Normocephalic and atraumatic        Right Ear: External ear normal       Left Ear: External ear normal       Nose: Nose normal       Mouth/Throat:      Pharynx: Oropharynx is clear  Eyes:      Extraocular Movements: Extraocular movements intact  Pupils: Pupils are equal, round, and reactive to light  Cardiovascular:      Rate and Rhythm: Normal rate and regular rhythm  Heart sounds: Normal heart sounds  Pulmonary:      Effort: Pulmonary effort is normal  No respiratory distress  Breath sounds: Normal breath sounds  No stridor  No wheezing  Abdominal:      General: There is no distension  Palpations: Abdomen is soft  Tenderness: There is no abdominal tenderness  Musculoskeletal:         General: Normal range of motion  Cervical back: Normal range of motion and neck supple  Skin:     General: Skin is warm and dry  Capillary Refill: Capillary refill takes less than 2 seconds  Comments: Well healed, linear scars on bilateral forearms; No acute wounds   Neurological:      General: No focal deficit present  Mental Status: He is alert and oriented to person, place, and time  Cranial Nerves: No cranial nerve deficit  Sensory: No sensory deficit  Coordination: Coordination normal    Psychiatric:         Attention and Perception: He does not perceive auditory or visual hallucinations  Mood and Affect: Mood is depressed  Speech: Speech normal          Behavior: Behavior is cooperative  Thought Content: Thought content includes suicidal ideation  Thought content does not include homicidal ideation  Thought content does not include homicidal or suicidal plan           ED Medications  Medications   LORazepam (ATIVAN) tablet 0 5 mg (0 5 mg Oral Given 5/27/23 8717)       Diagnostic Studies  Results Reviewed     Procedure Component Value Units Date/Time    Rapid drug screen, urine [[id number]]  (Abnormal) Collected: 05/27/23 2306    Lab Status: Final result Specimen: Urine, Clean Catch Updated: 05/27/23 3662 Amph/Meth UR Negative     Barbiturate Ur Negative     Benzodiazepine Urine Negative     Cocaine Urine Negative     Methadone Urine Negative     Opiate Urine Negative     PCP Ur Negative     THC Urine Positive     Oxycodone Urine Negative    Narrative:      Presumptive report  If requested, specimen will be sent to reference lab for confirmation  FOR MEDICAL PURPOSES ONLY  IF CONFIRMATION NEEDED PLEASE CONTACT THE LAB WITHIN 5 DAYS  Drug Screen Cutoff Levels:  AMPHETAMINE/METHAMPHETAMINES  1000 ng/mL  BARBITURATES     200 ng/mL  BENZODIAZEPINES     200 ng/mL  COCAINE      300 ng/mL  METHADONE      300 ng/mL  OPIATES      300 ng/mL  PHENCYCLIDINE     25 ng/mL  THC       50 ng/mL  OXYCODONE      100 ng/mL    POCT alcohol breath test [[id number]]  (Normal) Resulted: 05/27/23 2312    Lab Status: Final result Updated: 05/27/23 2313     EXTBreath Alcohol 0 000                 No orders to display         Procedures  Procedures      ED Course  ED Course as of 05/29/23 0156   Sat May 27, 2023   2239 Crisis made aware via TT     2313 EXTBreath Alcohol: 0 000  Negative  Sun May 28, 2023   [facility] URINE(!): Positive  Consistent with pt story  UDS otherwise negative  36 201 signed                                       Medical Decision Making  Pt is a 45yo M who presents with SI  Exam pertinent for dysphoric mood and suicidal ideation  Will plan for medical clearance and crisis evaluation  See ED course for results and details  Patient medically cleared in 201 signed  Plan to transfer patient to inpatient behavioral health  Patient pending placement at time of signout  Patient signed out to oncoming physician  Amount and/or Complexity of Data Reviewed  Labs: ordered  Decision-making details documented in ED Course  Risk  Prescription drug management  Decision regarding hospitalization              Disposition  Final diagnoses:   Depression   Suicidal ideations     Time reflects when diagnosis was documented in both MDM as applicable and the Disposition within this note     Time User Action Codes Description Comment    5/28/2023 12:06 AM Venkatesh Alas Add [F32  A] Depression     5/28/2023 12:06 AM Venkatesh Alas Add [N05 358] Suicidal ideations       ED Disposition     ED Disposition   Transfer to [facility]    Condition   --    Date/Time   Sun May 28, 2023 12:06 AM    Comment   Tiffanie Dash should be transferred out to [facility] and has been medically cleared             MD Documentation    Flowsheet Row Most Recent Value   Patient Condition The patient has been stabilized such that within reasonable medical probability, no material deterioration of the patient condition or the condition of the unborn child(lucero) is likely to result from the transfer   Reason for Transfer Level of Care needed not available at this facility   Benefits of Transfer Specialized equipment and/or services available at the receiving facility (Include comment)________________________  [inpatient mental health treatment]   Risks of Transfer Other: (Include comment)__________________________  [decompensation]   Accepting Physician Dr Ladarius Fox [facility], [place], [place]    (Name & Tel number) [facility] [phone]   Transported by [facility] and [facility] #) [facility]   Sending MD Will Rogers      RN Documentation    [address] [facility], [place], [place]    (Name & Tel number) [facility] [phone]   Transported by [facility] and Unit [facility]) [facility]      Follow-up Information    None         Discharge Medication List as of 5/28/2023 11:24 AM      CONTINUE these medications which have NOT CHANGED    Details   acetaminophen (TYLENOL) 650 mg CR tablet Take 1 tablet (650 mg total) by mouth every 8 (eight) hours as needed for mild pain, Starting Fri 4/1/2022, Normal      busPIRone (BUSPAR) 5 mg tablet Take 5 mg by mouth Three times a day, Historical Med      naproxen (EC NAPROSYN) 500 MG EC tablet Take 1 tablet (500 mg total) by mouth 2 (two) times a day with meals, Starting Wed 6/8/2022, Until Thu 6/8/2023, Normal      naproxen (Naprosyn) 500 mg tablet Take 1 tablet (500 mg total) by mouth 2 (two) times a day with meals, Starting Fri 4/1/2022, Normal      OLANZapine (ZyPREXA) 10 mg tablet Take 10 mg by mouth, Historical Med      venlafaxine (EFFEXOR-XR) 150 mg 24 hr capsule Take 1 capsule (150 mg total) by mouth daily, Starting Tue 9/28/2021, Until Sat 11/27/2021, Normal           No discharge procedures on file  PDMP Review       Value Time User    PDMP Reviewed  Yes 9/28/2021  8:17 AM Tristin Sandoval PA-C           ED Provider  Attending physically available and evaluated Yumiko Mateus FREEMAN managed the patient along with the ED Attending      Electronically Signed by         Eben Hamman, MD  05/29/23 7891

## 2023-05-28 NOTE — ED NOTES
Patient is accepted at [facility]  Patient is accepted by Dr Jabier Sanchez per [address] [facility] is arranged with: [facility]    Transportation is scheduled for 11am  Patient may go to the floor at anytime         Nurse report is to be called to ** prior to patient transfer

## 2023-05-31 NOTE — ED ATTENDING ATTESTATION
5/27/2023  I, Bela Jennings MD, saw and evaluated the patient  I have discussed the patient with the resident/non-physician practitioner and agree with the resident's/non-physician practitioner's findings, Plan of Care, and MDM as documented in the resident's/non-physician practitioner's note, except where noted  All available labs and Radiology studies were reviewed  I was present for key portions of any procedure(s) performed by the resident/non-physician practitioner and I was immediately available to provide assistance  At this point I agree with the current assessment done in the Emergency Department  I have conducted an independent evaluation of this patient a history and physical is as follows:    ED Course     69-year-old male presents for suicidal ideation  Patient has long history of depression self-harm  Reports history of cutting but has not cut the past 2 weeks  Patient states for last 3 days he has increased depression thoughts of self-harm and suicide  History of 1 prior suicide attempt  She offers no medical complaints at this time  Vitals reviewed  Heart regular rate and rhythm without murmurs  Lungs clear abdomen soft nontender nondistended normal bowel sounds  Extremities while here linear scars bilateral forearms no acute wounds  Neuro nonfocal  Psych: Depression present suicidal ideation present no AH or VH    Impression: Encounter for behavioral health evaluation  Differential diagnosis includes suicidal ideation, anxiety depression    Plan to check CMP blood alcohol consult discussed case with behavioral health patient given oral Ativan for symptoms         Labs reviewed UDS remarkable for THC blood alcohol level negative    Case discussed with crisis worker-  patient was signed is a 201 for inpatient placement for suicidal ideation      Critical Care Time  Procedures

## 2023-07-22 ENCOUNTER — HOSPITAL ENCOUNTER (EMERGENCY)
Facility: HOSPITAL | Age: 38
Discharge: HOME | End: 2023-07-23
Attending: INTERNAL MEDICINE
Payer: MEDICARE

## 2023-07-22 DIAGNOSIS — R45.851 SUICIDAL IDEATION: Primary | ICD-10-CM

## 2023-07-22 LAB
AMPHETAMINES SERPL QL SCN: POSITIVE
BARBITURATES UR QL: NEGATIVE
BENZODIAZ UR QL: NEGATIVE
COCAINE UR QL: NEGATIVE
ETHANOL EXG-MCNC: 0 MG/DL
METHADONE UR QL: NEGATIVE
OPIATES UR QL SCN: NEGATIVE
OXYCODONE+OXYMORPHONE UR QL SCN: NEGATIVE
PCP UR QL: NEGATIVE
THC UR QL: POSITIVE

## 2023-07-22 PROCEDURE — 99285 EMERGENCY DEPT VISIT HI MDM: CPT

## 2023-07-22 PROCEDURE — 82075 ASSAY OF BREATH ETHANOL: CPT | Performed by: INTERNAL MEDICINE

## 2023-07-22 PROCEDURE — 99285 EMERGENCY DEPT VISIT HI MDM: CPT | Performed by: INTERNAL MEDICINE

## 2023-07-22 PROCEDURE — 80307 DRUG TEST PRSMV CHEM ANLYZR: CPT | Performed by: INTERNAL MEDICINE

## 2023-07-22 RX ORDER — QUETIAPINE FUMARATE 100 MG/1
100 TABLET, FILM COATED ORAL
COMMUNITY

## 2023-07-22 RX ORDER — QUETIAPINE FUMARATE 100 MG/1
100 TABLET, FILM COATED ORAL
Status: DISCONTINUED | OUTPATIENT
Start: 2023-07-22 | End: 2023-07-23 | Stop reason: HOSPADM

## 2023-07-22 RX ORDER — ACETAMINOPHEN 325 MG/1
650 TABLET ORAL EVERY 6 HOURS PRN
Status: DISCONTINUED | OUTPATIENT
Start: 2023-07-22 | End: 2023-07-23 | Stop reason: HOSPADM

## 2023-07-22 RX ORDER — VENLAFAXINE HYDROCHLORIDE 75 MG/1
150 CAPSULE, EXTENDED RELEASE ORAL DAILY
Status: DISCONTINUED | OUTPATIENT
Start: 2023-07-23 | End: 2023-07-23 | Stop reason: HOSPADM

## 2023-07-23 VITALS
WEIGHT: 199.74 LBS | HEART RATE: 60 BPM | OXYGEN SATURATION: 100 % | DIASTOLIC BLOOD PRESSURE: 70 MMHG | TEMPERATURE: 97.6 F | BODY MASS INDEX: 29.5 KG/M2 | SYSTOLIC BLOOD PRESSURE: 110 MMHG | RESPIRATION RATE: 18 BRPM

## 2023-07-23 RX ADMIN — QUETIAPINE FUMARATE 100 MG: 100 TABLET ORAL at 00:12

## 2023-07-23 RX ADMIN — VENLAFAXINE HYDROCHLORIDE 150 MG: 75 CAPSULE, EXTENDED RELEASE ORAL at 09:32

## 2023-07-23 NOTE — ED CARE HANDOFF
Emergency Department Sign Out Note       Emergency Department Sign Out Note        Sign out and transfer of care from prior attending. See Separate Emergency Department note. The patient, Ji Gaona, was evaluated by the previous provider for see progress note(s) below. Workup Completed:  See progress note(s) below    ED Course / Workup Pending (followup):  See progress note(s) below                            ED Course as of 07/23/23 0525   Sat Jul 22, 2023   4968 SI no plan, crisis not avail overnight, they will eval in am, can choose to leave low risk     Procedures  MDM        Disposition  Final diagnoses:   Suicidal ideation     Time reflects when diagnosis was documented in both MDM as applicable and the Disposition within this note     Time User Action Codes Description Comment    7/22/2023 11:19 PM Mandy Call Add [U33.962] Suicidal ideation       ED Disposition     ED Disposition   Transfer to [address] [place] [address]   --    Date/Time   Sat Jul 22, 2023 11:19 PM    Comment   Ji Gaona should be transferred out to [facility] and has been medically cleared. Follow-up Information    None       Patient's Medications   Discharge Prescriptions    No medications on file     No discharge procedures on file.        ED Provider  Electronically Signed by     Rebecca Sneed MD  07/23/23 9556

## 2023-07-23 NOTE — ED NOTES
This writer discussed the patients current presentation and recommended discharge plan with  .      The patient was Instructed to follow up with their Dr. Jana Chowdhury at [facility] on Monday           National Suicide Prevention Hotline:  [address]: [address] [facility] [address]: [address] [place] [address] [phone] - [address] [facility] [address] [place], [place] [phone] - Crisis   [phone] - [facility] [address] (1-9pm daily)  [phone] - Teen Support Talk Line (1-9pm daily)  [address] [place] [address] [address] [facility] [address] [place] [address] [address] [place] [address] [phone] - [facility]

## 2023-07-23 NOTE — ED NOTES
Insurance   EVS (Eligibility Verification System) called - [phone].   Automated system indicates: [facility] ID # [id number]

## 2023-07-23 NOTE — ED NOTES
Pt given ginger ale, p.b. crackers,chips,cookie and sandwich. Tolerating well. Pt given pillow and blankets for his comfort. Pt has no other needs @ this time. All safety precautions are in place.      Chaz Neri  07/22/23 8352

## 2023-07-23 NOTE — ED PROVIDER NOTES
History  Chief Complaint   Patient presents with   • Suicidal     Pt states he is depressed and having suicidal thoughts of taking his meds. Discharged from [facility] "few days ago"     HPI  79-year-old man with a history of of depression, borderline personality disorder, ADD, social phobia and polysubstance abuse presents to ED for psychiatric evaluation. Patient reports increasing depression and suicidal thoughts. He denies any current plan. He states he has not tried to harm himself. He was recently discharged from [facility] behavioral health a few days ago for suicidal thoughts. He is currently staying at a friend's house. Patient denies homicidal ideation. Patient denies any auditory hallucinations or visual hallucinations. Patient denies any drug use. Patient is agreeable to crisis evaluation at this time. Patient denies any physical complaints or concerns. Prior to Admission Medications   Prescriptions Last Dose Informant Patient Reported? Taking?    OLANZapine (ZyPREXA) 10 mg tablet Not Taking  Yes No   Sig: Take 10 mg by mouth   Patient not taking: Reported on 7/22/2023   QUEtiapine (SEROquel) 100 mg tablet   Yes Yes   Sig: Take 100 mg by mouth daily at bedtime   acetaminophen (TYLENOL) 650 mg CR tablet Not Taking  No No   Sig: Take 1 tablet (650 mg total) by mouth every 8 (eight) hours as needed for mild pain   Patient not taking: Reported on 7/22/2023   busPIRone (BUSPAR) 5 mg tablet Not Taking  Yes No   Sig: Take 5 mg by mouth Three times a day   Patient not taking: Reported on 7/22/2023   lisdexamfetamine (VYVANSE) 40 MG capsule   Yes Yes   Sig: Take 40 mg by mouth every morning   naproxen (Naprosyn) 500 mg tablet Not Taking  No No   Sig: Take 1 tablet (500 mg total) by mouth 2 (two) times a day with meals   Patient not taking: Reported on 7/22/2023   venlafaxine (EFFEXOR-XR) 150 mg 24 hr capsule   No Yes   Sig: Take 1 capsule (150 mg total) by mouth daily      Facility-Administered Medications: None       Past Medical History:   Diagnosis Date   • ADD (attention deficit disorder)    • Addiction to drug [facility])    • Anxiety    • Borderline personality disorder ([facility])    • Chronic kidney disease    • Depression    • MDD (major depressive disorder)    • Panic attack    • Psychiatric illness    • Self-injurious behavior    • Social phobia    • Substance abuse ([facility])    • Suicide attempt [facility])        Past Surgical History:   Procedure Laterality Date   • NO PAST SURGERIES         History reviewed. No pertinent family history. I have reviewed and agree with the history as documented. E-Cigarette/Vaping   • E-Cigarette Use Never User      E-Cigarette/Vaping Substances   • Nicotine No    • THC No    • CBD No    • Flavoring No    • Other No    • Unknown No      Social History     Tobacco Use   • Smoking status: Every Day     Packs/day: 0.50     Years: 20.00     Total pack years: 10.00     Types: Cigarettes   • Smokeless tobacco: Never   Vaping Use   • Vaping Use: Never used   Substance Use Topics   • Alcohol use: Not Currently   • Drug use: Not Currently     Frequency: 1.0 times per week     Comment: once a week       Review of Systems   All other systems reviewed and are negative.       Physical Exam  Physical Exam   PHYSICAL EXAM    Constitutional:  Well developed, no acute distress  HEENT:  Conjunctiva normal. Oropharynx moist  Respiratory:  No respiratory distress  Cardiovascular:  Normal rate  GI:  Soft, nondistended, nontender  :  No costovertebral angle tenderness   Musculoskeletal:  No edema, no tenderness, no deformities  Integument:  Well hydrated, no rash   Lymphatic:  No lymphadenopathy noted   Neurologic:  Alert & oriented x 3, normal motor function, no focal deficits noted   Psychiatric:  Speech and behavior appropriate        Vital Signs  ED Triage Vitals [07/22/23 2250]   Temperature Pulse Respirations Blood Pressure SpO2   (!) 97 °F (36.1 °C) 79 16 125/89 98 %      Temp Source Heart Rate Source Patient Position - Orthostatic VS BP Location FiO2 (%)   Tympanic Monitor Sitting Left arm --      Pain Score       No Pain           Vitals:    07/22/23 2250   BP: 125/89   Pulse: 79   Patient Position - Orthostatic VS: Sitting         Visual Acuity      ED Medications  Medications   acetaminophen (TYLENOL) tablet 650 mg (has no administration in time range)   QUEtiapine (SEROquel) tablet 100 mg (has no administration in time range)   venlafaxine (EFFEXOR-XR) 24 hr capsule 150 mg (has no administration in time range)       Diagnostic Studies  Results Reviewed     Procedure Component Value Units Date/Time    Rapid drug screen, urine [[id number]] Collected: 07/22/23 2332    Lab Status: In process Specimen: Urine, Clean Catch Updated: 07/22/23 2336    POCT alcohol breath test [[id number]]  (Normal) Resulted: 07/22/23 2332    Lab Status: Final result Updated: 07/22/23 2332     EXTBreath Alcohol 0.000                 No orders to display              Procedures  Procedures         ED Course  ED Course as of 07/22/23 2338   Sat Jul 22, 2023 2336 EXTBreath Alcohol: 0.000   2336 SI, no plan. May be discharged any time. Discussed pt with crisis. They will evaluate him in am.                                SBIRT 20yo+    Flowsheet Row Most Recent Value   Initial Alcohol Screen: US AUDIT-C     1. How often do you have a drink containing alcohol? 1 Filed at: 07/22/2023 2300   2. How many drinks containing alcohol do you have on a typical day you are drinking? 1 Filed at: 07/22/2023 2300   3a. Male UNDER 65: How often do you have five or more drinks on one occasion? 1 Filed at: 07/22/2023 2300   Audit-C Score 3 Filed at: 07/22/2023 2300   LEATHA: How many times in the past year have you. .. Used an illegal drug or used a prescription medication for non-medical reasons? Once or Twice Filed at: 07/22/2023 2300   DAST-10: In the past 12 months. ..    1. Have you used drugs other than those required for medical reasons? 0 Filed at: 07/22/2023 2300   2. Do you use more than one drug at a time? 0 Filed at: 07/22/2023 2300   3. Have you had medical problems as a result of your drug use (e.g., memory loss, hepatitis, convulsions, bleeding, etc.)? 0 Filed at: 07/22/2023 2300   4. Have you had "blackouts" or "flashbacks" as a result of drug use? YesNo 0 Filed at: 07/22/2023 2300   5. Do you ever feel bad or guilty about your drug use? 0 Filed at: 07/22/2023 2300   6. Does your spouse (or parent) ever complain about your involvement with drugs? 0 Filed at: 07/22/2023 2300   7. Have you neglected your family because of your use of drugs? 0 Filed at: 07/22/2023 2300   8. Have you engaged in illegal activities in order to obtain drugs? 0 Filed at: 07/22/2023 2300   9. Have you ever experienced withdrawal symptoms (felt sick) when you stopped taking drugs? 0 Filed at: 07/22/2023 2300   10. Are you always able to stop using drugs when you want to? 0 Filed at: 07/22/2023 2300   DAST-10 Score 0 Filed at: 07/22/2023 2300                    Medical Decision Making  27-year-old man with a history of of depression, borderline personality disorder, ADD, social phobia and polysubstance abuse presents to ED for psychiatric evaluation. Patient reports suicidal ideation without a plan. We will have crisis evaluate patient. Patient signed out to Dr. Jonathan Brennan    Suicidal ideation: acute illness or injury  Amount and/or Complexity of Data Reviewed  External Data Reviewed: notes. Labs: ordered. Decision-making details documented in ED Course. Risk  OTC drugs. Prescription drug management. Decision regarding hospitalization.           Disposition  Final diagnoses:   Suicidal ideation     Time reflects when diagnosis was documented in both MDM as applicable and the Disposition within this note     Time User Action Codes Description Comment    7/22/2023 11:19 PM Yanci Gillespie Add [J02.801] Suicidal ideation       ED Disposition     ED Disposition Transfer to Behavioral Health    Condition   --    Date/Time   Sat Jul 22, 2023 11:19 PM    Comment   Oneyda Berger should be transferred out to D and has been medically cleared. Follow-up Information    None         Patient's Medications   Discharge Prescriptions    No medications on file       No discharge procedures on file.     PDMP Review       Value Time User    PDMP Reviewed  Yes 9/28/2021  8:17 AM Ho Littlejohn PA-C          ED Provider  Electronically Signed by           Crys Rodriguez MD  07/22/23 3174

## 2023-07-23 NOTE — ED NOTES
Assumed care for pt. Pt currently sleeping in abebe bed with no s/s of distress observed. Continues on Q15 for safety.       eKlli Sher RN  07/23/23 4105

## 2023-07-23 NOTE — ED NOTES
Patient presents in the ED after discharged from [facility] on friday. He said that he is feeling hopeless because he is hoping to get into [facility] but has not hear from them yet. He is currently living with a friend, and has no complaints but is hoping to get into [facility] or [facility]. Patient is pleasant and cooperative, slept most of the morning. He reports that he is compliant with his medications, has a system that he is able to remember what to take. He follows with Dr. Sandeep Donald at [facility], does have an appointment tomorrow and is looking forward to meeting with her. He has been offered to attend the day program at [facility], but he has resisted because he feels uncomfortable around a lot of people. He has been hospitalized 3 times in the last month for SI;s with no plan. Patient is cooperative, and is expressing no specific plan at this time. He is agreeable to go home and try to keep busy and meet with Dr. Lukas Chowdhury tomorrow    Patient is followed by Dr. Sandeep Donald at [facility]. He has a therapsit there as well, [facility] .   He has been hospitalized 3 times since June ([facility]  and [facility])    Priscila VALLECILLO

## 2023-07-29 ENCOUNTER — HOSPITAL ENCOUNTER (EMERGENCY)
Facility: HOSPITAL | Age: 38
End: 2023-07-30
Attending: EMERGENCY MEDICINE
Payer: MEDICARE

## 2023-07-29 DIAGNOSIS — F32.9 MDD (MAJOR DEPRESSIVE DISORDER): Primary | ICD-10-CM

## 2023-07-29 PROCEDURE — 99285 EMERGENCY DEPT VISIT HI MDM: CPT | Performed by: EMERGENCY MEDICINE

## 2023-07-29 PROCEDURE — 99284 EMERGENCY DEPT VISIT MOD MDM: CPT

## 2023-07-29 PROCEDURE — 80307 DRUG TEST PRSMV CHEM ANLYZR: CPT | Performed by: EMERGENCY MEDICINE

## 2023-07-29 PROCEDURE — 82075 ASSAY OF BREATH ETHANOL: CPT | Performed by: EMERGENCY MEDICINE

## 2023-07-29 RX ORDER — LORATADINE 10 MG/1
10 TABLET ORAL ONCE
Status: DISCONTINUED | OUTPATIENT
Start: 2023-07-29 | End: 2023-07-30 | Stop reason: HOSPADM

## 2023-07-29 NOTE — ED PROVIDER NOTES
History  Chief Complaint   Patient presents with   • Psychiatric Evaluation     Pt arrives c/o " depression, thoughts of suicide, crisis dropped me off here, I just want to kill myself "  "I had a knife but got rid of it when crisis came "  denies HI  denies [address]      Patient is a 35-year-old male with a h/o MDD who presents with worsening depression and SI.  Just released from [facility]- last week. States on meds and compliant. Depression and feelings of hopelessness worsened today. Had thoughts to cut himself. Had h/o cutting. No HI/hallucinations. Denies any drugs or etoh. States only spent 3-4 day inpt at [facility], believes they rushed him out. Currently homeless. Prior to Admission Medications   Prescriptions Last Dose Informant Patient Reported? Taking?    OLANZapine (ZyPREXA) 10 mg tablet   Yes No   Sig: Take 10 mg by mouth   Patient not taking: Reported on 7/22/2023   QUEtiapine (SEROquel) 100 mg tablet   Yes No   Sig: Take 100 mg by mouth daily at bedtime   acetaminophen (TYLENOL) 650 mg CR tablet   No No   Sig: Take 1 tablet (650 mg total) by mouth every 8 (eight) hours as needed for mild pain   Patient not taking: Reported on 7/22/2023   busPIRone (BUSPAR) 5 mg tablet   Yes No   Sig: Take 5 mg by mouth Three times a day   Patient not taking: Reported on 7/22/2023   lisdexamfetamine (VYVANSE) 40 MG capsule   Yes No   Sig: Take 40 mg by mouth every morning   naproxen (Naprosyn) 500 mg tablet   No No   Sig: Take 1 tablet (500 mg total) by mouth 2 (two) times a day with meals   Patient not taking: Reported on 7/22/2023   venlafaxine (EFFEXOR-XR) 150 mg 24 hr capsule   No No   Sig: Take 1 capsule (150 mg total) by mouth daily      Facility-Administered Medications: None       Past Medical History:   Diagnosis Date   • ADD (attention deficit disorder)    • Addiction to drug [facility])    • Anxiety    • Borderline personality disorder ([facility])    • Chronic kidney disease    • Depression    • MDD (major depressive disorder)    • Panic attack    • Psychiatric illness    • Self-injurious behavior    • Social phobia    • Substance abuse ([address])    • Suicide attempt [facility])        Past Surgical History:   Procedure Laterality Date   • NO PAST SURGERIES         History reviewed. No pertinent family history. I have reviewed and agree with the history as documented. E-Cigarette/Vaping   • E-Cigarette Use Never User      E-Cigarette/Vaping Substances   • Nicotine No    • THC No    • CBD No    • Flavoring No    • Other No    • Unknown No      Social History     Tobacco Use   • Smoking status: Every Day     Packs/day: 0.50     Years: 20.00     Total pack years: 10.00     Types: Cigarettes   • Smokeless tobacco: Never   Vaping Use   • Vaping Use: Never used   Substance Use Topics   • Alcohol use: Not Currently   • Drug use: Not Currently     Frequency: 1.0 times per week     Comment: once a week       Review of Systems   Constitutional: Negative. HENT: Negative. Eyes: Negative. Respiratory: Negative. Cardiovascular: Negative. Gastrointestinal: Negative. Endocrine: Negative. Genitourinary: Negative. Musculoskeletal: Negative. Skin: Negative. Allergic/Immunologic: Negative. Neurological: Negative. Hematological: Negative. Psychiatric/Behavioral: Positive for dysphoric mood and suicidal ideas. All other systems reviewed and are negative. Physical Exam  Physical Exam  Vitals and nursing note reviewed. Constitutional:       Appearance: Normal appearance. He is normal weight. HENT:      Head: Normocephalic and atraumatic. Cardiovascular:      Rate and Rhythm: Normal rate and regular rhythm. Pulses: Normal pulses. Heart sounds: Normal heart sounds. Pulmonary:      Effort: Pulmonary effort is normal.      Breath sounds: Normal breath sounds. Abdominal:      General: Bowel sounds are normal.      Palpations: Abdomen is soft.    Musculoskeletal:         General: Normal range of motion. Cervical back: Normal range of motion and neck supple. Skin:     General: Skin is warm and dry. Capillary Refill: Capillary refill takes less than 2 seconds. Comments: Old cutting scars on left forearm   Neurological:      General: No focal deficit present. Mental Status: He is alert and oriented to person, place, and time. Psychiatric:         Attention and Perception: Attention normal.         Mood and Affect: Mood is depressed. Affect is flat. Speech: Speech is delayed. Behavior: Behavior is slowed and withdrawn. Thought Content: Thought content is not paranoid. Thought content does not include homicidal or suicidal ideation. Vital Signs  ED Triage Vitals [07/29/23 1836]   Temperature Pulse Respirations Blood Pressure SpO2   98.2 °F (36.8 °C) 87 16 137/86 100 %      Temp Source Heart Rate Source Patient Position - Orthostatic VS BP Location FiO2 (%)   Tympanic Monitor Sitting Right arm --      Pain Score       --           Vitals:    07/29/23 1836 07/30/23 0737   BP: 137/86 124/83   Pulse: 87 70   Patient Position - Orthostatic VS: Sitting Lying         Visual Acuity      ED Medications  Medications - No data to display    Diagnostic Studies  Results Reviewed     Procedure Component Value Units Date/Time    Rapid drug screen, urine [[id number]]  (Abnormal) Collected: 07/29/23 1944    Lab Status: Final result Specimen: Urine, Clean Catch Updated: 07/29/23 2007     Amph/Meth UR Positive     Barbiturate Ur Negative     Benzodiazepine Urine Negative     Cocaine Urine Negative     Methadone Urine Negative     Opiate Urine Negative     PCP Ur Negative     THC Urine Positive     Oxycodone Urine Negative    Narrative:      Presumptive report. If requested, specimen will be sent to reference lab for confirmation. FOR MEDICAL PURPOSES ONLY. IF CONFIRMATION NEEDED PLEASE CONTACT THE LAB WITHIN 5 DAYS.     Drug Screen Cutoff Levels:  AMPHETAMINE/METHAMPHETAMINES  1000 ng/mL  BARBITURATES     200 ng/mL  BENZODIAZEPINES     200 ng/mL  COCAINE      300 ng/mL  METHADONE      300 ng/mL  OPIATES      300 ng/mL  PHENCYCLIDINE     25 ng/mL  THC       50 ng/mL  OXYCODONE      100 ng/mL    POCT alcohol breath test [[id number]]  (Normal) Resulted: 07/29/23 1944    Lab Status: Final result Updated: 07/29/23 1945     EXTBreath Alcohol 0.00                 No orders to display              Procedures  Procedures         ED Course  ED Course as of 07/30/23 1513   Sat Jul 29, 2023 2048 Signed a 201. SBIRT 20yo+    Flowsheet Row Most Recent Value   Initial Alcohol Screen: US AUDIT-C     1. How often do you have a drink containing alcohol? 0 Filed at: 07/29/2023 1842   2. How many drinks containing alcohol do you have on a typical day you are drinking? 0 Filed at: 07/29/2023 1842   3a. Male UNDER 65: How often do you have five or more drinks on one occasion? 0 Filed at: 07/29/2023 1842   Audit-C Score 0 Filed at: 07/29/2023 1842   LEATHA: How many times in the past year have you. .. Used an illegal drug or used a prescription medication for non-medical reasons? Weekly Filed at: 07/29/2023 1842                    Medical Decision Making  MDD (major depressive disorder): chronic illness or injury with exacerbation, progression, or side effects of treatment     Details: worseneing symptoms despite meds. 201 signed, pending placement at end of my shift. Amount and/or Complexity of Data Reviewed  External Data Reviewed: notes. Details: recent ER visit. Labs: ordered. Decision-making details documented in ED Course. Risk  Decision regarding hospitalization.           Disposition  Final diagnoses:   MDD (major depressive disorder)     Time reflects when diagnosis was documented in both MDM as applicable and the Disposition within this note     Time User Action Codes Description Comment    7/29/2023  7:31 PM Fidel Hamilton Add [F32.9] MDD (major depressive disorder)       ED Disposition     ED Disposition   Transfer to [facility]    Condition   --    Date/Time   Sat Jul 29, 2023  7:31 PM    Comment   [place] should be transferred out to [facility] and has been medically cleared.            MD Documentation    Annabel Laird Most Recent Value   Patient Condition The patient has been stabilized such that within reasonable medical probability, no material deterioration of the patient condition or the condition of the unborn child(lucero) is likely to result from the transfer, An emergency transfer is being made prior to stabilization due to the need for definitive care and the benefit of transfer outweighs the risk   Reason for Transfer Level of Care needed not available at this facility   Benefits of Transfer Specialized equipment and/or services available at the receiving facility (Include comment)________________________, Other benefits (Include comment)_______________________  [facility]]   Risks of Transfer Potential for delay in receiving treatment   Accepting Physician [id number] [facility] Name, [address]    (Name & Tel number) Roundtrip   Transported by [employer] and Unit #) CTS   Sending MD Fabiola Carrasco MD   Provider Certification General risk, such as traffic hazards, adverse weather conditions, rough terrain or turbulence, possible failure of equipment (including vehicle or aircraft), or consequences of actions of persons outside the control of the transport personnel      RN Documentation    Flowsheet Row Most [facility] Name, [address]    (Name & Tel number) Roundtrip   Medications Reviewed with Next Provider of Service Yes   Transported by [employer] and Unit #) CTS   Copies of Medical Records Sent History and Physical, Orders, Progress note, Transfer form, Nursing note, Labs   Patient Belongings Disposition Sent with patient   Transfer Date 07/30/23      Follow-up Information    None         Discharge Medication List as of 7/30/2023 11:09 AM      CONTINUE these medications which have NOT CHANGED    Details   acetaminophen (TYLENOL) 650 mg CR tablet Take 1 tablet (650 mg total) by mouth every 8 (eight) hours as needed for mild pain, Starting Fri 4/1/2022, Normal      busPIRone (BUSPAR) 5 mg tablet Take 5 mg by mouth Three times a day, Historical Med      lisdexamfetamine (VYVANSE) 40 MG capsule Take 40 mg by mouth every morning, Historical Med      naproxen (Naprosyn) 500 mg tablet Take 1 tablet (500 mg total) by mouth 2 (two) times a day with meals, Starting Fri 4/1/2022, Normal      OLANZapine (ZyPREXA) 10 mg tablet Take 10 mg by mouth, Historical Med      QUEtiapine (SEROquel) 100 mg tablet Take 100 mg by mouth daily at bedtime, Historical Med      venlafaxine (EFFEXOR-XR) 150 mg 24 hr capsule Take 1 capsule (150 mg total) by mouth daily, Starting Tue 9/28/2021, Until Sat 7/22/2023, Normal             No discharge procedures on file.     PDMP Review       Value Time User    PDMP Reviewed  Yes 9/28/2021  8:17 AM Susan Hess PA-C          ED Provider  Electronically Signed by           Jose D Tarango MD  07/30/23 2931

## 2023-07-29 NOTE — LETTER
Section I - General Information    Name of Patient: Linda Stapleton                 : 1985    Medicare #:____________________  Transport Date: 23 (PCS is valid for round trips on this date and for all repetitive trips in the 60-day range as noted below.)  Origin: [place] _______________________________________________  Is the pt's stay covered under Medicare Part A (PPS/DRG)     (_) YES  (_X) NO  Closest appropriate facility?  (_)X YES  (_) NO  If no, why is transport to more distant facility required?________________________  If hosp-hosp transfer, describe services needed at 2nd facility not available at 1st facility? _________________________________  If hospice pt, is this transport related to pt's terminal illness? (_) YES (_) NO Describe____________________________________    Section II - Medical Necessity Questionnaire  Ambulance transportation is medically necessary only if other means of transport are contraindicated or would be potentially harmful to the patient. To meet this requirement, the patient must either be "bed confined" or suffer from a condition such that transport by means other than ambulance is contraindicated by the patient's condition.  The following questions must be answered by the medical professional signing below for this form to be valid:    1)  Describe the MEDICAL CONDITION (physical and/or mental) of this patient  [address] that requires the patient to be transported in an ambulance and why transport by other means is contraindicated by the patient's condition:___Mental Health _______________________________________________________________________________________________    2) Is the patient "bed confined" as defined below?     (_) YES  (_)X NO  To be "be confined" the patient must satisfy all three of the following conditions: (1) unable to get up from bed without Assistance; AND (2) unable to ambulate; AND (3) unable to sit in a chair or wheelchair. 3) Can this patient safely be transported by car or wheelchair [facility] (i.e., seated during transport without a medical attendant or monitoring)?   (_) YES  (_) NO    4) In addition to completing questions 1-3 above, please check any of the following conditions that apply*:  *Note: supporting documentation for any boxes checked must be maintained in the patient's medical records  (_)Contractures   (_)Non-Healed Fractures  (_)Patient is confused (_)Patient is comatose (_)Moderate/severe pain on movement (_X)Danger to self/others  (_)IV meds/fluids required (_)Patient is combative(_X)Need or possible need for restraints (_)DVT requires elevation of lower extremity  (_)Medical attendant required (_)Requires oxygen-unable to self administer (_)Special handling/isolation/infection control precautions required (_)Unable to tolerate seated position for time needed to transport (_)Hemodynamic monitoring required en route (_)Unable to sit in a chair or wheelchair due to decubitus ulcers or other wounds (_)Cardiac monitoring required en route (_)Morbid obesity requires additional personnel/equipment to safely handle patient (_)Orthopedic device (backboard, halo, pins, traction, brace, wedge, etc,) requiring special handling during transport (_)Other(specify)_______________________________________________    Section III - Signature of Physician or Healthcare Professional    I certify that the above information is accurate based on my evaluation of this patient, and that the medical necessity provisions of 42 .40(e)(1) are met, requiring that this patient be transported by ambulance. I understand this information will be used by the Centers for Medicare and Medicaid Services (CMS) to support the determination of medical necessity for ambulance services.  I represent that I am the beneficiary’s attending physician; or an employee of the beneficiary’s attending physician, or the hospital or facility where the beneficiary is being treated and from which the beneficiary is being transported; that I have personal knowledge of the beneficiary’s condition at the time of transport; and that I meet all Medicare regulations and applicable State licensure laws for the credential indicated. (_) If this box is checked, I also certify that the patient is physically or mentally incapable of signing the ambulance service's claim and that the institution with which I am affiliated has furnished care, services, or assistance to the patient. My signature below is made on behalf of the patient pursuant to 42 CFR §424.36(b)(4). In accordance with 42 CFR §424.37, the specific reason(s) that the patient is physically or mentally incapable of signing the claim form is as follows: _________________________________________________________________________________________________________      Signature of Physician* or Healthcare Professional______________________________________________________________  Signature Date 07/30/23 (For scheduled repetitive transports, this form is not valid for transports performed more than 60 days after this date)    Printed Name & Credentials of Physician or Healthcare Professional (MD, DO, RN, etc.)_Yuliet Potter / Crisis Specialist _______________________________  *Form must be signed by patient's attending physician for scheduled, repetitive transports.  For non-repetitive, unscheduled ambulance transports, if unable to obtain the signature of the attending physician, any of the following may sign (choose appropriate option below)  (_) Physician Assistant   (_)  Clinical Nurse Specialist    (_)  Licensed Practical Nurse    (X_)    (_)  Nurse Practitioner     (_)  Registered Nurse                (_)                         (_) Discharge Planner

## 2023-07-29 NOTE — LETTER
[facility] EMERGENCY DEPARTMENT  [address] [facility] [address]  [phone]  Dept: [address] [facility] CONSENT    NAME Linda Stapleton                                         1985                              MRN [id number]    I have been informed of my rights regarding examination, treatment, and transfer   by Dr. Justyna Barker DO    Benefits: Specialized equipment and/or services available at the receiving facility (Include comment)________________________, Other benefits (Include comment)_______________________ [facility])    Risks: Potential for delay in receiving treatment      Consent for Transfer:  I acknowledge that my medical condition has been evaluated and explained to me by the emergency department physician or other qualified medical person and/or my attending physician, who has recommended that I be transferred to the service of  Accepting Physician: Belén Toledo at [address] Name, [address] [address] : [facility]. The above potential benefits of such transfer, the potential risks associated with such transfer, and the probable risks of not being transferred have been explained to me, and I fully understand them. The doctor has explained that, in my case, the benefits of transfer outweigh the risks. I agree to be transferred. I authorize the performance of emergency medical procedures and treatments upon me in both transit and upon arrival at the receiving facility. Additionally, I authorize the release of any and all medical records to the receiving facility and request they be transported with me, if possible. I understand that the safest mode of transportation during a medical emergency is an ambulance and that the Hospital advocates the use of this mode of transport.  Risks of traveling to the receiving facility by car, including absence of medical control, life sustaining equipment, such as oxygen, and medical personnel has been explained to me and I fully understand them. (ИРИНА CORRECT BOX BELOW)  [  ]  I consent to the stated transfer and to be transported by ambulance/helicopter. [  ]  I consent to the stated transfer, but refuse transportation by ambulance and accept full responsibility for my transportation by car. I understand the risks of non-ambulance transfers and I exonerate the Hospital and its staff from any deterioration in my condition that results from this refusal.    X___________________________________________    DATE  23  TIME________  Signature of patient or legally responsible individual signing on patient behalf           RELATIONSHIP TO PATIENT_________________________                Provider Certification    NAME [id number]                                         1985                              MRN [id number]    A medical screening exam was performed on the above named patient. Based on the examination:    Condition Necessitating Transfer The encounter diagnosis was MDD (major depressive disorder).     Patient Condition: The patient has been stabilized such that within reasonable medical probability, no material deterioration of the patient condition or the condition of the unborn child(lucero) is likely to result from the transfer, An emergency transfer is being made prior to stabilization due to the need for definitive care and the benefit of transfer outweighs the risk    Reason for Transfer: Level of Care needed not available at this facility    Transfer Requirements: [address] available and qualified personnel available for treatment as acknowledged by Emmanuel  Agreed to accept transfer and to provide appropriate medical treatment as acknowledged by       [facility]  Appropriate medical records of the examination and treatment of the patient are provided at the time of transfer   [address] _______  Transfer will be performed by qualified personnel from [address]  and appropriate transfer equipment as required, including the use of necessary and appropriate life support measures. Provider Certification: I have examined the patient and explained the following risks and benefits of being transferred/refusing transfer to the patient/family:  General risk, such as traffic hazards, adverse weather conditions, rough terrain or turbulence, possible failure of equipment (including vehicle or aircraft), or consequences of actions of persons outside the control of the transport personnel      Based on these reasonable risks and benefits to the patient and/or the unborn child(lucero), and based upon the information available at the time of the patient’s examination, I certify that the medical benefits reasonably to be expected from the provision of appropriate medical treatments at another medical facility outweigh the increasing risks, if any, to the individual’s medical condition, and in the case of labor to the unborn child, from effecting the transfer.     X____________________________________________ DATE 07/30/23        TIME_______      ORIGINAL - SEND TO MEDICAL RECORDS   COPY - SEND WITH PATIENT DURING TRANSFER

## 2023-07-30 VITALS
HEART RATE: 70 BPM | DIASTOLIC BLOOD PRESSURE: 83 MMHG | WEIGHT: 199.52 LBS | OXYGEN SATURATION: 99 % | BODY MASS INDEX: 29.46 KG/M2 | RESPIRATION RATE: 20 BRPM | SYSTOLIC BLOOD PRESSURE: 124 MMHG | TEMPERATURE: 98 F

## 2023-07-30 NOTE — ED NOTES
Insurance Authorization for admission:   Phone call placed to [employer] number:[phone]   Spoke to [place]      4 days approved. Level of care: Inpatient [address] [facility]   Review on 8/2/23   Authorization # received when pt arrives at facility       EVS (Eligibility Verification System) called - [phone].   Automated system indicates: [facility] ID # [id number]

## 2023-07-30 NOTE — ED NOTES
Lorna Montes  is a 45 y.o., black single male who is currently on disability. Patient was referred by [facility] crisis intervention. Patient reported SI thoughts after girlfriend broke up with him today. Patient reports increasing depression and inability to cope . When asked about the presenting problem, patient stated, that he needs to go inpatient  "  Patient reported struggling with SI thoughts and no specific plan. Patient denies Hi or hallucinations  Regarding substance use, patient reported smoking Marijuana occasionally  . Current stressors include break up with a girlfriend and he is currently staying with a friend temporarely . Regarding barriers, patient reported just getting out of inpatient 2 weeks ago. Regarding mental health treatment, patient reported having a Psychiatrist DR. Erika Cody at [facility] . Patient also has [facility] services Ls[place]on Alvarez through [facility]    Patient was receptive to 21 [phone] form was completed at this time.      Demario Song, MS   07/29/23

## 2023-07-30 NOTE — ED NOTES
Patient is accepted at [facility]   Patient is accepted by [facility]  per [address] is arranged with TBD    Transportation is scheduled for TBD   Patient may go to the floor at anytime          Christiano Yadav, [address] [facility]

## 2023-07-30 NOTE — ED NOTES
Anurag Wheat from [facility] is aware of the ETA. Patient is aware. EMTALA signed. Transfer envelope completed.

## 2023-07-30 NOTE — ED NOTES
Patient is accepted at [facility]   Patient is accepted by Qu[facility]ton  per Mariano Méndez is arranged with [facility]  Transportation is scheduled for 10:45am   Patient may go to the floor at anytime

## 2023-08-21 ENCOUNTER — HOSPITAL ENCOUNTER (EMERGENCY)
Facility: HOSPITAL | Age: 38
Discharge: HOME | End: 2023-08-22
Attending: EMERGENCY MEDICINE | Admitting: EMERGENCY MEDICINE
Payer: MEDICARE

## 2023-08-21 DIAGNOSIS — R45.851 SUICIDAL IDEATION: Primary | ICD-10-CM

## 2023-08-21 DIAGNOSIS — F33.2 MDD (MAJOR DEPRESSIVE DISORDER), RECURRENT SEVERE, WITHOUT PSYCHOSIS (HCC): ICD-10-CM

## 2023-08-21 DIAGNOSIS — F32.A DEPRESSION, UNSPECIFIED DEPRESSION TYPE: ICD-10-CM

## 2023-08-21 PROCEDURE — 99285 EMERGENCY DEPT VISIT HI MDM: CPT

## 2023-08-21 NOTE — Clinical Note
Dung Caed was seen and treated in our emergency department on 8/21/2023. Diagnosis:     Sarahi Lehman  . He may return on this date: 08/25/2023         If you have any questions or concerns, please don't hesitate to call.       Jerome Mcnulty, DO    ______________________________           _______________          _______________  Hospital Representative                              Date                                Time

## 2023-08-22 VITALS
HEART RATE: 84 BPM | RESPIRATION RATE: 16 BRPM | TEMPERATURE: 97.8 F | BODY MASS INDEX: 30.13 KG/M2 | OXYGEN SATURATION: 96 % | WEIGHT: 204 LBS | SYSTOLIC BLOOD PRESSURE: 125 MMHG | DIASTOLIC BLOOD PRESSURE: 83 MMHG

## 2023-08-22 LAB — ETHANOL EXG-MCNC: 0 MG/DL

## 2023-08-22 PROCEDURE — 99285 EMERGENCY DEPT VISIT HI MDM: CPT | Performed by: EMERGENCY MEDICINE

## 2023-08-22 PROCEDURE — 82075 ASSAY OF BREATH ETHANOL: CPT | Performed by: EMERGENCY MEDICINE

## 2023-08-22 PROCEDURE — 99245 OFF/OP CONSLTJ NEW/EST HI 55: CPT | Performed by: PSYCHIATRY & NEUROLOGY

## 2023-08-22 RX ORDER — VENLAFAXINE HYDROCHLORIDE 37.5 MG/1
37.5 CAPSULE, EXTENDED RELEASE ORAL DAILY
Status: DISCONTINUED | OUTPATIENT
Start: 2023-08-22 | End: 2023-08-22 | Stop reason: HOSPADM

## 2023-08-22 RX ORDER — QUETIAPINE FUMARATE 100 MG/1
200 TABLET, FILM COATED ORAL
Status: DISCONTINUED | OUTPATIENT
Start: 2023-08-22 | End: 2023-08-22 | Stop reason: HOSPADM

## 2023-08-22 RX ADMIN — QUETIAPINE FUMARATE 200 MG: 100 TABLET ORAL at 00:51

## 2023-08-22 RX ADMIN — VENLAFAXINE HYDROCHLORIDE 37.5 MG: 37.5 CAPSULE, EXTENDED RELEASE ORAL at 09:57

## 2023-08-22 NOTE — ED NOTES
Spoke with Fidel Vargas at [facility] to confirm that referral was received.  There are no beds available overnight and referral will be reviewed in AM. Crisis to follow up in AM.

## 2023-08-22 NOTE — DISCHARGE INSTRUCTIONS
If your symptoms worsen or you have concerns for any reason please return to the emergency room immediately for further evaluation.

## 2023-08-22 NOTE — CONSULTS
Consultation - [address] [facility] 45 y.o. male MRN: [id number]  Unit/Bed#: ED 07 Encounter: [id number]    Assessment/Plan   Cristobal Qureshi is a 45 y.o. male with PMH of Depression, Anxiety, ADHD, Borderline presenting to the ED for suicidal ideation, without plan. Patient has had multiple hospital admissions for the same. On exam patient is blunted in affect, scant in speech and demonstrates cognitive and psychomotor slowing. Patient initially indorsed passive SI for this writer, but later denied it. Patient states that he wishes to stay alive for "my son, Kacey Terry". Patient follows with his , Nkechi Pappas, once a week and has a psychiatrist appointment scheduled in 3 days with Dr. Jing Mott from [facility]. Patient states he has been compliant with his medications. Given that patient is currently denying SI, states he wishes to stay alive for his son, has not had a suicide attempt for 9 years, has an outpatient follow-up appointment scheduled soon, sees case management on a weekly basis, and is compliant with medications, patient is safe to be discharged, with the understanding that he should come to the ED if he feels he may hurt himself in anyway. Diagnosis: Unspecified mood disorder    Major Depressive Disorder, Substance induced mood disorder, Persistent dysthymic disorder    Recommended Treatment:   • The patient does not meet criteria for inpatient psychiatric hospitalization and will be discharged home at their request.  The patient has capacity to understand the risks and benefits of the different types of psychiatric treatment available (including inpatient, outpatient, and partial hospitalization) and has verbalized understanding of the same. • Patient will follow-up with Dr. Jing Mott from [facility] in 3 days  • Continue home medications  • Psychiatry will sign off. Please call or TigerText the on-call team with any questions or concerns.     Diagnoses were discussed with the patient. Available treatment options were discussed with the patient. Prior records were reviewed in [address] [place] [address]. Risks, benefits and possible side effects of Medications:       Chief Complaint: "I have thoughts of suicide"    History of Present Illness   Physician Requesting Consult: Princess Dewey DO  Reason for Consult / Principal Problem: suicidal ideation    Velma Cordero is a 45 y.o. male with PMH of ADHD, borderline personality disorder, MDD, anxiety, admitted for suicidal ideation without plan. Prior records were reviewed:    HPI from ED doctor, Wade Pa MD:  "44 yo M hx of ADD polysubstance abuse, borderline personality diosrder, depression anxiety self injurious behavior, prior suicide attempt presents to ed for psych eval. Patient reports depression with suicidal thoughts with no plan at this time. No hallucinations. No homicidal thoughts."    Crisis Worker Evaluation, Debbie Mcgill:  "Pt presented to the ED as a self-referral for suicidal ideation. Pt reports today his increased depression and suicidal ideation started today. There is no stressor he is able to identify. Pt denies any plan for his SI. He does report that he has self-harming tendencies by cutting, but denies any prior suicide attempts with this method. He reports he last cut ~2 weeks ago. Pt was most recently hospitalized at the end of July at [facility]. He has been compliant with his medication regiment. He reports no changes to his sleep or appetite. He reports maintaining his aftercare appointments with [facility] and [facility]. Pt has had several recent inpatient admissions, but is willing to consider crisis residence referral at this time as it was helpful for him in the past. Pt to be reassessed if crisis residence is not an option as Pt would sign a 201. He remained calm and cooperative, although appears to be withdrawn. Denies HI/AH/VH.  UDS is pending at this time, but he does report occasional THC use."    ED Workup: POCT alcohol breath test: normal      Patient was evaluated at bedside today. He was laying in bed and would fall asleep intermittently during the interview. Patient was scant in speech and blunted in affect. He occasionally would not respond to questions. He demonstrated cognitive and psychomotor slowing. Narcisa Saez states that he came to the hospital because "I was feeling suicidal". He stated that he started to feel this way yesterday afternoon for "no reason". When asked whether there are any stressors in his life contributing to him feeling like this, he stated "I don't know". Patient endorses a recent depressed mood, feelings of hopelessness, worthlessness, decreased sleep, decreased energy, decreased interest in activities. When asked whether there was a time he didn't feel depressed, he stated "I don't know". Patient states that he has been compliant with his medications: Quetiapine 100mg, Venlafaxine 150mg, Vyvanse 40mg and that "they help a little". Patient endorsed one suicide attempt in 2014 when he his ex-fiance passed away. He has been hospitalized multiple times in the past for suicidal ideation, most recently in July 18th 2023. He also endorses self harming behavior by "cutting", most recently 2 weeks ago. He denies HI. Patient denies history of AH/VH. Denies episodes of distractibility, irritability, increased goal oriented activities, grandiosity, pressured speech. Denies panic attacks or nightmares/flashbacks. Denies history of trauma. Narcisa Saez states that he has been living with his friend Janay Bermudez "for awhile" and is currently unemployed. He states that he receives SSI checks, and that he does not have issues providing for himself. He endorses smoking marijuana- 1 blunt 2 times per week. At the time of the initial encounter patient endorsed that he still felt "like I want to die", but did not currently have a plan to take his own life.  When asked if he thought he would take his own life if the opportunity arose, patient stated "I'm not sure". One hour later, the patient was interviewed again and when asked if he currently had thoughts of harming himself or taking his own life, he stated "no". Patient states that "my son, Karina Krishnamurthy" is a reason that he wishes to stay alive. At the time of the second interview, Cheri Hinds stated his mood was "ok". Patient stated that he follows up with his , Maddy Ornelas, every week, and has an appointment to follow up with Dr. Kris Perkins, his psychiatrist from [facility], on Friday (in 3 days). Patient stated that he feels safe being discharged and feels comfortable following up with his  and psychiatrist.     Historical Information   Prior psychiatric diagnoses: per chart, ADHD, borderline, anxiety, depression  Inpatient hospitalizations: Multiple admissions for SI/Depression, most recently July 18th 2023  Suicide attempts: 1 attempt in 2014 by cutting wrists  Self-harm behaviors: Patient endorses cutting wrists, most recently 2 weeks ago  Outpatient treatment: [facility], [facility] at [facility] medication trial: Aripiprazole, Atarax    Substance Abuse History:  Social History     Tobacco Use   • Smoking status: Every Day     Packs/day: 0.50     Years: 20.00     Total pack years: 10.00     Types: Cigarettes   • Smokeless tobacco: Never   Vaping Use   • Vaping Use: Never used   Substance Use Topics   • Alcohol use: Not Currently   • Drug use: Not Currently     Frequency: 1.0 times per week     Comment: once a week      Patient reports smoking 1/2 pack a day of cigarettes for "a long time". Endorses smoking 1 blunt twice a week.    I have assessed this patient for substance use within the past 12 months    Family Psychiatric History:   Son has autism, per chart    Social History  Marital history: Single  Children: yes  Living arrangement: Lives in friend Shyanne's house  Functioning Relationships: States his friend Graham Aviles is a support system for him  Education: 11th grade  Occupational History: unemployed, receives SSI  Other Pertinent History: None    Traumatic History:   Abuse: none reported  Other Traumatic Events: none reported    Past Medical History:   Diagnosis Date   • ADD (attention deficit disorder)    • Addiction to drug ([address] )    • Anxiety    • Borderline personality disorder ([address])    • Chronic kidney disease    • Depression    • MDD (major depressive disorder)    • Panic attack    • Psychiatric illness    • Self-injurious behavior    • Social phobia    • Substance abuse ([address] [facility])    • Suicide attempt [facility])        Medical Review Of Systems:  Review of Systems - Negative except as noted in HPI    Meds/Allergies   all current active meds have been reviewed and current meds:   Current Facility-Administered Medications   Medication Dose Route Frequency   • QUEtiapine (SEROquel) tablet 200 mg  200 mg Oral HS   • venlafaxine (EFFEXOR-XR) 24 hr capsule 37.5 mg  37.5 mg Oral Daily     No Known Allergies    Objective   Vital signs in last 24 hours:  Temp:  [97.8 °F (36.6 °C)-98.3 °F (36.8 °C)] 97.8 °F (36.6 °C)  HR:  [84-90] 84  Resp:  [16] 16  BP: (125-133)/(79-83) 125/83    Mental Status Exam:  Appearance:  drowsy, marginal eye contact, appears older than stated age, marginal grooming/hygiene, overweight and dressed in hospital attire   Behavior:  calm, cooperative, laying in bed and intermittently falling asleep   Motor: no abnormal movements, psychomotor retardation and normal gait and balance   Speech:  slow, soft, scant, poverty of content, coherent and monotone   Mood:  "ok"   Affect:  mood-congruent, blunted and depressed   Thought Process:  poverty of thought   Thought Content: no verbalized delusions or overt paranoia   Perceptual disturbances: no reported hallucinations and does not appear to be responding to internal stimuli at this time   Risk Potential: Patient initially endorsed passive SI of wanting to die. Later denied SI.  Denies HI Cognition: oriented to person, place, time, and situation, appears to be below average intelligence, normal abstract reasoning, attention span appeared shorter than expected for age and cognition not formally tested   Insight:  Fair   Judgment: Héctor Le     Laboratory results:  I have personally reviewed all pertinent laboratory/tests results. The following portions of the patient's history were reviewed and updated as appropriate: allergies, current medications, past family history, past medical history, past social history, past surgical history and problem list.      Risks, benefits, and possible side effects of medications were explained to the patient, who verbalizes understanding.           Randi No, Medical Student IV      This note was not shared with the patient due to reasonable likelihood of causing patient harm

## 2023-08-22 NOTE — ED NOTES
Call placed to [address] [place] [address] Spoke with Mitzy Arita to ask if there was any decision regarding the referral. She stated she will speak to her supervisor as to whether the patient can be accepted. Awaiting return call.

## 2023-08-22 NOTE — ED NOTES
Second call placed to [facility]. Spoke with Mal Hill regarding the status of this referral. She stated they have no bed, however, they would be unable to consider the patient as their program has been a poor fit for him in the past. She stated their experience with him has been that he breaks rules, steals, and he exploits the other clients, who are often quite vulnerable. She stated he has been known to make romantic overtures toward the female clients. ED Crisis notified Vandana Perez MD, Psychiatry. He stated they will now pursue a 201 with the patient, as the next level of care would be acute inpatient.

## 2023-08-22 NOTE — ED NOTES
Pt presented to the ED as a self-referral for suicidal ideation. Pt reports today his increased depression and suicidal ideation started today. There is no stressor he is able to identify. Pt denies any plan for his SI. He does report that he has self-harming tendencies by cutting, but denies any prior suicide attempts with this method. He reports he last cut ~2 weeks ago. Pt was most recently hospitalized at the end of July at [facility]. He has been compliant with his medication regiment. He reports no changes to his sleep or appetite. He reports maintaining his aftercare appointments with [facility] and [facility]. Pt has had several recent inpatient admissions, but is willing to consider crisis residence referral at this time as it was helpful for him in the past. Pt to be reassessed if crisis residence is not an option as Pt would sign a 201. He remained calm and cooperative, although appears to be withdrawn. Denies HI/AH/VH. UDS is pending at this time, but he does report occasional THC use.

## 2023-08-22 NOTE — ED PROVIDER NOTES
History  Chief Complaint   Patient presents with   • Psychiatric Evaluation     Patient reports depression with suicidal thoughts with no plan at this time. No hallucinations. No homicidal thoughts. HPI     44 yo M hx of ADD polysubstance abuse, borderline personality diosrder, depression anxiety self injurious behavior, prior suicide attempt presents to ed for psych eval.    SI HI: suicidal, no hi  Plan: no plan  Any particular triggers?: no  Hallucinations:  denies   Guns at home:  denies   drugs:  thc  Alcohol: denies   previous hospitalizations: yes   previous suicide attempt:  Denies attempt, but cut his arm he states   What psych meds does patient take: on effexor seroquel vyvanse  Any changes to those meds: yes  Taking psych meds regularly: yes  Would like to sign self in?: yes    Any Medical complaints? no      Prior to Admission Medications   Prescriptions Last Dose Informant Patient Reported? Taking?    OLANZapine (ZyPREXA) 10 mg tablet   Yes No   Sig: Take 10 mg by mouth   Patient not taking: Reported on 7/22/2023   QUEtiapine (SEROquel) 100 mg tablet   Yes No   Sig: Take 100 mg by mouth daily at bedtime   acetaminophen (TYLENOL) 650 mg CR tablet   No No   Sig: Take 1 tablet (650 mg total) by mouth every 8 (eight) hours as needed for mild pain   Patient not taking: Reported on 7/22/2023   busPIRone (BUSPAR) 5 mg tablet   Yes No   Sig: Take 5 mg by mouth Three times a day   Patient not taking: Reported on 7/22/2023   lisdexamfetamine (VYVANSE) 40 MG capsule   Yes No   Sig: Take 40 mg by mouth every morning   naproxen (Naprosyn) 500 mg tablet   No No   Sig: Take 1 tablet (500 mg total) by mouth 2 (two) times a day with meals   Patient not taking: Reported on 7/22/2023   venlafaxine (EFFEXOR-XR) 150 mg 24 hr capsule   No No   Sig: Take 1 capsule (150 mg total) by mouth daily      Facility-Administered Medications: None       Past Medical History:   Diagnosis Date   • ADD (attention deficit disorder)    • Addiction to drug [facility])    • Anxiety    • Borderline personality disorder ([address] [facility])    • Chronic kidney disease    • Depression    • MDD (major depressive disorder)    • Panic attack    • Psychiatric illness    • Self-injurious behavior    • Social phobia    • Substance abuse ([address] [facility])    • Suicide attempt [facility])        Past Surgical History:   Procedure Laterality Date   • NO PAST SURGERIES         History reviewed. No pertinent family history. I have reviewed and agree with the history as documented. E-Cigarette/Vaping   • E-Cigarette Use Never User      E-Cigarette/Vaping Substances   • Nicotine No    • THC No    • CBD No    • Flavoring No    • Other No    • Unknown No      Social History     Tobacco Use   • Smoking status: Every Day     Packs/day: 0.50     Years: 20.00     Total pack years: 10.00     Types: Cigarettes   • Smokeless tobacco: Never   Vaping Use   • Vaping Use: Never used   Substance Use Topics   • Alcohol use: Not Currently   • Drug use: Not Currently     Frequency: 1.0 times per week     Comment: once a week       Review of Systems   Constitutional: Negative for chills, fatigue and fever. HENT: Negative for nosebleeds and sore throat. Eyes: Negative for redness and visual disturbance. Respiratory: Negative for shortness of breath and wheezing. Cardiovascular: Negative for chest pain and palpitations. Gastrointestinal: Negative for abdominal pain and diarrhea. Endocrine: Negative for polyuria. Genitourinary: Negative for difficulty urinating and testicular pain. Musculoskeletal: Negative for back pain and neck stiffness. Skin: Negative for rash and wound. Neurological: Negative for seizures, speech difficulty and headaches. Psychiatric/Behavioral: Positive for decreased concentration, self-injury and suicidal ideas. Negative for dysphoric mood and hallucinations. The patient is nervous/anxious. All other systems reviewed and are negative.       Physical Exam  Physical Exam  Vitals and nursing note reviewed. Constitutional:       Appearance: He is well-developed. HENT:      Head: Normocephalic and atraumatic. Right Ear: External ear normal.      Left Ear: External ear normal.   Eyes:      Conjunctiva/sclera: Conjunctivae normal.   Cardiovascular:      Rate and Rhythm: Normal rate and regular rhythm. Heart sounds: Normal heart sounds. Pulmonary:      Effort: Pulmonary effort is normal.      Breath sounds: Normal breath sounds. Abdominal:      General: There is no distension. Tenderness: There is no guarding. Musculoskeletal:         General: Normal range of motion. Cervical back: Normal range of motion. Skin:     General: Skin is warm and dry. Findings: No rash. Neurological:      Mental Status: He is alert and oriented to person, place, and time. Cranial Nerves: No cranial nerve deficit. Sensory: No sensory deficit. Motor: No abnormal muscle tone. Coordination: Coordination normal.   Psychiatric:         Thought Content: Thought content includes suicidal ideation. Thought content does not include homicidal ideation. Thought content does not include homicidal or suicidal plan.          Vital Signs  ED Triage Vitals   Temperature Pulse Respirations Blood Pressure SpO2   08/21/23 2344 08/21/23 2343 08/21/23 2343 08/21/23 2343 08/21/23 2343   98.3 °F (36.8 °C) 90 16 133/79 94 %      Temp Source Heart Rate Source Patient Position - Orthostatic VS BP Location FiO2 (%)   08/21/23 2344 08/21/23 2343 08/21/23 2343 08/21/23 2343 --   Oral Monitor Sitting Left arm       Pain Score       08/22/23 0000       No Pain           Vitals:    08/21/23 2343 08/21/23 2344   BP: 133/79    Pulse: 90    Patient Position - Orthostatic VS: Sitting Sitting         Visual Acuity      ED Medications  Medications   QUEtiapine (SEROquel) tablet 200 mg (has no administration in time range)   venlafaxine (EFFEXOR-XR) 24 hr capsule 37.5 mg (has no administration in time range)       Diagnostic Studies  Results Reviewed     Procedure Component Value Units Date/Time    Rapid drug screen, urine [[id number]]     Lab Status: No result Specimen: Urine     POCT alcohol breath test [[id number]]     Lab Status: No result                  No orders to display              Procedures  Procedures         ED Course                               SBIRT 20yo+    Flowsheet Row Most Recent Value   Initial Alcohol Screen: US AUDIT-C     1. How often do you have a drink containing alcohol? 0 Filed at: 08/21/2023 2347   2. How many drinks containing alcohol do you have on a typical day you are drinking? 0 Filed at: 08/21/2023 2347   3a. Male UNDER 65: How often do you have five or more drinks on one occasion? 0 Filed at: 08/21/2023 2347   3b. FEMALE Any Age, or MALE 65+: How often do you have 4 or more drinks on one occassion? 0 Filed at: 08/21/2023 2347   Audit-C Score 0 Filed at: 08/21/2023 2347   LEATHA: How many times in the past year have you. .. Used an illegal drug or used a prescription medication for non-medical reasons? Never Filed at: 08/21/2023 2347                    MDM    Reviewed past medical records: yes, known prior psych history    History Provided by patient     Differential considered: Decompensation in setting of known prior psych hx including med non compliance and/or drug induced psychosis. Consideration of tests: Alcohol testing, UDS for signs of altered mental state in setting of drug or alcohol abuse, clearance testing for Crisis eval.   1:1 given history and high risk    Patient to sign 201. Signed out pending placement.        Disposition  Final diagnoses:   Suicidal ideation   Depression, unspecified depression type   MDD (major depressive disorder), recurrent severe, without psychosis ([address])     Time reflects when diagnosis was documented in both MDM as applicable and the Disposition within this note     Time User Action Codes Description Comment    8/22/2023 12:16 AM Aileen Aase Add [R45.851] Suicidal ideation     8/22/2023 12:16 AM Aileen Aase Add Fabius.Po. A] Depression, unspecified depression type     8/22/2023 12:18 AM Aileen Aase Add [F33.2] MDD (major depressive disorder), recurrent severe, without psychosis [facility])       ED Disposition     ED Disposition   Transfer to Behavioral Health Condition   --    Date/Time   Tue Aug 22, 2023 12:16 AM    Comment   Carol Tran should be transferred out to [facility] and has been medically cleared. Follow-up Information    None         Patient's Medications   Discharge Prescriptions    No medications on file       No discharge procedures on file.     PDMP Review       Value Time User    PDMP Reviewed  Yes 9/28/2021  8:17 AM Kim Fiore PA-C          ED Provider  Electronically Signed by           Aileen Aase, MD  08/22/23 8712

## 2023-08-22 NOTE — ED NOTES
Pt given belongings including valuables stored in security, and medications stored in pharmacy.      Ned Portillo RN  08/22/23 6374

## 2023-08-22 NOTE — ED NOTES
The patient was seen by Storm Morales MD, Psychiatry. Dr. Daryl Encinas found the patient to be free of suicidal ideation. He has had recent contact with [facility], his outpatient provider, and with Tiffanie Henderson, his [facility] I[facility]. Patient is reportedly feeling safe to be discharged to the community with his current supports. He reportedly is able to stay with his friend, Graham Aviles. This writer discussed the patient's current presentation and recommended discharge plan with Dr.Holter and with Lisette Garza DO, the ED attending physician. They agree with the patient being discharged at this time to follow up with the above providers. The patient declined to complete a safety plan, however, he was clear that he would reach out to his [facility], call 911, or return to the Emergency Department if his symptoms worsen. .     This writer discussed discharge plans with the patient, who agrees with and understands the discharge plans.      [facility]: [phone]  [facility] CHEMICAL DEPENDENCY [facility] office: [address] cell: [phone]  [facility] Line: [phone]    [place] Suicide Prevention Hotline:  [facility] [address] [place] [address] [phone] - [facility] Crisis/Mobile Crisis   [address]: [phone]  [facility]: [address] [place] [address] [phone] - [facility] [address] [place] [address], [place] [phone] - Crisis   [phone] - Peer Support Talk Line (1-9pm daily)  [phone] - Teen Support Talk Line (1-9pm daily)  [phone] [facility] [address] [place] [address] Avenue [address] [place] [address]) [phone] - [facility]

## 2023-08-31 ENCOUNTER — HOSPITAL ENCOUNTER (EMERGENCY)
Facility: HOSPITAL | Age: 38
Discharge: HOME | End: 2023-08-31
Attending: EMERGENCY MEDICINE
Payer: MEDICARE

## 2023-08-31 VITALS
HEART RATE: 84 BPM | DIASTOLIC BLOOD PRESSURE: 83 MMHG | TEMPERATURE: 97.8 F | OXYGEN SATURATION: 99 % | SYSTOLIC BLOOD PRESSURE: 150 MMHG | RESPIRATION RATE: 18 BRPM | BODY MASS INDEX: 30.42 KG/M2 | WEIGHT: 206 LBS

## 2023-08-31 DIAGNOSIS — R58 ECCHYMOSIS: Primary | ICD-10-CM

## 2023-08-31 LAB
APTT PPP: 28 SECONDS (ref 23–37)
BASOPHILS # BLD AUTO: 0.01 THOUSANDS/ÂΜL (ref 0–0.1)
BASOPHILS NFR BLD AUTO: 0 % (ref 0–1)
EOSINOPHIL # BLD AUTO: 0.24 THOUSAND/ÂΜL (ref 0–0.61)
EOSINOPHIL NFR BLD AUTO: 5 % (ref 0–6)
ERYTHROCYTE [DISTWIDTH] IN BLOOD BY AUTOMATED COUNT: 13.3 % (ref 11.6–15.1)
HCT VFR BLD AUTO: 45.9 % (ref 36.5–49.3)
HGB BLD-MCNC: 15.1 G/DL (ref 12–17)
IMM GRANULOCYTES # BLD AUTO: 0.01 THOUSAND/UL (ref 0–0.2)
IMM GRANULOCYTES NFR BLD AUTO: 0 % (ref 0–2)
INR PPP: 0.84 (ref 0.84–1.19)
LYMPHOCYTES # BLD AUTO: 1.94 THOUSANDS/ÂΜL (ref 0.6–4.47)
LYMPHOCYTES NFR BLD AUTO: 39 % (ref 14–44)
MCH RBC QN AUTO: 29.2 PG (ref 26.8–34.3)
MCHC RBC AUTO-ENTMCNC: 32.9 G/DL (ref 31.4–37.4)
MCV RBC AUTO: 89 FL (ref 82–98)
MONOCYTES # BLD AUTO: 0.35 THOUSAND/ÂΜL (ref 0.17–1.22)
MONOCYTES NFR BLD AUTO: 7 % (ref 4–12)
NEUTROPHILS # BLD AUTO: 2.4 THOUSANDS/ÂΜL (ref 1.85–7.62)
NEUTS SEG NFR BLD AUTO: 49 % (ref 43–75)
NRBC BLD AUTO-RTO: 0 /100 WBCS
PLATELET # BLD AUTO: 292 THOUSANDS/UL (ref 149–390)
PMV BLD AUTO: 9 FL (ref 8.9–12.7)
PROTHROMBIN TIME: 11.8 SECONDS (ref 11.6–14.5)
RBC # BLD AUTO: 5.18 MILLION/UL (ref 3.88–5.62)
WBC # BLD AUTO: 4.95 THOUSAND/UL (ref 4.31–10.16)

## 2023-08-31 PROCEDURE — 85610 PROTHROMBIN TIME: CPT | Performed by: EMERGENCY MEDICINE

## 2023-08-31 PROCEDURE — 85025 COMPLETE CBC W/AUTO DIFF WBC: CPT | Performed by: EMERGENCY MEDICINE

## 2023-08-31 PROCEDURE — 36415 COLL VENOUS BLD VENIPUNCTURE: CPT | Performed by: EMERGENCY MEDICINE

## 2023-08-31 PROCEDURE — 99283 EMERGENCY DEPT VISIT LOW MDM: CPT

## 2023-08-31 PROCEDURE — 99283 EMERGENCY DEPT VISIT LOW MDM: CPT | Performed by: EMERGENCY MEDICINE

## 2023-08-31 PROCEDURE — 85730 THROMBOPLASTIN TIME PARTIAL: CPT | Performed by: EMERGENCY MEDICINE

## 2023-08-31 NOTE — ED PROVIDER NOTES
History  Chief Complaint   Patient presents with   • Medical Problem     Pt presenting with bruise on their upper R bicep. Pt states they "didn't hit anything". 80-year-old gentleman presents with concern about bruising that occurred on his right upper extremity. He states that this occurred 4 to 5 days ago. He does not recall a specific injury or time of onset. He has taken no medications for discomfort and denies gum bleeding, other bruises, or any other acute issues or concerns. Medical Problem  Severity:  Mild  Onset quality:  Gradual  Duration:  5 days  Timing:  Constant  Progression:  Partially resolved  Chronicity:  New  Relieved by:  Nothing  Worsened by:  Nothing  Ineffective treatments:  None tried  Associated symptoms: no abdominal pain, no chest pain, no cough, no fatigue, no fever, no headaches, no myalgias, no shortness of breath and no vomiting        Prior to Admission Medications   Prescriptions Last Dose Informant Patient Reported? Taking?    OLANZapine (ZyPREXA) 10 mg tablet   Yes No   Sig: Take 10 mg by mouth   Patient not taking: Reported on 7/22/2023   QUEtiapine (SEROquel) 100 mg tablet   Yes No   Sig: Take 100 mg by mouth daily at bedtime   acetaminophen (TYLENOL) 650 mg CR tablet   No No   Sig: Take 1 tablet (650 mg total) by mouth every 8 (eight) hours as needed for mild pain   Patient not taking: Reported on 7/22/2023   busPIRone (BUSPAR) 5 mg tablet   Yes No   Sig: Take 5 mg by mouth Three times a day   Patient not taking: Reported on 7/22/2023   lisdexamfetamine (VYVANSE) 40 MG capsule   Yes No   Sig: Take 40 mg by mouth every morning   naproxen (Naprosyn) 500 mg tablet   No No   Sig: Take 1 tablet (500 mg total) by mouth 2 (two) times a day with meals   Patient not taking: Reported on 7/22/2023   venlafaxine (EFFEXOR-XR) 150 mg 24 hr capsule   No No   Sig: Take 1 capsule (150 mg total) by mouth daily      Facility-Administered Medications: None       Past Medical History: Diagnosis Date   • ADD (attention deficit disorder)    • Addiction to drug [facility])    • Anxiety    • Borderline personality disorder ([facility])    • Chronic kidney disease    • Depression    • MDD (major depressive disorder)    • Panic attack    • Psychiatric illness    • Self-injurious behavior    • Social phobia    • Substance abuse ([facility])    • Suicide attempt [facility])        Past Surgical History:   Procedure Laterality Date   • NO PAST SURGERIES         History reviewed. No pertinent family history. I have reviewed and agree with the history as documented. E-Cigarette/Vaping   • E-Cigarette Use Never User      E-Cigarette/Vaping Substances   • Nicotine No    • THC No    • CBD No    • Flavoring No    • Other No    • Unknown No      Social History     Tobacco Use   • Smoking status: Every Day     Packs/day: 0.50     Years: 20.00     Total pack years: 10.00     Types: Cigarettes   • Smokeless tobacco: Never   Vaping Use   • Vaping Use: Never used   Substance Use Topics   • Alcohol use: Not Currently   • Drug use: Not Currently     Frequency: 1.0 times per week     Comment: once a week       Review of Systems   Constitutional: Negative for fatigue and fever. HENT: Negative for mouth sores. Respiratory: Negative for cough and shortness of breath. Cardiovascular: Negative for chest pain. Gastrointestinal: Negative for abdominal pain and vomiting. Musculoskeletal: Negative for myalgias. Skin: Negative for wound. Neurological: Negative for headaches. Physical Exam  Physical Exam  Vitals and nursing note reviewed. Constitutional:       General: He is not in acute distress. Appearance: Normal appearance. He is well-developed. He is not ill-appearing, toxic-appearing or diaphoretic. HENT:      Head: Normocephalic and atraumatic.       Right Ear: External ear normal.      Left Ear: External ear normal.      Nose: Nose normal.      Mouth/Throat:      Mouth: Mucous membranes are moist.      Pharynx: Oropharynx is clear. Eyes:      Conjunctiva/sclera: Conjunctivae normal.      Pupils: Pupils are equal, round, and reactive to light. Cardiovascular:      Rate and Rhythm: Normal rate and regular rhythm. Heart sounds: Normal heart sounds. Pulmonary:      Effort: Pulmonary effort is normal. No respiratory distress. Breath sounds: Normal breath sounds. Abdominal:      General: Bowel sounds are normal. There is no distension. Palpations: Abdomen is soft. Tenderness: There is no abdominal tenderness. There is no guarding. Musculoskeletal:         General: Normal range of motion. Cervical back: Neck supple. No rigidity. Right lower leg: No edema. Left lower leg: No edema. Skin:     General: Skin is warm and dry. Capillary Refill: Capillary refill takes less than 2 seconds. Findings: Bruising present. Neurological:      General: No focal deficit present. Mental Status: He is alert and oriented to person, place, and time.    Psychiatric:         Mood and Affect: Mood normal.         Behavior: Behavior normal.         Vital Signs  ED Triage Vitals [08/31/23 0236]   Temperature Pulse Respirations Blood Pressure SpO2   97.8 °F (36.6 °C) 84 18 150/83 99 %      Temp Source Heart Rate Source Patient Position - Orthostatic VS BP Location FiO2 (%)   Oral Monitor Sitting Left arm --      Pain Score       --           Vitals:    08/31/23 0236   BP: 150/83   Pulse: 84   Patient Position - Orthostatic VS: Sitting         Visual Acuity      ED Medications  Medications - No data to display    Diagnostic Studies  Results Reviewed     Procedure Component Value Units Date/Time    Protime-INR [[id number]]  (Normal) Collected: 08/31/23 0246    Lab Status: Final result Specimen: Blood from Arm, Left Updated: 08/31/23 0307     Protime 11.8 seconds      INR 0.84    APTT [[id number]]  (Normal) Collected: 08/31/23 0246    Lab Status: Final result Specimen: Blood from Arm, Left Updated: 08/31/23 0307     PTT 28 seconds     CBC and differential [[id number]] Collected: 08/31/23 0246    Lab Status: Final result Specimen: Blood from Arm, Left Updated: 08/31/23 0254     WBC 4.95 Thousand/uL      RBC 5.18 Million/uL      Hemoglobin 15.1 g/dL      Hematocrit 45.9 %      MCV 89 fL      MCH 29.2 pg      MCHC 32.9 g/dL      RDW 13.3 %      MPV 9.0 fL      Platelets 875 Thousands/uL      nRBC 0 /100 WBCs      Neutrophils Relative 49 %      Immat GRANS % 0 %      Lymphocytes Relative 39 %      Monocytes Relative 7 %      Eosinophils Relative 5 %      Basophils Relative 0 %      Neutrophils Absolute 2.40 Thousands/µL      Immature Grans Absolute 0.01 Thousand/uL      Lymphocytes Absolute 1.94 Thousands/µL      Monocytes Absolute 0.35 Thousand/µL      Eosinophils Absolute 0.24 Thousand/µL      Basophils Absolute 0.01 Thousands/µL                  No orders to display              Procedures  Procedures         ED Course  ED Course as of 08/31/23 0320   Thu Aug 31, 2023   0252 After labs were drawn, reviewing the medical records indicated that the patient had a telephone consult for the same complaint. He admitted at that time that the bruising started after donating plasma - reports that he fell asleep with the needle in his arm. SBIRT 22yo+    Flowsheet Row Most Recent Value   Initial Alcohol Screen: US AUDIT-C     1. How often do you have a drink containing alcohol? 0 Filed at: 08/31/2023 0243   2. How many drinks containing alcohol do you have on a typical day you are drinking? 0 Filed at: 08/31/2023 0243   3a. Male UNDER 65: How often do you have five or more drinks on one occasion? 0 Filed at: 08/31/2023 0243   Audit-C Score 0 Filed at: 08/31/2023 9648   LEATHA: How many times in the past year have you. .. Used an illegal drug or used a prescription medication for non-medical reasons?  Never Filed at: 08/31/2023 0243                    Medical Decision Making  40-year-old gentleman presents with concern about bruising of his right upper extremity. Laboratory studies were unremarkable. In further review of the medical records, it is clear that the bruising initiated secondary to plasma donation. The patient was again counseled on natural consequences of plasma donation. There are no other concerning signs or symptoms on examination or history taking. Patient will follow-up as needed. Amount and/or Complexity of Data Reviewed  Labs: ordered. Disposition  Final diagnoses:   Ecchymosis     Time reflects when diagnosis was documented in both MDM as applicable and the Disposition within this note     Time User Action Codes Description Comment    8/31/2023  3:18 AM Mariely Morales Add [R58] Ecchymosis       ED Disposition     ED Disposition   Discharge    Condition   Stable    Date/Time   Thu Aug 31, 2023  3:18 AM    Comment   [address] discharge to home/self care. Follow-up Information    None         Patient's Medications   Discharge Prescriptions    No medications on file       No discharge procedures on file.     PDMP Review       Value Time User    PDMP Reviewed  Yes 8/22/2023 10:34 AM Alta Mayotte Holter, DO          ED Provider  Electronically Signed by           Dianne Victoria DO  08/31/23 0320

## 2023-09-03 ENCOUNTER — HOSPITAL ENCOUNTER (EMERGENCY)
Facility: HOSPITAL | Age: 38
Discharge: HOME | End: 2023-09-03
Attending: EMERGENCY MEDICINE
Payer: MEDICARE

## 2023-09-03 VITALS
DIASTOLIC BLOOD PRESSURE: 59 MMHG | OXYGEN SATURATION: 98 % | HEART RATE: 63 BPM | WEIGHT: 205 LBS | SYSTOLIC BLOOD PRESSURE: 104 MMHG | HEIGHT: 69 IN | TEMPERATURE: 98.1 F | RESPIRATION RATE: 19 BRPM | BODY MASS INDEX: 30.36 KG/M2

## 2023-09-03 DIAGNOSIS — R45.851 DEPRESSION WITH SUICIDAL IDEATION: Primary | ICD-10-CM

## 2023-09-03 DIAGNOSIS — Z76.0 ENCOUNTER FOR MEDICATION REFILL: ICD-10-CM

## 2023-09-03 DIAGNOSIS — F32.A DEPRESSION WITH SUICIDAL IDEATION: Primary | ICD-10-CM

## 2023-09-03 DIAGNOSIS — F32.9 MAJOR DEPRESSIVE DISORDER: ICD-10-CM

## 2023-09-03 DIAGNOSIS — F33.2 MDD (MAJOR DEPRESSIVE DISORDER), RECURRENT SEVERE, WITHOUT PSYCHOSIS (HCC): ICD-10-CM

## 2023-09-03 LAB
AMPHETAMINES SERPL QL SCN: NEGATIVE
BARBITURATES UR QL: NEGATIVE
BENZODIAZ UR QL: NEGATIVE
COCAINE UR QL: NEGATIVE
ETHANOL EXG-MCNC: 0 MG/DL
OPIATES UR QL SCN: NEGATIVE
OXYCODONE+OXYMORPHONE UR QL SCN: NEGATIVE
PCP UR QL: NEGATIVE
THC UR QL: POSITIVE

## 2023-09-03 PROCEDURE — 80307 DRUG TEST PRSMV CHEM ANLYZR: CPT | Performed by: EMERGENCY MEDICINE

## 2023-09-03 PROCEDURE — 82075 ASSAY OF BREATH ETHANOL: CPT | Performed by: EMERGENCY MEDICINE

## 2023-09-03 PROCEDURE — 99284 EMERGENCY DEPT VISIT MOD MDM: CPT | Performed by: EMERGENCY MEDICINE

## 2023-09-03 PROCEDURE — 99284 EMERGENCY DEPT VISIT MOD MDM: CPT

## 2023-09-03 RX ORDER — QUETIAPINE FUMARATE 100 MG/1
100 TABLET, FILM COATED ORAL
Qty: 15 TABLET | Refills: 0 | Status: ON HOLD | OUTPATIENT
Start: 2023-09-03 | End: 2023-09-18

## 2023-09-03 RX ORDER — VENLAFAXINE HYDROCHLORIDE 150 MG/1
150 CAPSULE, EXTENDED RELEASE ORAL DAILY
Qty: 15 CAPSULE | Refills: 0 | Status: ON HOLD | OUTPATIENT
Start: 2023-09-03 | End: 2023-09-18

## 2023-09-03 NOTE — ED NOTES
Chart review indicates patient is active with:    [facility]:  Psychiatrist- Dr. Rozina Rincon [address]  Crisis Intervention Specialist II  09/03/23

## 2023-09-03 NOTE — DISCHARGE INSTRUCTIONS
Please follow up with your outpatient providers on Tuesday to get your appointments rescheduled. A voicemail was left for them by hospital staff to inform them that you were seen in the Emergency Department today.      [facility]: [phone] office: [address] cell: [phone] [facility]: [phone]

## 2023-09-03 NOTE — ED NOTES
Crisis worker and ED provider met with Pt. Pt is tearful and is requesting to be hospitalized, although continues to deny any SI/HI/AH/VH or current thoughts to self-harm. Pt is fearful that he will have the thought to cut himself if he is not in the hospital. Pt current available resources were reviewed with him and educated to utilize his ICM and outpatient therapist. Pt educated on return precautions and he voiced understanding. Pt feels safe at the friend's home he is currently staying at and continues to deny SI/HI/AH/VH.

## 2023-09-03 NOTE — ED NOTES
Call placed to patient's ICM through [facility] ([phone])- Left message requesting call back.     Sintia Code  Crisis Intervention Specialist II  09/03/23

## 2023-09-03 NOTE — ED PROVIDER NOTES
Pt Name: Corby Baum  MRN: [id number]  [address] 1985  Age/Sex: 45 y.o. male  Date of evaluation: 9/3/2023  PCP: Elsworth Opitz, MD    CHIEF COMPLAINT    Chief Complaint   Patient presents with   • Depression     Arrives with Police, reports increased depression, ran out of meds (effexor, vyvanse, seroquel) approx 2 days. Denies SI/HI/AH/VH. HPI    Dariana Mcgill presents to the Emergency Department complaining of depression. He has a hx of cutting/ self harm but has not done that in over a month. No SI/HI. He is out of his medication and is not sure when he will be able to get them. He was recently seen and had signed a 201 but then changed his mind and left the hospital.  He is not sure that was the best idea but he does not have criteria for inpatient admission at this time. HPI      Past Medical and Surgical History    Past Medical History:   Diagnosis Date   • ADD (attention deficit disorder)    • Addiction to drug [facility])    • Anxiety    • Borderline personality disorder ([facility])    • Chronic kidney disease    • Depression    • MDD (major depressive disorder)    • Panic attack    • Psychiatric illness    • Self-injurious behavior    • Social phobia    • Substance abuse ([facility])    • Suicide attempt [facility])        Past Surgical History:   Procedure Laterality Date   • NO PAST SURGERIES         History reviewed. No pertinent family history. Social History     Tobacco Use   • Smoking status: Every Day     Packs/day: 0.50     Years: 20.00     Total pack years: 10.00     Types: Cigarettes   • Smokeless tobacco: Never   Vaping Use   • Vaping Use: Never used   Substance Use Topics   • Alcohol use: Not Currently   • Drug use: Yes     Frequency: 1.0 times per week     Types: Marijuana     Comment: once a week         . Allergies    No Known Allergies    Home Medications    Prior to Admission medications    Medication Sig Start Date End Date Taking?  Authorizing Provider   acetaminophen (TYLENOL) 650 mg CR tablet Take 1 tablet (650 mg total) by mouth every 8 (eight) hours as needed for mild pain  Patient not taking: Reported on 7/22/2023 4/1/22   Jon Cuevas PA-C   busPIRone (BUSPAR) 5 mg tablet Take 5 mg by mouth Three times a day  Patient not taking: Reported on 7/22/2023    Historical Provider, MD   lisdexamfetamine (VYVANSE) 40 MG capsule Take 40 mg by mouth every morning    Historical Provider, MD   naproxen (Naprosyn) 500 mg tablet Take 1 tablet (500 mg total) by mouth 2 (two) times a day with meals  Patient not taking: Reported on 7/22/2023 4/1/22   Jon Cuevas PA-C   OLANZapine (ZyPREXA) 10 mg tablet Take 10 mg by mouth  Patient not taking: Reported on 7/22/2023    Historical Provider, MD   QUEtiapine (SEROquel) 100 mg tablet Take 100 mg by mouth daily at bedtime    Historical Provider, MD   venlafaxine (EFFEXOR-XR) 150 mg 24 hr capsule Take 1 capsule (150 mg total) by mouth daily 9/28/21 7/22/23  Sravani Bryant PA-C           Review of Systems    Review of Systems   Constitutional: Negative for chills and fever. HENT: Negative for ear pain and sore throat. Eyes: Negative for pain and visual disturbance. Respiratory: Negative for cough and shortness of breath. Cardiovascular: Negative for chest pain and palpitations. Gastrointestinal: Negative for abdominal pain and vomiting. Genitourinary: Negative for dysuria and hematuria. Musculoskeletal: Negative for arthralgias and back pain. Skin: Negative for color change and rash. Neurological: Negative for seizures and syncope. Psychiatric/Behavioral: Negative for agitation, self-injury and suicidal ideas. All other systems reviewed and are negative.         Physical Exam      ED Triage Vitals   Temperature Pulse Respirations Blood Pressure SpO2   09/03/23 1344 09/03/23 1344 09/03/23 1344 09/03/23 1344 09/03/23 1344   98.1 °F (36.7 °C) 87 19 131/84 98 %      Temp Source Heart Rate Source Patient Position - Orthostatic VS BP Location FiO2 (%)   09/03/23 1344 09/03/23 1456 09/03/23 1456 09/03/23 1456 --   Oral Monitor Sitting Right arm       Pain Score       09/03/23 1344       No Pain               Physical Exam  Vitals and nursing note reviewed. Constitutional:       General: He is not in acute distress. Appearance: He is well-developed. He is not diaphoretic. HENT:      Head: Normocephalic and atraumatic. Nose: Nose normal.   Eyes:      Conjunctiva/sclera: Conjunctivae normal.      Pupils: Pupils are equal, round, and reactive to light. Cardiovascular:      Rate and Rhythm: Normal rate and regular rhythm. Heart sounds: Normal heart sounds. No murmur heard. No friction rub. No gallop. Pulmonary:      Effort: Pulmonary effort is normal. No respiratory distress. Breath sounds: Normal breath sounds. No wheezing or rales. Abdominal:      General: Bowel sounds are normal.      Palpations: Abdomen is soft. Tenderness: There is no abdominal tenderness. There is no guarding or rebound. Musculoskeletal:         General: Normal range of motion. Cervical back: Normal range of motion and neck supple. Skin:     General: Skin is warm and dry. Neurological:      Mental Status: He is alert and oriented to person, place, and time. Psychiatric:         Behavior: Behavior normal.                Assessment and Plan    Wilmer Powell is a 45 y.o. male who presents with depression. Physical examination unremarkable. Discussed with crisis who saw the patient. [facility] for discharge. Will provide short term Rx.      MDM    Diagnostic Results          Labs:    Results for orders placed or performed during the hospital encounter of 09/03/23   Rapid drug screen, urine   Result Value Ref Range    Amph/Meth UR Negative Negative    Barbiturate Ur Negative Negative    Benzodiazepine Urine Negative Negative    Cocaine Urine Negative Negative    Methadone Urine      Opiate Urine Negative Negative    PCP Ur Negative Negative    THC Urine Positive (A) Negative    Oxycodone Urine Negative Negative   POCT alcohol breath test   Result Value Ref Range    EXTBreath Alcohol 0.00        All labs reviewed and utilized in the medical decision making process    Radiology:    No orders to display       All radiology studies independently viewed by me and interpreted by the radiologist.    Procedure    Procedures      ED Course of Care and Re-Assessments        Medications - No data to display        FINAL IMPRESSION    Final diagnoses:   Depression with suicidal ideation         DISPOSITION/PLAN    Time reflects when diagnosis was documented in both MDM as applicable and the Disposition within this note     Time User Action Codes Description Comment    9/3/2023  5:11 PM Navarro Acre Add [F32.9] Major depressive disorder     9/3/2023  5:11 PM Navarro Acre Add [F33.2] MDD (major depressive disorder), recurrent severe, without psychosis ([address])     9/3/2023  5:13 PM Navarro Acre Add Anshu.Anthony. A,  R45.851] Depression with suicidal ideation       ED Disposition     ED Disposition   Discharge    Condition   Stable    Date/Time   Sun Sep 3, 2023  5:10 PM    Comment   [address] discharge to home/self care.                Follow-up Information     Follow up With Specialties Details Why Contact Info    Clarke Olszewski, MD Family Medicine Schedule an appointment as soon as possible for a visit   [address] [facility] [address] [facility] [address] [phone]              PATIENT REFERRED TO:    Clarke Olszewski, MD  [address] [facility] [address]  [address] [place] [address] [phone]    Schedule an appointment as soon as possible for a visit         DISCHARGE MEDICATIONS:    Current Discharge Medication List      CONTINUE these medications which have CHANGED    Details   lisdexamfetamine (VYVANSE) 40 MG capsule Take 1 capsule (40 mg total) by mouth every morning for 15 days Max Daily Amount: 40 mg  Qty: 15 capsule, Refills: 0    Associated Diagnoses: Depression with suicidal ideation      QUEtiapine (SEROquel) 100 mg tablet Take 1 tablet (100 mg total) by mouth daily at bedtime for 15 days  Qty: 15 tablet, Refills: 0    Associated Diagnoses: Depression with suicidal ideation      venlafaxine (EFFEXOR-XR) 150 mg 24 hr capsule Take 1 capsule (150 mg total) by mouth daily for 15 days  Qty: 15 capsule, Refills: 0    Associated Diagnoses: Major depressive disorder; MDD (major depressive disorder), recurrent severe, without psychosis ([address])         CONTINUE these medications which have NOT CHANGED    Details   acetaminophen (TYLENOL) 650 mg CR tablet Take 1 tablet (650 mg total) by mouth every 8 (eight) hours as needed for mild pain  Qty: 30 tablet, Refills: 0    Associated Diagnoses: Viral syndrome      busPIRone (BUSPAR) 5 mg tablet Take 5 mg by mouth Three times a day      OLANZapine (ZyPREXA) 10 mg tablet Take 10 mg by mouth             No discharge procedures on file.          Celia Galicia, [facility], DO  09/03/23 5114

## 2023-09-03 NOTE — ED NOTES
This writer discussed the patients current presentation and recommended discharge plan with Dr. Estella Man. They agree with the patient being discharged at this time with referrals and/or information about outpatient follow up. The patient was Instructed to follow up with their [facility] and [facility]. The patient was provided with referral information for:   [facility] and outpatient provider. The patient declined to complete a safety plan, however, he was clear that he would reach out to his [facility], call 911, or return to the Emergency Department if his symptoms worsen. In addition, the patient was instructed to call local [facility] crisis, other crisis services, 91 or to go to the nearest ER immediately if their situation changes at any time. This writer discussed discharge plans with the patient, who agrees with and understands the discharge plans.        [facility]: [phone]   [facility] CHEMICAL DEPENDENCY [place] [facility] office: [address] [facility] cell: [phone]   [facility] Line: [phone]          [place] Suicide Prevention Hotline:  [address] [place] [phone] - [facility] Crisis/Mobile Crisis   [address]: [phone]  [facility]: [address] [place] [address] [phone] - [address] [facility] [address] [place] [address] [phone] - Crisis   [phone] - Peer Support Talk Line (1-9pm daily)  [phone] - Teen Support Talk Line (1-9pm daily)  [phone] [address] [phone] - [facility]

## 2023-09-12 ENCOUNTER — HOSPITAL ENCOUNTER (EMERGENCY)
Facility: HOSPITAL | Age: 38
End: 2023-09-13
Attending: EMERGENCY MEDICINE
Payer: MEDICARE

## 2023-09-12 DIAGNOSIS — F33.2 SEVERE EPISODE OF RECURRENT MAJOR DEPRESSIVE DISORDER, WITHOUT PSYCHOTIC FEATURES (HCC): Primary | ICD-10-CM

## 2023-09-12 DIAGNOSIS — Z00.8 MEDICAL CLEARANCE FOR PSYCHIATRIC ADMISSION: ICD-10-CM

## 2023-09-12 DIAGNOSIS — R45.851 SUICIDAL IDEATIONS: ICD-10-CM

## 2023-09-12 LAB — ETHANOL EXG-MCNC: 0 MG/DL

## 2023-09-12 PROCEDURE — 99283 EMERGENCY DEPT VISIT LOW MDM: CPT

## 2023-09-12 PROCEDURE — 99285 EMERGENCY DEPT VISIT HI MDM: CPT | Performed by: EMERGENCY MEDICINE

## 2023-09-12 PROCEDURE — 82075 ASSAY OF BREATH ETHANOL: CPT | Performed by: EMERGENCY MEDICINE

## 2023-09-12 RX ORDER — BUSPIRONE HYDROCHLORIDE 5 MG/1
5 TABLET ORAL 3 TIMES DAILY
Status: DISCONTINUED | OUTPATIENT
Start: 2023-09-12 | End: 2023-09-13

## 2023-09-12 RX ORDER — QUETIAPINE FUMARATE 100 MG/1
100 TABLET, FILM COATED ORAL
Status: DISCONTINUED | OUTPATIENT
Start: 2023-09-12 | End: 2023-09-14 | Stop reason: HOSPADM

## 2023-09-12 RX ORDER — VENLAFAXINE HYDROCHLORIDE 75 MG/1
150 CAPSULE, EXTENDED RELEASE ORAL DAILY
Status: DISCONTINUED | OUTPATIENT
Start: 2023-09-13 | End: 2023-09-14 | Stop reason: HOSPADM

## 2023-09-13 VITALS
OXYGEN SATURATION: 99 % | TEMPERATURE: 97.7 F | HEART RATE: 67 BPM | BODY MASS INDEX: 29.7 KG/M2 | WEIGHT: 201.1 LBS | DIASTOLIC BLOOD PRESSURE: 65 MMHG | RESPIRATION RATE: 20 BRPM | SYSTOLIC BLOOD PRESSURE: 122 MMHG

## 2023-09-13 PROCEDURE — 99245 OFF/OP CONSLTJ NEW/EST HI 55: CPT | Performed by: PSYCHIATRY & NEUROLOGY

## 2023-09-13 RX ORDER — DIPHENHYDRAMINE HYDROCHLORIDE 50 MG/ML
50 INJECTION INTRAMUSCULAR; INTRAVENOUS EVERY 6 HOURS PRN
Status: CANCELLED | OUTPATIENT
Start: 2023-09-13

## 2023-09-13 RX ORDER — HYDROXYZINE 50 MG/1
50 TABLET, FILM COATED ORAL
Status: CANCELLED | OUTPATIENT
Start: 2023-09-13

## 2023-09-13 RX ORDER — LORAZEPAM 2 MG/ML
2 INJECTION INTRAMUSCULAR EVERY 6 HOURS PRN
Status: CANCELLED | OUTPATIENT
Start: 2023-09-13

## 2023-09-13 RX ORDER — PROPRANOLOL HYDROCHLORIDE 20 MG/1
10 TABLET ORAL EVERY 8 HOURS PRN
Status: CANCELLED | OUTPATIENT
Start: 2023-09-13

## 2023-09-13 RX ORDER — ACETAMINOPHEN 325 MG/1
650 TABLET ORAL EVERY 4 HOURS PRN
Status: CANCELLED | OUTPATIENT
Start: 2023-09-13

## 2023-09-13 RX ORDER — AMOXICILLIN 250 MG
1 CAPSULE ORAL DAILY PRN
Status: CANCELLED | OUTPATIENT
Start: 2023-09-13

## 2023-09-13 RX ORDER — QUETIAPINE FUMARATE 100 MG/1
100 TABLET, FILM COATED ORAL
Status: CANCELLED | OUTPATIENT
Start: 2023-09-13

## 2023-09-13 RX ORDER — ACETAMINOPHEN 325 MG/1
975 TABLET ORAL EVERY 6 HOURS PRN
Status: CANCELLED | OUTPATIENT
Start: 2023-09-13

## 2023-09-13 RX ORDER — BENZTROPINE MESYLATE 1 MG/1
1 TABLET ORAL
Status: CANCELLED | OUTPATIENT
Start: 2023-09-13

## 2023-09-13 RX ORDER — POLYETHYLENE GLYCOL 3350 17 G/17G
17 POWDER, FOR SOLUTION ORAL DAILY PRN
Status: CANCELLED | OUTPATIENT
Start: 2023-09-13

## 2023-09-13 RX ORDER — ACETAMINOPHEN 325 MG/1
650 TABLET ORAL EVERY 6 HOURS PRN
Status: CANCELLED | OUTPATIENT
Start: 2023-09-13

## 2023-09-13 RX ORDER — LORAZEPAM 2 MG/ML
2 INJECTION INTRAMUSCULAR
Status: CANCELLED | OUTPATIENT
Start: 2023-09-13

## 2023-09-13 RX ORDER — HALOPERIDOL 5 MG/ML
5 INJECTION INTRAMUSCULAR
Status: CANCELLED | OUTPATIENT
Start: 2023-09-13

## 2023-09-13 RX ORDER — HYDROXYZINE HYDROCHLORIDE 25 MG/1
25 TABLET, FILM COATED ORAL
Status: CANCELLED | OUTPATIENT
Start: 2023-09-13

## 2023-09-13 RX ORDER — LORAZEPAM 2 MG/ML
1 INJECTION INTRAMUSCULAR
Status: CANCELLED | OUTPATIENT
Start: 2023-09-13

## 2023-09-13 RX ORDER — HALOPERIDOL 5 MG/1
5 TABLET ORAL
Status: CANCELLED | OUTPATIENT
Start: 2023-09-13

## 2023-09-13 RX ORDER — BENZTROPINE MESYLATE 1 MG/ML
1 INJECTION INTRAMUSCULAR; INTRAVENOUS
Status: CANCELLED | OUTPATIENT
Start: 2023-09-13

## 2023-09-13 RX ORDER — HYDROXYZINE 50 MG/1
100 TABLET, FILM COATED ORAL
Status: CANCELLED | OUTPATIENT
Start: 2023-09-13

## 2023-09-13 RX ORDER — HALOPERIDOL 5 MG/ML
2.5 INJECTION INTRAMUSCULAR
Status: CANCELLED | OUTPATIENT
Start: 2023-09-13

## 2023-09-13 RX ORDER — HALOPERIDOL 0.5 MG/1
2 TABLET ORAL
Status: CANCELLED | OUTPATIENT
Start: 2023-09-13

## 2023-09-13 RX ORDER — VENLAFAXINE HYDROCHLORIDE 75 MG/1
150 CAPSULE, EXTENDED RELEASE ORAL DAILY
Status: CANCELLED | OUTPATIENT
Start: 2023-09-14

## 2023-09-13 RX ORDER — MAGNESIUM HYDROXIDE/ALUMINUM HYDROXICE/SIMETHICONE 120; 1200; 1200 MG/30ML; MG/30ML; MG/30ML
30 SUSPENSION ORAL EVERY 4 HOURS PRN
Status: CANCELLED | OUTPATIENT
Start: 2023-09-13

## 2023-09-13 RX ORDER — BENZTROPINE MESYLATE 1 MG/ML
0.5 INJECTION INTRAMUSCULAR; INTRAVENOUS
Status: CANCELLED | OUTPATIENT
Start: 2023-09-13

## 2023-09-13 RX ORDER — LANOLIN ALCOHOL/MO/W.PET/CERES
3 CREAM (GRAM) TOPICAL
Status: CANCELLED | OUTPATIENT
Start: 2023-09-13

## 2023-09-13 RX ORDER — BISACODYL 10 MG
10 SUPPOSITORY, RECTAL RECTAL DAILY PRN
Status: CANCELLED | OUTPATIENT
Start: 2023-09-13

## 2023-09-13 RX ADMIN — DEXTROAMPHETAMINE SACCHARATE, AMPHETAMINE ASPARTATE, DEXTROAMPHETAMINE SULFATE AND AMPHETAMINE SULFATE 15 MG: 1.25; 1.25; 1.25; 1.25 TABLET ORAL at 11:18

## 2023-09-13 RX ADMIN — VENLAFAXINE HYDROCHLORIDE 150 MG: 75 CAPSULE, EXTENDED RELEASE ORAL at 11:18

## 2023-09-13 RX ADMIN — BUSPIRONE HYDROCHLORIDE 5 MG: 5 TABLET ORAL at 11:18

## 2023-09-13 RX ADMIN — QUETIAPINE FUMARATE 100 MG: 100 TABLET ORAL at 21:14

## 2023-09-13 NOTE — ED NOTES
Call placed to [facility], [facility], [phone]. Left VM regarding patient's presentation to the ED and requested a return call.

## 2023-09-13 NOTE — ED NOTES
Insurance Authorization for admission:   Phone call placed to [facility]. Phone number: [phone]. Spoke to [employer].     3 days approved, LCD 9/15. Level of care: IP. Review on 9/15.    Authorization # To be issued upon arrival.

## 2023-09-13 NOTE — ED NOTES
Pt is a 45 y.o. male who was brought to the ED with   Chief Complaint   Patient presents with   • Psychiatric Evaluation     Arrives reporting feeling increased depression. Reports having fleeting SI but no intent or plan at this time. No HI. States he has been taking psychiatric medications regularly. Intake Assessment completed, Safety risk Assessment completed  Pt will be admitted to [facility]     Pt is 45 male with a significant PMH of Schizoaffective disorder, depressive type ([address]) (Primary Dx); Unsheltered homelessness; Tobacco use disorder; Attention deficit disorder (ADD) without hyperactivity presenting to the ED after being seen many times in the past 2 months (8/21/2023,8/31/2023, 9/3/2023). Pt reports During assessment pt appeared distant and confused. Pt responded with delayed answers and provided not answer for increased depressed mood, anxiety and "feeling off". Pt was not been compliant with is medication management, but has presented to [address] [place] [address] for medication and advice as recent as 9/5/2023. Pt presents as a poor historian and was unable to discuss past history or current social/medical & (behavioral) needs. Pt is currently still struggling with food insecurities, transportation and mental health stability. He is willing to sign a 201 for [facility] and would like to have his medications reviewed and stabilize with the help of a structured, healthy environment.

## 2023-09-13 NOTE — ED NOTES
Psychiatry, Kenneth Mosquera MD and Vik Yarbrough MD, are recommending an inpatient psychiatric admission. Patient is concerned about a hearing he stated is scheduled tomorrow regarding visitation with his son. He can join the hearing remotely from his phone, as he was planning to do as [facility].

## 2023-09-13 NOTE — ED NOTES
Per Cat Romero from [facility] [place], the patient last received Aristada 400mg IM q 1 month on 8/14.

## 2023-09-13 NOTE — ED NOTES
Patient ate breakfast. He stated he is unsure if he wants to follow through with an admission. Consult to Psychiatry placed.

## 2023-09-13 NOTE — ED NOTES
[place] PROMISe indicates: Active. Recipient #[id number]   [facility] managed by [facility].

## 2023-09-13 NOTE — ED NOTES
ICM in room with patient.   Patient states he does not have SI at this time       Chloe Napoles, RN  09/13/23 6711

## 2023-09-13 NOTE — ED NOTES
Patient is accepted at [facility]. Patient is accepted by Evi Jonas MD.     Patient may go to the floor on 9/14 between 8799-8530 or after 0900. Transportation is arranged with: [facility]  Transportation is scheduled for 9/14 @ 0000. Jeovanny from Intake is aware. Patient is aware. Nurse report is to be called to [phone] prior to patient transfer. Kassy Lee  (RN)  2022 12:37:59

## 2023-09-13 NOTE — EMTALA/ACUTE CARE TRANSFER
[facility] EMERGENCY DEPARTMENT  [address] [id number] [phone]  Dept: [phone]      EMTALA TRANSFER CONSENT    NAME Js Rush                                         1985                              MRN [id number]    I have been informed of my rights regarding examination, treatment, and transfer   by Dr. Nimo Portillo MD    Benefits: Continuity of care    Risks: Potential for delay in receiving treatment      Consent for Transfer:  I acknowledge that my medical condition has been evaluated and explained to me by the emergency department physician or other qualified medical person and/or my attending physician, who has recommended that I be transferred to the service of  Accepting Physician: Lilia Schaumann, MD at [address] [place] [address] [address] Name, [address] : [place] [address]. The above potential benefits of such transfer, the potential risks associated with such transfer, and the probable risks of not being transferred have been explained to me, and I fully understand them. The doctor has explained that, in my case, the benefits of transfer outweigh the risks. I agree to be transferred. I authorize the performance of emergency medical procedures and treatments upon me in both transit and upon arrival at the receiving facility. Additionally, I authorize the release of any and all medical records to the receiving facility and request they be transported with me, if possible. I understand that the safest mode of transportation during a medical emergency is an ambulance and that the Hospital advocates the use of this mode of transport. Risks of traveling to the receiving facility by car, including absence of medical control, life sustaining equipment, such as oxygen, and medical personnel has been explained to me and I fully understand them.     (ИРИНА CORRECT BOX BELOW)  [  ]  I consent to the stated transfer and to be transported by ambulance/helicopter. [  ]  I consent to the stated transfer, but refuse transportation by ambulance and accept full responsibility for my transportation by car. I understand the risks of non-ambulance transfers and I exonerate the Hospital and its staff from any deterioration in my condition that results from this refusal.    X___________________________________________    DATE  23  TIME________  Signature of patient or legally responsible individual signing on patient behalf           RELATIONSHIP TO PATIENT_________________________          Provider Certification    NAME [address]                                         1985                              MRN [id number]    A medical screening exam was performed on the above named patient. Based on the examination:    Condition Necessitating Transfer The primary encounter diagnosis was Severe episode of recurrent major depressive disorder, without psychotic features ([address]). Diagnoses of Suicidal ideations and Medical clearance for psychiatric admission were also pertinent to this visit.     Patient Condition: The patient has been stabilized such that within reasonable medical probability, no material deterioration of the patient condition or the condition of the unborn child(lucero) is likely to result from the transfer    Reason for Transfer: No bed available at level of patient's needs    [facility] Requirements[facility], [address] [address]   · Space available and qualified personnel available for treatment as acknowledged by Kateryna Fernandez [phone]  · Agreed to accept transfer and to provide appropriate medical treatment as acknowledged by       Mayi Levine MD  · Appropriate medical records of the examination and treatment of the patient are provided at the time of transfer   [address] _______  · Transfer will be performed by qualified personnel from [facility]/ [phone]  and appropriate transfer equipment as required, including the use of necessary and appropriate life support measures. Provider Certification: I have examined the patient and explained the following risks and benefits of being transferred/refusing transfer to the patient/family:  General risk, such as traffic hazards, adverse weather conditions, rough terrain or turbulence, possible failure of equipment (including vehicle or aircraft), or consequences of actions of persons outside the control of the transport personnel      Based on these reasonable risks and benefits to the patient and/or the unborn child(lucero), and based upon the information available at the time of the patient’s examination, I certify that the medical benefits reasonably to be expected from the provision of appropriate medical treatments at another medical facility outweigh the increasing risks, if any, to the individual’s medical condition, and in the case of labor to the unborn child, from effecting the transfer.     X____________________________________________ DATE 09/13/23        TIME_______      ORIGINAL - SEND TO MEDICAL RECORDS   COPY - SEND WITH PATIENT DURING TRANSFER

## 2023-09-13 NOTE — ED PROVIDER NOTES
History  Chief Complaint   Patient presents with   • Psychiatric Evaluation     Arrives reporting feeling increased depression. Reports having fleeting SI but no intent or plan at this time. No HI. States he has been taking psychiatric medications regularly. Patient is a 80-year-old male with a history of depression who presents with worsening depression to the point where he's had SI. Plan to cut, h/o cutting in the past.  No attempts this time. No HI, hallucinations. States compliant with meds. No illicit drugs or etoh. Has a therapist at [facility]. Cannot cite any reason for worsening depression. No trigger. Prior to Admission Medications   Prescriptions Last Dose Informant Patient Reported? Taking?    OLANZapine (ZyPREXA) 10 mg tablet   Yes No   Sig: Take 10 mg by mouth   Patient not taking: Reported on 7/22/2023   QUEtiapine (SEROquel) 100 mg tablet   No No   Sig: Take 1 tablet (100 mg total) by mouth daily at bedtime for 15 days   acetaminophen (TYLENOL) 650 mg CR tablet   No No   Sig: Take 1 tablet (650 mg total) by mouth every 8 (eight) hours as needed for mild pain   Patient not taking: Reported on 7/22/2023   busPIRone (BUSPAR) 5 mg tablet   Yes No   Sig: Take 5 mg by mouth Three times a day   Patient not taking: Reported on 7/22/2023   lisdexamfetamine (VYVANSE) 40 MG capsule   No No   Sig: Take 1 capsule (40 mg total) by mouth every morning for 15 days Max Daily Amount: 40 mg   venlafaxine (EFFEXOR-XR) 150 mg 24 hr capsule   No No   Sig: Take 1 capsule (150 mg total) by mouth daily for 15 days      Facility-Administered Medications: None       Past Medical History:   Diagnosis Date   • ADD (attention deficit disorder)    • Addiction to drug [facility])    • Anxiety    • Borderline personality disorder ([address])    • Chronic kidney disease    • Depression    • MDD (major depressive disorder)    • Panic attack    • Psychiatric illness    • Self-injurious behavior    • Social phobia    • Substance abuse ([address])    • Suicide attempt [facility])        Past Surgical History:   Procedure Laterality Date   • NO PAST SURGERIES         History reviewed. No pertinent family history. I have reviewed and agree with the history as documented. E-Cigarette/Vaping   • E-Cigarette Use Never User      E-Cigarette/Vaping Substances   • Nicotine No    • THC No    • CBD No    • Flavoring No    • Other No    • Unknown No      Social History     Tobacco Use   • Smoking status: Every Day     Packs/day: 0.50     Years: 20.00     Total pack years: 10.00     Types: Cigarettes   • Smokeless tobacco: Never   Vaping Use   • Vaping Use: Never used   Substance Use Topics   • Alcohol use: Not Currently   • Drug use: Yes     Frequency: 1.0 times per week     Types: Marijuana     Comment: once a week       Review of Systems   Constitutional: Negative. HENT: Negative. Eyes: Negative. Respiratory: Negative. Cardiovascular: Negative. Gastrointestinal: Negative. Endocrine: Negative. Genitourinary: Negative. Musculoskeletal: Negative. Skin: Negative. Allergic/Immunologic: Negative. Neurological: Negative. Hematological: Negative. Psychiatric/Behavioral: Positive for dysphoric mood and suicidal ideas. All other systems reviewed and are negative. Physical Exam  Physical Exam  Vitals and nursing note reviewed. Constitutional:       Appearance: Normal appearance. He is obese. HENT:      Head: Normocephalic and atraumatic. Cardiovascular:      Rate and Rhythm: Normal rate and regular rhythm. Pulses: Normal pulses. Pulmonary:      Effort: Pulmonary effort is normal.      Breath sounds: Normal breath sounds. Musculoskeletal:         General: Normal range of motion. Cervical back: Normal range of motion and neck supple. Skin:     General: Skin is warm and dry. Capillary Refill: Capillary refill takes less than 2 seconds. Neurological:      General: No focal deficit present. Mental Status: He is alert and oriented to person, place, and time. Psychiatric:         Attention and Perception: Attention normal.         Mood and Affect: Mood is depressed. Affect is flat. Speech: Speech is delayed. Behavior: Behavior is slowed and withdrawn. Thought Content: Thought content includes suicidal ideation. Cognition and Memory: Cognition and memory normal.         Vital Signs  ED Triage Vitals [09/12/23 2130]   Temperature Pulse Respirations Blood Pressure SpO2   98.2 °F (36.8 °C) 76 18 136/81 96 %      Temp Source Heart Rate Source Patient Position - Orthostatic VS BP Location FiO2 (%)   Oral Monitor Sitting Left arm --      Pain Score       --           Vitals:    09/12/23 2130   BP: 136/81   Pulse: 76   Patient Position - Orthostatic VS: Sitting         Visual Acuity      ED Medications  Medications - No data to display    Diagnostic Studies  Results Reviewed     Procedure Component Value Units Date/Time    POCT alcohol breath test [[id number]]     Lab Status: No result     Rapid drug screen, urine [[id number]]     Lab Status: No result Specimen: Urine                  No orders to display              Procedures  Procedures         ED Course                               SBIRT 20yo+    Flowsheet Row Most Recent Value   Initial Alcohol Screen: US AUDIT-C     1. How often do you have a drink containing alcohol? 0 Filed at: 09/12/2023 2132   2. How many drinks containing alcohol do you have on a typical day you are drinking? 0 Filed at: 09/12/2023 2132   3a. Male UNDER 65: How often do you have five or more drinks on one occasion? 0 Filed at: 09/12/2023 2132   Audit-C Score 0 Filed at: 09/12/2023 2132   LEATHA: How many times in the past year have you. .. Used an illegal drug or used a prescription medication for non-medical reasons?  Never Filed at: 09/12/2023 2132                    MDM    Disposition  Final diagnoses:   None     ED Disposition     None Follow-up Information    None         Patient's Medications   Discharge Prescriptions    No medications on file       No discharge procedures on file.     PDMP Review       Value Time User    PDMP Reviewed  Yes 8/22/2023 10:34 AM Dionne Otter Holter, DO          ED Provider  Electronically Signed by           Ruth Fischer MD  09/12/23 9503

## 2023-09-13 NOTE — ED NOTES
Pt has been sleeping most of the day and states not being suicidal.  Once ICM visited he stated he would like to leave and he was told that psychiatry is recommending he goes inpatient since he has not being compliant with his medications of follow up as outpatient.        Luca Hill RN  09/13/23 0195

## 2023-09-13 NOTE — ED CARE HANDOFF
Emergency Department Sign Out Note        Sign out and transfer of care from Dr. Audelia Yun. See Separate Emergency Department note. The patient, Dona Hess, was evaluated by the previous provider for Depression. Workup Completed:  See previous notes    ED Course / Workup Pending (followup):                                    ED Course as of 09/13/23 0713   Wed Sep 13, 2023   0712 +depression, seen by crisis, 201 appropriate. Awaiting placement. Continue safety plan. Procedures  MDM        Disposition  Final diagnoses:   Severe episode of recurrent major depressive disorder, without psychotic features ([address])   Suicidal ideations     Time reflects when diagnosis was documented in both MDM as applicable and the Disposition within this note     Time User Action Codes Description Comment    9/12/2023  9:39 PM Juan Manuel Tatiana Add [F33.2] Severe episode of recurrent major depressive disorder, without psychotic features ([address])     9/12/2023  9:40 PM Juan Manuel Tatiana Add [X07.610] Suicidal ideations       ED Disposition     ED Disposition   Transfer to [address] [place] [address]   --    Date/Time   Tue Sep 12, 2023  9:39 PM    Comment   Dona Hess should be transferred out to [facility] and has been medically cleared. MD Unique Rodriguez   Sending MD Dr. Audelia Yun, DO      Follow-up Information    None       Patient's Medications   Discharge Prescriptions    No medications on file     No discharge procedures on file.        ED Provider  Electronically Signed by     David Preston DO  09/13/23 0088

## 2023-09-13 NOTE — CONSULTS
Psychiatry Consultation Note   Joe Sauceda 45 y.o. male MRN: [id number]  Unit/Bed#: ED 12 Encounter: [id number]      Assessment and Plan     Assessment       Assessment:    Joe Sauceda is a 45 y.o. male, single, homeless, with past psychiatric history of depression, anxiety, borderline personality disorder, ADHD, schizoaffective disorder, and no significant past medical history who presented to [facility] ED on 9/12/2023 due to suicidal ideation with plan to cut himself. Psychiatric consultation was requested for management of suicidal ideation. On initial psychiatric evaluation, patient appeared dysphoric, constricted, distracted, slow to respond, guarded with poor eye contact endorsing passive death wishes. Although patient is currently denying active suicidal ideation or plan, patient did endorse a plan to cut himself to ED provider on arrival.  With this in mind, patient has a history of cutting to the severity of his suicide attempt in 2014. Patient has a significant stressor pending. He has a hearing tomorrow for visitation rights for his son who he has not seen in 3 years. There is a significant concern for suicide if patient were to leave hospital and have negative outcome at hearing tomorrow. Patient has had multiple emergency department visits in the last few months and has not been fully compliant with taking his medications and following up with his intensive , psychiatrist, and psychotherapist.  Given that he is still having suicidal ideation with fleeting plan to cut himself in the context of multiple outpatient resources, patient is meeting criteria for inpatient psychiatric treatment as he is a danger to himself. At this time, the patient is agreeable with inpatient psychiatric treatment. 201 has been signed. If patient attempts to leave AMA, 302 should be initiated as patient is a danger to himself. Principal Psychiatric Problem:  1.  Unspecified mood disorder ([address])   Differential includes, but is not limited to: MDD v schizoaffective disorder v OCTAVIANO     Active Problems: There are no active Hospital Problems. Plan     Plan:   Discussed with primary team, with the following recommendations:    1. Admission labs reviewed. Medical treatment per primary team.  2. Patient has signed 12 for voluntary admission, awaiting inpatient psychiatry placement  3. Patient is currently not safe for discharge. If patient wishes to leave, please reach out to on call psychiatrist for re-evaluation  4. Recommend the following changes in psychiatric medication at this time as patient is being seen in an acute setting  o Continue home psychotropic medications while in hospital setting:  - Seroquel 100 mg nightly  - Effexor  mg daily  - Discontinue Buspar as patient has not been taking at home  o Patient with unknown date of Aristada injection (662 mg). Will not make changes at this time. o Patient taking Vyvanse 40 mg at home. 5. Observation level: Virtual patient observation for suicide risk  6. Collaborate with collaterals for baseline assessment and disposition as indicated  7. Psychiatry will continue to follow as needed. Please contact our service via [employer] with any additional questions or concerns. If contacting after hours, please call or [facility] the on-call team ([facility]: [phone]) with any questions or concerns. Risks, benefits and possible side effects of Medications: No new medications at this time.      HPI   History of Present Illness   Physician Requesting Consult: Miguelito Stone DO  Reason for Consult / Principal Problem: "suicidal ideations"    Chief Complaint: "I've been down"    Cloteal Burn is a 45 y.o. male, single, homeless, with past psychiatric history of depression, anxiety, borderline personality disorder, ADHD, schizoaffective disorder, and no significant past medical history who presented to [facility] on 9/12/2023 due to suicidal ideation with plan to cut himself. Psychiatric consultation was requested for management of suicidal ideation. Per ED crisis, Estherearle Maritza, on 9/13/23 "Pt is 45 male with a significant PMH of Schizoaffective disorder, depressive type ([address]) (Primary Dx); Unsheltered homelessness; Tobacco use disorder; Attention deficit disorder (ADD) without hyperactivity presenting to the ED after being seen many times in the past 2 months (8/21/2023,8/31/2023, 9/3/2023). Pt reports During assessment pt appeared distant and confused. Pt responded with delayed answers and provided not answer for increased depressed mood, anxiety and "feeling off". Pt was not been compliant with is medication management, but has presented to [address] [place] [address] for medication and advice as recent as 9/5/2023. Pt presents as a poor historian and was unable to discuss past history or current social/medical & (behavioral) needs. Pt is currently still struggling with food insecurities, transportation and mental health stability. He is willing to sign a 201 for [facility] and would like to have his medications reviewed and stabilize with the help of a structured, healthy environment. "    Per ED physician, Francisco Javier Brown MD, on 9/12/23  "Patient is a 29-year-old male with a history of depression who presents with worsening depression to the point where he's had SI. Plan to cut, h/o cutting in the past.  No attempts this time. No HI, hallucinations. States compliant with meds. No illicit drugs or etoh. Has a therapist at [facility]. Cannot cite any reason for worsening depression. No trigger. "    On initial psychiatric evaluation, Obinna Gabriel appeared depressed, constricted, guarded, with poor eye contact. Patient reports he came to the hospital because he was feeling "down" for a few days.   He states that when he came to the hospital he was having thoughts of "wishing I was dead."  He reports that he has felt that he provider that he had a plan to hurt himself by cutting, but not kill himself. Patient does report that he did have a suicide attempt in 2014 by means of cutting wrists which resulted in him coming to the hospital.    Patient states that his mood is "down."  He notes that he has been feeling this way for the last 2 days. He notes feeling depressed, hopeless, helpless. He endorses decreased energy levels. He endorses fleeting passive death wishes. Patient denies changes in sleep, interest in daily activities (enjoys video games), appetite, concentration, memory. He denies active suicidal ideation or plan. He denies active homicidal ideation or plan. Patient reports a vague episode in his life where he experienced 3 to 5 days of elevated mood, decreased need for sleep, and increased goal-directed activity. He reports that at this time he did not have distractibility, impulsivity, flight of ideas, pressured speech. He states that he does not know when this happened. He denies use of drugs or alcohol at this time. Patient denies current or history of auditory or visual hallucinations. He denies delusions, paranoia, preoccupations. Patient endorses increased anxiety and excessive worrying. He reports that he has episodes of panic attacks once a month. Describes these episodes as intense crying and tremors. Patient denies history of trauma or abuse. Patient reports that at home he has been taking Seroquel 100 mg at night, Vyvanse 40 mg daily, Effexor  mg daily. He states he does not remember when he got his Aristada injection, reports that it was over 1 month ago. He reports that he was unable to get to his appointment to get a shot. Patient has been following with outpatient psychiatry and psychotherapist at [address] [place] [address]. Patient reports a recent stressor. He states he has a hearing tomorrow (9/14) for visitation rights for his 3year-old son.   He states he has not seen his son since he was 3years old. He states that he  from his son's mother that she has full custody of the child. She reports that he has also been homeless for 2 months, but does not expand on this. He reports that he does not have supportive friends/family. Patient has ICM, Nkechi Pappas, who is at bedside. Nkechi Pappas reports that he has been hard to find. She notes that she feels like she is "spinning the wheels" because although he is following outpatient, he continues to have recurring emergency department visits. Patient is agreeable at this time to inpatient psychiatric treatment. He does have concerns for the hearing tomorrow and his therapy appointment today. Psychiatric ROS and PMHx     Psychiatric Review Of Systems:  Sleep: Denies  Interest/Anhedonia: Denies change, and enjoys playing video games when able.   Guilt/hopeless: Yes, increased  Low energy/anergy: Yes, increased  Poor Concentration: Denies  Appetite changes: Denies  Weight changes: Denies  Somatic symptoms: Denies  Anxiety/panic: Yes, increased  Olya: No clear history  Self injurious behavior/risky behavior: yes, cutting  Trauma: no   If yes: none    Suicidal ideation: yes, passive death wish  Homicidal ideation: no  Auditory hallucinations: no  Visual hallucinations: no  Other hallucinations: Denies  Delusional thinking: no    Eating disorder history: no  Obsessive/compulsive symptoms: no    Historical Information     Past Psychiatric History:   Past Inpatient Psychiatric Treatment:   Multiple past inpatient psychiatric admissions  Past Outpatient Psychiatric Treatment:    Currently in outpatient psychiatric treatment with a psychiatrist at [facility]  Has a therapist at [facility]  Has an Intensive  with [facility]  Past Suicide Attempts: yes, by cutting wrists in 2014  Past Violent Behavior: no  Past Psychiatric Medication Trials: multiple psychiatric medication trials    Substance Abuse History:  Social History     Tobacco History     Smoking Status  Every Day Smoking Frequency  0.50 packs/day for 20.00 years (10.00 ttl pk-yrs) Smoking Tobacco Type  Cigarettes    Smokeless Tobacco Use  Never          Alcohol History     Alcohol Use Status  Not Currently          Drug Use     Drug Use Status  Yes Types  Marijuana Frequency   1 time/week Comment  once a week          Sexual Activity     Sexually Active  Not Currently          Activities of Daily Living    Not Asked               Additional Substance Use Detail     Questions Responses    Problems Due to Past Use of Alcohol? No    Problems Due to Past Use of Substances?  No    Substance Use Assessment Denies substance use within the past 12 months    Alcohol Use Frequency Denies use in past 12 months    Cannabis frequency 1-2 times/week    Comment: 1-2 times/week on 8/22/2020     Heroin Frequency Denies use in past 12 months    Cannabis method Smoke    Comment: Smoke on 8/22/2020     Cannabis 1st Use 20 years ago    Cannabis last use 8/20/20    Comment: 8/20/2020 on 8/22/2020     Cocaine frequency Never used    Comment: Never used on 8/12/2020     Crack Cocaine Frequency Denies use in past 12 months    Methamphetamine Frequency Denies use in past 12 months    Narcotic Frequency Denies use in past 12 months    Benzodiazepine Frequency Denies use in past 12 months    Amphetamine frequency Denies use in past 12 months    Barbituate Frequency Denies use use in past 12 months    Inhalant frequency Never used    Comment: Never used on 8/11/2020     Hallucinogen frequency Never used    Comment: Never used on 8/12/2020     Ecstasy frequency Never used    Comment: Never used on 8/11/2020     Other drug frequency Never used    Comment: Never used on 8/11/2020     Opiate frequency Denies use in past 12 months    Last reviewed by Dalton Noguera RN on 9/12/2023        I have assessed this patient for substance use within the past 12 months    Alcohol use: Denies current use  Marijuana: Endorses current use of Occasionally  Other substance use:  Denies current use  Longest clean time: not applicable  History of Inpatient/Outpatient rehabilitation program: no  Nicotine use: 1/2 pack per day    Family Psychiatric History:   Psychiatric Illness:  no family history of psychiatric illness  Substance Abuse:  no family history of substance abuse  Suicide Attempts:  no family history of suicide attempts    Social History:  Education: 11th grade  Learning Disabilities: yes, "mute when I was young"  Marital History:   Children: 1 son, Rahul Gee, does not have custody  Living Arrangement: is presently homeless  Occupational History: unemployed  Functioning Relationships: limited support system  Legal History: patient denies   History: None  Access to Firearms: no    Traumatic History:   Abuse: none  Other Traumatic Events: none     Past Medical History:  History of Seizures: no  History of Head injury with loss of consciousness: no    Past Medical History:   Diagnosis Date   • ADD (attention deficit disorder)    • Addiction to drug ([address] [facility])    • Anxiety    • Borderline personality disorder ([address] [facility])    • Chronic kidney disease    • Depression    • MDD (major depressive disorder)    • Panic attack    • Psychiatric illness    • Self-injurious behavior    • Social phobia    • Substance abuse ([address] )    • Suicide attempt ([address])      Past Surgical History:   Procedure Laterality Date   • NO PAST SURGERIES         Mental Status Evaluation and Medical ROS     Medical Review Of Systems:  Pertinent items are noted in HPI. Meds/Allergies   All current active medications have been reviewed.   Current medications:   Current Facility-Administered Medications   Medication Dose Route Frequency   • amphetamine-dextroamphetamine (ADDERALL) tablet 15 mg  15 mg Oral Daily   • QUEtiapine (SEROquel) tablet 100 mg  100 mg Oral HS   • venlafaxine (EFFEXOR-XR) 24 hr capsule 150 mg  150 mg Oral Daily No Known Allergies    Objective   Vital signs in last 24 hours:  Temp:  [97.7 °F (36.5 °C)-98.2 °F (36.8 °C)] 97.7 °F (36.5 °C)  HR:  [61-76] 70  Resp:  [16-20] 16  BP: (108-136)/(64-81) 108/64    No intake or output data in the 24 hours ending 09/13/23 1151    Mental Status Evaluation:  Appearance:  disheveled, dressed in hospital attire, looks stated age, overweight, tattooed   Behavior:  calm, guarded, poor eye contact, slow responses   Speech:  coherent, slow, scant, decreased volume   Mood:  "down"   Affect:  constricted, mood-congruent   Language: naming objects   Thought Process:  organized, logical, goal directed   Associations: intact associations   Thought Content:  no overt delusions   Perceptual Disturbances: no auditory hallucinations, no visual hallucinations, appears distracted   Risk Potential: Suicidal ideation - Yes, passive death wish, no active suicidal ideation or plan at present  Homicidal ideation - None at present  Potential for aggression - No   Sensorium:  oriented to person, place and time/date   Memory:  recent and remote memory grossly intact   Consciousness:  alert and awake   Attention/Concentration: attention span and concentration appear shorter than expected for age   Intellect: within normal limits   Fund of Knowledge: awareness of current events: yes   Insight:  poor   Judgment: limited   Muscle Strength Muscle Tone: Did not formally assess  normal   Gait/Station: in bed   Motor Activity: no abnormal movements     Laboratory Results: I have personally reviewed all pertinent laboratory/tests results    Results from the past 24 hours:   Recent Results (from the past 24 hour(s))   POCT alcohol breath test    Collection Time: 09/12/23 10:00 PM   Result Value Ref Range    EXTBreath Alcohol 0.00      Most Recent Labs:   Lab Results   Component Value Date    WBC 4.95 08/31/2023    RBC 5.18 08/31/2023    HGB 15.1 08/31/2023    HCT 45.9 08/31/2023     08/31/2023    RDW 13.3 08/31/2023    NEUTROABS 2.40 08/31/2023    SODIUM 135 (L) 09/25/2021    K 3.8 09/25/2021     09/25/2021    CO2 30 09/25/2021    BUN 15 09/25/2021    CREATININE 0.92 09/25/2021    GLUC 91 09/25/2021    CALCIUM 8.4 09/25/2021    AST 38 09/25/2021    ALT 78 09/25/2021    ALKPHOS 89 09/25/2021    TP 6.7 09/25/2021    ALB 3.4 (L) 09/25/2021    TBILI 0.30 09/25/2021    DHS3SLWNKSCK 0.803 09/25/2021    HGBA1C 5.7 (H) 07/07/2023     07/07/2023       Imaging Studies: No results found. EKG: LSy=895 on 7/18/23    Code Status: Prior  Advance Directive and Living Will:       Power of :      Suicide/Homicide Risk Assessment:  Risk of Harm to Self:  • The following ratings are based on assessment at the time of the interview  • Demographic risk factors include: lowest socioeconomic class, , male  • Historical Risk Factors include: chronic depressive symptoms, history of depression, chronic anxiety symptoms, history of suicide attempt, self-mutilating behaviors, substance use, history of legal problems  • Recent Specific Risk Factors include: diagnosis of depression, mental illness diagnosis, current depressive symptoms, current anxiety symptoms, unstable mood, recent hospital discharge, passive death wishes, experienced fleeting suicidal ideation, hopelessness, worries about finances or work, significant legal issues pending, lack of support, social isolation, unemployed  • Protective Factors: access to mental health treatment, being a parent, restricted access to lethal means, sobriety  • Weapons: none. The following steps have been taken to ensure weapons are properly secured: not applicable  • Based on today's assessment, Randi Rae presents the following risk of harm to self: moderate    Risk of Harm to Others:  • The following ratings are based on assessment at the time of the interview  • Demographic Risk Factors include: male, unemployed.   • Historical Risk Factors include: prior arrest, history of substance use. • Recent Specific Risk Factors include: noncompliance with treatment, multiple stressors, social difficulties, behavior suggesting impulsivity. • Protective Factors: no current homicidal ideation, access to mental health treatment, being a parent, restricted access to lethal means  • Weapons: none. The following steps have been taken to ensure weapons are properly secured: not applicable  • Based on today's assessment, Abad Covington presents the following risk of harm to others: dorys Handley DO 09/13/23  Psychiatry Resident, PGY-I    This note was completed in part utilizing M-Modal Fluency Direct Software. Grammatical, translation, syntax errors, random word insertions, spelling mistakes, and incomplete sentences may be an occasional consequence of this system secondary to software limitations with voice recognition, ambient noise, and hardware issues. If you have any questions or concerns about the content, text, or information contained within the body of this dictation, please contact the provider for clarification.

## 2023-09-14 ENCOUNTER — HOSPITAL ENCOUNTER (INPATIENT)
Facility: HOSPITAL | Age: 38
LOS: 5 days | Discharge: HOME | DRG: 751 | End: 2023-09-19
Attending: STUDENT IN AN ORGANIZED HEALTH CARE EDUCATION/TRAINING PROGRAM | Admitting: PSYCHIATRY & NEUROLOGY
Payer: COMMERCIAL

## 2023-09-14 DIAGNOSIS — Z72.0 TOBACCO USE: ICD-10-CM

## 2023-09-14 DIAGNOSIS — F33.2 MDD (MAJOR DEPRESSIVE DISORDER), RECURRENT SEVERE, WITHOUT PSYCHOSIS (HCC): Primary | ICD-10-CM

## 2023-09-14 DIAGNOSIS — Z00.8 MEDICAL CLEARANCE FOR PSYCHIATRIC ADMISSION: ICD-10-CM

## 2023-09-14 DIAGNOSIS — F32.9 MAJOR DEPRESSIVE DISORDER: ICD-10-CM

## 2023-09-14 LAB
ALBUMIN SERPL BCP-MCNC: 3.8 G/DL (ref 3.5–5)
ALP SERPL-CCNC: 65 U/L (ref 34–104)
ALT SERPL W P-5'-P-CCNC: 14 U/L (ref 7–52)
ANION GAP SERPL CALCULATED.3IONS-SCNC: 5 MMOL/L
AST SERPL W P-5'-P-CCNC: 14 U/L (ref 13–39)
BASOPHILS # BLD AUTO: 0.02 THOUSANDS/ÂΜL (ref 0–0.1)
BASOPHILS NFR BLD AUTO: 0 % (ref 0–1)
BILIRUB SERPL-MCNC: 0.29 MG/DL (ref 0.2–1)
BUN SERPL-MCNC: 13 MG/DL (ref 5–25)
CALCIUM SERPL-MCNC: 9.1 MG/DL (ref 8.4–10.2)
CHLORIDE SERPL-SCNC: 105 MMOL/L (ref 96–108)
CHOLEST SERPL-MCNC: 194 MG/DL
CO2 SERPL-SCNC: 29 MMOL/L (ref 21–32)
CREAT SERPL-MCNC: 0.82 MG/DL (ref 0.6–1.3)
EOSINOPHIL # BLD AUTO: 0.26 THOUSAND/ÂΜL (ref 0–0.61)
EOSINOPHIL NFR BLD AUTO: 5 % (ref 0–6)
ERYTHROCYTE [DISTWIDTH] IN BLOOD BY AUTOMATED COUNT: 13.2 % (ref 11.6–15.1)
EST. AVERAGE GLUCOSE BLD GHB EST-MCNC: 120 MG/DL
GFR SERPL CREATININE-BSD FRML MDRD: 112 ML/MIN/1.73SQ M
GLUCOSE P FAST SERPL-MCNC: 86 MG/DL (ref 65–99)
GLUCOSE SERPL-MCNC: 86 MG/DL (ref 65–140)
HBA1C MFR BLD: 5.8 %
HCT VFR BLD AUTO: 46.3 % (ref 36.5–49.3)
HDLC SERPL-MCNC: 34 MG/DL
HGB BLD-MCNC: 15 G/DL (ref 12–17)
IMM GRANULOCYTES # BLD AUTO: 0.02 THOUSAND/UL (ref 0–0.2)
IMM GRANULOCYTES NFR BLD AUTO: 0 % (ref 0–2)
LDLC SERPL CALC-MCNC: 127 MG/DL (ref 0–100)
LYMPHOCYTES # BLD AUTO: 1.55 THOUSANDS/ÂΜL (ref 0.6–4.47)
LYMPHOCYTES NFR BLD AUTO: 32 % (ref 14–44)
MCH RBC QN AUTO: 29.1 PG (ref 26.8–34.3)
MCHC RBC AUTO-ENTMCNC: 32.4 G/DL (ref 31.4–37.4)
MCV RBC AUTO: 90 FL (ref 82–98)
MONOCYTES # BLD AUTO: 0.33 THOUSAND/ÂΜL (ref 0.17–1.22)
MONOCYTES NFR BLD AUTO: 7 % (ref 4–12)
NEUTROPHILS # BLD AUTO: 2.68 THOUSANDS/ÂΜL (ref 1.85–7.62)
NEUTS SEG NFR BLD AUTO: 56 % (ref 43–75)
NONHDLC SERPL-MCNC: 160 MG/DL
NRBC BLD AUTO-RTO: 0 /100 WBCS
PLATELET # BLD AUTO: 296 THOUSANDS/UL (ref 149–390)
PMV BLD AUTO: 9.9 FL (ref 8.9–12.7)
POTASSIUM SERPL-SCNC: 3.7 MMOL/L (ref 3.5–5.3)
PROT SERPL-MCNC: 6 G/DL (ref 6.4–8.4)
RBC # BLD AUTO: 5.16 MILLION/UL (ref 3.88–5.62)
SODIUM SERPL-SCNC: 139 MMOL/L (ref 135–147)
T4 FREE SERPL-MCNC: 0.65 NG/DL (ref 0.61–1.12)
TREPONEMA PALLIDUM IGG+IGM AB [PRESENCE] IN SERUM OR PLASMA BY IMMUNOASSAY: NORMAL
TRIGL SERPL-MCNC: 165 MG/DL
TSH SERPL DL<=0.05 MIU/L-ACNC: 0.24 UIU/ML (ref 0.45–4.5)
WBC # BLD AUTO: 4.86 THOUSAND/UL (ref 4.31–10.16)

## 2023-09-14 PROCEDURE — 99223 1ST HOSP IP/OBS HIGH 75: CPT | Performed by: PSYCHIATRY & NEUROLOGY

## 2023-09-14 PROCEDURE — 84439 ASSAY OF FREE THYROXINE: CPT | Performed by: PSYCHIATRY & NEUROLOGY

## 2023-09-14 PROCEDURE — 80053 COMPREHEN METABOLIC PANEL: CPT | Performed by: PSYCHIATRY & NEUROLOGY

## 2023-09-14 PROCEDURE — 83036 HEMOGLOBIN GLYCOSYLATED A1C: CPT | Performed by: PSYCHIATRY & NEUROLOGY

## 2023-09-14 PROCEDURE — 84443 ASSAY THYROID STIM HORMONE: CPT | Performed by: PSYCHIATRY & NEUROLOGY

## 2023-09-14 PROCEDURE — 85025 COMPLETE CBC W/AUTO DIFF WBC: CPT | Performed by: PSYCHIATRY & NEUROLOGY

## 2023-09-14 PROCEDURE — 99253 IP/OBS CNSLTJ NEW/EST LOW 45: CPT

## 2023-09-14 PROCEDURE — 80061 LIPID PANEL: CPT | Performed by: PSYCHIATRY & NEUROLOGY

## 2023-09-14 PROCEDURE — 86780 TREPONEMA PALLIDUM: CPT | Performed by: PSYCHIATRY & NEUROLOGY

## 2023-09-14 RX ORDER — LORAZEPAM 2 MG/ML
1 INJECTION INTRAMUSCULAR
Status: DISCONTINUED | OUTPATIENT
Start: 2023-09-14 | End: 2023-09-19 | Stop reason: HOSPADM

## 2023-09-14 RX ORDER — PROPRANOLOL HYDROCHLORIDE 10 MG/1
10 TABLET ORAL EVERY 8 HOURS PRN
Status: DISCONTINUED | OUTPATIENT
Start: 2023-09-14 | End: 2023-09-19 | Stop reason: HOSPADM

## 2023-09-14 RX ORDER — BENZTROPINE MESYLATE 1 MG/ML
1 INJECTION INTRAMUSCULAR; INTRAVENOUS
Status: DISCONTINUED | OUTPATIENT
Start: 2023-09-14 | End: 2023-09-19 | Stop reason: HOSPADM

## 2023-09-14 RX ORDER — HYDROXYZINE 50 MG/1
50 TABLET, FILM COATED ORAL
Status: DISCONTINUED | OUTPATIENT
Start: 2023-09-14 | End: 2023-09-19 | Stop reason: HOSPADM

## 2023-09-14 RX ORDER — ACETAMINOPHEN 325 MG/1
975 TABLET ORAL EVERY 6 HOURS PRN
Status: DISCONTINUED | OUTPATIENT
Start: 2023-09-14 | End: 2023-09-19 | Stop reason: HOSPADM

## 2023-09-14 RX ORDER — BENZTROPINE MESYLATE 1 MG/ML
0.5 INJECTION INTRAMUSCULAR; INTRAVENOUS
Status: DISCONTINUED | OUTPATIENT
Start: 2023-09-14 | End: 2023-09-19 | Stop reason: HOSPADM

## 2023-09-14 RX ORDER — ACETAMINOPHEN 325 MG/1
650 TABLET ORAL EVERY 6 HOURS PRN
Status: DISCONTINUED | OUTPATIENT
Start: 2023-09-14 | End: 2023-09-19 | Stop reason: HOSPADM

## 2023-09-14 RX ORDER — HALOPERIDOL 5 MG/1
5 TABLET ORAL
Status: DISCONTINUED | OUTPATIENT
Start: 2023-09-14 | End: 2023-09-19 | Stop reason: HOSPADM

## 2023-09-14 RX ORDER — HALOPERIDOL 5 MG/ML
5 INJECTION INTRAMUSCULAR
Status: DISCONTINUED | OUTPATIENT
Start: 2023-09-14 | End: 2023-09-19 | Stop reason: HOSPADM

## 2023-09-14 RX ORDER — POLYETHYLENE GLYCOL 3350 17 G/17G
17 POWDER, FOR SOLUTION ORAL DAILY PRN
Status: DISCONTINUED | OUTPATIENT
Start: 2023-09-14 | End: 2023-09-19 | Stop reason: HOSPADM

## 2023-09-14 RX ORDER — HYDROXYZINE 50 MG/1
100 TABLET, FILM COATED ORAL
Status: DISCONTINUED | OUTPATIENT
Start: 2023-09-14 | End: 2023-09-19 | Stop reason: HOSPADM

## 2023-09-14 RX ORDER — ACETAMINOPHEN 325 MG/1
650 TABLET ORAL EVERY 4 HOURS PRN
Status: DISCONTINUED | OUTPATIENT
Start: 2023-09-14 | End: 2023-09-19 | Stop reason: HOSPADM

## 2023-09-14 RX ORDER — LORAZEPAM 2 MG/ML
2 INJECTION INTRAMUSCULAR
Status: DISCONTINUED | OUTPATIENT
Start: 2023-09-14 | End: 2023-09-19 | Stop reason: HOSPADM

## 2023-09-14 RX ORDER — HALOPERIDOL 2 MG/1
2 TABLET ORAL
Status: DISCONTINUED | OUTPATIENT
Start: 2023-09-14 | End: 2023-09-19 | Stop reason: HOSPADM

## 2023-09-14 RX ORDER — BISACODYL 10 MG
10 SUPPOSITORY, RECTAL RECTAL DAILY PRN
Status: DISCONTINUED | OUTPATIENT
Start: 2023-09-14 | End: 2023-09-19 | Stop reason: HOSPADM

## 2023-09-14 RX ORDER — DEXTROAMPHETAMINE SACCHARATE, AMPHETAMINE ASPARTATE, DEXTROAMPHETAMINE SULFATE AND AMPHETAMINE SULFATE 2.5; 2.5; 2.5; 2.5 MG/1; MG/1; MG/1; MG/1
5 TABLET ORAL
Status: DISCONTINUED | OUTPATIENT
Start: 2023-09-14 | End: 2023-09-19 | Stop reason: HOSPADM

## 2023-09-14 RX ORDER — DIPHENHYDRAMINE HYDROCHLORIDE 50 MG/ML
50 INJECTION INTRAMUSCULAR; INTRAVENOUS EVERY 6 HOURS PRN
Status: DISCONTINUED | OUTPATIENT
Start: 2023-09-14 | End: 2023-09-19 | Stop reason: HOSPADM

## 2023-09-14 RX ORDER — LORAZEPAM 2 MG/ML
2 INJECTION INTRAMUSCULAR EVERY 6 HOURS PRN
Status: DISCONTINUED | OUTPATIENT
Start: 2023-09-14 | End: 2023-09-19 | Stop reason: HOSPADM

## 2023-09-14 RX ORDER — HYDROXYZINE HYDROCHLORIDE 25 MG/1
25 TABLET, FILM COATED ORAL
Status: DISCONTINUED | OUTPATIENT
Start: 2023-09-14 | End: 2023-09-19 | Stop reason: HOSPADM

## 2023-09-14 RX ORDER — AMOXICILLIN 250 MG
1 CAPSULE ORAL DAILY PRN
Status: DISCONTINUED | OUTPATIENT
Start: 2023-09-14 | End: 2023-09-19 | Stop reason: HOSPADM

## 2023-09-14 RX ORDER — TRAZODONE HYDROCHLORIDE 50 MG/1
50 TABLET ORAL
Status: DISCONTINUED | OUTPATIENT
Start: 2023-09-14 | End: 2023-09-19 | Stop reason: HOSPADM

## 2023-09-14 RX ORDER — HALOPERIDOL 5 MG/ML
2.5 INJECTION INTRAMUSCULAR
Status: DISCONTINUED | OUTPATIENT
Start: 2023-09-14 | End: 2023-09-19 | Stop reason: HOSPADM

## 2023-09-14 RX ORDER — VENLAFAXINE HYDROCHLORIDE 150 MG/1
150 CAPSULE, EXTENDED RELEASE ORAL DAILY
Status: DISCONTINUED | OUTPATIENT
Start: 2023-09-14 | End: 2023-09-19 | Stop reason: HOSPADM

## 2023-09-14 RX ORDER — LANOLIN ALCOHOL/MO/W.PET/CERES
3 CREAM (GRAM) TOPICAL
Status: DISCONTINUED | OUTPATIENT
Start: 2023-09-14 | End: 2023-09-19 | Stop reason: HOSPADM

## 2023-09-14 RX ORDER — BENZTROPINE MESYLATE 1 MG/1
1 TABLET ORAL
Status: DISCONTINUED | OUTPATIENT
Start: 2023-09-14 | End: 2023-09-19 | Stop reason: HOSPADM

## 2023-09-14 RX ORDER — QUETIAPINE FUMARATE 100 MG/1
100 TABLET, FILM COATED ORAL
Status: DISCONTINUED | OUTPATIENT
Start: 2023-09-14 | End: 2023-09-14

## 2023-09-14 RX ORDER — MAGNESIUM HYDROXIDE/ALUMINUM HYDROXICE/SIMETHICONE 120; 1200; 1200 MG/30ML; MG/30ML; MG/30ML
30 SUSPENSION ORAL EVERY 4 HOURS PRN
Status: DISCONTINUED | OUTPATIENT
Start: 2023-09-14 | End: 2023-09-19 | Stop reason: HOSPADM

## 2023-09-14 RX ADMIN — VENLAFAXINE HYDROCHLORIDE 150 MG: 150 CAPSULE, EXTENDED RELEASE ORAL at 09:05

## 2023-09-14 RX ADMIN — TRAZODONE HYDROCHLORIDE 50 MG: 50 TABLET ORAL at 21:15

## 2023-09-14 RX ADMIN — NICOTINE POLACRILEX 4 MG: 4 GUM, CHEWING BUCCAL at 09:08

## 2023-09-14 RX ADMIN — NICOTINE POLACRILEX 4 MG: 4 GUM, CHEWING BUCCAL at 10:48

## 2023-09-14 RX ADMIN — DEXTROAMPHETAMINE SACCHARATE, AMPHETAMINE ASPARTATE, DEXTROAMPHETAMINE SULFATE AND AMPHETAMINE SULFATE 5 MG: 2.5; 2.5; 2.5; 2.5 TABLET ORAL at 13:20

## 2023-09-14 NOTE — NURSING NOTE
Patient scored "Moderate Risk" on Lifetime C-SSRS. Currently, patient is "Low Risk," no active SI. Dr. Sylvain Talavera of [address] [place] [address] notified via Kyleigh Perez Text, no new orders obtained at this time. Patient endorses feeling of safety on the unit. Observational rounds enacted for promotion of patient safety.

## 2023-09-14 NOTE — TREATMENT PLAN
TREATMENT PLAN REVIEW - [id number] [facility] 45 y.o. 1985 male MRN: [id number]    Mir Castillo Room / Bed: [facility] 214/[facility] Encounter: [id number]          Admit Date/Time:  9/14/2023 12:33 AM    Treatment Team: Attending Provider: Joaquin Roe DO; Registered Nurse: Blanca Galicia RN; Patient Care Technician: Demetrius Buenrostro; Security: Winston Ip; Registered Nurse: Kd Conti RN; Patient Care Assistant: Ritu Floyd; Licensed Practical Nurse: Xiao Raya LPN; Charge Nurse: Melchor Lomeli RN; Patient Care Assistant: Darleen Burnette;  Occupational Therapy Assistant: Marcie Alexander IV    Diagnosis: Principal Problem:    MDD (major depressive disorder), recurrent severe, without psychosis ([address])  Active Problems:    Medical clearance for psychiatric admission    Cannabis abuse    Tobacco use      Patient Strengths/Assets: good past treatment response    Patient Barriers/Limitations: noncompliant with medication, noncompliant with treatment    Short Term Goals: decrease in depressive symptoms, decrease in anxiety symptoms, decrease in suicidal thoughts    Long Term Goals: improvement in depression, improvement in anxiety, free of suicidal thoughts    Progress Towards Goals: starting psychiatric medications as prescribed, improving gradually    Recommended Treatment: medication management, patient medication education, group therapy, milieu therapy, continued Behavioral Health psychiatric evaluation/assessment process    Treatment Frequency: daily medication monitoring, group and milieu therapy daily, monitoring through interdisciplinary rounds, monitoring through weekly patient care conferences    Expected Discharge Date:  7 days    Discharge Plan: discharge to home    Treatment Plan Created/Updated By: Joaquin Roe DO

## 2023-09-14 NOTE — ASSESSMENT & PLAN NOTE
Admission labs pending: CBC, CMP, RPR, HbA1c, lipid panel, TSH   Vitals stable  UDS pending collection  EKG pending collection   Medically stable for continued inpatient psychiatric treatment based on available results

## 2023-09-14 NOTE — PROGRESS NOTES
Status: Pt is a 201 from [facility] ER, who presented with increased depression & fleeting SI but no intent or plan. Pt reported he is homeless & was delayed in response. Upon arrival to the unit Pt denied any SI/HI or hallucinations. He has an ICM through [facility] reported he has been compliant with his medications, despite homelessness. Pt reported semi-regular THC use. Pt appeared to have slept. Reviewed readmit score: 22(Yellow), AUDIT: 0, PAWSS: 0, BAT: 0, UDS: none done  Medication: to be reviewed / no PRNs  D/C: TBD / new admission      09/14/23 0750   Team Meeting   Meeting Type Daily Rounds   Team Members Present   Team Members Present Physician;Nurse; Other (Discipline and Name);    Physician Team Member Dr. Jhoana Sparks / Dr. Morales Montoya / Iris Huerta / Tamra Hilario Team Member Mesha Moore / [facility] Management Team Member Robyn Apley / Yaa Echevarria   Other (Discipline and Name) Benedict(art therapy) / Yasir(group facilitator)   Patient/Family Present   Patient Present No   Patient's Family Present No

## 2023-09-14 NOTE — NURSING NOTE
Evonne Victoria denies SI/HI and hallucinations. He has been attending groups and interacting with his roommate. He gives limited responses and appears guarded and delayed when responding.

## 2023-09-14 NOTE — PLAN OF CARE
Problem: Ineffective Coping  Goal: Cooperates with admission process  Description: Interventions:   - Complete admission process  Outcome: Progressing     Problem: Risk for Self Injury/Neglect  Goal: Verbalize thoughts and feelings  Description: Interventions:  - Assess and re-assess patient's lethality and potential for self-injury  - Engage patient in 1:1 interactions, daily, for a minimum of 15 minutes  - Encourage patient to express feelings, fears, frustrations, hopes  - Establish rapport/trust with patient   Outcome: Progressing

## 2023-09-14 NOTE — ED NOTES
Patient sleeping at this time. No signs of distress noted. Patient remains on Virtual monitor and Q15 minute checks.  assessments deferred until patient awakens.       Sylvester Weiss RN  09/13/23 3594

## 2023-09-14 NOTE — CONSULTS
[address] [facility]  Name: Harrison Zhang 45 y.o. male I MRN: [id number]  Unit/Bed#: -01 I Date of Admission: 9/14/2023   Date of Service: 9/14/2023 I Hospital Day: 0    Inpatient consult for Medical Clearance for [address] patient  Consult performed by: Lashawn Womack PA-C  Consult ordered by: Bonilla Cowart, DO           Assessment/Plan   Medical clearance for psychiatric admission  Assessment & Plan  Admission labs pending: CBC, CMP, RPR, HbA1c, lipid panel, TSH   Vitals stable  UDS pending collection  EKG pending collection   Medically stable for continued inpatient psychiatric treatment based on available results    Tobacco use  Assessment & Plan  · Reports 1/2 PPD tobacco use  · Encourage cessation  · Declining NRT currently            Counseling / Coordination of Care Time: 30 minutes. Greater than 50% of total time spent on patient counseling and coordination of care. Collaboration of Care: Were Recommendations Directly Discussed with Primary Treatment Team? - No     History of Present Illness:    Harrison Zhang is a 45 y.o. male with PMH of MDD + prior SA who is originally admitted to the psychiatry service due to suicidal ideation + plan. We are consulted for medical clearance for admission to [address] [place] [address] and treatment of underlying psychiatric illness. Patient seen and examined. Reports compliance with Lexapro, Vyvanse, Seroquel for mood stabilization. Patient denies additional medication use or known medical conditions. Patient reports smoking 1/2 pack/day of tobacco, he denies additional alcohol use or tobacco use. Patient denies any physical complaints or symptoms currently, see ROS. Labs are pending, vitals are reviewed. Based on all available data patient appears medically stable to continue inpatient psychiatric treatment    Review of Systems:    Review of Systems   Constitutional: Negative for chills and fever.    HENT: Negative for congestion, postnasal drip, rhinorrhea and sore throat. Eyes: Negative for pain and visual disturbance. Respiratory: Negative for cough, chest tightness, shortness of breath and wheezing. Cardiovascular: Negative for chest pain and palpitations. Gastrointestinal: Negative for abdominal pain, diarrhea, nausea and vomiting. Genitourinary: Negative for decreased urine volume, difficulty urinating, dysuria and frequency. Musculoskeletal: Negative for arthralgias and back pain. Skin: Negative for rash and wound. Neurological: Negative for dizziness, facial asymmetry, light-headedness and headaches. Psychiatric/Behavioral: Positive for dysphoric mood and suicidal ideas. All other systems reviewed and are negative. Past Medical and Surgical History:     Past Medical History:   Diagnosis Date   • ADD (attention deficit disorder)    • Addiction to drug [facility])    • Anxiety    • Borderline personality disorder ([address] [place] [address])    • Chronic kidney disease    • Depression    • MDD (major depressive disorder)    • Panic attack    • Psychiatric illness    • Self-injurious behavior    • Social phobia    • Substance abuse ([address] [place] [address])    • Suicide attempt [facility])        Past Surgical History:   Procedure Laterality Date   • NO PAST SURGERIES         Meds/Allergies:    PTA meds:   Prior to Admission Medications   Prescriptions Last Dose Informant Patient Reported? Taking?    OLANZapine (ZyPREXA) 10 mg tablet   Yes No   Sig: Take 10 mg by mouth   Patient not taking: Reported on 7/22/2023   QUEtiapine (SEROquel) 100 mg tablet   No No   Sig: Take 1 tablet (100 mg total) by mouth daily at bedtime for 15 days   acetaminophen (TYLENOL) 650 mg CR tablet   No No   Sig: Take 1 tablet (650 mg total) by mouth every 8 (eight) hours as needed for mild pain   Patient not taking: Reported on 7/22/2023   busPIRone (BUSPAR) 5 mg tablet   Yes No   Sig: Take 5 mg by mouth Three times a day   Patient not taking: Reported on 7/22/2023 lisdexamfetamine (VYVANSE) 40 MG capsule   No No   Sig: Take 1 capsule (40 mg total) by mouth every morning for 15 days Max Daily Amount: 40 mg   venlafaxine (EFFEXOR-XR) 150 mg 24 hr capsule   No No   Sig: Take 1 capsule (150 mg total) by mouth daily for 15 days      Facility-Administered Medications: None       Allergies: No Known Allergies    Social History:     Marital Status: Single    Substance Use History:   Social History     Substance and Sexual Activity   Alcohol Use Not Currently     Social History     Tobacco Use   Smoking Status Every Day   • Packs/day: 0.50   • Years: 20.00   • Total pack years: 10.00   • Types: Cigarettes   Smokeless Tobacco Never   Tobacco Comments    Not ready to quit. Social History     Substance and Sexual Activity   Drug Use Yes   • Frequency: 1.0 times per week   • Types: Marijuana    Comment: once a week       Family History:    History reviewed. No pertinent family history. Physical Exam:     Vitals:   Blood Pressure: 104/68 (09/14/23 0729)  Pulse: 61 (09/14/23 0729)  Temperature: 97.9 °F (36.6 °C) (09/14/23 0729)  Temp Source: Tympanic (09/14/23 0729)  Respirations: 18 (09/14/23 0729)  Height: 5' 9" (175.3 cm) (09/14/23 0057)  Weight - Scale: 90 kg (198 lb 6.4 oz) (09/14/23 0057)  SpO2: 100 % (09/14/23 0729)    Physical Exam  Vitals and nursing note reviewed. Constitutional:       General: He is not in acute distress. Appearance: He is not ill-appearing. HENT:      Head: Normocephalic and atraumatic. Eyes:      General:         Right eye: No discharge. Left eye: No discharge. Extraocular Movements: Extraocular movements intact. Conjunctiva/sclera: Conjunctivae normal.   Cardiovascular:      Rate and Rhythm: Normal rate and regular rhythm. Heart sounds: Normal heart sounds. No murmur heard. Pulmonary:      Effort: Pulmonary effort is normal. No respiratory distress. Breath sounds: Normal breath sounds.  No wheezing, rhonchi or rales.   Abdominal:      General: Bowel sounds are normal.      Palpations: Abdomen is soft. Tenderness: There is no abdominal tenderness. There is no guarding. Musculoskeletal:      Right lower leg: No edema. Left lower leg: No edema. Skin:     General: Skin is warm and dry. Neurological:      General: No focal deficit present. Mental Status: He is alert and oriented to person, place, and time. Mental status is at baseline. Cranial Nerves: No cranial nerve deficit. Psychiatric:         Mood and Affect: Mood normal.         Behavior: Behavior normal.          Additional Data:     Lab Results: I have personally reviewed pertinent reports. Lab Results   Component Value Date/Time    HGBA1C 5.7 (H) 07/07/2023 06:39 AM    HGBA1C 5.7 (H) 09/25/2021 06:36 AM    HGBA1C 5.7 (H) 08/01/2020 06:02 AM    HGBA1C 5.7 (H) 01/31/2020 08:43 AM           EKG, Pathology, and Other Studies Reviewed on Admission:   · EKG pending collection    ** Please Note: This note has been constructed using a voice recognition system.  **

## 2023-09-14 NOTE — NURSING NOTE
Salvador Boucher is a 46 y/o male, here on a 201 from [facility]. Patient presented to the ED, c/o worsening depression and +SI with a plan to cut himself. Salvador Boucher has a history of inpatient stays, last at [facility] in July and last at [facility] in 2021. Patient has an ICM through [place]. Salvador Boucher reports no trigger for worsening depression, feels it has been slowly building up over time. Salvador Boucher reports he is currently homeless and has been compliant with prescribed medications, reports taking Effexor, Vyvanse and Seroquel. Patient is delayed with responses and scant in conversation. Salvador Boucher denies current SI/HI/AH/VH. Patient has one prior SA in 2014 where he cut his wrists after his fiancee passed away unexpectedly. Salvador Boucher has a history of MDD, ADHD and borderline personality disorder. Patient did not have a UDS performed prior to transport, but endorses semi-regular marijuana use.

## 2023-09-14 NOTE — CMS CERTIFICATION NOTE
Recertification: Based upon physical, mental and social evaluations, I certify that inpatient psychiatric services continue to be medically necessary for this patient for a duration of 7 midnights for the treatment of  MDD (major depressive disorder), recurrent severe, without psychosis ([address])   Available alternative community resources still do not meet the patient's mental health care needs. I further attest that an established written individualized plan of care has been updated and is outlined in the patient's medical records.

## 2023-09-14 NOTE — PLAN OF CARE
RN will meet with patient at least twice per day to assess for any concerns, teach about prescribed medications and diagnosis.  Patient will be taught and encouraged to utilize healthy coping skills.

## 2023-09-14 NOTE — H&P
Psychiatric Evaluation - [address] [facility] 45 y.o. male MRN: [id number]  Unit/Bed#: [facility] 214-01 Encounter: [id number]    Assessment/Plan   Principal Problem:    MDD (major depressive disorder), recurrent severe, without psychosis ([address] )  Active Problems:    Medical clearance for psychiatric admission    Cannabis abuse    Tobacco use      Plan:   Continue Effexor XR 150mg PO QD  Discontinue Seroquel; start Trazodone 50mg PO HS for insomnia  Patient takes Vyvanse 40mg PO QD but we do not have that on formulary; substitute with Adderall IR. Discussed with pharmacy. Admit to [address] [facility] psychiatry. Medical management per SLIM. Check admission labs: CMP, CBC, TSH, RPR, UDS, Lipid Panel, A1c. Collaborate with case management for baseline assessment and disposition planning.   Chart reviewed and case discussed with treatment team.  Start/continue the following medications:  Current Facility-Administered Medications   Medication Dose Route Frequency Provider Last Rate   • acetaminophen  650 mg Oral Q6H PRN Waynard Pierini, DO     • acetaminophen  650 mg Oral Q4H PRN Waynard Pierini, DO     • acetaminophen  975 mg Oral Q6H PRN Waynard Pierini, DO     • aluminum-magnesium hydroxide-simethicone  30 mL Oral Q4H PRN Waynard Pierini, DO     • haloperidol lactate  2.5 mg Intramuscular Q6H PRN Max 4/day Waynard Pierini, DO      And   • LORazepam  1 mg Intramuscular Q6H PRN Max 4/day Waynard Pierini, DO      And   • benztropine  0.5 mg Intramuscular Q6H PRN Max 4/day Waynard Pierini, DO     • haloperidol lactate  5 mg Intramuscular Q4H PRN Max 4/day Waynard Pierini, DO      And   • LORazepam  2 mg Intramuscular Q4H PRN Max 4/day Waynard Pierini, DO      And   • benztropine  1 mg Intramuscular Q4H PRN Max 4/day Waynard Pierini, DO     • benztropine  1 mg Intramuscular Q4H PRN Max 6/day Waynard Pierini, DO     • benztropine  1 mg Oral Q4H PRN Max 6/day Ardie Pap, DO     • bisacodyl  10 mg Rectal Daily PRN Ardie Pap, DO     • hydrOXYzine HCL  50 mg Oral Q6H PRN Max 4/day Ardie Pap, DO      Or   • diphenhydrAMINE  50 mg Intramuscular Q6H PRN Ardie Pap, DO     • glycerin-hypromellose-  1 drop Both Eyes Q3H PRN Ardie Pap, DO     • haloperidol  2 mg Oral Q4H PRN Max 6/day Ardie Pap, DO     • haloperidol  5 mg Oral Q6H PRN Max 4/day Ardie Pap, DO     • haloperidol  5 mg Oral Q4H PRN Max 4/day Ardie Pap, DO     • hydrOXYzine HCL  100 mg Oral Q6H PRN Max 4/day Ardie Pap, DO      Or   • LORazepam  2 mg Intramuscular Q6H PRN Ardie Pap, DO     • hydrOXYzine HCL  25 mg Oral Q6H PRN Max 4/day Ardie Pap, DO     • melatonin  3 mg Oral HS PRN Ardie Pap, DO     • nicotine polacrilex  4 mg Oral Q2H PRN Ardie Pap, DO     • polyethylene glycol  17 g Oral Daily PRN Ardie Pap, DO     • propranolol  10 mg Oral Q8H PRN Ardie Pap, DO     • senna-docusate sodium  1 tablet Oral Daily PRN Ardie Pap, DO     • traZODone  50 mg Oral HS Ardie Pap, DO     • venlafaxine  150 mg Oral Daily Ardie Pap, DO         Treatment options and alternatives were reviewed with the patient, who concurs with the above plan.  Risks, benefits, and possible side effects of medications were explained to the patient, and he verbalizes understanding.      -----------------------------------    Chief Complaint: SI    History of Present Illness     Per Psych CL note:  Soha Montiel is a 45 y.o., overtly appearing , single male, possessing pertinent psychiatric history of schizoaffective disorder, attention deficit hyperactivity disorder, borderline personality disorder in addition to previous instances of polysubstance abuse including cannabis use disorder, medically complicated by prediabetes, presenting to [facility] emergency department, subsequent to worsening dysphoric mood including depression, depressive signs/symptoms including suicidal ideation with particular plan to self-inflicted injury by means of cutting, possessing previous suicide attempt by means of cutting in addition to instances of increased anxiety in the setting of worsening psychosocial stressors including an upcoming court hearing pertaining to having custody of his child, having not seen said child for approximately 3 years. Per record review, it appears patient has presented to the emergency department approximately 9 times since the end of July, possessing similar presentation, having had previous evaluation by this writer. At that time, discharge disposition included outpatient psychiatric management with his psychiatrist in addition to psychotherapist and intensive , although his ICM, present at time of evaluation, is advocating for inpatient behavioral health placement to procure additional outpatient resources as necessary. On admission to Inpatient Psychiatric Unit:  Patient is a 28-year-old black male with a reported past psychiatric history of major depressive disorder and ADHD, who of note was documented to have a history of schizoaffective disorder, ADHD, and borderline personality disorder and his psychiatric consultation, who presents voluntarily to inpatient BHU with suicidal ideation with a plan to cut himself. Symptoms prior to hospitalization include: Depressed mood, suicidal ideation with a plan to cut self, nonsuicidal self injury via cutting forearms bilaterally, and hopelessness. Mitigating factors are none. Exacerbating stressors are reported as homelessness and an upcoming court hearing regarding visitation rights of his child. Symptom severity is rated as severe. Timeline is progressively worsening over the course of the past few weeks.     Today, the patient states that he is suicidal.  He currently has no plan or intent. He cuts his bilateral forearms and states that it is not to end his life but rather to feel better. He states that he has been stressed out related to homelessness. He did not report any other stressors when explicitly asked, but of note he did report in his psychiatric consultation that he has an upcoming court hearing regarding visitation rights of his child. He is somewhat of a poor historian and does not communicate all of the medications that he has been taking at home, failing to mention in my evaluation that he previously received an Aristada injection, which he did note in his psychiatric consultation before admission. He reportedly never followed up for his subsequent injection after receiving the initial injection at [facility]. He states that he has been taking Effexor XR for mood, Vyvanse for ADHD, and Seroquel for sleep. He denies manic or psychotic symptoms. He is unable to have a friend or family member bring his Vyvanse in, and we discussed using a substitute medication while admitted. Psychiatric Review Of Systems:  Medication side effects: none  Sleep: no change  Appetite: no change  Hygiene: able to tend to instrumental and basic ADLs  Anxiety Symptoms: denies  Psychotic Symptoms: denies  Depression Symptoms: per HPI  Manic Symptoms: denies  PTSD Symptoms: denies  Suicidal Thoughts: per HPI  Homicidal Thoughts: denies    Medical Review Of Systems:   Patient denies headache or dizziness. Patient denies chest pain or palpitations. Patient denies difficulty breathing or wheezing. Patient denies nausea, vomiting, or diarrhea. Patient denies polyuria or polydipsia. Patient denies weakness or numbness. Pertinent positives as per HPI.     Historical Information     Psychiatric History:   Diagnoses: Schizoaffective vs. MDD, ADHD, Tobacco use  Inpatient Hx: Multiple  Outpatient Hx: [facility]  Medications/Trials: Effexor XR 150mg QD, Seroquel 100mg HS, Vyvanse 40mg QD, reportedly received Aristada >1 mo ago. Substance Abuse History:  Social History     Substance and Sexual Activity   Alcohol Use Not Currently     Social History     Substance and Sexual Activity   Drug Use Yes   • Frequency: 1.0 times per week   • Types: Marijuana    Comment: once a week     Tobacco use unspecified    I spent time with Jorge Galindo in counseling and education on risk of substance abuse. I assessed motivation and encouraged him for treatment as appropriate. Family History:   History reviewed. No pertinent family history. Social History:  Highest education: HS  Currently living: Homeless  Relationships: Single  Children: 3 y/o son  Occupation: Unemployed no income    Rest of social history as per below:    Social History     Socioeconomic History   • Marital status: Single     Spouse name: Not on file   • Number of children: Not on file   • Years of education: Not on file   • Highest education level: Not on file   Occupational History   • Not on file   Tobacco Use   • Smoking status: Every Day     Packs/day: 0.50     Years: 20.00     Total pack years: 10.00     Types: Cigarettes   • Smokeless tobacco: Never   • Tobacco comments:     Not ready to quit.    Vaping Use   • Vaping Use: Never used   Substance and Sexual Activity   • Alcohol use: Not Currently   • Drug use: Yes     Frequency: 1.0 times per week     Types: Marijuana     Comment: once a week   • Sexual activity: Not Currently   Other Topics Concern   • Not on file   Social History Narrative   • Not on file     Social Determinants of Health     Financial Resource Strain: Not on file   Food Insecurity: Not on file   Transportation Needs: Not on file   Physical Activity: Not on file   Stress: Not on file   Social Connections: Not on file   Intimate Partner Violence: Not on file   Housing Stability: Not on file       Past Medical History:   Past Medical History:   Diagnosis Date   • ADD (attention deficit disorder) • Addiction to drug [facility])    • Anxiety    • Borderline personality disorder ([address] [facility])    • Chronic kidney disease    • Depression    • MDD (major depressive disorder)    • Panic attack    • Psychiatric illness    • Self-injurious behavior    • Social phobia    • Substance abuse ([address] [facility])    • Suicide attempt ([address] [facility])         -----------------------------------  Objective    Temp:  [95.9 °F (35.5 °C)-97.9 °F (36.6 °C)] 97.9 °F (36.6 °C)  HR:  [56-61] 61  Resp:  [17-18] 18  BP: (104-109)/(68-80) 104/68    Mental Status Evaluation:  Appearance: casually dressed, consistent with stated age  Motor: +psychomotor retardation, no gait abnormalities  Behavior: cooperative, answers questions appropriately  Speech: soft, normal rhythm  Mood: depressed  Affect: constricted, depressed-appearing  Thought Process: linear and goal-oriented  Thought Content: denies auditory hallucinations, denies visual hallucinations, denies delusions  Risk Potential: +passive suicidal ideation, NO plan, NO intent. Denies homicidal ideation  Sensorium: Oriented to person, place, time, and situation  Cognition: cognitive ability appears intact but was not quantitatively tested  Consciousness: alert and awake  Attention: intact, able to focus without difficulty  Insight: fair  Judgement: limited        Meds/Allergies   No Known Allergies      Behavioral Health Medications: all current active meds have been reviewed as per above. Changes as per above. Risks, benefits, indications, and alternatives to treatment were discussed with patient. Laboratory results:  I have personally reviewed all pertinent laboratory/tests results.   Recent Results (from the past 48 hour(s))   POCT alcohol breath test    Collection Time: 09/12/23 10:00 PM   Result Value Ref Range    EXTBreath Alcohol 0.00    CBC and differential    Collection Time: 09/14/23  6:21 AM   Result Value Ref Range    WBC 4.86 4.31 - 10.16 Thousand/uL    RBC 5.16 3.88 - 5.62 Million/uL    Hemoglobin 15.0 12.0 - 17.0 g/dL    Hematocrit 46.3 36.5 - 49.3 %    MCV 90 82 - 98 fL    MCH 29.1 26.8 - 34.3 pg    MCHC 32.4 31.4 - 37.4 g/dL    RDW 13.2 11.6 - 15.1 %    MPV 9.9 8.9 - 12.7 fL    Platelets 324 480 - 523 Thousands/uL    nRBC 0 /100 WBCs    Neutrophils Relative 56 43 - 75 %    Immat GRANS % 0 0 - 2 %    Lymphocytes Relative 32 14 - 44 %    Monocytes Relative 7 4 - 12 %    Eosinophils Relative 5 0 - 6 %    Basophils Relative 0 0 - 1 %    Neutrophils Absolute 2.68 1.85 - 7.62 Thousands/µL    Immature Grans Absolute 0.02 0.00 - 0.20 Thousand/uL    Lymphocytes Absolute 1.55 0.60 - 4.47 Thousands/µL    Monocytes Absolute 0.33 0.17 - 1.22 Thousand/µL    Eosinophils Absolute 0.26 0.00 - 0.61 Thousand/µL    Basophils Absolute 0.02 0.00 - 0.10 Thousands/µL        Progress Toward Goals & Illness Status: Patient is not at goal. They are psychiatrically unstable and are not yet ready for discharge. The patient's condition currently requires active psychopharmacological medication management, interdisciplinary coordination with case management, and the utilization of adjunctive milieu and group therapy to augment psychopharmacological efficacy. The patient's risk of morbidity, and progression or decompensation of psychiatric disease, is high without this current treatment.           -----------------------------------    Risks / Benefits of Treatment:     Risks, benefits, and possible side effects of medications explained to patient. The patient verbalizes understanding and agreement for treatment. Counseling / Coordination of Care:     Patient's presentation on admission and proposed treatment plan were discussed with the treatment team.  Diagnosis, medication changes and treatment plan were reviewed with the patient. Recent stressors were discussed with the patient. Events leading to admission were reviewed with the patient. Importance of medication and treatment compliance was reviewed with the patient. Inpatient Psychiatric Certification:     Certification: Based upon physical, mental and social evaluations, I certify that inpatient psychiatric services are medically necessary for this patient for a duration of 5-7 midnights (exact duration TBD pending patient's response to psychiatric treatment) for the diagnosis listed above. Available alternative community resources do not meet the patient's mental health care needs. I further attest that an established written individualized plan of care has been implemented and is outlined in the patient's medical records. This note has been constructed using a voice recognition system. There may be translation, syntax, or grammatical errors. If you have any questions, please contact the dictating provider.

## 2023-09-14 NOTE — NURSING NOTE
Sneakers  Socks  Shorts  t-shirt  (Pre-bagged)  Lighter, screwdriver, cellphone, tablet, wallet, w/cards.   Backpack  Phone   USB (2)  Marker  Batteries (2)  Isai hook  Paperwork  Pencils  USB cords (6)  Headphones (2)  Hat  Color pens  Markers

## 2023-09-15 PROCEDURE — 99233 SBSQ HOSP IP/OBS HIGH 50: CPT | Performed by: PSYCHIATRY & NEUROLOGY

## 2023-09-15 RX ADMIN — NICOTINE POLACRILEX 4 MG: 4 GUM, CHEWING BUCCAL at 12:23

## 2023-09-15 RX ADMIN — HYDROXYZINE HYDROCHLORIDE 50 MG: 50 TABLET, FILM COATED ORAL at 12:22

## 2023-09-15 RX ADMIN — DEXTROAMPHETAMINE SACCHARATE, AMPHETAMINE ASPARTATE, DEXTROAMPHETAMINE SULFATE AND AMPHETAMINE SULFATE 5 MG: 2.5; 2.5; 2.5; 2.5 TABLET ORAL at 09:25

## 2023-09-15 RX ADMIN — DEXTROAMPHETAMINE SACCHARATE, AMPHETAMINE ASPARTATE, DEXTROAMPHETAMINE SULFATE AND AMPHETAMINE SULFATE 5 MG: 2.5; 2.5; 2.5; 2.5 TABLET ORAL at 14:27

## 2023-09-15 RX ADMIN — VENLAFAXINE HYDROCHLORIDE 150 MG: 150 CAPSULE, EXTENDED RELEASE ORAL at 09:25

## 2023-09-15 RX ADMIN — NICOTINE POLACRILEX 4 MG: 4 GUM, CHEWING BUCCAL at 10:37

## 2023-09-15 NOTE — NURSING NOTE
Patient remained calm and quiet throughout the shift. Denies SI / HI / A/VH. Scant conversation, but present on the unit. Patient presents with a flat affect. Watched a football game in the dayroom with peers and attended groups this evening. Good appetite for dinner and HS snack.

## 2023-09-15 NOTE — NURSING NOTE
Pt is pleasant and cooperative on approach. Reports improving mood since admission. Denies SI/HI/AVH. Denies any questions or concerns. Visible on unit and attending groups.

## 2023-09-15 NOTE — NURSING NOTE
Pt received atarax for anxiety. Upon follow up pt has showered and is feeling improved. Medication effective.

## 2023-09-15 NOTE — PROGRESS NOTES
Pt denies symptoms, flat, delayed. Walks the halls. Was on [facility] in the past.     DC: TBD      09/15/23 0755   Team Meeting   Meeting Type Daily Rounds   Team Members Present   Team Members Present Physician;Nurse;; Other (Discipline and Name)   Physician Team Member Dr. Reema Min / Dr. Aruna Ceron / Stanford Guerrero / Randall Davila Team Member [facility] Management Team Member Vilma Watts / Fariha Harley / Kimberli Vitale   Other (Discipline and Name) Yani Alexandra - Art Therapy / Sigmund - Group Facilitator   Patient/Family Present   Patient Present No   Patient's Family Present No

## 2023-09-15 NOTE — PROGRESS NOTES
Progress Note - [address] [facility],[address] [place] 45 y.o. male MRN: [id number]  Unit/Bed#: U 214-01 Encounter: [id number]    Assessment:  Principal Problem:    MDD (major depressive disorder), recurrent severe, without psychosis ([address] [facility])  Active Problems:    Medical clearance for psychiatric admission    Cannabis abuse    Tobacco use      Plan:  --TC addition of Abilify if patient does not improve in next 48 hours or so  --Continue with psychiatric hospitalization  --Continue with individual, group, and milieu therapy  --Continue the following medications:  Current Facility-Administered Medications   Medication Dose Route Frequency   • acetaminophen (TYLENOL) tablet 650 mg  650 mg Oral Q6H PRN   • acetaminophen (TYLENOL) tablet 650 mg  650 mg Oral Q4H PRN   • acetaminophen (TYLENOL) tablet 975 mg  975 mg Oral Q6H PRN   • aluminum-magnesium hydroxide-simethicone (MAALOX) oral suspension 30 mL  30 mL Oral Q4H PRN   • amphetamine-dextroamphetamine (ADDERALL) tablet 5 mg  5 mg Oral BID (AM & Afternoon)   • haloperidol lactate (HALDOL) injection 2.5 mg  2.5 mg Intramuscular Q6H PRN Max 4/day    And   • LORazepam (ATIVAN) injection 1 mg  1 mg Intramuscular Q6H PRN Max 4/day    And   • benztropine (COGENTIN) injection 0.5 mg  0.5 mg Intramuscular Q6H PRN Max 4/day   • haloperidol lactate (HALDOL) injection 5 mg  5 mg Intramuscular Q4H PRN Max 4/day    And   • LORazepam (ATIVAN) injection 2 mg  2 mg Intramuscular Q4H PRN Max 4/day    And   • benztropine (COGENTIN) injection 1 mg  1 mg Intramuscular Q4H PRN Max 4/day   • benztropine (COGENTIN) injection 1 mg  1 mg Intramuscular Q4H PRN Max 6/day   • benztropine (COGENTIN) tablet 1 mg  1 mg Oral Q4H PRN Max 6/day   • bisacodyl (DULCOLAX) rectal suppository 10 mg  10 mg Rectal Daily PRN   • hydrOXYzine HCL (ATARAX) tablet 50 mg  50 mg Oral Q6H PRN Max 4/day    Or   • diphenhydrAMINE (BENADRYL) injection 50 mg  50 mg Intramuscular Q6H PRN   • glycerin-hypromellose- (ARTIFICIAL TEARS) ophthalmic solution 1 drop  1 drop Both Eyes Q3H PRN   • haloperidol (HALDOL) tablet 2 mg  2 mg Oral Q4H PRN Max 6/day   • haloperidol (HALDOL) tablet 5 mg  5 mg Oral Q6H PRN Max 4/day   • haloperidol (HALDOL) tablet 5 mg  5 mg Oral Q4H PRN Max 4/day   • hydrOXYzine HCL (ATARAX) tablet 100 mg  100 mg Oral Q6H PRN Max 4/day    Or   • LORazepam (ATIVAN) injection 2 mg  2 mg Intramuscular Q6H PRN   • hydrOXYzine HCL (ATARAX) tablet 25 mg  25 mg Oral Q6H PRN Max 4/day   • melatonin tablet 3 mg  3 mg Oral HS PRN   • nicotine polacrilex (NICORETTE) gum 4 mg  4 mg Oral Q2H PRN   • polyethylene glycol (MIRALAX) packet 17 g  17 g Oral Daily PRN   • propranolol (INDERAL) tablet 10 mg  10 mg Oral Q8H PRN   • senna-docusate sodium (SENOKOT S) 8.6-50 mg per tablet 1 tablet  1 tablet Oral Daily PRN   • traZODone (DESYREL) tablet 50 mg  50 mg Oral HS   • venlafaxine (EFFEXOR-XR) 24 hr capsule 150 mg  150 mg Oral Daily       Subjective: Patient was seen for continuation of care. Chart was reviewed and discussed with treatment team.     No acute behavioral events over the past 24 hours. Today, patient was seen and examined at bedside for continuation of care. Patient is tolerating restarted medications. He states that his mood has mildly improved since yesterday but he has not yet back to baseline. The patient was able to go in more detail about his past psychiatric history and treatment today, stating that he did previously receive Aristada but did not follow-up and has not received the injection in 1 to 2 months. He is ambivalent about restarting it. We discussed deferring Abilify until after we can observe his response to restarting medications over the next 24 to 48 hours. Patient denied adverse effects to their psychiatric medication regimen. Patient denied other new or worsening psychiatric symptoms/complaints at this time.  Discussed the importance of continuing to take medications as prescribed, as well as the importance of continuing to attend groups on the unit. Psychiatric Review of Systems:  Medication adverse effects: none  Sleep: unchanged  Appetite: unchanged  Behavior over the past 24 hours: as per above    Vitals:  Vitals:    09/15/23 0733   BP: 109/76   Pulse: 55   Resp: 17   Temp: 97.9 °F (36.6 °C)   SpO2: 100%       Laboratory results:    I have personally reviewed all pertinent laboratory/tests results  Recent Results (from the past 48 hour(s))   TSH, 3rd generation with Free T4 reflex    Collection Time: 09/14/23  6:21 AM   Result Value Ref Range    TSH 3RD GENERATON 0.240 (L) 0.450 - 4.500 uIU/mL   Hemoglobin A1C    Collection Time: 09/14/23  6:21 AM   Result Value Ref Range    Hemoglobin A1C 5.8 (H) Normal 4.0-5.6%; PreDiabetic 5.7-6.4%;  Diabetic >=6.5%; Glycemic control for adults with diabetes <7.0% %     mg/dl   Lipid panel    Collection Time: 09/14/23  6:21 AM   Result Value Ref Range    Cholesterol 194 See Comment mg/dL    Triglycerides 165 (H) See Comment mg/dL    HDL, Direct 34 (L) >=40 mg/dL    LDL Calculated 127 (H) 0 - 100 mg/dL    Non-HDL-Chol (CHOL-HDL) 160 mg/dl   CBC and differential    Collection Time: 09/14/23  6:21 AM   Result Value Ref Range    WBC 4.86 4.31 - 10.16 Thousand/uL    RBC 5.16 3.88 - 5.62 Million/uL    Hemoglobin 15.0 12.0 - 17.0 g/dL    Hematocrit 46.3 36.5 - 49.3 %    MCV 90 82 - 98 fL    MCH 29.1 26.8 - 34.3 pg    MCHC 32.4 31.4 - 37.4 g/dL    RDW 13.2 11.6 - 15.1 %    MPV 9.9 8.9 - 12.7 fL    Platelets 376 910 - 818 Thousands/uL    nRBC 0 /100 WBCs    Neutrophils Relative 56 43 - 75 %    Immat GRANS % 0 0 - 2 %    Lymphocytes Relative 32 14 - 44 %    Monocytes Relative 7 4 - 12 %    Eosinophils Relative 5 0 - 6 %    Basophils Relative 0 0 - 1 %    Neutrophils Absolute 2.68 1.85 - 7.62 Thousands/µL    Immature Grans Absolute 0.02 0.00 - 0.20 Thousand/uL    Lymphocytes Absolute 1.55 0.60 - 4.47 Thousands/µL    Monocytes Absolute 0.33 0.17 - 1.22 Thousand/µL    Eosinophils Absolute 0.26 0.00 - 0.61 Thousand/µL    Basophils Absolute 0.02 0.00 - 0.10 Thousands/µL   Comprehensive metabolic panel    Collection Time: 09/14/23  6:21 AM   Result Value Ref Range    Sodium 139 135 - 147 mmol/L    Potassium 3.7 3.5 - 5.3 mmol/L    Chloride 105 96 - 108 mmol/L    CO2 29 21 - 32 mmol/L    ANION GAP 5 mmol/L    BUN 13 5 - 25 mg/dL    Creatinine 0.82 0.60 - 1.30 mg/dL    Glucose 86 65 - 140 mg/dL    Glucose, Fasting 86 65 - 99 mg/dL    Calcium 9.1 8.4 - 10.2 mg/dL    AST 14 13 - 39 U/L    ALT 14 7 - 52 U/L    Alkaline Phosphatase 65 34 - 104 U/L    Total Protein 6.0 (L) 6.4 - 8.4 g/dL    Albumin 3.8 3.5 - 5.0 g/dL    Total Bilirubin 0.29 0.20 - 1.00 mg/dL    eGFR 112 ml/min/1.73sq m   RPR-Syphilis Screening (Total Syphilis IGG/IGM)    Collection Time: 09/14/23  6:21 AM   Result Value Ref Range    Syphilis Total Antibody Non-reactive Non-Reactive   T4, free    Collection Time: 09/14/23  6:21 AM   Result Value Ref Range    Free T4 0.65 0.61 - 1.12 ng/dL        Current Medications:  Current medications as per above. All medications have been reviewed. Risks, benefits, alternatives, and possible side effects of patient's psychiatric medications were discussed with patient. Mental Status Evaluation:  Appearance: casually dressed, consistent with stated age  Motor: +psychomotor retardation, no gait abnormalities  Behavior: cooperative, answers questions appropriately  Speech: soft, normal rhythm  Mood: "okay"  Affect: constricted, depressed-appearing  Thought Process: linear and goal-oriented  Thought Content: denies auditory hallucinations, denies visual hallucinations, denies delusions  Risk Potential: denies suicidal ideation, plan, or intent.  Denies homicidal ideation  Sensorium: Oriented to person, place, time, and situation  Cognition: cognitive ability appears intact but was not quantitatively tested  Consciousness: alert and awake  Attention: intact, able to focus without difficulty  Insight: fair  Judgement: fair        Progress Toward Goals & Illness Status: Patient is not at goal. They are not yet ready for discharge. The patient's condition currently requires active psychopharmacological medication management, interdisciplinary coordination with case management, and the utilization of adjunctive milieu and group therapy to augment psychopharmacological efficacy. The patient's risk of morbidity, and progression or decompensation of psychiatric disease, is higher without this current treatment. This note has been constructed using a voice recognition system. There may be translation, syntax, or grammatical errors. If you have any questions, please contact the dictating provider.

## 2023-09-15 NOTE — PLAN OF CARE
Problem: Risk for Self Injury/Neglect  Goal: Verbalize thoughts and feelings  Description: Interventions:  - Assess and re-assess patient's lethality and potential for self-injury  - Engage patient in 1:1 interactions, daily, for a minimum of 15 minutes  - Encourage patient to express feelings, fears, frustrations, hopes  - Establish rapport/trust with patient   9/15/2023 1804 by Nomi Russell RN  Outcome: Progressing  9/15/2023 1804 by Nomi Russell RN  Outcome: Progressing  Goal: Refrain from harming self  Description: Interventions:  - Monitor patient closely, per order  - Develop a trusting relationship  - Supervise medication ingestion, monitor effects and side effects   9/15/2023 1804 by Nomi Russell RN  Outcome: Progressing  9/15/2023 1804 by Nomi Russell RN  Outcome: Progressing  Goal: Attend and participate in unit activities, including therapeutic, recreational, and educational groups  Description: Interventions:  - Provide therapeutic and educational activities daily, encourage attendance and participation, and document same in the medical record  - Obtain collateral information, encourage visitation and family involvement in care   9/15/2023 1804 by Nomi Russell RN  Outcome: Progressing  9/15/2023 1804 by Nomi Russell RN  Outcome: Progressing  Goal: Recognize maladaptive responses and adopt new coping mechanisms  9/15/2023 1804 by Nomi Russell RN  Outcome: Progressing  9/15/2023 1804 by Nomi Russell RN  Outcome: Progressing  Goal: Complete daily ADLs, including personal hygiene independently, as able  Description: Interventions:  - Observe, teach, and assist patient with ADLS  - Monitor and promote a balance of rest/activity, with adequate nutrition and elimination  9/15/2023 1804 by Nomi Russell RN  Outcome: Progressing  9/15/2023 1804 by Nomi Russell RN  Outcome: Progressing     Problem: Depression  Goal: Treatment Goal: Demonstrate behavioral control of depressive symptoms, verbalize feelings of improved mood/affect, and adopt new coping skills prior to discharge  9/15/2023 1804 by Maddy Arreguin RN  Outcome: Progressing  9/15/2023 1804 by Maddy Arreguin RN  Outcome: Progressing  Goal: Complete daily ADLs, including personal hygiene independently, as able  Description: Interventions:  - Observe, teach, and assist patient with ADLS  -  Monitor and promote a balance of rest/activity, with adequate nutrition and elimination   9/15/2023 1804 by Maddy Arreguin RN  Outcome: Progressing  9/15/2023 1804 by Maddy Arreguni RN  Outcome: Progressing

## 2023-09-15 NOTE — NURSING NOTE
Pt presents as scant and guarded in conversation with somewhat delayed speech and flat affect. Minimal answers to most questions but does deny SI/HI/AVH. Appears internally preoccupied but not observed to be RIS. Using radio at times, reports appetite and sleep okay, denies questions/concerns for nursing. Med's reviewed, no questions.

## 2023-09-16 PROCEDURE — 99232 SBSQ HOSP IP/OBS MODERATE 35: CPT | Performed by: PSYCHIATRY & NEUROLOGY

## 2023-09-16 RX ADMIN — DEXTROAMPHETAMINE SACCHARATE, AMPHETAMINE ASPARTATE, DEXTROAMPHETAMINE SULFATE AND AMPHETAMINE SULFATE 5 MG: 2.5; 2.5; 2.5; 2.5 TABLET ORAL at 14:29

## 2023-09-16 RX ADMIN — TRAZODONE HYDROCHLORIDE 50 MG: 50 TABLET ORAL at 21:31

## 2023-09-16 RX ADMIN — DEXTROAMPHETAMINE SACCHARATE, AMPHETAMINE ASPARTATE, DEXTROAMPHETAMINE SULFATE AND AMPHETAMINE SULFATE 5 MG: 2.5; 2.5; 2.5; 2.5 TABLET ORAL at 10:11

## 2023-09-16 RX ADMIN — VENLAFAXINE HYDROCHLORIDE 150 MG: 150 CAPSULE, EXTENDED RELEASE ORAL at 10:11

## 2023-09-16 NOTE — PLAN OF CARE
Problem: Ineffective Coping  Goal: Cooperates with admission process  Description: Interventions:   - Complete admission process  Outcome: Progressing  Goal: Identifies healthy coping skills  Outcome: Progressing  Goal: Participates in unit activities  Description: Interventions:  - Provide therapeutic environment   - Provide required programming   - Redirect inappropriate behaviors   Outcome: Progressing     Problem: Risk for Self Injury/Neglect  Goal: Verbalize thoughts and feelings  Description: Interventions:  - Assess and re-assess patient's lethality and potential for self-injury  - Engage patient in 1:1 interactions, daily, for a minimum of 15 minutes  - Encourage patient to express feelings, fears, frustrations, hopes  - Establish rapport/trust with patient   Outcome: Progressing  Goal: Refrain from harming self  Description: Interventions:  - Monitor patient closely, per order  - Develop a trusting relationship  - Supervise medication ingestion, monitor effects and side effects   Outcome: Progressing  Goal: Attend and participate in unit activities, including therapeutic, recreational, and educational groups  Description: Interventions:  - Provide therapeutic and educational activities daily, encourage attendance and participation, and document same in the medical record  - Obtain collateral information, encourage visitation and family involvement in care   Outcome: Progressing  Goal: Recognize maladaptive responses and adopt new coping mechanisms  Outcome: Progressing  Goal: Complete daily ADLs, including personal hygiene independently, as able  Description: Interventions:  - Observe, teach, and assist patient with ADLS  - Monitor and promote a balance of rest/activity, with adequate nutrition and elimination  Outcome: Progressing     Problem: Depression  Goal: Treatment Goal: Demonstrate behavioral control of depressive symptoms, verbalize feelings of improved mood/affect, and adopt new coping skills prior to discharge  Outcome: Progressing  Goal: Complete daily ADLs, including personal hygiene independently, as able  Description: Interventions:  - Observe, teach, and assist patient with ADLS  -  Monitor and promote a balance of rest/activity, with adequate nutrition and elimination   Outcome: Progressing

## 2023-09-16 NOTE — NURSING NOTE
Pt OOB for meds and breakfast and then returned to bed. Scant but pleasant in conversation. Denies SI/HI/AVH. States mood is improving. Reports sleeping well overnight. Denies any questions or concerns.

## 2023-09-16 NOTE — PROGRESS NOTES
Progress Note - [address] [facility],[address] [place] 45 y.o. male MRN: [id number]  Unit/Bed#: U 214-01 Encounter: [id number]    Assessment:  Principal Problem:    MDD (major depressive disorder), recurrent severe, without psychosis ([address] )  Active Problems:    Medical clearance for psychiatric admission    Cannabis abuse    Tobacco use      Plan:  --Continue with psychiatric hospitalization  --Continue with individual, group, and milieu therapy  --Continue the following medications:  Current Facility-Administered Medications   Medication Dose Route Frequency   • acetaminophen (TYLENOL) tablet 650 mg  650 mg Oral Q6H PRN   • acetaminophen (TYLENOL) tablet 650 mg  650 mg Oral Q4H PRN   • acetaminophen (TYLENOL) tablet 975 mg  975 mg Oral Q6H PRN   • aluminum-magnesium hydroxide-simethicone (MAALOX) oral suspension 30 mL  30 mL Oral Q4H PRN   • amphetamine-dextroamphetamine (ADDERALL) tablet 5 mg  5 mg Oral BID (AM & Afternoon)   • haloperidol lactate (HALDOL) injection 2.5 mg  2.5 mg Intramuscular Q6H PRN Max 4/day    And   • LORazepam (ATIVAN) injection 1 mg  1 mg Intramuscular Q6H PRN Max 4/day    And   • benztropine (COGENTIN) injection 0.5 mg  0.5 mg Intramuscular Q6H PRN Max 4/day   • haloperidol lactate (HALDOL) injection 5 mg  5 mg Intramuscular Q4H PRN Max 4/day    And   • LORazepam (ATIVAN) injection 2 mg  2 mg Intramuscular Q4H PRN Max 4/day    And   • benztropine (COGENTIN) injection 1 mg  1 mg Intramuscular Q4H PRN Max 4/day   • benztropine (COGENTIN) injection 1 mg  1 mg Intramuscular Q4H PRN Max 6/day   • benztropine (COGENTIN) tablet 1 mg  1 mg Oral Q4H PRN Max 6/day   • bisacodyl (DULCOLAX) rectal suppository 10 mg  10 mg Rectal Daily PRN   • hydrOXYzine HCL (ATARAX) tablet 50 mg  50 mg Oral Q6H PRN Max 4/day    Or   • diphenhydrAMINE (BENADRYL) injection 50 mg  50 mg Intramuscular Q6H PRN   • glycerin-hypromellose- (ARTIFICIAL TEARS) ophthalmic solution 1 drop  1 drop Both Eyes Q3H PRN   • haloperidol (HALDOL) tablet 2 mg  2 mg Oral Q4H PRN Max 6/day   • haloperidol (HALDOL) tablet 5 mg  5 mg Oral Q6H PRN Max 4/day   • haloperidol (HALDOL) tablet 5 mg  5 mg Oral Q4H PRN Max 4/day   • hydrOXYzine HCL (ATARAX) tablet 100 mg  100 mg Oral Q6H PRN Max 4/day    Or   • LORazepam (ATIVAN) injection 2 mg  2 mg Intramuscular Q6H PRN   • hydrOXYzine HCL (ATARAX) tablet 25 mg  25 mg Oral Q6H PRN Max 4/day   • melatonin tablet 3 mg  3 mg Oral HS PRN   • nicotine polacrilex (NICORETTE) gum 4 mg  4 mg Oral Q2H PRN   • polyethylene glycol (MIRALAX) packet 17 g  17 g Oral Daily PRN   • propranolol (INDERAL) tablet 10 mg  10 mg Oral Q8H PRN   • senna-docusate sodium (SENOKOT S) 8.6-50 mg per tablet 1 tablet  1 tablet Oral Daily PRN   • traZODone (DESYREL) tablet 50 mg  50 mg Oral HS   • venlafaxine (EFFEXOR-XR) 24 hr capsule 150 mg  150 mg Oral Daily       Subjective: Patient was seen for continuation of care. Chart was reviewed and discussed with treatment team.     No acute behavioral events over the past 24 hours. Today, patient was seen and examined at bedside for continuation of care. Patient states that he feels better than when he initially came into the hospital.  He received Atarax yesterday around noon for anxiety. He is noticing that his mood is returning closer to his established baseline and he feels that the medications are working well. He is otherwise without complaints. Patient denied adverse effects to their psychiatric medication regimen. Patient denied other new or worsening psychiatric symptoms/complaints at this time. Discussed the importance of continuing to take medications as prescribed, as well as the importance of continuing to attend groups on the unit.      Psychiatric Review of Systems:  Medication adverse effects: none  Sleep: unchanged  Appetite: unchanged  Behavior over the past 24 hours: as per above    Vitals:  Vitals:    09/16/23 0729   BP: 152/85   Pulse: 59   Resp: 18 Temp: 97.6 °F (36.4 °C)   SpO2: 99%       Laboratory results:    I have personally reviewed all pertinent laboratory/tests results  No results found for this or any previous visit (from the past 48 hour(s)). Current Medications:  Current medications as per above. All medications have been reviewed. Risks, benefits, alternatives, and possible side effects of patient's psychiatric medications were discussed with patient. Mental Status Evaluation:  Appearance: casually dressed, appears consistent with stated age  Motor: no psychomotor disturbances, no gait abnormalities  Behavior: cooperative, interacts with this writer appropriately  Speech: normal rate, rhythm, and volume  Mood: "better"  Affect: euthymic, normal range and intensity  Thought Process: organized, linear, and goal-oriented  Thought Content: denies auditory hallucinations, denies visual hallucinations, denies delusions  Risk Potential: denies suicidal ideation, plan, or intent. Denies homicidal ideation  Sensorium: Oriented to person, place, time, and situation  Cognition: cognitive ability appears intact but was not quantitatively tested  Consciousness: alert and awake  Attention: able to focus without difficulty  Insight: improved  Judgement: improved      Progress Toward Goals & Illness Status: Patient is not at goal. They are not yet ready for discharge. The patient's condition currently requires active psychopharmacological medication management, interdisciplinary coordination with case management, and the utilization of adjunctive milieu and group therapy to augment psychopharmacological efficacy. The patient's risk of morbidity, and progression or decompensation of psychiatric disease, is higher without this current treatment. This note has been constructed using a voice recognition system. There may be translation, syntax, or grammatical errors. If you have any questions, please contact the dictating provider.

## 2023-09-16 NOTE — NURSING NOTE
Pt was seen in his bed resting and pleasant on approach. He states that he is feeling good today. Denies anxiety and depression. Denies SI, HI, AVH. He says that he has not have any trouble with sleep.

## 2023-09-17 PROCEDURE — 99232 SBSQ HOSP IP/OBS MODERATE 35: CPT | Performed by: PSYCHIATRY & NEUROLOGY

## 2023-09-17 RX ADMIN — DEXTROAMPHETAMINE SACCHARATE, AMPHETAMINE ASPARTATE, DEXTROAMPHETAMINE SULFATE AND AMPHETAMINE SULFATE 5 MG: 2.5; 2.5; 2.5; 2.5 TABLET ORAL at 14:10

## 2023-09-17 RX ADMIN — DEXTROAMPHETAMINE SACCHARATE, AMPHETAMINE ASPARTATE, DEXTROAMPHETAMINE SULFATE AND AMPHETAMINE SULFATE 5 MG: 2.5; 2.5; 2.5; 2.5 TABLET ORAL at 08:32

## 2023-09-17 RX ADMIN — TRAZODONE HYDROCHLORIDE 50 MG: 50 TABLET ORAL at 21:57

## 2023-09-17 RX ADMIN — VENLAFAXINE HYDROCHLORIDE 150 MG: 150 CAPSULE, EXTENDED RELEASE ORAL at 08:32

## 2023-09-17 NOTE — NURSING NOTE
Tamiaa Cassette is calm upon approach, visualized resting in bed this afternoon. Patient brightens slightly upon staff interaction. Jackthea Cassette denies current SI/HI/AH/VH, remains scant in conversation. Patient is compliant with prescribed medication and reports feeling ready for discharge on Tuesday. Tamiaa Mari was briefly social with peers during dinner and returned to room. Patient was encouraged to seek staff with any questions and/or concerns, verbalized understanding.

## 2023-09-17 NOTE — PROGRESS NOTES
Progress Note - [facility] Sanchez 45 y.o. male MRN: [id number]  Unit/Bed#: U 214-01 Encounter: [id number]    Assessment:  Principal Problem:    MDD (major depressive disorder), recurrent severe, without psychosis ([address] [facility])  Active Problems:    Medical clearance for psychiatric admission    Cannabis abuse    Tobacco use      Plan:  --Plan for discharge Tuesday  --Continue with psychiatric hospitalization  --Continue with individual, group, and milieu therapy  --Continue the following medications:  Current Facility-Administered Medications   Medication Dose Route Frequency   • acetaminophen (TYLENOL) tablet 650 mg  650 mg Oral Q6H PRN   • acetaminophen (TYLENOL) tablet 650 mg  650 mg Oral Q4H PRN   • acetaminophen (TYLENOL) tablet 975 mg  975 mg Oral Q6H PRN   • aluminum-magnesium hydroxide-simethicone (MAALOX) oral suspension 30 mL  30 mL Oral Q4H PRN   • amphetamine-dextroamphetamine (ADDERALL) tablet 5 mg  5 mg Oral BID (AM & Afternoon)   • haloperidol lactate (HALDOL) injection 2.5 mg  2.5 mg Intramuscular Q6H PRN Max 4/day    And   • LORazepam (ATIVAN) injection 1 mg  1 mg Intramuscular Q6H PRN Max 4/day    And   • benztropine (COGENTIN) injection 0.5 mg  0.5 mg Intramuscular Q6H PRN Max 4/day   • haloperidol lactate (HALDOL) injection 5 mg  5 mg Intramuscular Q4H PRN Max 4/day    And   • LORazepam (ATIVAN) injection 2 mg  2 mg Intramuscular Q4H PRN Max 4/day    And   • benztropine (COGENTIN) injection 1 mg  1 mg Intramuscular Q4H PRN Max 4/day   • benztropine (COGENTIN) injection 1 mg  1 mg Intramuscular Q4H PRN Max 6/day   • benztropine (COGENTIN) tablet 1 mg  1 mg Oral Q4H PRN Max 6/day   • bisacodyl (DULCOLAX) rectal suppository 10 mg  10 mg Rectal Daily PRN   • hydrOXYzine HCL (ATARAX) tablet 50 mg  50 mg Oral Q6H PRN Max 4/day    Or   • diphenhydrAMINE (BENADRYL) injection 50 mg  50 mg Intramuscular Q6H PRN   • glycerin-hypromellose- (ARTIFICIAL TEARS) ophthalmic solution 1 drop  1 drop Both Eyes Q3H PRN   • haloperidol (HALDOL) tablet 2 mg  2 mg Oral Q4H PRN Max 6/day   • haloperidol (HALDOL) tablet 5 mg  5 mg Oral Q6H PRN Max 4/day   • haloperidol (HALDOL) tablet 5 mg  5 mg Oral Q4H PRN Max 4/day   • hydrOXYzine HCL (ATARAX) tablet 100 mg  100 mg Oral Q6H PRN Max 4/day    Or   • LORazepam (ATIVAN) injection 2 mg  2 mg Intramuscular Q6H PRN   • hydrOXYzine HCL (ATARAX) tablet 25 mg  25 mg Oral Q6H PRN Max 4/day   • melatonin tablet 3 mg  3 mg Oral HS PRN   • nicotine polacrilex (NICORETTE) gum 4 mg  4 mg Oral Q2H PRN   • polyethylene glycol (MIRALAX) packet 17 g  17 g Oral Daily PRN   • propranolol (INDERAL) tablet 10 mg  10 mg Oral Q8H PRN   • senna-docusate sodium (SENOKOT S) 8.6-50 mg per tablet 1 tablet  1 tablet Oral Daily PRN   • traZODone (DESYREL) tablet 50 mg  50 mg Oral HS   • venlafaxine (EFFEXOR-XR) 24 hr capsule 150 mg  150 mg Oral Daily       Subjective: Patient was seen for continuation of care. Chart was reviewed and discussed with treatment team.     No acute behavioral events over the past 24 hours. Today, patient was seen and examined at bedside for continuation of care. Patient states that his symptoms are almost back to baseline. His mood and anxiety levels have improved. He is not having any side effects and is noticing future oriented thinking. We discussed tentative plan for discharge Tuesday and the patient is agreeable with this. Patient denied adverse effects to their psychiatric medication regimen. Patient denied other new or worsening psychiatric symptoms/complaints at this time. Discussed the importance of continuing to take medications as prescribed, as well as the importance of continuing to attend groups on the unit.      Psychiatric Review of Systems:  Medication adverse effects: none  Sleep: unchanged  Appetite: unchanged  Behavior over the past 24 hours: as per above    Vitals:  Vitals:    09/17/23 0733   BP: 117/84   Pulse: 60   Resp: 18   Temp: 97.5 °F (36.4 °C)   SpO2: 98%       Laboratory results:    I have personally reviewed all pertinent laboratory/tests results  No results found for this or any previous visit (from the past 48 hour(s)). Current Medications:  Current medications as per above. All medications have been reviewed. Risks, benefits, alternatives, and possible side effects of patient's psychiatric medications were discussed with patient. Mental Status Evaluation:  Appearance: casually dressed, appears consistent with stated age  Motor: no psychomotor disturbances, no gait abnormalities  Behavior: cooperative, interacts with this writer appropriately  Speech: normal rate, rhythm, and volume  Mood: "Good"  Affect: euthymic, normal range and intensity  Thought Process: organized, linear, and goal-oriented  Thought Content: denies auditory hallucinations, denies visual hallucinations, denies delusions  Risk Potential: denies suicidal ideation, plan, or intent. Denies homicidal ideation  Sensorium: Oriented to person, place, time, and situation  Cognition: cognitive ability appears intact but was not quantitatively tested  Consciousness: alert and awake  Attention: able to focus without difficulty  Insight: improved  Judgement: improved      Progress Toward Goals & Illness Status: Patient is not at goal. They are not yet ready for discharge. The patient's condition currently requires active psychopharmacological medication management, interdisciplinary coordination with case management, and the utilization of adjunctive milieu and group therapy to augment psychopharmacological efficacy. The patient's risk of morbidity, and progression or decompensation of psychiatric disease, is higher without this current treatment. This note has been constructed using a voice recognition system. There may be translation, syntax, or grammatical errors. If you have any questions, please contact the dictating provider.

## 2023-09-17 NOTE — NURSING NOTE
Pt is visible on unit this morning, walking halls. Reports mild difficulty falling asleep but otherwise sleeping well. Denies SI/HI/AVH. Reports overall improving mood. Pt reports feeling good about pending discharge on Tuesday. Plans to stay with a friend.

## 2023-09-17 NOTE — PLAN OF CARE
Problem: Ineffective Coping  Goal: Identifies healthy coping skills  Outcome: Progressing     Problem: Risk for Self Injury/Neglect  Goal: Verbalize thoughts and feelings  Description: Interventions:  - Assess and re-assess patient's lethality and potential for self-injury  - Engage patient in 1:1 interactions, daily, for a minimum of 15 minutes  - Encourage patient to express feelings, fears, frustrations, hopes  - Establish rapport/trust with patient   Outcome: Progressing  Goal: Refrain from harming self  Description: Interventions:  - Monitor patient closely, per order  - Develop a trusting relationship  - Supervise medication ingestion, monitor effects and side effects   Outcome: Progressing

## 2023-09-17 NOTE — TREATMENT TEAM
09/17/23 0800   Team Meeting   Meeting Type Daily Rounds   Team Members Present   Team Members Present Physician;Nurse   Physician Team Member [facility] Team Member [facility]   Patient/Family Present   Patient Present No   Patient's Family Present No       Daily Rounds: Pt is pleasant and cooperative. Denies all symptoms.

## 2023-09-18 PROCEDURE — 99232 SBSQ HOSP IP/OBS MODERATE 35: CPT | Performed by: PSYCHIATRY & NEUROLOGY

## 2023-09-18 RX ORDER — VENLAFAXINE HYDROCHLORIDE 150 MG/1
150 CAPSULE, EXTENDED RELEASE ORAL DAILY
Qty: 30 CAPSULE | Refills: 0 | Status: CANCELLED | OUTPATIENT
Start: 2023-09-18 | End: 2023-10-18

## 2023-09-18 RX ADMIN — DEXTROAMPHETAMINE SACCHARATE, AMPHETAMINE ASPARTATE, DEXTROAMPHETAMINE SULFATE AND AMPHETAMINE SULFATE 5 MG: 2.5; 2.5; 2.5; 2.5 TABLET ORAL at 14:25

## 2023-09-18 RX ADMIN — TRAZODONE HYDROCHLORIDE 50 MG: 50 TABLET ORAL at 21:54

## 2023-09-18 RX ADMIN — DEXTROAMPHETAMINE SACCHARATE, AMPHETAMINE ASPARTATE, DEXTROAMPHETAMINE SULFATE AND AMPHETAMINE SULFATE 5 MG: 2.5; 2.5; 2.5; 2.5 TABLET ORAL at 09:47

## 2023-09-18 RX ADMIN — VENLAFAXINE HYDROCHLORIDE 150 MG: 150 CAPSULE, EXTENDED RELEASE ORAL at 09:47

## 2023-09-18 RX ADMIN — ACETAMINOPHEN 650 MG: 325 TABLET, FILM COATED ORAL at 15:19

## 2023-09-18 NOTE — NURSING NOTE
Pt resting in bed. Compliant with medication. Denies SI/HI or hallucination. Utilizing Tylenol for moderate neck pain. Denies any question or concern at this time.

## 2023-09-18 NOTE — CASE MANAGEMENT
CM met with pt to check in about dc plans for tomorrow, Tuesday 9/19/23. Pt reported he is feeling ready. CM informed him that CM made calls and emails waiting to hear back. Pt has ICM that he will follow up with after dc. Pt reported that he plans to stay at his friend's house which is at:   [address] [facility], [address] [facility]      CM will arrange transportation.

## 2023-09-18 NOTE — PLAN OF CARE
Problem: Ineffective Coping  Goal: Identifies healthy coping skills  Outcome: Progressing     Problem: Risk for Self Injury/Neglect  Goal: Verbalize thoughts and feelings  Description: Interventions:  - Assess and re-assess patient's lethality and potential for self-injury  - Engage patient in 1:1 interactions, daily, for a minimum of 15 minutes  - Encourage patient to express feelings, fears, frustrations, hopes  - Establish rapport/trust with patient   Outcome: Progressing  Goal: Refrain from harming self  Description: Interventions:  - Monitor patient closely, per order  - Develop a trusting relationship  - Supervise medication ingestion, monitor effects and side effects   Outcome: Progressing  Goal: Attend and participate in unit activities, including therapeutic, recreational, and educational groups  Description: Interventions:  - Provide therapeutic and educational activities daily, encourage attendance and participation, and document same in the medical record  - Obtain collateral information, encourage visitation and family involvement in care   Outcome: Progressing  Goal: Recognize maladaptive responses and adopt new coping mechanisms  Outcome: Progressing  Goal: Complete daily ADLs, including personal hygiene independently, as able  Description: Interventions:  - Observe, teach, and assist patient with ADLS  - Monitor and promote a balance of rest/activity, with adequate nutrition and elimination  Outcome: Progressing     Problem: Depression  Goal: Treatment Goal: Demonstrate behavioral control of depressive symptoms, verbalize feelings of improved mood/affect, and adopt new coping skills prior to discharge  Outcome: Progressing  Goal: Complete daily ADLs, including personal hygiene independently, as able  Description: Interventions:  - Observe, teach, and assist patient with ADLS  -  Monitor and promote a balance of rest/activity, with adequate nutrition and elimination   Outcome: Progressing

## 2023-09-18 NOTE — NURSING NOTE
Pt scant during interaction. Withdrawn to room, sleeping in between meals. Compliant with medication. Denies SI/HI or hallucination. Reports improved mood. Denies any question or concern at this time.

## 2023-09-18 NOTE — DISCHARGE INSTR - OTHER ORDERS
[address] is a confidential 7 days/week telephone support service manned by trained mental health consumers. [facility] operates daily but is not able to accept calls between 2AM-6AM.   [facility] provides support, a listening ear and can provide information about available services. [facility] specializes in the concerns of mental health consumers, their families and friends. However, we are also here for anyone who has a mental health concern, is confused about or just doesn't know anything about mental health or where to get information. To reach Jostin, call [phone] accepts calls between 6:00 AM to 10:00 AM and from 4:00 PM to 12:00 AM.   Text CONNECT to 343091 from anywhere in the [facility], anytime, about any type of crisis. A live, trained Crisis Counselor receives the text and lets you know that they are here to listen. The volunteer Crisis Counselor will help you move from a hot moment to a cool moment. [facility] ([facility]) AT [place] [facility] - licensed telephone and mobile crisis services that provide mental health assessments to all age groups regardless of income or insurance. Crisis Intervention operates 24-hour/7 days a week. [address] assists consumer who are experiencing a mental health emergency and lack the resources to assist themselves. Immediate intervention for suicidal and depressed individuals with home visits/outreach being top priority. Crisis can be contacted at [phone]. The [facility] on Mental Illness ([facility]) offers various education & support groups for you & your family. For more information visit their website at   http://www.[employer]ot/.  Dial 2-1-1 to get connected/get help.   Free, confidential information & referral available 24/7: Aging Services, Child & Youth Services, Counseling, Education/Training, Food/Shelter/Clothing, Health Services, Parenting, Substance Abuse, Support Groups, Volunteer Opportunities, & much more.   Phone: 2-1-1 or 696-310-4092, Web: XWM.MU519AGVM.JNANA, Email: [email or web address]

## 2023-09-18 NOTE — CASE MANAGEMENT
CM contacted Stephanie Cevallos from [facility] to see the status of pt on their waitlist for shelter. ([phone]  ext Manoj Anne,  - [email or web address])     CM contacted Walter Elizondo ([address] [email or web address]) from [facility] to let her know pt is hospitalized and to see the status of his referral.     CM called pt [facility] Kimberli Jefferson to update her on pt and discuss community referrals including [address] [place] [address], [facility], [place], and OP appointments. CM left  asking for a call back. Pt also mentioned possible referral to [facility]. CM received response from Surya Collins at [facility] and she reported   "Unfortunately Mr. David Ferraro has had multiple stays with us in the past several years and the way that our wait list is right now we have to prioritize individuals who have never had a chance here. I will let Gar Post in if he thinks he will have sufficient availability but at this point I don't believe we would be a housing resource for Mr. David Ferraro."    CM contacted [facility] ([email or web address]) asking for an update on status of his referral for their shelter. CM faxed pt Psychiatric Evaluation and Medication List to [facility] (Fax# [phone]) and then called (Phone: [phone]) to schedule pt for follow up appointment after dc on 9/19/23.      Pt has been scheduled for IN PERSON Medication Management appointment on MONDAY OCTOBER 2, 2023 at 10:30am.

## 2023-09-18 NOTE — PROGRESS NOTES
Progress Note - [address] [facility] Sanchez 45 y.o. male MRN: [id number]  Unit/Bed#: U 214-01 Encounter: [id number]    Assessment:  Principal Problem:    MDD (major depressive disorder), recurrent severe, without psychosis ([address] )  Active Problems:    Medical clearance for psychiatric admission    Cannabis abuse    Tobacco use      Plan:  --DC tomorrow  --Continue with psychiatric hospitalization  --Continue with individual, group, and milieu therapy  --Continue the following medications:  Current Facility-Administered Medications   Medication Dose Route Frequency   • acetaminophen (TYLENOL) tablet 650 mg  650 mg Oral Q6H PRN   • acetaminophen (TYLENOL) tablet 650 mg  650 mg Oral Q4H PRN   • acetaminophen (TYLENOL) tablet 975 mg  975 mg Oral Q6H PRN   • aluminum-magnesium hydroxide-simethicone (MAALOX) oral suspension 30 mL  30 mL Oral Q4H PRN   • amphetamine-dextroamphetamine (ADDERALL) tablet 5 mg  5 mg Oral BID (AM & Afternoon)   • haloperidol lactate (HALDOL) injection 2.5 mg  2.5 mg Intramuscular Q6H PRN Max 4/day    And   • LORazepam (ATIVAN) injection 1 mg  1 mg Intramuscular Q6H PRN Max 4/day    And   • benztropine (COGENTIN) injection 0.5 mg  0.5 mg Intramuscular Q6H PRN Max 4/day   • haloperidol lactate (HALDOL) injection 5 mg  5 mg Intramuscular Q4H PRN Max 4/day    And   • LORazepam (ATIVAN) injection 2 mg  2 mg Intramuscular Q4H PRN Max 4/day    And   • benztropine (COGENTIN) injection 1 mg  1 mg Intramuscular Q4H PRN Max 4/day   • benztropine (COGENTIN) injection 1 mg  1 mg Intramuscular Q4H PRN Max 6/day   • benztropine (COGENTIN) tablet 1 mg  1 mg Oral Q4H PRN Max 6/day   • bisacodyl (DULCOLAX) rectal suppository 10 mg  10 mg Rectal Daily PRN   • hydrOXYzine HCL (ATARAX) tablet 50 mg  50 mg Oral Q6H PRN Max 4/day    Or   • diphenhydrAMINE (BENADRYL) injection 50 mg  50 mg Intramuscular Q6H PRN   • glycerin-hypromellose- (ARTIFICIAL TEARS) ophthalmic solution 1 drop  1 drop Both Eyes Q3H PRN   • haloperidol (HALDOL) tablet 2 mg  2 mg Oral Q4H PRN Max 6/day   • haloperidol (HALDOL) tablet 5 mg  5 mg Oral Q6H PRN Max 4/day   • haloperidol (HALDOL) tablet 5 mg  5 mg Oral Q4H PRN Max 4/day   • hydrOXYzine HCL (ATARAX) tablet 100 mg  100 mg Oral Q6H PRN Max 4/day    Or   • LORazepam (ATIVAN) injection 2 mg  2 mg Intramuscular Q6H PRN   • hydrOXYzine HCL (ATARAX) tablet 25 mg  25 mg Oral Q6H PRN Max 4/day   • melatonin tablet 3 mg  3 mg Oral HS PRN   • nicotine polacrilex (NICORETTE) gum 4 mg  4 mg Oral Q2H PRN   • polyethylene glycol (MIRALAX) packet 17 g  17 g Oral Daily PRN   • propranolol (INDERAL) tablet 10 mg  10 mg Oral Q8H PRN   • senna-docusate sodium (SENOKOT S) 8.6-50 mg per tablet 1 tablet  1 tablet Oral Daily PRN   • traZODone (DESYREL) tablet 50 mg  50 mg Oral HS   • venlafaxine (EFFEXOR-XR) 24 hr capsule 150 mg  150 mg Oral Daily       Subjective: Patient was seen for continuation of care. Chart was reviewed and discussed with treatment team.     No acute behavioral events over the past 24 hours. Today, patient was seen and examined at bedside for continuation of care. Patient is doing well. Is future oriented. Is looking forward to MA tomorrow. Is tolerating medications well and no longer feels mood symptoms that were present on admission. Patient denied adverse effects to their psychiatric medication regimen. Patient denied other new or worsening psychiatric symptoms/complaints at this time. Discussed the importance of continuing to take medications as prescribed, as well as the importance of continuing to attend groups on the unit.      Psychiatric Review of Systems:  Medication adverse effects: none  Sleep: unchanged  Appetite: unchanged  Behavior over the past 24 hours: as per above    Vitals:  Vitals:    09/18/23 0700   BP: 121/87   Pulse: 98   Resp: 18   Temp: (!) 97.2 °F (36.2 °C)   SpO2: 99%       Laboratory results:    I have personally reviewed all pertinent laboratory/tests results  No results found for this or any previous visit (from the past 48 hour(s)). Current Medications:  Current medications as per above. All medications have been reviewed. Risks, benefits, alternatives, and possible side effects of patient's psychiatric medications were discussed with patient. Mental Status Evaluation:  Appearance: casually dressed, appears consistent with stated age  Motor: no psychomotor disturbances, no gait abnormalities  Behavior: cooperative, interacts with this writer appropriately  Speech: normal rate, rhythm, and volume  Mood: "good"  Affect: euthymic, normal range and intensity  Thought Process: organized, linear, and goal-oriented  Thought Content: denies auditory hallucinations, denies visual hallucinations, denies delusions  Risk Potential: denies suicidal ideation, plan, or intent. Denies homicidal ideation  Sensorium: Oriented to person, place, time, and situation  Cognition: cognitive ability appears intact but was not quantitatively tested  Consciousness: alert and awake  Attention: able to focus without difficulty  Insight: improved  Judgement: improved      Progress Toward Goals & Illness Status: Patient is not at goal. They are not yet ready for discharge. The patient's condition currently requires active psychopharmacological medication management, interdisciplinary coordination with case management, and the utilization of adjunctive milieu and group therapy to augment psychopharmacological efficacy. The patient's risk of morbidity, and progression or decompensation of psychiatric disease, is higher without this current treatment. This note has been constructed using a voice recognition system. There may be translation, syntax, or grammatical errors. If you have any questions, please contact the dictating provider.

## 2023-09-18 NOTE — CASE MANAGEMENT
CM sent transportation request to [address] [place] [address] Drive transport for pt dc Tuesday 9/19/23. They reported the [facility] is not available.      CM scheduled pt transport in [employer] for 10:30am.

## 2023-09-19 VITALS
TEMPERATURE: 97.1 F | WEIGHT: 198.4 LBS | RESPIRATION RATE: 19 BRPM | HEART RATE: 87 BPM | OXYGEN SATURATION: 98 % | BODY MASS INDEX: 29.38 KG/M2 | DIASTOLIC BLOOD PRESSURE: 102 MMHG | SYSTOLIC BLOOD PRESSURE: 146 MMHG | HEIGHT: 69 IN

## 2023-09-19 PROBLEM — Z00.8 MEDICAL CLEARANCE FOR PSYCHIATRIC ADMISSION: Status: RESOLVED | Noted: 2020-08-12 | Resolved: 2023-09-19

## 2023-09-19 PROCEDURE — 99239 HOSP IP/OBS DSCHRG MGMT >30: CPT | Performed by: STUDENT IN AN ORGANIZED HEALTH CARE EDUCATION/TRAINING PROGRAM

## 2023-09-19 RX ORDER — VENLAFAXINE HYDROCHLORIDE 150 MG/1
150 CAPSULE, EXTENDED RELEASE ORAL DAILY
Qty: 30 CAPSULE | Refills: 0 | Status: SHIPPED | OUTPATIENT
Start: 2023-09-19 | End: 2023-10-19

## 2023-09-19 RX ADMIN — ACETAMINOPHEN 975 MG: 325 TABLET, FILM COATED ORAL at 09:42

## 2023-09-19 RX ADMIN — DEXTROAMPHETAMINE SACCHARATE, AMPHETAMINE ASPARTATE, DEXTROAMPHETAMINE SULFATE AND AMPHETAMINE SULFATE 5 MG: 2.5; 2.5; 2.5; 2.5 TABLET ORAL at 08:47

## 2023-09-19 RX ADMIN — VENLAFAXINE HYDROCHLORIDE 150 MG: 150 CAPSULE, EXTENDED RELEASE ORAL at 08:47

## 2023-09-19 RX ADMIN — NICOTINE POLACRILEX 4 MG: 4 GUM, CHEWING BUCCAL at 08:49

## 2023-09-19 NOTE — BH TRANSITION RECORD
Contact Information: If you have any questions, concerns, pended studies, tests and/or procedures, or emergencies regarding your inpatient behavioral health visit. Please contact [address] behavioral health [facility] [phone] and ask to speak to a , nurse or physician. A contact is available 24 hours/ 7 days a week at this number. Summary of Procedures Performed During your Stay:  Below is a list of major procedures performed during your hospital stay and a summary of results:  - No major procedures performed. Pending Studies (From admission, onward)    None        Please follow up on the above pending studies with your PCP and/or referring provider.

## 2023-09-19 NOTE — CASE MANAGEMENT
9/19/23 3:06pm     DAWIT sent pt AVS to his [place] ICM Suresh Maharaj at email: [email or web address] for her records.

## 2023-09-19 NOTE — NURSING NOTE
Pt is pleasant and cooperative on approach. Denies SI/HI/AVH. Expresses readiness for discharge and plans to stay with a friend. AVS reviewed and prescriptions have been e-prescribed. Pt expresses understanding. Denies any further questions or concerns. Pt discharged from unit via [address].

## 2023-09-19 NOTE — PROGRESS NOTES
Diagnosis of  MDD (major depressive disorder), recurrent severe, without psychosis reviewed. Short term goals for decrease in depressive symptoms, decrease in anxiety symptoms, decrease in suicidal thoughts discussed. All present parties in agreement and treatment plan signed.     09/15/23 1341   Team Meeting   Meeting Type Tx Team Meeting   Team Members Present   Team Members Present Physician;Nurse;   Physician Team Member Dr. Kimmie Dee Team Member [facility] Management Team Member Hardeep   Patient/Family Present   Patient Present No  (pt declined to meet with treatment team)   Patient's Family Present No

## 2023-09-19 NOTE — DISCHARGE SUMMARY
[place] Summary - [facility] [facility] 45 y.o[place] MRN: [id number]  Unit/Bed#: -01 Encounter: [id number]     Admission Date: 9/14/2023         Discharge Date: 9/19/23    Attending Psychiatrist: Kp Aguero DO    Reason for Admission/HPI:     Per HPI from admission H&P obtained by Dr. Jacy Shane on 9/14/23:    "Per Psych CL note:  Reese Becerra a 45 y.o., overtly appearing , single male, possessing pertinent psychiatric history of schizoaffective disorder, attention deficit hyperactivity disorder, borderline personality disorder in addition to previous instances of polysubstance abuse including cannabis use disorder, medically complicated by prediabetes, presenting to [address] [place] [address] emergency department, subsequent to worsening dysphoric mood including depression, depressive signs/symptoms including suicidal ideation with particular plan to self-inflicted injury by means of cutting, possessing previous suicide attempt by means of cutting in addition to instances of increased anxiety in the setting of worsening psychosocial stressors including an upcoming court hearing pertaining to having custody of his child, having not seen said child for approximately 3 years.  Per record review, it appears patient has presented to the emergency department approximately 9 times since the end of July, possessing similar presentation, having had previous evaluation by vance Bautista that time, discharge disposition included outpatient psychiatric management with his psychiatrist in addition to psychotherapist and intensive , although his ICM, present at time of evaluation, is advocating for inpatient behavioral health placement to procure additional outpatient resources as necessary.     On admission to Inpatient Psychiatric Unit:  Patient is a 29-year-old black male with a reported past psychiatric history of major depressive disorder and ADHD, who of note was documented to have a history of schizoaffective disorder, ADHD, and borderline personality disorder and his psychiatric consultation, who presents voluntarily to inpatient U with suicidal ideation with a plan to cut himself.     Symptoms prior to hospitalization include: Depressed mood, suicidal ideation with a plan to cut self, nonsuicidal self injury via cutting forearms bilaterally, and hopelessness. Mitigating factors are none. Exacerbating stressors are reported as homelessness and an upcoming court hearing regarding visitation rights of his child. Symptom severity is rated as severe. Timeline is progressively worsening over the course of the past few weeks.     Today, the patient states that he is suicidal.  He currently has no plan or intent. He cuts his bilateral forearms and states that it is not to end his life but rather to feel better. He states that he has been stressed out related to homelessness. He did not report any other stressors when explicitly asked, but of note he did report in his psychiatric consultation that he has an upcoming court hearing regarding visitation rights of his child. He is somewhat of a poor historian and does not communicate all of the medications that he has been taking at home, failing to mention in my evaluation that he previously received an Aristada injection, which he did note in his psychiatric consultation before admission. He reportedly never followed up for his subsequent injection after receiving the initial injection at [facility]. He states that he has been taking Effexor XR for mood, Vyvanse for ADHD, and Seroquel for sleep. He denies manic or psychotic symptoms.  He is unable to have a friend or family member bring his Vyvanse in, and we discussed using a substitute medication while admitted."      Social History     Tobacco History     Smoking Status  Every Day Smoking Frequency  0.50 packs/day for 20.00 years (10.00 ttl pk-yrs) Smoking Tobacco Type  Cigarettes    Smokeless Tobacco Use  Never    Tobacco Comments  Not ready to quit. Alcohol History     Alcohol Use Status  Not Currently          Drug Use     Drug Use Status  Yes Types  Marijuana Frequency   1 time/week Comment  once a week          Sexual Activity     Sexually Active  Not Currently          Activities of Daily Living    Not Asked               Additional Substance Use Detail     Questions Responses    Problems Due to Past Use of Alcohol? No    Problems Due to Past Use of Substances?  No    Substance Use Assessment Denies substance use within the past 12 months    Alcohol Use Frequency Denies use in past 12 months    Cannabis frequency 1-2 times/week    Comment: 1-2 times/week on 8/22/2020     Heroin Frequency Denies use in past 12 months    Cannabis method Smoke    Comment: Smoke on 8/22/2020     Cannabis 1st Use 20 years ago    Cannabis last use 8/20/20    Comment: 8/20/2020 on 8/22/2020     Cocaine frequency Never used    Comment: Never used on 8/12/2020     Crack Cocaine Frequency Denies use in past 12 months    Methamphetamine Frequency Denies use in past 12 months    Narcotic Frequency Denies use in past 12 months    Benzodiazepine Frequency Denies use in past 12 months    Amphetamine frequency Denies use in past 12 months    Barbituate Frequency Denies use use in past 12 months    Inhalant frequency Never used    Comment: Never used on 8/11/2020     Hallucinogen frequency Never used    Comment: Never used on 8/12/2020     Ecstasy frequency Never used    Comment: Never used on 8/11/2020     Other drug frequency Never used    Comment: Never used on 8/11/2020     Opiate frequency Denies use in past 12 months    Last reviewed by Karo Veloz RN on 9/14/2023          Past Medical History:   Diagnosis Date   • ADD (attention deficit disorder)    • Addiction to drug [facility])    • Anxiety    • Borderline personality disorder ([address])    • Chronic kidney disease    • Depression    • MDD (major depressive disorder)    • Panic attack    • Psychiatric illness    • Self-injurious behavior    • Social phobia    • Substance abuse ([address] [facility])    • Suicide attempt [facility])      Past Surgical History:   Procedure Laterality Date   • NO PAST SURGERIES         Medications: All current active medications have been reviewed. Medications prior to admission:    Prior to Admission Medications   Prescriptions Last Dose Informant Patient Reported? Taking? OLANZapine (ZyPREXA) 10 mg tablet   Yes No   Sig: Take 10 mg by mouth   Patient not taking: Reported on 7/22/2023   QUEtiapine (SEROquel) 100 mg tablet   No No   Sig: Take 1 tablet (100 mg total) by mouth daily at bedtime for 15 days   acetaminophen (TYLENOL) 650 mg CR tablet   No No   Sig: Take 1 tablet (650 mg total) by mouth every 8 (eight) hours as needed for mild pain   Patient not taking: Reported on 7/22/2023   busPIRone (BUSPAR) 5 mg tablet   Yes No   Sig: Take 5 mg by mouth Three times a day   Patient not taking: Reported on 7/22/2023   lisdexamfetamine (VYVANSE) 40 MG capsule   No No   Sig: Take 1 capsule (40 mg total) by mouth every morning for 15 days Max Daily Amount: 40 mg   venlafaxine (EFFEXOR-XR) 150 mg 24 hr capsule   No No   Sig: Take 1 capsule (150 mg total) by mouth daily for 15 days      Facility-Administered Medications: None       Allergies:     No Known Allergies    Objective     Vital signs in last 24 hours:    Temp:  [97.1 °F (36.2 °C)-98.9 °F (37.2 °C)] 97.1 °F (36.2 °C)  HR:  [74-87] 87  Resp:  [17-19] 19  BP: (114-146)/() 146/102    No intake or output data in the 24 hours ending 09/19/23 [address]:     Marybeth Gordillo was admitted to the inpatient psychiatric unit and started on [address] checks every 7 minutes. During the hospitalization he was encouraged to attend individual therapy, group therapy, milieu therapy and occupational therapy.     Psychiatric medications were adjusted over the hospital stay. To address depressive symptoms, Jorge Galindo was treated with antidepressant Effexor XR and Trazodone, antipsychotic medication Seroquel and medication to address ADHD symptoms Adderall. Medication doses were adjusted during the hospital course. Seroquel was discontinued. Vyvanse was Not available on hospital formulary (patient was taking Vyvanse 40 mg daily as an outpatient prior to admission), so patient received a substitute of Adderall 5 mg twice a day during his hospitalization. Trazodone was added at the dose of 50mg qhs. Effexor XR was continued at 150mg daily. Prior to beginning of treatment medications risks and benefits and possible side effects including risk of suicidality and serotonin syndrome related to treatment with antidepressants and risks of cardiovascular side effects including elevated blood pressure, risk of misuse, abuse or dependence and risk of increased anxiety related to treatment with stimulant medications were reviewed with Jorge Galindo. He verbalized understanding and agreement for treatment. Upon admission Jorge Galindo was seen by medical service for medical clearance for inpatient treatment and medical follow up. Mihir's symptoms slowly improved over the hospital course. Initially after admission he was still feeling depressed. With adjustment of medications and therapeutic milieu his symptoms gradually resolved. At the end of treatment Jorge Galindo was doing much better. His mood was more stable at the time of discharge. Jorge Galindo denied suicidal ideation, intent or plan at the time of discharge and denied homicidal ideation, intent or plan at the time of discharge. There was no overt psychosis at the time of discharge. Jorge Galindo was participating appropriately in milieu at the time of discharge. Behavior was appropriate on the unit at the time of discharge. Sleep and appetite were improved.  Jorge Galindo was tolerating medications and was not reporting any significant side effects at the time of discharge. Since Jennifer Light was doing well at the end of the hospitalization, treatment team felt that he could be safely discharged to outpatient care. Jennifer Light also felt stable and ready for discharge at the end of the hospital stay. The outpatient follow up with a psychiatrist at [facility] and Intensive  with Pam was arranged by the unit  upon discharge. Mental Status at Time of Discharge:     Appearance: casually dressed, appears consistent with stated age, normal grooming  Motor: no psychomotor disturbances, no gait abnormalities  Behavior: calm, cooperative, interacts with this writer appropriately  Speech: normal rate, rhythm, and volume  Mood: "good"  Affect: appropriate, normal range and intensity, mood-congruent  Thought Process: organized, linear, and goal-oriented; intact associations  Thought Content: denies any delusional material, no preoccupation  Perception: denies any auditory or visual hallucinations, denies other perceptual disturbances  Risk Potential: denies suicidal ideation, plan, or intent.  Denies homicidal ideation  Sensorium: Oriented to person, place, time, and situation  Cognition: cognitive ability appears intact but was not quantitatively tested  Consciousness: alert and awake  Attention/Concentration: able to focus without difficulty, attention and concentration are age appropriate  Insight: improved  Judgement: improved    Admission Diagnosis:    Principal Problem:    MDD (major depressive disorder), recurrent severe, without psychosis ([address])  Active Problems:    Cannabis abuse    Tobacco use      Discharge Diagnosis:     Principal Problem:    MDD (major depressive disorder), recurrent severe, without psychosis ([address])  Active Problems:    Cannabis abuse    Tobacco use  Resolved Problems:    Medical clearance for psychiatric admission      Lab Results:   I have personally reviewed all pertinent laboratory/tests results. Most Recent Labs:   Lab Results   Component Value Date    WBC 4.86 09/14/2023    RBC 5.16 09/14/2023    HGB 15.0 09/14/2023    HCT 46.3 09/14/2023     09/14/2023    RDW 13.2 09/14/2023    NEUTROABS 2.68 09/14/2023    SODIUM 139 09/14/2023    K 3.7 09/14/2023     09/14/2023    CO2 29 09/14/2023    BUN 13 09/14/2023    CREATININE 0.82 09/14/2023    GLUC 86 09/14/2023    GLUF 86 09/14/2023    CALCIUM 9.1 09/14/2023    AST 14 09/14/2023    ALT 14 09/14/2023    ALKPHOS 65 09/14/2023    TP 6.0 (L) 09/14/2023    ALB 3.8 09/14/2023    TBILI 0.29 09/14/2023    CHOLESTEROL 194 09/14/2023    HDL 34 (L) 09/14/2023    TRIG 165 (H) 09/14/2023    LDLCALC 127 (H) 09/14/2023    NONHDLC 160 09/14/2023    JOM8IGLDCPRC 0.240 (L) 09/14/2023    FREET4 0.65 09/14/2023    RPR Non-Reactive 09/25/2021    HGBA1C 5.8 (H) 09/14/2023     09/14/2023       Discharge Medications:    See after visit summary for all reconciled discharge medications provided to patient and family. Discharge instructions/Information to patient and family:     See after visit summary for information provided to patient and family. Provisions for Follow-Up Care:    See after visit summary for information related to follow-up care and any pertinent home health orders. Discharge Statement:    I spent 35 minutes discharging the patient. This time was spent on the day of discharge. I had direct contact with the patient on the day of discharge. Additional documentation is required if more than 30 minutes were spent on discharge:    I reviewed with Nori Mathews importance of compliance with medications and outpatient treatment after discharge. I discussed the medication regimen and possible side effects of the medications with Nori Mathews prior to discharge. At the time of discharge he was tolerating psychiatric medications. I discussed outpatient follow up with Nori Mathews.   I reviewed with Nori Mathews crisis plan and safety plan upon discharge. Jay Bryan was competent to understand risks and benefits of withholding information and risks and benefits of his actions.   Jay Hammondss has been filing controlled prescriptions on time as prescribed according to Clara1 Aldo Castillo.    Discharge on Two Antipsychotic Medications: Nolvia Bishop PA-C 09/19/23

## 2023-09-19 NOTE — CASE MANAGEMENT
INTAKE    Readmit score:  22 Yellow   Confirmed Address   [facility]   [address] [place] [address]    Pt is homeless     Reported staying in a tent or friend's houses. Pt reported that he and his ICM have been in contact with these resources/referrals made to:  -[address] [facility] supportive housing referral  Pt requested that CM follow up on these referrals. County: [place]     Resides in the home with/can return?:    Pt is homeless and reported "I don't want to be back on the streets."    Confirmed Phone Number: [phone]    Commitment Status/Admitted from: [facility] ED   Presenting C/O:             ED Note   "  Psychiatric Evaluation        Arrives reporting feeling increased depression. Reports having fleeting SI but no intent or plan at this time. No HI. States he has been taking psychiatric medications regularly.       Patient is a 70-year-old male with a history of depression who presents with worsening depression to the point where he's had SI. Plan to cut, h/o cutting in the past.  No attempts this time. No HI, hallucinations. States compliant with meds. No illicit drugs or etoh. Has a therapist at [facility]. Cannot cite any reason for worsening depression. No trigger."     Outpatient:    [facility] (had appt on 7/28/23 @ 10am)   ACT/ICM/CPS/WRT/SC: Agatha Jaime ICM - Horris Minors   PCP:    Nikki Boone MD  [phone]   Work/Income:      Unemployed - receives SSI monthly    Legal/  Probation/[place] [employer]:    Pt reported he missed a custody/visitatioon court hearing for his 3year old child on 9/13/23 in [facility]. Pt reported "I don't know if me missing it will hurt my chances of visitation."    Access to Firearms:    Denies   Referrals Needed:  Follow up with previous referrals    Transport at Discharge:    Pt will need transportation    IMM:   Dulce Maria Text SAVANNA: [facility]   Emergency Contact:     Jessy Marie ()   [phone]    ROIs obtained:       [address] [facility]   TLC - Transitional Living   [facility] ICM    Insurance:     [place] [employer] medicaid    Audit:        PAWSS:  BAT:  UDS: + for [facility]

## 2023-09-19 NOTE — CASE MANAGEMENT
DAWIT received response from Jose Cortez at [facility]. [email or web address]) and she reported:     "Thank you for reaching out. Sigrid Lemos does have a referral for the program and Erika Perez did interview him. At this time we are unsure if he is appropriate for the program due to being here before and lack of commitment to the program previously. Do you know where he plans to go today, and how has he been presenting while on the unit? Does he plan to follow up with aftercare services?"       DAWIT responded with update on pt   "He said he will be staying with a friend in [facility] for a bit upon dc today. He has done well while on the unit. I just double checked with our group facilitators and he did attend the groups throughout the day and did participate appropriately and was social with peers and staff. (he has not been isolating in his room)    He reported he does plan to follow up with OP and is scheduled with [facility] on 10/2/23 @ 10:30am.     I updated his ICM Victoriano Palmer with this info and his dc plan from us so hopefully she can continue to support him after discharge and to keep in touch with you guys about his referral for the future."       Joan responded:   "Thank you for the information, we have Victoriano Palmer and Matthew’s information and can follow up with both of them.  Thank you."

## 2023-10-17 ENCOUNTER — HOSPITAL ENCOUNTER (EMERGENCY)
Facility: HOSPITAL | Age: 38
End: 2023-10-18
Attending: EMERGENCY MEDICINE
Payer: MEDICARE

## 2023-10-17 DIAGNOSIS — F32.A DEPRESSION: Primary | ICD-10-CM

## 2023-10-17 PROCEDURE — 99284 EMERGENCY DEPT VISIT MOD MDM: CPT

## 2023-10-17 PROCEDURE — 99285 EMERGENCY DEPT VISIT HI MDM: CPT | Performed by: EMERGENCY MEDICINE

## 2023-10-17 PROCEDURE — 80307 DRUG TEST PRSMV CHEM ANLYZR: CPT | Performed by: EMERGENCY MEDICINE

## 2023-10-17 PROCEDURE — 82075 ASSAY OF BREATH ETHANOL: CPT | Performed by: EMERGENCY MEDICINE

## 2023-10-17 RX ORDER — VENLAFAXINE HYDROCHLORIDE 75 MG/1
150 CAPSULE, EXTENDED RELEASE ORAL DAILY
Status: DISCONTINUED | OUTPATIENT
Start: 2023-10-18 | End: 2023-10-18 | Stop reason: HOSPADM

## 2023-10-17 RX ORDER — QUETIAPINE FUMARATE 200 MG/1
200 TABLET, FILM COATED ORAL
COMMUNITY
Start: 2023-08-14

## 2023-10-17 NOTE — LETTER
[facility] EMERGENCY DEPARTMENT  [address] [facility] [address] [phone]  Dept: [phone]      EMTALA TRANSFER CONSENT    NAME Js Ruhs                                  1985                              MRN [id number]    I have been informed of my rights regarding examination, treatment, and transfer   by Dr. Shaun Faria MD    Benefits: Specialized equipment and/or services available at the receiving facility (Include comment)________________________    Risks:        Consent for Transfer:  I acknowledge that my medical condition has been evaluated and explained to me by the emergency department physician or other qualified medical person and/or my attending physician, who has recommended that I be transferred to the service of  Accepting Physician: Dr. Catherine Perrin at [address] [place] [address] [address] Name, [address] : [facility] [id number] [place] [address], [facility]. The above potential benefits of such transfer, the potential risks associated with such transfer, and the probable risks of not being transferred have been explained to me, and I fully understand them. The doctor has explained that, in my case, the benefits of transfer outweigh the risks. I agree to be transferred. I authorize the performance of emergency medical procedures and treatments upon me in both transit and upon arrival at the receiving facility. Additionally, I authorize the release of any and all medical records to the receiving facility and request they be transported with me, if possible. I understand that the safest mode of transportation during a medical emergency is an ambulance and that the Hospital advocates the use of this mode of transport. Risks of traveling to the receiving facility by car, including absence of medical control, life sustaining equipment, such as oxygen, and medical personnel has been explained to me and I fully understand them.     (ИРИНА CORRECT BOX BELOW)  [  ]  I consent to the stated transfer and to be transported by ambulance/helicopter. [  ]  I consent to the stated transfer, but refuse transportation by ambulance and accept full responsibility for my transportation by car. I understand the risks of non-ambulance transfers and I exonerate the Hospital and its staff from any deterioration in my condition that results from this refusal.    X___________________________________________    DATE  10/18/23  TIME________  Signature of patient or legally responsible individual signing on patient behalf           RELATIONSHIP TO PATIENT_________________________                Provider Certification    NAME [address]                                    1985                              MRN [id number]    A medical screening exam was performed on the above named patient. Based on the examination:    Condition Necessitating Transfer The encounter diagnosis was Depression.     Patient Condition: The patient has been stabilized such that within reasonable medical probability, no material deterioration of the patient condition or the condition of the unborn child(lucero) is likely to result from the transfer, An emergency transfer is being made prior to stabilization due to the need for definitive care and the benefit of transfer outweighs the risk, Other (Include comment)_________________________________________ [facility] Unit Needed)    Reason for Transfer: Level of Care needed not available at this facility, No bed available at level of patient's needs    Transfer Requirements: [address] [facility], [address] [facility] available and qualified personnel available for treatment as acknowledged by [employer]  Agreed to accept transfer and to provide appropriate medical treatment as acknowledged by       Dr. Willie Menard  Appropriate medical records of the examination and treatment of the patient are provided at the time of transfer   [address] _______  Transfer will be performed by qualified personnel from [address]  and appropriate transfer equipment as required, including the use of necessary and appropriate life support measures. Provider Certification: I have examined the patient and explained the following risks and benefits of being transferred/refusing transfer to the patient/family:  General risk, such as traffic hazards, adverse weather conditions, rough terrain or turbulence, possible failure of equipment (including vehicle or aircraft), or consequences of actions of persons outside the control of the transport personnel      Based on these reasonable risks and benefits to the patient and/or the unborn child(lucero), and based upon the information available at the time of the patient’s examination, I certify that the medical benefits reasonably to be expected from the provision of appropriate medical treatments at another medical facility outweigh the increasing risks, if any, to the individual’s medical condition, and in the case of labor to the unborn child, from effecting the transfer.     X____________________________________________ DATE 10/18/23        TIME_______      ORIGINAL - SEND TO MEDICAL RECORDS   COPY - SEND WITH PATIENT DURING TRANSFER

## 2023-10-17 NOTE — LETTER
Section I - General Information    Name of Patient: Leticia Noguera                 : 1985    Medicare #:____________________  Transport Date: 10/18/23 (PCS is valid for round trips on this date and for all repetitive trips in the 60-day range as noted below.)  Origin: [address]                                                         Destination:__[facility] ______________________________________________  Is the pt's stay covered under Medicare Part A (PPS/DRG)     (_) YES  (_) NO  Closest appropriate facility?  (_) YES  (_) NO  If no, why is transport to more distant facility required?________________________  If hosp-hosp transfer, describe services needed at 2nd facility not available at 1st facility? _________________________________  If hospice pt, is this transport related to pt's terminal illness? (_) YES (_) NO Describe____________________________________    Section II - Medical Necessity Questionnaire  Ambulance transportation is medically necessary only if other means of transport are contraindicated or would be potentially harmful to the patient. To meet this requirement, the patient must either be "bed confined" or suffer from a condition such that transport by means other than ambulance is contraindicated by the patient's condition.  The following questions must be answered by the medical professional signing below for this form to be valid:    1)  Describe the MEDICAL CONDITION (physical and/or mental) of this patient  [address] that requires the patient to be transported in an ambulance and why transport by other means is contraindicated by the patient's condition:__________________________________________________________________________________________________    2) Is the patient "bed confined" as defined below?     (_) YES  (_) NO  To be "be confined" the patient must satisfy all three of the following conditions: (1) unable to get up from bed without Assistance; AND (2) unable to ambulate; AND (3) unable to sit in a chair or wheelchair. 3) Can this patient safely be transported by car or wheelchair Walda Amass (i.e., seated during transport without a medical attendant or monitoring)?   (_) YES  (_) NO    4) In addition to completing questions 1-3 above, please check any of the following conditions that apply*:  *Note: supporting documentation for any boxes checked must be maintained in the patient's medical records  (_)Contractures   (_)Non-Healed Fractures  (_)Patient is confused (_)Patient is comatose (_)Moderate/severe pain on movement (_)Danger to self/others  (_)IV meds/fluids required (_)Patient is combative(_)Need or possible need for restraints (_)DVT requires elevation of lower extremity  (_)Medical attendant required (_)Requires oxygen-unable to self administer (_)Special handling/isolation/infection control precautions required (_)Unable to tolerate seated position for time needed to transport (_)Hemodynamic monitoring required en route (_)Unable to sit in a chair or wheelchair due to decubitus ulcers or other wounds (_)Cardiac monitoring required en route (_)Morbid obesity requires additional personnel/equipment to safely handle patient (_)Orthopedic device (backboard, halo, pins, traction, brace, wedge, etc,) requiring special handling during transport (_)Other(specify)_______________________________________________    Section III - Signature of Physician or Healthcare Professional    I certify that the above information is accurate based on my evaluation of this patient, and that the medical necessity provisions of 42 .40(e)(1) are met, requiring that this patient be transported by ambulance. I understand this information will be used by the Centers for Medicare and Medicaid Services (CMS) to support the determination of medical necessity for ambulance services.  I represent that I am the beneficiary’s attending physician; or an employee of the beneficiary’s attending physician, or the hospital or facility where the beneficiary is being treated and from which the beneficiary is being transported; that I have personal knowledge of the beneficiary’s condition at the time of transport; and that I meet all Medicare regulations and applicable State licensure laws for the credential indicated. (_) If this box is checked, I also certify that the patient is physically or mentally incapable of signing the ambulance service's claim and that the institution with which I am affiliated has furnished care, services, or assistance to the patient. My signature below is made on behalf of the patient pursuant to 42 CFR §424.36(b)(4). In accordance with 42 CFR §424.37, the specific reason(s) that the patient is physically or mentally incapable of signing the claim form is as follows: _________________________________________________________________________________________________________      Signature of Physician* or Healthcare Professional______________________________________________________________  Signature Date 10/18/23 (For scheduled repetitive transports, this form is not valid for transports performed more than 60 days after this date)    Printed Name & Credentials of Physician or Healthcare Professional (MD, DO, RN, etc.)________________________________  *Form must be signed by patient's attending physician for scheduled, repetitive transports.  For non-repetitive, unscheduled ambulance transports, if unable to obtain the signature of the attending physician, any of the following may sign (choose appropriate option below)  (_) Physician Assistant   (_)  Clinical Nurse Specialist    (_)  Licensed Practical Nurse    (_)    (_)  Nurse Practitioner     (_)  Registered Nurse                (_)                         (_) Discharge Planner

## 2023-10-18 VITALS
HEART RATE: 71 BPM | WEIGHT: 206.4 LBS | DIASTOLIC BLOOD PRESSURE: 81 MMHG | OXYGEN SATURATION: 99 % | RESPIRATION RATE: 16 BRPM | BODY MASS INDEX: 30.48 KG/M2 | TEMPERATURE: 98 F | SYSTOLIC BLOOD PRESSURE: 130 MMHG

## 2023-10-18 RX ADMIN — VENLAFAXINE HYDROCHLORIDE 150 MG: 75 CAPSULE, EXTENDED RELEASE ORAL at 08:43

## 2023-10-18 NOTE — ED NOTES
Patient is accepted at [facility]  Patient is accepted by Dr. Blanca Eisenberg per Barbee Dubin. Transportation is arranged with [facility]. Transportation is scheduled for 10/18 11:15am    Patient may go to the floor at arrive @ 12noon     Nurse report is to be called to [phone] prior to patient transfer.

## 2023-10-18 NOTE — ED NOTES
Insurance Authorization for admission:   Phone call placed to [employer] number: [phone]  Spoke to [facility]    5 days approved. Level of care:IP  Review on 10/23/23  Authorization # given once pt arrives on [facility] unit      Eligibility Verification System checked - ([phone]). Online system / automated system indicates: **    Insurance Authorization for Transportation:    Phone call placed to **. Phone number **. Spoke to **.    Authorization #: **

## 2023-10-18 NOTE — ED PROVIDER NOTES
History  Chief Complaint   Patient presents with   • Psychiatric Evaluation     Patient having increased depression d/t family issues - states some suicidal thoughts with no plan or intent - hx of inpatient psych - suicide attempt in 2014 by cutting wrists     Patient is a 43-year-old male. He has a history depression and anxiety. He has had prior suicide by cutting. He has ADD. He has borderline personality disorder. He has a history of substance abuse. Patient presents to the emergency room several days increasing depression. Unclear precipitant. Today he is starting to have thoughts of self-harm. No plan. He came to the emergency room for help. Denies current drug or alcohol use. He denies hallucinations. He is otherwise without complaint. Prior to Admission Medications   Prescriptions Last Dose Informant Patient Reported? Taking?   lisdexamfetamine (VYVANSE) 40 MG capsule   No No   Sig: Take 1 capsule (40 mg total) by mouth every morning for 15 days Max Daily Amount: 40 mg   venlafaxine (EFFEXOR-XR) 150 mg 24 hr capsule   No No   Sig: Take 1 capsule (150 mg total) by mouth daily      Facility-Administered Medications: None       Past Medical History:   Diagnosis Date   • ADD (attention deficit disorder)    • Addiction to drug [facility])    • Anxiety    • Borderline personality disorder ([facility])    • Chronic kidney disease    • Depression    • MDD (major depressive disorder)    • Panic attack    • Psychiatric illness    • Self-injurious behavior    • Social phobia    • Substance abuse ([facility])    • Suicide attempt [facility])        Past Surgical History:   Procedure Laterality Date   • NO PAST SURGERIES         History reviewed. No pertinent family history. I have reviewed and agree with the history as documented.     E-Cigarette/Vaping   • E-Cigarette Use Never User      E-Cigarette/Vaping Substances   • Nicotine No    • THC No    • CBD No    • Flavoring No    • Other No    • Unknown No      Social History Tobacco Use   • Smoking status: Every Day     Packs/day: 0.50     Years: 20.00     Total pack years: 10.00     Types: Cigarettes   • Smokeless tobacco: Never   • Tobacco comments:     Not ready to quit. Vaping Use   • Vaping Use: Never used   Substance Use Topics   • Alcohol use: Not Currently   • Drug use: Yes     Frequency: 1.0 times per week     Types: Marijuana     Comment: once a week       Review of Systems   Constitutional:  Negative for chills and fever. HENT:  Negative for rhinorrhea and sore throat. Eyes:  Negative for pain, redness and visual disturbance. Respiratory:  Negative for cough and shortness of breath. Cardiovascular:  Negative for chest pain and leg swelling. Gastrointestinal:  Negative for abdominal pain, diarrhea and vomiting. Endocrine: Negative for polydipsia and polyuria. Genitourinary:  Negative for dysuria, frequency and hematuria. Musculoskeletal:  Negative for back pain and neck pain. Skin:  Negative for rash and wound. Allergic/Immunologic: Negative for immunocompromised state. Neurological:  Negative for weakness, numbness and headaches. Psychiatric/Behavioral:  Positive for dysphoric mood and suicidal ideas. Negative for hallucinations. The patient is nervous/anxious. Physical Exam  Physical Exam  Vitals reviewed. Constitutional:       General: He is not in acute distress. Appearance: He is not toxic-appearing. HENT:      Head: Normocephalic and atraumatic. Nose: Nose normal.      Mouth/Throat:      Mouth: Mucous membranes are moist.   Eyes:      General:         Right eye: No discharge. Left eye: No discharge. Conjunctiva/sclera: Conjunctivae normal.   Cardiovascular:      Rate and Rhythm: Normal rate and regular rhythm. Pulses: Normal pulses. Heart sounds: Normal heart sounds. No murmur heard. No friction rub. No gallop. Pulmonary:      Effort: Pulmonary effort is normal. No respiratory distress. Breath sounds: Normal breath sounds. No stridor. No wheezing, rhonchi or rales. Abdominal:      General: Bowel sounds are normal. There is no distension. Palpations: Abdomen is soft. Tenderness: There is no abdominal tenderness. There is no right CVA tenderness, left CVA tenderness, guarding or rebound. Musculoskeletal:         General: No swelling, tenderness, deformity or signs of injury. Normal range of motion. Cervical back: Normal range of motion and neck supple. No rigidity. Right lower leg: No edema. Left lower leg: No edema. Comments: No calf pain or unilateral leg swelling   Skin:     General: Skin is warm and dry. Coloration: Skin is not jaundiced or pale. Findings: No bruising, erythema or rash. Neurological:      General: No focal deficit present. Mental Status: He is alert and oriented to person, place, and time. Cranial Nerves: No facial asymmetry. Sensory: No sensory deficit. Motor: Motor function is intact. Psychiatric:         Mood and Affect: Mood is depressed.          Behavior: Behavior normal.         Vital Signs  ED Triage Vitals [10/17/23 2137]   Temperature Pulse Respirations Blood Pressure SpO2   97.7 °F (36.5 °C) 94 16 130/85 98 %      Temp Source Heart Rate Source Patient Position - Orthostatic VS BP Location FiO2 (%)   Oral Monitor Sitting Left arm --      Pain Score       --           Vitals:    10/17/23 2137   BP: 130/85   Pulse: 94   Patient Position - Orthostatic VS: Sitting         Visual Acuity      ED Medications  Medications   venlafaxine (EFFEXOR-XR) 24 hr capsule 150 mg (has no administration in time range)       Diagnostic Studies  Results Reviewed       Procedure Component Value Units Date/Time    POCT alcohol breath test [[id number]]     Lab Status: No result     Rapid drug screen, urine [[id number]]     Lab Status: No result Specimen: Urine                    No orders to display Procedures  Procedures         ED Course  ED Course as of 10/17/23 2353   Tue Oct 17, 2023   2340 201 signed                                 SBIRT 22yo+      Flowsheet Row Most Recent Value   Initial Alcohol Screen: US AUDIT-C     1. How often do you have a drink containing alcohol? 0 Filed at: 10/17/2023 2141   2. How many drinks containing alcohol do you have on a typical day you are drinking? 0 Filed at: 10/17/2023 2141   3a. Male UNDER 65: How often do you have five or more drinks on one occasion? 0 Filed at: 10/17/2023 2141   Audit-C Score 0 Filed at: 10/17/2023 2141   LEATHA: How many times in the past year have you. .. Used an illegal drug or used a prescription medication for non-medical reasons? Never Filed at: 10/17/2023 2141                      Medical Decision Making  Patient is medic cleared for crisis evaluation and possible psychiatric admission. Amount and/or Complexity of Data Reviewed  Labs: ordered. Risk  Prescription drug management. Decision regarding hospitalization. Disposition  Final diagnoses:   None     ED Disposition       None          Follow-up Information    None         Patient's Medications   Discharge Prescriptions    No medications on file       No discharge procedures on file.     PDMP Review         Value Time User    PDMP Reviewed  Yes 9/19/2023 11:00 AM Clementina Melendez PA-C            ED Provider  Electronically Signed by             Severiano Catalan MD  10/17/23 2020

## 2023-10-18 NOTE — ED NOTES
Pt is a 45 y.o. male who was brought to the ED with   Chief Complaint   Patient presents with    Psychiatric Evaluation     Patient having increased depression d/t family issues - states some suicidal thoughts with no plan or intent - hx of inpatient psych - suicide attempt in 2014 by cutting wrists     Intake Assessment completed, Safety risk Assessment completed  Pt will be admitted to [address]    Pt is 45 male with a significant PMH of Schizoaffective disorder, depressive type ([address]) (Primary Dx); Unsheltered homelessness; Tobacco use; Attention deficit disorder (ADD) without hyperactivity presenting to the ED for his seemingly, 15th visit to the emergency room since the end of July 2023. During assessment pt appeared distant, slightly confused, with delayed answers and slow speech . Pt reported increased depressed mood, and sadness, but was unable to elaborate on current stressors. Pt reported seeing his 3yr old son recently, but failed to attend the latest court date and is know unsure if he will be able to see him again. Pt is typically non-compliant with is medication management, but presents to [address] [place] [address] for medication and advice as recent as 10/13/2023. Pt states he intermittently struggles with food insecurities, transportation and mental health stability. Pt is agreeable to signing a  [address].

## 2023-11-13 RX ORDER — OLANZAPINE 10 MG/1
10 TABLET ORAL
Qty: 30 TABLET | OUTPATIENT
Start: 2023-11-13

## 2024-02-23 ENCOUNTER — APPOINTMENT (EMERGENCY)
Dept: RADIOLOGY | Facility: HOSPITAL | Age: 39
End: 2024-02-23
Payer: MEDICARE

## 2024-02-23 ENCOUNTER — HOSPITAL ENCOUNTER (EMERGENCY)
Facility: HOSPITAL | Age: 39
Discharge: HOME | End: 2024-02-23
Attending: EMERGENCY MEDICINE
Payer: MEDICARE

## 2024-02-23 VITALS
WEIGHT: 201.28 LBS | TEMPERATURE: 99.9 F | BODY MASS INDEX: 29.72 KG/M2 | HEART RATE: 98 BPM | RESPIRATION RATE: 18 BRPM | SYSTOLIC BLOOD PRESSURE: 112 MMHG | OXYGEN SATURATION: 99 % | DIASTOLIC BLOOD PRESSURE: 66 MMHG

## 2024-02-23 DIAGNOSIS — R50.9 FEVER: ICD-10-CM

## 2024-02-23 DIAGNOSIS — J18.9 PNEUMONIA: Primary | ICD-10-CM

## 2024-02-23 LAB
ALBUMIN SERPL BCP-MCNC: 4.1 G/DL (ref 3.5–5)
ALP SERPL-CCNC: 62 U/L (ref 34–104)
ALT SERPL W P-5'-P-CCNC: 22 U/L (ref 7–52)
ANION GAP SERPL CALCULATED.3IONS-SCNC: 7 MMOL/L
AST SERPL W P-5'-P-CCNC: 31 U/L (ref 13–39)
BASOPHILS # BLD AUTO: 0.02 THOUSANDS/ÂΜL (ref 0–0.1)
BASOPHILS NFR BLD AUTO: 0 % (ref 0–1)
BILIRUB SERPL-MCNC: 0.55 MG/DL (ref 0.2–1)
BUN SERPL-MCNC: 17 MG/DL (ref 5–25)
CALCIUM SERPL-MCNC: 8.9 MG/DL (ref 8.4–10.2)
CHLORIDE SERPL-SCNC: 97 MMOL/L (ref 96–108)
CO2 SERPL-SCNC: 27 MMOL/L (ref 21–32)
CREAT SERPL-MCNC: 0.96 MG/DL (ref 0.6–1.3)
EOSINOPHIL # BLD AUTO: 0.04 THOUSAND/ÂΜL (ref 0–0.61)
EOSINOPHIL NFR BLD AUTO: 0 % (ref 0–6)
ERYTHROCYTE [DISTWIDTH] IN BLOOD BY AUTOMATED COUNT: 12.4 % (ref 11.6–15.1)
FLUAV RNA RESP QL NAA+PROBE: NEGATIVE
FLUBV RNA RESP QL NAA+PROBE: NEGATIVE
GFR SERPL CREATININE-BSD FRML MDRD: 99 ML/MIN/1.73SQ M
GLUCOSE SERPL-MCNC: 88 MG/DL (ref 65–140)
HCT VFR BLD AUTO: 44.7 % (ref 36.5–49.3)
HGB BLD-MCNC: 15.1 G/DL (ref 12–17)
IMM GRANULOCYTES # BLD AUTO: 0.05 THOUSAND/UL (ref 0–0.2)
IMM GRANULOCYTES NFR BLD AUTO: 1 % (ref 0–2)
LYMPHOCYTES # BLD AUTO: 0.81 THOUSANDS/ÂΜL (ref 0.6–4.47)
LYMPHOCYTES NFR BLD AUTO: 7 % (ref 14–44)
MCH RBC QN AUTO: 29.4 PG (ref 26.8–34.3)
MCHC RBC AUTO-ENTMCNC: 33.8 G/DL (ref 31.4–37.4)
MCV RBC AUTO: 87 FL (ref 82–98)
MONOCYTES # BLD AUTO: 0.7 THOUSAND/ÂΜL (ref 0.17–1.22)
MONOCYTES NFR BLD AUTO: 6 % (ref 4–12)
NEUTROPHILS # BLD AUTO: 9.42 THOUSANDS/ÂΜL (ref 1.85–7.62)
NEUTS SEG NFR BLD AUTO: 86 % (ref 43–75)
NRBC BLD AUTO-RTO: 0 /100 WBCS
PLATELET # BLD AUTO: 275 THOUSANDS/UL (ref 149–390)
PMV BLD AUTO: 9.2 FL (ref 8.9–12.7)
POTASSIUM SERPL-SCNC: 4.8 MMOL/L (ref 3.5–5.3)
PROT SERPL-MCNC: 6.7 G/DL (ref 6.4–8.4)
RBC # BLD AUTO: 5.14 MILLION/UL (ref 3.88–5.62)
RSV RNA RESP QL NAA+PROBE: NEGATIVE
SARS-COV-2 RNA RESP QL NAA+PROBE: NEGATIVE
SODIUM SERPL-SCNC: 131 MMOL/L (ref 135–147)
WBC # BLD AUTO: 11.04 THOUSAND/UL (ref 4.31–10.16)

## 2024-02-23 PROCEDURE — 71045 X-RAY EXAM CHEST 1 VIEW: CPT

## 2024-02-23 PROCEDURE — 80053 COMPREHEN METABOLIC PANEL: CPT | Performed by: PHYSICIAN ASSISTANT

## 2024-02-23 PROCEDURE — 99285 EMERGENCY DEPT VISIT HI MDM: CPT | Performed by: PHYSICIAN ASSISTANT

## 2024-02-23 PROCEDURE — 85025 COMPLETE CBC W/AUTO DIFF WBC: CPT | Performed by: PHYSICIAN ASSISTANT

## 2024-02-23 PROCEDURE — 0241U HB NFCT DS VIR RESP RNA 4 TRGT: CPT | Performed by: PHYSICIAN ASSISTANT

## 2024-02-23 PROCEDURE — 96361 HYDRATE IV INFUSION ADD-ON: CPT

## 2024-02-23 PROCEDURE — 36415 COLL VENOUS BLD VENIPUNCTURE: CPT | Performed by: PHYSICIAN ASSISTANT

## 2024-02-23 PROCEDURE — 99283 EMERGENCY DEPT VISIT LOW MDM: CPT

## 2024-02-23 PROCEDURE — 96374 THER/PROPH/DIAG INJ IV PUSH: CPT

## 2024-02-23 RX ORDER — AZITHROMYCIN 200 MG/5ML
POWDER, FOR SUSPENSION ORAL
Qty: 30 ML | Refills: 0 | Status: SHIPPED | OUTPATIENT
Start: 2024-02-23 | End: 2024-02-23

## 2024-02-23 RX ORDER — ACETAMINOPHEN 325 MG/1
650 TABLET ORAL ONCE
Status: COMPLETED | OUTPATIENT
Start: 2024-02-23 | End: 2024-02-23

## 2024-02-23 RX ORDER — IBUPROFEN 600 MG/1
600 TABLET ORAL EVERY 6 HOURS PRN
Qty: 10 TABLET | Refills: 0 | Status: SHIPPED | OUTPATIENT
Start: 2024-02-23

## 2024-02-23 RX ORDER — AZITHROMYCIN 250 MG/1
250 TABLET, FILM COATED ORAL DAILY
Qty: 4 TABLET | Refills: 0 | Status: SHIPPED | OUTPATIENT
Start: 2024-02-23 | End: 2024-02-23

## 2024-02-23 RX ORDER — AZITHROMYCIN 200 MG/5ML
POWDER, FOR SUSPENSION ORAL
Qty: 40 ML | Refills: 0 | Status: SHIPPED | OUTPATIENT
Start: 2024-02-23

## 2024-02-23 RX ORDER — KETOROLAC TROMETHAMINE 30 MG/ML
15 INJECTION, SOLUTION INTRAMUSCULAR; INTRAVENOUS ONCE
Status: COMPLETED | OUTPATIENT
Start: 2024-02-23 | End: 2024-02-23

## 2024-02-23 RX ORDER — AZITHROMYCIN 250 MG/1
500 TABLET, FILM COATED ORAL ONCE
Status: COMPLETED | OUTPATIENT
Start: 2024-02-23 | End: 2024-02-23

## 2024-02-23 RX ADMIN — KETOROLAC TROMETHAMINE 15 MG: 30 INJECTION, SOLUTION INTRAMUSCULAR; INTRAVENOUS at 22:46

## 2024-02-23 RX ADMIN — AZITHROMYCIN DIHYDRATE 500 MG: 250 TABLET ORAL at 22:46

## 2024-02-23 RX ADMIN — ACETAMINOPHEN 650 MG: 325 TABLET ORAL at 22:01

## 2024-02-23 RX ADMIN — SODIUM CHLORIDE 1000 ML: 0.9 INJECTION, SOLUTION INTRAVENOUS at 21:17

## 2024-02-24 NOTE — DISCHARGE INSTRUCTIONS
Return with any worsening symptoms questions comments or concerns    Follow-up with your family doctor for ongoing care on re-evaluation

## 2024-02-24 NOTE — ED PROVIDER NOTES
History  Chief Complaint   Patient presents with    Flu Symptoms     Pt presents to the ED with c/o fatigue since yesterday. States that he woke up today and had a headache, generalized body aches, and a sore throat. Denies sick contacts.      Is a 38-year-old male patient who presents with cough body aches fatigue since yesterday.  He presented with fever of 101.2 today.  He has no stiff neck no rash no vomiting, no diarrhea no abdominal pain no chest pain or shortness of breath.  Has a sore throat when he coughs only.  Patient does have a significant psychiatric history and history of chronic kidney disease states he has not been eating and drinking as much.  No pleuritic pain.  Nothing makes his better or worse he tried nothing over-the-counter        Prior to Admission Medications   Prescriptions Last Dose Informant Patient Reported? Taking?   QUEtiapine (SEROquel) 200 mg tablet   Yes No   Sig: Take 200 mg by mouth daily at bedtime   lisdexamfetamine (VYVANSE) 40 MG capsule   No No   Sig: Take 1 capsule (40 mg total) by mouth every morning for 15 days Max Daily Amount: 40 mg   venlafaxine (EFFEXOR-XR) 150 mg 24 hr capsule   No No   Sig: Take 1 capsule (150 mg total) by mouth daily      Facility-Administered Medications: None       Past Medical History:   Diagnosis Date    ADD (attention deficit disorder)     Addiction to drug (HCC)     Anxiety     Borderline personality disorder (HCC)     Chronic kidney disease     Depression     MDD (major depressive disorder)     Panic attack     Psychiatric illness     Self-injurious behavior     Social phobia     Substance abuse (HCC)     Suicide attempt (HCC)        Past Surgical History:   Procedure Laterality Date    NO PAST SURGERIES         History reviewed. No pertinent family history.  I have reviewed and agree with the history as documented.    E-Cigarette/Vaping    E-Cigarette Use Never User      E-Cigarette/Vaping Substances    Nicotine No     THC No     CBD No      Flavoring No     Other No     Unknown No      Social History     Tobacco Use    Smoking status: Every Day     Current packs/day: 0.50     Average packs/day: 0.5 packs/day for 20.0 years (10.0 ttl pk-yrs)     Types: Cigarettes    Smokeless tobacco: Never    Tobacco comments:     Not ready to quit.   Vaping Use    Vaping status: Never Used   Substance Use Topics    Alcohol use: Not Currently    Drug use: Yes     Frequency: 1.0 times per week     Types: Marijuana     Comment: once a week       Review of Systems   Constitutional:  Positive for chills, fatigue and fever. Negative for diaphoresis.   HENT:  Negative for congestion, ear pain, nosebleeds and sore throat.    Eyes:  Negative for photophobia, pain, discharge and visual disturbance.   Respiratory:  Positive for cough. Negative for choking, chest tightness, shortness of breath and wheezing.    Cardiovascular:  Negative for chest pain and palpitations.   Gastrointestinal:  Negative for abdominal distention, abdominal pain, anal bleeding, blood in stool, constipation, diarrhea, nausea, rectal pain and vomiting.   Genitourinary:  Negative for dysuria, flank pain, frequency and hematuria.   Musculoskeletal:  Positive for myalgias. Negative for arthralgias, back pain, gait problem and joint swelling.   Skin:  Negative for color change and rash.   Neurological:  Negative for dizziness, seizures, syncope and headaches.   Psychiatric/Behavioral:  Negative for behavioral problems and confusion. The patient is not nervous/anxious.    All other systems reviewed and are negative.      Physical Exam  Physical Exam  Vitals and nursing note reviewed.   Constitutional:       General: He is not in acute distress.     Appearance: Normal appearance. He is well-developed. He is not ill-appearing, toxic-appearing or diaphoretic.   HENT:      Head: Normocephalic and atraumatic.      Right Ear: Tympanic membrane, ear canal and external ear normal.      Left Ear: Tympanic membrane, ear  canal and external ear normal.      Nose: Nose normal.      Mouth/Throat:      Mouth: Mucous membranes are moist.      Pharynx: Oropharynx is clear. No oropharyngeal exudate or posterior oropharyngeal erythema.   Eyes:      General: No scleral icterus.        Right eye: No discharge.         Left eye: No discharge.      Conjunctiva/sclera: Conjunctivae normal.      Pupils: Pupils are equal, round, and reactive to light.   Cardiovascular:      Rate and Rhythm: Normal rate and regular rhythm.   Pulmonary:      Effort: Pulmonary effort is normal.      Breath sounds: Normal breath sounds.   Abdominal:      General: Bowel sounds are normal.      Palpations: Abdomen is soft.      Tenderness: There is no abdominal tenderness.   Musculoskeletal:         General: Normal range of motion.      Cervical back: Normal range of motion and neck supple.      Right lower leg: No edema.      Left lower leg: No edema.   Skin:     General: Skin is warm.      Capillary Refill: Capillary refill takes less than 2 seconds.   Neurological:      General: No focal deficit present.      Mental Status: He is alert and oriented to person, place, and time. Mental status is at baseline.   Psychiatric:         Mood and Affect: Mood normal.         Behavior: Behavior normal.         Vital Signs  ED Triage Vitals   Temperature Pulse Respirations Blood Pressure SpO2   02/23/24 2013 02/23/24 2013 02/23/24 2013 02/23/24 2013 02/23/24 2013   (!) 101.2 °F (38.4 °C) 102 20 125/80 98 %      Temp Source Heart Rate Source Patient Position - Orthostatic VS BP Location FiO2 (%)   02/23/24 2013 02/23/24 2013 02/23/24 2013 02/23/24 2013 --   Tympanic Monitor Sitting Left arm       Pain Score       02/23/24 2246       Med Not Given for Pain - for MAR use only           Vitals:    02/23/24 2013 02/23/24 2248   BP: 125/80 112/66   Pulse: 102 98   Patient Position - Orthostatic VS: Sitting Lying         Visual Acuity      ED Medications  Medications   sodium chloride  0.9 % bolus 1,000 mL (0 mL Intravenous Stopped 2/23/24 2251)   acetaminophen (TYLENOL) tablet 650 mg (650 mg Oral Given 2/23/24 2201)   ketorolac (TORADOL) injection 15 mg (15 mg Intravenous Given 2/23/24 2246)   azithromycin (ZITHROMAX) tablet 500 mg (500 mg Oral Given 2/23/24 2246)       Diagnostic Studies  Results Reviewed       Procedure Component Value Units Date/Time    FLU/RSV/COVID - if FLU/RSV clinically relevant [[id number]]  (Normal) Collected: 02/23/24 2201    Lab Status: Final result Specimen: Nares from Nose Updated: 02/23/24 2248     SARS-CoV-2 Negative     INFLUENZA A PCR Negative     INFLUENZA B PCR Negative     RSV PCR Negative    Narrative:      FOR PEDIATRIC PATIENTS - copy/paste COVID Guidelines URL to browser: [email or web address]    SARS-CoV-2 assay is a Nucleic Acid Amplification assay intended for the  qualitative detection of nucleic acid from SARS-CoV-2 in nasopharyngeal  swabs. Results are for the presumptive identification of SARS-CoV-2 RNA.    Positive results are indicative of infection with SARS-CoV-2, the virus  causing COVID-19, but do not rule out bacterial infection or co-infection  with other viruses. Laboratories within the [place] and its  territories are required to report all positive results to the appropriate  public health authorities. Negative results do not preclude SARS-CoV-2  infection and should not be used as the sole basis for treatment or other  patient management decisions. Negative results must be combined with  clinical observations, patient history, and epidemiological information.  This test has not been FDA cleared or approved.    This test has been authorized by FDA under an Emergency Use Authorization  (EUA). This test is only authorized for the duration of time the  declaration that circumstances exist justifying the authorization of the  emergency use of an in vitro diagnostic tests for detection  of SARS-CoV-2  virus and/or diagnosis of COVID-19 infection under section 564(b)(1) of  the Act, 21 U.S.C. 360bbb-3(b)(1), unless the authorization is terminated  or revoked sooner. The test has been validated but independent review by FDA  and CLIA is pending.    Test performed using [employer] GeneXpert: This RT-PCR assay targets N2,  a region unique to SARS-CoV-2. A conserved region in the E-gene was chosen  for pan-Sarbecovirus detection which includes SARS-CoV-2.    According to CMS-2020-01-R, this platform meets the definition of high-throughput technology.    Comprehensive metabolic panel [[id number]]  (Abnormal) Collected: 02/23/24 2118    Lab Status: Final result Specimen: Blood from Arm, Right Updated: 02/23/24 2149     Sodium 131 mmol/L      Potassium 4.8 mmol/L      Chloride 97 mmol/L      CO2 27 mmol/L      ANION GAP 7 mmol/L      BUN 17 mg/dL      Creatinine 0.96 mg/dL      Glucose 88 mg/dL      Calcium 8.9 mg/dL      AST 31 U/L      ALT 22 U/L      Alkaline Phosphatase 62 U/L      Total Protein 6.7 g/dL      Albumin 4.1 g/dL      Total Bilirubin 0.55 mg/dL      eGFR 99 ml/min/1.73sq m     Narrative:      National Kidney Disease Foundation guidelines for Chronic Kidney Disease (CKD):     Stage 1 with normal or high GFR (GFR > 90 mL/min/1.73 square meters)    Stage 2 Mild CKD (GFR = 60-89 mL/min/1.73 square meters)    Stage 3A Moderate CKD (GFR = 45-59 mL/min/1.73 square meters)    Stage 3B Moderate CKD (GFR = 30-44 mL/min/1.73 square meters)    Stage 4 Severe CKD (GFR = 15-29 mL/min/1.73 square meters)    Stage 5 End Stage CKD (GFR <15 mL/min/1.73 square meters)  Note: GFR calculation is accurate only with a steady state creatinine    CBC and differential [[id number]]  (Abnormal) Collected: 02/23/24 2118    Lab Status: Final result Specimen: Blood from Arm, Right Updated: 02/23/24 2123     WBC 11.04 Thousand/uL      RBC 5.14 Million/uL      Hemoglobin 15.1 g/dL      Hematocrit 44.7 %      MCV 87 fL      MCH  29.4 pg      MCHC 33.8 g/dL      RDW 12.4 %      MPV 9.2 fL      Platelets 275 Thousands/uL      nRBC 0 /100 WBCs      Neutrophils Relative 86 %      Immat GRANS % 1 %      Lymphocytes Relative 7 %      Monocytes Relative 6 %      Eosinophils Relative 0 %      Basophils Relative 0 %      Neutrophils Absolute 9.42 Thousands/µL      Immature Grans Absolute 0.05 Thousand/uL      Lymphocytes Absolute 0.81 Thousands/µL      Monocytes Absolute 0.70 Thousand/µL      Eosinophils Absolute 0.04 Thousand/µL      Basophils Absolute 0.02 Thousands/µL                    XR chest 1 view portable   ED Interpretation by Denilson Chu PA-C (02/23 2206)   Questionable infiltrate right lower lobe                 Procedures  Procedures         ED Course                               SBIRT 20yo+      Flowsheet Row Most Recent Value   Initial Alcohol Screen: US AUDIT-C     1. How often do you have a drink containing alcohol? 0 Filed at: 02/23/2024 2221   2. How many drinks containing alcohol do you have on a typical day you are drinking?  0 Filed at: 02/23/2024 2221   3a. Male UNDER 65: How often do you have five or more drinks on one occasion? 0 Filed at: 02/23/2024 2221   3b. FEMALE Any Age, or MALE 65+: How often do you have 4 or more drinks on one occassion? 0 Filed at: 02/23/2024 2221   Audit-C Score 0 Filed at: 02/23/2024 2221   LEATHA: How many times in the past year have you...    Used an illegal drug or used a prescription medication for non-medical reasons? Never Filed at: 02/23/2024 2221                      Medical Decision Making  This is a 38-year-old male patient who presented with 101.2 states that he has fatigue body aches and nonproductive cough no shortness of breath no chest pain no pleuritic pain states his throat hurts only when he coughs.  Does not hurt to swallow.  Denies any headache blurred vision double vision no nausea vomiting diarrhea abdominal pain nothing makes his better or worse he is nontoxic no  acute distress differential diagnose includes not limited to COVID, RSV, influenza, pneumonia, bronchitis    Problems Addressed:  Fever: acute illness or injury     Details: Patient was given 650 of Tylenol and broke the fever he felt better also was given Toradol which helped the aches  Pneumonia: acute illness or injury     Details: I did interpret the x-ray and showed a very early right lower lobe pneumonia he was given his first dose of Zithromax here and the remainder of the Zithromax given a prescription via liquid because insurance did not cover the pills.    Amount and/or Complexity of Data Reviewed  External Data Reviewed: labs.     Details: I did look at previous notes and labs for comparison  Labs: ordered. Decision-making details documented in ED Course.     Details: Negative COVID-negative flu negative RSV.  All labs reviewed there is no acute findings required immediate intervention  Radiology: ordered and independent interpretation performed.     Details: I personally interpreted chest x-ray I believe there is an early infiltrate right lower lobe he was treated for pneumonia  Discussion of management or test interpretation with external provider(s): Using joint decision-making patient be discharged continued on Zithromax follow-up with her PCP return with any worsening symptoms questions or concerns    Risk  OTC drugs.  Prescription drug management.             Disposition  Final diagnoses:   Pneumonia   Fever     Time reflects when diagnosis was documented in both MDM as applicable and the Disposition within this note       Time User Action Codes Description Comment    2/23/2024 10:47 PM Denilson Guajardo Add [J18.9] Pneumonia     2/23/2024 10:48 PM Denilson Guajardo Add [R50.9] Fever           ED Disposition       ED Disposition   Discharge    Condition   Stable    Date/Time   Fri Feb 23, 2024 8409    Comment   Mihir Sanchez discharge to home/self care.                   Follow-up Information        Follow up With Specialties Details Why Contact Info    Billy Miramontes MD Family Medicine Schedule an appointment as soon as possible for a visit   [address] [facility], [address]  [address]  [place] [address] [phone]              Discharge Medication List as of 2/23/2024 10:52 PM        START taking these medications    Details   azithromycin (ZITHROMAX) 200 mg/5 mL suspension 250 mg daily x 4 days dispense quantity sufficient, Normal           CONTINUE these medications which have NOT CHANGED    Details   lisdexamfetamine (VYVANSE) 40 MG capsule Take 1 capsule (40 mg total) by mouth every morning for 15 days Max Daily Amount: 40 mg, Starting Sun 9/3/2023, Until Tue 10/17/2023, Normal      QUEtiapine (SEROquel) 200 mg tablet Take 200 mg by mouth daily at bedtime, Starting Mon 8/14/2023, Historical Med      venlafaxine (EFFEXOR-XR) 150 mg 24 hr capsule Take 1 capsule (150 mg total) by mouth daily, Starting Tue 9/19/2023, Until Thu 10/19/2023, Normal             No discharge procedures on file.    PDMP Review         Value Time User    PDMP Reviewed  Yes 9/19/2023 11:00 AM Vinnie Boyd PA-C            ED Provider  Electronically Signed by             Denilson Chu PA-C  02/24/24 [id number]

## 2024-05-26 ENCOUNTER — HOSPITAL ENCOUNTER (EMERGENCY)
Facility: HOSPITAL | Age: 39
Discharge: HOME | End: 2024-05-27
Attending: EMERGENCY MEDICINE
Payer: MEDICARE

## 2024-05-26 DIAGNOSIS — R11.2 NAUSEA VOMITING AND DIARRHEA: Primary | ICD-10-CM

## 2024-05-26 DIAGNOSIS — R19.7 NAUSEA VOMITING AND DIARRHEA: Primary | ICD-10-CM

## 2024-05-26 LAB
ALBUMIN SERPL BCP-MCNC: 4.3 G/DL (ref 3.5–5)
ALP SERPL-CCNC: 65 U/L (ref 34–104)
ALT SERPL W P-5'-P-CCNC: 25 U/L (ref 7–52)
ANION GAP SERPL CALCULATED.3IONS-SCNC: 7 MMOL/L (ref 4–13)
AST SERPL W P-5'-P-CCNC: 25 U/L (ref 13–39)
BASOPHILS # BLD AUTO: 0.01 THOUSANDS/ÂΜL (ref 0–0.1)
BASOPHILS NFR BLD AUTO: 0 % (ref 0–1)
BILIRUB SERPL-MCNC: 0.45 MG/DL (ref 0.2–1)
BUN SERPL-MCNC: 15 MG/DL (ref 5–25)
CALCIUM SERPL-MCNC: 9.5 MG/DL (ref 8.4–10.2)
CHLORIDE SERPL-SCNC: 102 MMOL/L (ref 96–108)
CO2 SERPL-SCNC: 32 MMOL/L (ref 21–32)
CREAT SERPL-MCNC: 1.17 MG/DL (ref 0.6–1.3)
EOSINOPHIL # BLD AUTO: 0.12 THOUSAND/ÂΜL (ref 0–0.61)
EOSINOPHIL NFR BLD AUTO: 1 % (ref 0–6)
ERYTHROCYTE [DISTWIDTH] IN BLOOD BY AUTOMATED COUNT: 12.3 % (ref 11.6–15.1)
GFR SERPL CREATININE-BSD FRML MDRD: 78 ML/MIN/1.73SQ M
GLUCOSE SERPL-MCNC: 95 MG/DL (ref 65–140)
HCT VFR BLD AUTO: 47.3 % (ref 36.5–49.3)
HGB BLD-MCNC: 15.4 G/DL (ref 12–17)
IMM GRANULOCYTES # BLD AUTO: 0.02 THOUSAND/UL (ref 0–0.2)
IMM GRANULOCYTES NFR BLD AUTO: 0 % (ref 0–2)
LIPASE SERPL-CCNC: 13 U/L (ref 11–82)
LYMPHOCYTES # BLD AUTO: 0.59 THOUSANDS/ÂΜL (ref 0.6–4.47)
LYMPHOCYTES NFR BLD AUTO: 7 % (ref 14–44)
MCH RBC QN AUTO: 28.8 PG (ref 26.8–34.3)
MCHC RBC AUTO-ENTMCNC: 32.6 G/DL (ref 31.4–37.4)
MCV RBC AUTO: 89 FL (ref 82–98)
MONOCYTES # BLD AUTO: 0.42 THOUSAND/ÂΜL (ref 0.17–1.22)
MONOCYTES NFR BLD AUTO: 5 % (ref 4–12)
NEUTROPHILS # BLD AUTO: 7.73 THOUSANDS/ÂΜL (ref 1.85–7.62)
NEUTS SEG NFR BLD AUTO: 87 % (ref 43–75)
NRBC BLD AUTO-RTO: 0 /100 WBCS
PLATELET # BLD AUTO: 317 THOUSANDS/UL (ref 149–390)
PMV BLD AUTO: 9.5 FL (ref 8.9–12.7)
POTASSIUM SERPL-SCNC: 3.6 MMOL/L (ref 3.5–5.3)
PROT SERPL-MCNC: 6.8 G/DL (ref 6.4–8.4)
RBC # BLD AUTO: 5.34 MILLION/UL (ref 3.88–5.62)
SODIUM SERPL-SCNC: 141 MMOL/L (ref 135–147)
WBC # BLD AUTO: 8.89 THOUSAND/UL (ref 4.31–10.16)

## 2024-05-26 PROCEDURE — 36415 COLL VENOUS BLD VENIPUNCTURE: CPT

## 2024-05-26 PROCEDURE — 83690 ASSAY OF LIPASE: CPT

## 2024-05-26 PROCEDURE — 80053 COMPREHEN METABOLIC PANEL: CPT

## 2024-05-26 PROCEDURE — 99283 EMERGENCY DEPT VISIT LOW MDM: CPT

## 2024-05-26 PROCEDURE — 81001 URINALYSIS AUTO W/SCOPE: CPT

## 2024-05-26 PROCEDURE — 96361 HYDRATE IV INFUSION ADD-ON: CPT

## 2024-05-26 PROCEDURE — 85025 COMPLETE CBC W/AUTO DIFF WBC: CPT

## 2024-05-26 PROCEDURE — 99284 EMERGENCY DEPT VISIT MOD MDM: CPT

## 2024-05-26 RX ORDER — ONDANSETRON 2 MG/ML
1 INJECTION INTRAMUSCULAR; INTRAVENOUS ONCE
Status: COMPLETED | OUTPATIENT
Start: 2024-05-26 | End: 2024-05-26

## 2024-05-26 RX ADMIN — SODIUM CHLORIDE 1000 ML: 0.9 INJECTION, SOLUTION INTRAVENOUS at 23:17

## 2024-05-27 VITALS
RESPIRATION RATE: 18 BRPM | SYSTOLIC BLOOD PRESSURE: 125 MMHG | OXYGEN SATURATION: 98 % | DIASTOLIC BLOOD PRESSURE: 75 MMHG | TEMPERATURE: 98.7 F | HEART RATE: 95 BPM

## 2024-05-27 LAB
BACTERIA UR QL AUTO: ABNORMAL /HPF
BILIRUB UR QL STRIP: NEGATIVE
CLARITY UR: CLEAR
COLOR UR: ABNORMAL
GLUCOSE UR STRIP-MCNC: NEGATIVE MG/DL
HGB UR QL STRIP.AUTO: NEGATIVE
KETONES UR STRIP-MCNC: NEGATIVE MG/DL
LEUKOCYTE ESTERASE UR QL STRIP: 25
MUCOUS THREADS UR QL AUTO: ABNORMAL
NITRITE UR QL STRIP: NEGATIVE
NON-SQ EPI CELLS URNS QL MICRO: ABNORMAL /HPF
PH UR STRIP.AUTO: 6 [PH]
PROT UR STRIP-MCNC: ABNORMAL MG/DL
RBC #/AREA URNS AUTO: ABNORMAL /HPF
SP GR UR STRIP.AUTO: 1.01 (ref 1–1.04)
UROBILINOGEN UA: NEGATIVE MG/DL
WBC #/AREA URNS AUTO: ABNORMAL /HPF

## 2024-05-27 PROCEDURE — 96374 THER/PROPH/DIAG INJ IV PUSH: CPT

## 2024-05-27 PROCEDURE — 96361 HYDRATE IV INFUSION ADD-ON: CPT

## 2024-05-27 PROCEDURE — 87209 SMEAR COMPLEX STAIN: CPT

## 2024-05-27 PROCEDURE — 87505 NFCT AGENT DETECTION GI: CPT

## 2024-05-27 PROCEDURE — 87177 OVA AND PARASITES SMEARS: CPT

## 2024-05-27 RX ORDER — ALUMINA, MAGNESIA, AND SIMETHICONE 2400; 2400; 240 MG/30ML; MG/30ML; MG/30ML
10 SUSPENSION ORAL EVERY 6 HOURS PRN
Qty: 355 ML | Refills: 0 | Status: SHIPPED | OUTPATIENT
Start: 2024-05-27

## 2024-05-27 RX ORDER — MAGNESIUM HYDROXIDE/ALUMINUM HYDROXICE/SIMETHICONE 120; 1200; 1200 MG/30ML; MG/30ML; MG/30ML
30 SUSPENSION ORAL ONCE
Status: COMPLETED | OUTPATIENT
Start: 2024-05-27 | End: 2024-05-27

## 2024-05-27 RX ORDER — FAMOTIDINE 20 MG/1
20 TABLET, FILM COATED ORAL ONCE
Status: COMPLETED | OUTPATIENT
Start: 2024-05-27 | End: 2024-05-27

## 2024-05-27 RX ORDER — ONDANSETRON 2 MG/ML
4 INJECTION INTRAMUSCULAR; INTRAVENOUS ONCE
Status: COMPLETED | OUTPATIENT
Start: 2024-05-27 | End: 2024-05-27

## 2024-05-27 RX ORDER — DICYCLOMINE HCL 20 MG
10 TABLET ORAL EVERY 6 HOURS PRN
Qty: 20 TABLET | Refills: 0 | Status: SHIPPED | OUTPATIENT
Start: 2024-05-27

## 2024-05-27 RX ORDER — ONDANSETRON 4 MG/1
4 TABLET, ORALLY DISINTEGRATING ORAL EVERY 6 HOURS PRN
Qty: 20 TABLET | Refills: 0 | Status: SHIPPED | OUTPATIENT
Start: 2024-05-27

## 2024-05-27 RX ORDER — DICYCLOMINE HCL 20 MG
20 TABLET ORAL ONCE
Status: COMPLETED | OUTPATIENT
Start: 2024-05-27 | End: 2024-05-27

## 2024-05-27 RX ADMIN — ALUMINUM HYDROXIDE, MAGNESIUM HYDROXIDE, DIMETHICONE 30 ML: 200; 20; 200 SUSPENSION ORAL at 02:02

## 2024-05-27 RX ADMIN — FAMOTIDINE 20 MG: 20 TABLET ORAL at 02:02

## 2024-05-27 RX ADMIN — DICYCLOMINE HYDROCHLORIDE 20 MG: 20 TABLET ORAL at 02:02

## 2024-05-27 RX ADMIN — ONDANSETRON 4 MG: 2 INJECTION INTRAMUSCULAR; INTRAVENOUS at 01:30

## 2024-05-27 NOTE — ED PROVIDER NOTES
History  Chief Complaint   Patient presents with    Vomiting     Arrives with [facility] c/o N/V/D x3 days. States he drank river water due to being dehydrated. Given 4mg Zofran en route via [facility].      The patient is a 39-year-old male with a past medical history of schizoaffective disorder, borderline personality disorder, substance use disorder, housing instability, and CKD, who presents for evaluation of vomiting.  He reports 3 days of nausea, vomiting, diarrhea, dry mouth, and diaphoresis.  Associated symptoms include abdominal pain only when vomiting.  He denies blood in the emesis, but did not taken a look at his stool.  No urinary symptoms, chest pain, back pain, shortness of breath, URI symptoms, or fevers.  The patient is with his significant other who states she recently was treated for dysentery after drinking unfiltered river water.  Patient denies recent antibiotic use.        Prior to Admission Medications   Prescriptions Last Dose Informant Patient Reported? Taking?   QUEtiapine (SEROquel) 200 mg tablet   Yes No   Sig: Take 200 mg by mouth daily at bedtime   azithromycin (ZITHROMAX) 200 mg/5 mL suspension   No No   Si mg p.o. daily x 4 days   ibuprofen (MOTRIN) 600 mg tablet   No No   Sig: Take 1 tablet (600 mg total) by mouth every 6 (six) hours as needed for mild pain   lisdexamfetamine (VYVANSE) 40 MG capsule   No No   Sig: Take 1 capsule (40 mg total) by mouth every morning for 15 days Max Daily Amount: 40 mg   venlafaxine (EFFEXOR-XR) 150 mg 24 hr capsule   No No   Sig: Take 1 capsule (150 mg total) by mouth daily      Facility-Administered Medications: None       Past Medical History:   Diagnosis Date    ADD (attention deficit disorder)     Addiction to drug (HCC)     Anxiety     Borderline personality disorder (HCC)     Chronic kidney disease     Depression     MDD (major depressive disorder)     Panic attack     Psychiatric illness     Self-injurious behavior     Social phobia     Substance  abuse (HCC)     Suicide attempt (HCC)        Past Surgical History:   Procedure Laterality Date    NO PAST SURGERIES         History reviewed. No pertinent family history.  I have reviewed and agree with the history as documented.    E-Cigarette/Vaping    E-Cigarette Use Never User      E-Cigarette/Vaping Substances    Nicotine No     THC No     CBD No     Flavoring No     Other No     Unknown No      Social History     Tobacco Use    Smoking status: Every Day     Current packs/day: 0.50     Average packs/day: 0.5 packs/day for 20.0 years (10.0 ttl pk-yrs)     Types: Cigarettes    Smokeless tobacco: Never    Tobacco comments:     Not ready to quit.   Vaping Use    Vaping status: Never Used   Substance Use Topics    Alcohol use: Not Currently    Drug use: Yes     Frequency: 1.0 times per week     Types: Marijuana     Comment: once a week       Review of Systems   Constitutional:  Positive for diaphoresis. Negative for chills and fever.   HENT:  Negative for congestion, ear pain, rhinorrhea and sore throat.         + Xerostomia   Eyes:  Negative for pain and visual disturbance.   Respiratory:  Negative for cough and shortness of breath.    Cardiovascular:  Negative for chest pain and palpitations.   Gastrointestinal:  Positive for abdominal pain, diarrhea, nausea and vomiting. Negative for abdominal distention and constipation.   Genitourinary:  Negative for dysuria and hematuria.   Musculoskeletal:  Negative for arthralgias, back pain and myalgias.   Skin:  Negative for color change and rash.   Neurological:  Negative for dizziness, seizures, syncope, light-headedness and headaches.   All other systems reviewed and are negative.      Physical Exam  Physical Exam  Vitals and nursing note reviewed.   Constitutional:       General: He is awake. He is not in acute distress.     Appearance: He is well-developed and overweight. He is ill-appearing. He is not toxic-appearing or diaphoretic.   HENT:      Head: Normocephalic  and atraumatic.      Right Ear: External ear normal.      Left Ear: External ear normal.      Nose: Nose normal.      Mouth/Throat:      Lips: Pink.      Mouth: Mucous membranes are dry.      Pharynx: Oropharynx is clear. Uvula midline. No pharyngeal swelling, oropharyngeal exudate or posterior oropharyngeal erythema.   Eyes:      General: Lids are normal. Vision grossly intact. Gaze aligned appropriately. No scleral icterus.     Conjunctiva/sclera: Conjunctivae normal.      Pupils: Pupils are equal, round, and reactive to light.   Cardiovascular:      Rate and Rhythm: Normal rate and regular rhythm.      Heart sounds: S1 normal and S2 normal. No murmur heard.     No friction rub. No gallop.   Pulmonary:      Effort: Pulmonary effort is normal. No respiratory distress.      Breath sounds: Normal breath sounds and air entry. No wheezing, rhonchi or rales.   Abdominal:      General: Abdomen is flat. Bowel sounds are normal. There is no distension.      Palpations: Abdomen is soft. There is no mass.      Tenderness: There is no abdominal tenderness. There is no guarding or rebound.   Musculoskeletal:      Cervical back: Normal, full passive range of motion without pain and neck supple. No rigidity or crepitus. No spinous process tenderness or muscular tenderness.      Thoracic back: Normal. No tenderness or bony tenderness.      Lumbar back: Normal. No tenderness or bony tenderness.   Skin:     General: Skin is warm.      Capillary Refill: Capillary refill takes less than 2 seconds.      Coloration: Skin is not pale.      Findings: No rash.   Neurological:      Mental Status: He is alert and oriented to person, place, and time.   Psychiatric:         Attention and Perception: Attention normal.         Mood and Affect: Mood normal.         Speech: Speech normal.         Behavior: Behavior normal. Behavior is cooperative.         Vital Signs  ED Triage Vitals [05/26/24 2259]   Temperature Pulse Respirations Blood  Pressure SpO2   98.7 °F (37.1 °C) 100 18 143/88 100 %      Temp Source Heart Rate Source Patient Position - Orthostatic VS BP Location FiO2 (%)   Tympanic Monitor Sitting Left arm --      Pain Score       --           Vitals:    05/26/24 2259 05/27/24 0215   BP: 143/88 125/75   Pulse: 100 95   Patient Position - Orthostatic VS: Sitting Lying         ED Medications  Medications   ondansetron (FOR EMS ONLY) (ZOFRAN) 4 mg/2 mL injection 4 mg (0 mg Does not apply Given to EMS 5/26/24 2301)   sodium chloride 0.9 % bolus 1,000 mL (0 mL Intravenous Stopped 5/27/24 0108)   ondansetron (ZOFRAN) injection 4 mg (4 mg Intravenous Given 5/27/24 0130)   aluminum-magnesium hydroxide-simethicone (MAALOX) oral suspension 30 mL (30 mL Oral Given 5/27/24 0202)   famotidine (PEPCID) tablet 20 mg (20 mg Oral Given 5/27/24 0202)   dicyclomine (BENTYL) tablet 20 mg (20 mg Oral Given 5/27/24 0202)       Diagnostic Studies  Results Reviewed       Procedure Component Value Units Date/Time    Stool Enteric Bacterial Panel by PCR [[id number]] Collected: 05/27/24 0144    Lab Status: In process Specimen: Stool from Rectum Updated: 05/27/24 0159    Ova and parasite examination [[id number]] Collected: 05/27/24 0144    Lab Status: In process Specimen: Stool from Rectum Updated: 05/27/24 0159    Urine Microscopic [[id number]]  (Abnormal) Collected: 05/26/24 2351    Lab Status: Final result Specimen: Urine, Other Updated: 05/27/24 0036     RBC, UA None Seen /hpf      WBC, UA None Seen /hpf      Epithelial Cells None Seen /hpf      Bacteria, UA Occasional /hpf      MUCUS THREADS Moderate    UA (URINE) with reflex to Scope [[id number]]  (Abnormal) Collected: 05/26/24 2351    Lab Status: Final result Specimen: Urine, Other Updated: 05/27/24 0019     Color, UA Jeannette     Clarity, UA Clear     Specific Gravity, UA 1.015     pH, UA 6.0     Leukocytes, UA 25.0     Nitrite, UA Negative     Protein, UA 15 (Trace) mg/dl      Glucose, UA Negative mg/dl      Ketones,  UA Negative mg/dl      Bilirubin, UA Negative     Occult Blood, UA Negative     UROBILINOGEN UA Negative mg/dL     Comprehensive metabolic panel [[id number]] Collected: 05/26/24 2317    Lab Status: Final result Specimen: Blood from Arm, Left Updated: 05/26/24 2349     Sodium 141 mmol/L      Potassium 3.6 mmol/L      Chloride 102 mmol/L      CO2 32 mmol/L      ANION GAP 7 mmol/L      BUN 15 mg/dL      Creatinine 1.17 mg/dL      Glucose 95 mg/dL      Calcium 9.5 mg/dL      AST 25 U/L      ALT 25 U/L      Alkaline Phosphatase 65 U/L      Total Protein 6.8 g/dL      Albumin 4.3 g/dL      Total Bilirubin 0.45 mg/dL      eGFR 78 ml/min/1.73sq m     Narrative:      National Kid[employer] guidelines for Chronic Kidney Disease (CKD):     Stage 1 with normal or high GFR (GFR > 90 mL/min/1.73 square meters)    Stage 2 Mild CKD (GFR = 60-89 mL/min/1.73 square meters)    Stage 3A Moderate CKD (GFR = 45-59 mL/min/1.73 square meters)    Stage 3B Moderate CKD (GFR = 30-44 mL/min/1.73 square meters)    Stage 4 Severe CKD (GFR = 15-29 mL/min/1.73 square meters)    Stage 5 End Stage CKD (GFR <15 mL/min/1.73 square meters)  Note: GFR calculation is accurate only with a steady state creatinine    Lipase [[id number]]  (Normal) Collected: 05/26/24 2317    Lab Status: Final result Specimen: Blood from Arm, Left Updated: 05/26/24 2349     Lipase 13 u/L     CBC and differential [[id number]]  (Abnormal) Collected: 05/26/24 2317    Lab Status: Final result Specimen: Blood from Arm, Left Updated: 05/26/24 2335     WBC 8.89 Thousand/uL      RBC 5.34 Million/uL      Hemoglobin 15.4 g/dL      Hematocrit 47.3 %      MCV 89 fL      MCH 28.8 pg      MCHC 32.6 g/dL      RDW 12.3 %      MPV 9.5 fL      Platelets 317 Thousands/uL      nRBC 0 /100 WBCs      Segmented % 87 %      Immature Grans % 0 %      Lymphocytes % 7 %      Monocytes % 5 %      Eosinophils Relative 1 %      Basophils Relative 0 %      Absolute Neutrophils 7.73 Thousands/µL       Absolute Immature Grans 0.02 Thousand/uL      Absolute Lymphocytes 0.59 Thousands/µL      Absolute Monocytes 0.42 Thousand/µL      Eosinophils Absolute 0.12 Thousand/µL      Basophils Absolute 0.01 Thousands/µL                    No orders to display              Procedures  Procedures         ED Course  ED Course as of 05/27/24 0414   Mon May 27, 2024   0020 POCT URINE PROTEIN(!): 15 (Trace)   0020 LIPASE: 13   0020 Comprehensive metabolic panel  WNL   0020 WBC: 8.89   0020 Hemoglobin: 15.4   0020 Hematocrit: 47.3   0020 Platelet Count: 317   0036 RBC Urine: None Seen   0036 WBC, UA: None Seen   0036 Epithelial Cells: None Seen   0036 Bacteria, UA: Occasional         SBIRT 22yo+      Flowsheet Row Most Recent Value   Initial Alcohol Screen: US AUDIT-C     1. How often do you have a drink containing alcohol? 0 Filed at: 05/26/2024 2300   2. How many drinks containing alcohol do you have on a typical day you are drinking?  0 Filed at: 05/26/2024 2300   3a. Male UNDER 65: How often do you have five or more drinks on one occasion? 0 Filed at: 05/26/2024 2300   Audit-C Score 0 Filed at: 05/26/2024 2300   LEATHA: How many times in the past year have you...    Used an illegal drug or used a prescription medication for non-medical reasons? Never Filed at: 05/26/2024 2300                Medical Decision Making  Patient presents for 3 days of N/V/D.  On arrival he has dry mucous membranes but does not appear overall volume depleted.  Vitals are stable and he is afebrile.  Differential diagnosis includes but is not limited to viral syndrome, GERD, gastritis, gastroenteritis, colitis, food poisoning, or infectious diarrhea.  Doubt C. difficile infection as the patient has had no recent antibiotic use.  Labs revealed mild proteinuria, but were otherwise unremarkable.  Stool samples were collected and are pending.  Patient will be contacted with any positive result.  He reported relief with IV fluid resuscitation and  subsequently passed a p.o. challenge.  Strict return precautions discussed and he verbalized understanding.  Follow-up with [facility], but return to the ED in the interim with new or worsening symptoms.    Problems Addressed:  Nausea vomiting and diarrhea: acute illness or injury    Amount and/or Complexity of Data Reviewed  Labs: ordered. Decision-making details documented in ED Course.    Risk  OTC drugs.  Prescription drug management.           Disposition  Final diagnoses:   Nausea vomiting and diarrhea     Time reflects when diagnosis was documented in both MDM as applicable and the Disposition within this note       Time User Action Codes Description Comment    5/27/2024  2:04 AM Amy Snyder Add [R11.2,  R19.7] Nausea vomiting and diarrhea           ED Disposition       ED Disposition   Discharge    Condition   Stable    Date/Time   Mon May 27, 2024 0252    Comment   Mihir Sanchez discharge to home/self care.                   Follow-up Information       Follow up With Specialties Details Why Contact Info    [facility] [place] [address] [place] [id number] [phone]            Discharge Medication List as of 5/27/2024  2:53 AM        START taking these medications    Details   aluminum-magnesium hydroxide-simethicone (MAALOX MAX) 400-400-40 MG/5ML suspension Take 10 mL by mouth every 6 (six) hours as needed for indigestion or heartburn, Starting Mon 5/27/2024, Print      dicyclomine (BENTYL) 20 mg tablet Take 0.5 tablets (10 mg total) by mouth every 6 (six) hours as needed (abdominal cramping), Starting Mon 5/27/2024, Print      ondansetron (ZOFRAN-ODT) 4 mg disintegrating tablet Take 1 tablet (4 mg total) by mouth every 6 (six) hours as needed for nausea or vomiting, Starting Mon 5/27/2024, Print           CONTINUE these medications which have NOT CHANGED    Details   azithromycin (ZITHROMAX) 200 mg/5 mL suspension 250 mg p.o. daily  x 4 days, Normal      ibuprofen (MOTRIN) 600 mg tablet Take 1 tablet (600 mg total) by mouth every 6 (six) hours as needed for mild pain, Starting Fri 2/23/2024, Normal      lisdexamfetamine (VYVANSE) 40 MG capsule Take 1 capsule (40 mg total) by mouth every morning for 15 days Max Daily Amount: 40 mg, Starting Sun 9/3/2023, Until Tue 10/17/2023, Normal      QUEtiapine (SEROquel) 200 mg tablet Take 200 mg by mouth daily at bedtime, Starting Mon 8/14/2023, Historical Med      venlafaxine (EFFEXOR-XR) 150 mg 24 hr capsule Take 1 capsule (150 mg total) by mouth daily, Starting Tue 9/19/2023, Until Thu 10/19/2023, Normal             No discharge procedures on file.    PDMP Review         Value Time User    PDMP Reviewed  Yes 9/19/2023 11:00 AM Vinnie Boyd PA-C            ED Provider  Electronically Signed by             Amy Snyder PA-C  05/27/24 0415

## 2024-05-27 NOTE — ED NOTES
Ginger ale and crackers given to pt for PO challenge. Tolerated well.      Conchita Mann RN  05/27/24 0109       Conchita Mann RN  05/27/24 0113

## 2024-05-28 LAB
C COLI+JEJUNI TUF STL QL NAA+PROBE: NEGATIVE
EC STX1+STX2 GENES STL QL NAA+PROBE: NEGATIVE
SALMONELLA SP SPAO STL QL NAA+PROBE: NEGATIVE
SHIGELLA SP+EIEC IPAH STL QL NAA+PROBE: NEGATIVE

## 2024-08-09 ENCOUNTER — HOSPITAL ENCOUNTER (EMERGENCY)
Facility: HOSPITAL | Age: 39
Discharge: HOME | End: 2024-08-09
Attending: EMERGENCY MEDICINE
Payer: MEDICARE

## 2024-08-09 VITALS
OXYGEN SATURATION: 98 % | RESPIRATION RATE: 18 BRPM | WEIGHT: 203.04 LBS | SYSTOLIC BLOOD PRESSURE: 116 MMHG | BODY MASS INDEX: 29.98 KG/M2 | TEMPERATURE: 97.9 F | HEART RATE: 78 BPM | DIASTOLIC BLOOD PRESSURE: 74 MMHG

## 2024-08-09 VITALS
TEMPERATURE: 97.8 F | WEIGHT: 177 LBS | OXYGEN SATURATION: 95 % | BODY MASS INDEX: 26.14 KG/M2 | DIASTOLIC BLOOD PRESSURE: 85 MMHG | SYSTOLIC BLOOD PRESSURE: 122 MMHG | HEART RATE: 95 BPM | RESPIRATION RATE: 18 BRPM

## 2024-08-09 DIAGNOSIS — R10.9 ABDOMINAL PAIN: Primary | ICD-10-CM

## 2024-08-09 DIAGNOSIS — R10.13 EPIGASTRIC PAIN: Primary | ICD-10-CM

## 2024-08-09 DIAGNOSIS — R11.0 NAUSEA: ICD-10-CM

## 2024-08-09 DIAGNOSIS — R11.2 NAUSEA AND VOMITING: ICD-10-CM

## 2024-08-09 LAB
ALBUMIN SERPL BCG-MCNC: 3.8 G/DL (ref 3.5–5)
ALP SERPL-CCNC: 71 U/L (ref 34–104)
ALT SERPL W P-5'-P-CCNC: 15 U/L (ref 7–52)
ANION GAP SERPL CALCULATED.3IONS-SCNC: 9 MMOL/L (ref 4–13)
AST SERPL W P-5'-P-CCNC: 14 U/L (ref 13–39)
BACTERIA UR QL AUTO: ABNORMAL /HPF
BASOPHILS # BLD AUTO: 0.01 THOUSANDS/ÂΜL (ref 0–0.1)
BASOPHILS NFR BLD AUTO: 0 % (ref 0–1)
BILIRUB SERPL-MCNC: 0.59 MG/DL (ref 0.2–1)
BILIRUB UR QL STRIP: ABNORMAL
BUN SERPL-MCNC: 11 MG/DL (ref 5–25)
CALCIUM SERPL-MCNC: 8.8 MG/DL (ref 8.4–10.2)
CHLORIDE SERPL-SCNC: 101 MMOL/L (ref 96–108)
CLARITY UR: CLEAR
CO2 SERPL-SCNC: 27 MMOL/L (ref 21–32)
COLOR UR: ABNORMAL
CREAT SERPL-MCNC: 0.82 MG/DL (ref 0.6–1.3)
EOSINOPHIL # BLD AUTO: 0.11 THOUSAND/ÂΜL (ref 0–0.61)
EOSINOPHIL NFR BLD AUTO: 2 % (ref 0–6)
ERYTHROCYTE [DISTWIDTH] IN BLOOD BY AUTOMATED COUNT: 12.7 % (ref 11.6–15.1)
GFR SERPL CREATININE-BSD FRML MDRD: 111 ML/MIN/1.73SQ M
GLUCOSE SERPL-MCNC: 108 MG/DL (ref 65–140)
GLUCOSE UR STRIP-MCNC: NEGATIVE MG/DL
HCT VFR BLD AUTO: 46.1 % (ref 36.5–49.3)
HGB BLD-MCNC: 16.2 G/DL (ref 12–17)
HGB UR QL STRIP.AUTO: NEGATIVE
IMM GRANULOCYTES # BLD AUTO: 0.01 THOUSAND/UL (ref 0–0.2)
IMM GRANULOCYTES NFR BLD AUTO: 0 % (ref 0–2)
KETONES UR STRIP-MCNC: ABNORMAL MG/DL
LEUKOCYTE ESTERASE UR QL STRIP: 25
LIPASE SERPL-CCNC: 7 U/L (ref 11–82)
LYMPHOCYTES # BLD AUTO: 0.88 THOUSANDS/ÂΜL (ref 0.6–4.47)
LYMPHOCYTES NFR BLD AUTO: 12 % (ref 14–44)
MCH RBC QN AUTO: 29.5 PG (ref 26.8–34.3)
MCHC RBC AUTO-ENTMCNC: 35.1 G/DL (ref 31.4–37.4)
MCV RBC AUTO: 84 FL (ref 82–98)
MONOCYTES # BLD AUTO: 0.35 THOUSAND/ÂΜL (ref 0.17–1.22)
MONOCYTES NFR BLD AUTO: 5 % (ref 4–12)
MUCOUS THREADS UR QL AUTO: ABNORMAL
NEUTROPHILS # BLD AUTO: 6.05 THOUSANDS/ÂΜL (ref 1.85–7.62)
NEUTS SEG NFR BLD AUTO: 81 % (ref 43–75)
NITRITE UR QL STRIP: NEGATIVE
NON-SQ EPI CELLS URNS QL MICRO: ABNORMAL /HPF
NRBC BLD AUTO-RTO: 0 /100 WBCS
OTHER STN SPEC: ABNORMAL
PH UR STRIP.AUTO: 5 [PH]
PLATELET # BLD AUTO: 280 THOUSANDS/UL (ref 149–390)
PMV BLD AUTO: 9 FL (ref 8.9–12.7)
POTASSIUM SERPL-SCNC: 3.6 MMOL/L (ref 3.5–5.3)
PROT SERPL-MCNC: 6.3 G/DL (ref 6.4–8.4)
PROT UR STRIP-MCNC: ABNORMAL MG/DL
RBC # BLD AUTO: 5.49 MILLION/UL (ref 3.88–5.62)
RBC #/AREA URNS AUTO: ABNORMAL /HPF
SODIUM SERPL-SCNC: 137 MMOL/L (ref 135–147)
SP GR UR STRIP.AUTO: 1.02 (ref 1–1.04)
UROBILINOGEN UA: 4 MG/DL
WBC # BLD AUTO: 7.41 THOUSAND/UL (ref 4.31–10.16)
WBC #/AREA URNS AUTO: ABNORMAL /HPF

## 2024-08-09 PROCEDURE — 96361 HYDRATE IV INFUSION ADD-ON: CPT

## 2024-08-09 PROCEDURE — 83690 ASSAY OF LIPASE: CPT | Performed by: EMERGENCY MEDICINE

## 2024-08-09 PROCEDURE — 96374 THER/PROPH/DIAG INJ IV PUSH: CPT

## 2024-08-09 PROCEDURE — 99284 EMERGENCY DEPT VISIT MOD MDM: CPT | Performed by: EMERGENCY MEDICINE

## 2024-08-09 PROCEDURE — 99284 EMERGENCY DEPT VISIT MOD MDM: CPT

## 2024-08-09 PROCEDURE — 99283 EMERGENCY DEPT VISIT LOW MDM: CPT

## 2024-08-09 PROCEDURE — 85025 COMPLETE CBC W/AUTO DIFF WBC: CPT | Performed by: EMERGENCY MEDICINE

## 2024-08-09 PROCEDURE — 81001 URINALYSIS AUTO W/SCOPE: CPT | Performed by: EMERGENCY MEDICINE

## 2024-08-09 PROCEDURE — 36415 COLL VENOUS BLD VENIPUNCTURE: CPT | Performed by: EMERGENCY MEDICINE

## 2024-08-09 PROCEDURE — 80053 COMPREHEN METABOLIC PANEL: CPT | Performed by: EMERGENCY MEDICINE

## 2024-08-09 RX ORDER — ALUMINA, MAGNESIA, AND SIMETHICONE 2400; 2400; 240 MG/30ML; MG/30ML; MG/30ML
10 SUSPENSION ORAL EVERY 6 HOURS PRN
Qty: 355 ML | Refills: 0 | Status: SHIPPED | OUTPATIENT
Start: 2024-08-09

## 2024-08-09 RX ORDER — ONDANSETRON 2 MG/ML
4 INJECTION INTRAMUSCULAR; INTRAVENOUS ONCE
Status: COMPLETED | OUTPATIENT
Start: 2024-08-09 | End: 2024-08-09

## 2024-08-09 RX ORDER — ONDANSETRON 4 MG/1
4 TABLET, ORALLY DISINTEGRATING ORAL EVERY 8 HOURS PRN
Qty: 20 TABLET | Refills: 0 | Status: SHIPPED | OUTPATIENT
Start: 2024-08-09

## 2024-08-09 RX ORDER — MAGNESIUM HYDROXIDE/ALUMINUM HYDROXICE/SIMETHICONE 120; 1200; 1200 MG/30ML; MG/30ML; MG/30ML
30 SUSPENSION ORAL ONCE
Status: COMPLETED | OUTPATIENT
Start: 2024-08-09 | End: 2024-08-09

## 2024-08-09 RX ORDER — LIDOCAINE HYDROCHLORIDE 20 MG/ML
15 SOLUTION OROPHARYNGEAL ONCE
Status: COMPLETED | OUTPATIENT
Start: 2024-08-09 | End: 2024-08-09

## 2024-08-09 RX ORDER — DICYCLOMINE HCL 20 MG
20 TABLET ORAL ONCE
Status: COMPLETED | OUTPATIENT
Start: 2024-08-09 | End: 2024-08-09

## 2024-08-09 RX ORDER — FAMOTIDINE 20 MG/1
20 TABLET, FILM COATED ORAL ONCE
Status: COMPLETED | OUTPATIENT
Start: 2024-08-09 | End: 2024-08-09

## 2024-08-09 RX ORDER — MAGNESIUM HYDROXIDE/ALUMINUM HYDROXICE/SIMETHICONE 120; 1200; 1200 MG/30ML; MG/30ML; MG/30ML
15 SUSPENSION ORAL ONCE
Status: COMPLETED | OUTPATIENT
Start: 2024-08-09 | End: 2024-08-09

## 2024-08-09 RX ADMIN — SODIUM CHLORIDE 1000 ML: 0.9 INJECTION, SOLUTION INTRAVENOUS at 00:57

## 2024-08-09 RX ADMIN — LIDOCAINE HYDROCHLORIDE 15 ML: 20 SOLUTION ORAL at 01:57

## 2024-08-09 RX ADMIN — DICYCLOMINE HYDROCHLORIDE 20 MG: 20 TABLET ORAL at 01:57

## 2024-08-09 RX ADMIN — ALUMINUM HYDROXIDE, MAGNESIUM HYDROXIDE, DIMETHICONE 15 ML: 200; 200; 20 LIQUID ORAL at 01:57

## 2024-08-09 RX ADMIN — LIDOCAINE HYDROCHLORIDE 15 ML: 20 SOLUTION ORAL at 21:39

## 2024-08-09 RX ADMIN — ALUMINUM HYDROXIDE, MAGNESIUM HYDROXIDE, DIMETHICONE 30 ML: 200; 200; 20 LIQUID ORAL at 21:39

## 2024-08-09 RX ADMIN — FAMOTIDINE 20 MG: 20 TABLET, FILM COATED ORAL at 21:39

## 2024-08-09 RX ADMIN — ONDANSETRON 4 MG: 2 INJECTION INTRAMUSCULAR; INTRAVENOUS at 00:56

## 2024-08-09 NOTE — ED PROVIDER NOTES
"History  Chief Complaint   Patient presents with    Abdominal Pain     Pt brought in by EMS for epigastric pain that started yesterday. States he threw up once and is having nausea but denies diarrhea. No meds PTA and states \"I think I might have ate some bad food\".      39-year-old gentleman presents with complaint of abdominal pain along with nausea and vomiting.  Symptoms began yesterday and he is taken no medications.  He believes he may have had bad food exposure but is unaware of any sick contacts.      Abdominal Pain  Pain location:  Epigastric  Pain quality: aching    Pain radiates to:  Does not radiate  Pain severity:  Moderate  Onset quality:  Gradual  Duration:  1 day  Timing:  Constant  Progression:  Waxing and waning  Chronicity:  New  Relieved by:  Nothing  Worsened by:  Nothing  Ineffective treatments:  None tried  Associated symptoms: diarrhea, nausea and vomiting    Associated symptoms: no chest pain, no chills, no fever and no shortness of breath        Prior to Admission Medications   Prescriptions Last Dose Informant Patient Reported? Taking?   QUEtiapine (SEROquel) 200 mg tablet   Yes No   Sig: Take 200 mg by mouth daily at bedtime   aluminum-magnesium hydroxide-simethicone (MAALOX MAX) 400-400-40 MG/5ML suspension   No No   Sig: Take 10 mL by mouth every 6 (six) hours as needed for indigestion or heartburn   azithromycin (ZITHROMAX) 200 mg/5 mL suspension   No No   Si mg p.o. daily x 4 days   dicyclomine (BENTYL) 20 mg tablet   No No   Sig: Take 0.5 tablets (10 mg total) by mouth every 6 (six) hours as needed (abdominal cramping)   ibuprofen (MOTRIN) 600 mg tablet   No No   Sig: Take 1 tablet (600 mg total) by mouth every 6 (six) hours as needed for mild pain   lisdexamfetamine (VYVANSE) 40 MG capsule   No No   Sig: Take 1 capsule (40 mg total) by mouth every morning for 15 days Max Daily Amount: 40 mg   ondansetron (ZOFRAN-ODT) 4 mg disintegrating tablet   No No   Sig: Take 1 tablet (4 " mg total) by mouth every 6 (six) hours as needed for nausea or vomiting   venlafaxine (EFFEXOR-XR) 150 mg 24 hr capsule   No No   Sig: Take 1 capsule (150 mg total) by mouth daily      Facility-Administered Medications: None       Past Medical History:   Diagnosis Date    ADD (attention deficit disorder)     Addiction to drug (HCC)     Anxiety     Borderline personality disorder (HCC)     Chronic kidney disease     Depression     MDD (major depressive disorder)     Panic attack     Psychiatric illness     Self-injurious behavior     Social phobia     Substance abuse (HCC)     Suicide attempt (HCC)        Past Surgical History:   Procedure Laterality Date    NO PAST SURGERIES         History reviewed. No pertinent family history.  I have reviewed and agree with the history as documented.    E-Cigarette/Vaping    E-Cigarette Use Never User      E-Cigarette/Vaping Substances    Nicotine No     THC No     CBD No     Flavoring No     Other No     Unknown No      Social History     Tobacco Use    Smoking status: Every Day     Current packs/day: 0.50     Average packs/day: 0.5 packs/day for 20.0 years (10.0 ttl pk-yrs)     Types: Cigarettes    Smokeless tobacco: Never    Tobacco comments:     Not ready to quit.   Vaping Use    Vaping status: Never Used   Substance Use Topics    Alcohol use: Not Currently    Drug use: Yes     Frequency: 1.0 times per week     Types: Marijuana     Comment: once a week       Review of Systems   Constitutional:  Negative for chills and fever.   Respiratory:  Negative for shortness of breath.    Cardiovascular:  Negative for chest pain.   Gastrointestinal:  Positive for abdominal pain, diarrhea, nausea and vomiting.   All other systems reviewed and are negative.      Physical Exam  Physical Exam  Vitals and nursing note reviewed.   Constitutional:       General: He is not in acute distress.     Appearance: Normal appearance. He is well-developed. He is not ill-appearing, toxic-appearing or  diaphoretic.   HENT:      Head: Normocephalic and atraumatic.      Right Ear: External ear normal.      Left Ear: External ear normal.      Nose: Nose normal.      Mouth/Throat:      Mouth: Mucous membranes are moist.      Pharynx: Oropharynx is clear.   Eyes:      Conjunctiva/sclera: Conjunctivae normal.      Pupils: Pupils are equal, round, and reactive to light.   Cardiovascular:      Rate and Rhythm: Normal rate and regular rhythm.      Heart sounds: Normal heart sounds.   Pulmonary:      Effort: Pulmonary effort is normal. No respiratory distress.      Breath sounds: Normal breath sounds.   Abdominal:      General: Bowel sounds are normal. There is no distension.      Palpations: Abdomen is soft.      Tenderness: There is abdominal tenderness in the epigastric area. There is no guarding.   Musculoskeletal:         General: Normal range of motion.      Cervical back: Neck supple. No rigidity.      Right lower leg: No edema.      Left lower leg: No edema.   Skin:     General: Skin is warm and dry.      Capillary Refill: Capillary refill takes less than 2 seconds.   Neurological:      General: No focal deficit present.      Mental Status: He is alert and oriented to person, place, and time.   Psychiatric:         Mood and Affect: Mood normal.         Behavior: Behavior normal.         Vital Signs  ED Triage Vitals [08/09/24 0043]   Temperature Pulse Respirations Blood Pressure SpO2   97.9 °F (36.6 °C) 78 18 116/74 98 %      Temp Source Heart Rate Source Patient Position - Orthostatic VS BP Location FiO2 (%)   Oral Monitor Lying Left arm --      Pain Score       --           Vitals:    08/09/24 0043   BP: 116/74   Pulse: 78   Patient Position - Orthostatic VS: Lying         Visual Acuity      ED Medications  Medications   sodium chloride 0.9 % bolus 1,000 mL (1,000 mL Intravenous New Bag 8/9/24 0057)   ondansetron (ZOFRAN) injection 4 mg (4 mg Intravenous Given 8/9/24 0056)   Lidocaine Viscous HCl (XYLOCAINE) 2 %  mucosal solution 15 mL (15 mL Swish & Spit Given 8/9/24 0157)   aluminum-magnesium hydroxide-simethicone (MAALOX) oral suspension 15 mL (15 mL Oral Given 8/9/24 0157)   dicyclomine (BENTYL) tablet 20 mg (20 mg Oral Given 8/9/24 0157)       Diagnostic Studies  Results Reviewed       Procedure Component Value Units Date/Time    Comprehensive metabolic panel [[id number]]  (Abnormal) Collected: 08/09/24 0056    Lab Status: Final result Specimen: Blood from Arm, Right Updated: 08/09/24 0118     Sodium 137 mmol/L      Potassium 3.6 mmol/L      Chloride 101 mmol/L      CO2 27 mmol/L      ANION GAP 9 mmol/L      BUN 11 mg/dL      Creatinine 0.82 mg/dL      Glucose 108 mg/dL      Calcium 8.8 mg/dL      AST 14 U/L      ALT 15 U/L      Alkaline Phosphatase 71 U/L      Total Protein 6.3 g/dL      Albumin 3.8 g/dL      Total Bilirubin 0.59 mg/dL      eGFR 111 ml/min/1.73sq m     Narrative:      [employer] guidelines for Chronic Kidney Disease (CKD):     Stage 1 with normal or high GFR (GFR > 90 mL/min/1.73 square meters)    Stage 2 Mild CKD (GFR = 60-89 mL/min/1.73 square meters)    Stage 3A Moderate CKD (GFR = 45-59 mL/min/1.73 square meters)    Stage 3B Moderate CKD (GFR = 30-44 mL/min/1.73 square meters)    Stage 4 Severe CKD (GFR = 15-29 mL/min/1.73 square meters)    Stage 5 End Stage CKD (GFR <15 mL/min/1.73 square meters)  Note: GFR calculation is accurate only with a steady state creatinine    Lipase [[id number]]  (Abnormal) Collected: 08/09/24 0056    Lab Status: Final result Specimen: Blood from Arm, Right Updated: 08/09/24 0118     Lipase 7 u/L     Urine Microscopic [[id number]]  (Abnormal) Collected: 08/09/24 0056    Lab Status: Final result Specimen: Urine, Clean Catch Updated: 08/09/24 0111     RBC, UA None Seen /hpf      WBC, UA 1-2 /hpf      Epithelial Cells Occasional /hpf      Bacteria, UA Occasional /hpf      OTHER OBSERVATIONS Sperm Present     MUCUS THREADS Occasional    UA (URINE) with  reflex to Scope [[id number]]  (Abnormal) Collected: 08/09/24 0056    Lab Status: Final result Specimen: Urine, Clean Catch Updated: 08/09/24 0105     Color, UA Brown     Clarity, UA Clear     Specific Gravity, UA 1.025     pH, UA 5.0     Leukocytes, UA 25.0     Nitrite, UA Negative     Protein, UA 30 (1+) mg/dl      Glucose, UA Negative mg/dl      Ketones, UA 5 (Trace) mg/dl      Bilirubin, UA 1 mg/dL     Occult Blood, UA Negative     UROBILINOGEN UA 4.0 mg/dL     CBC and differential [[id number]]  (Abnormal) Collected: 08/09/24 0056    Lab Status: Final result Specimen: Blood from Arm, Right Updated: 08/09/24 0101     WBC 7.41 Thousand/uL      RBC 5.49 Million/uL      Hemoglobin 16.2 g/dL      Hematocrit 46.1 %      MCV 84 fL      MCH 29.5 pg      MCHC 35.1 g/dL      RDW 12.7 %      MPV 9.0 fL      Platelets 280 Thousands/uL      nRBC 0 /100 WBCs      Segmented % 81 %      Immature Grans % 0 %      Lymphocytes % 12 %      Monocytes % 5 %      Eosinophils Relative 2 %      Basophils Relative 0 %      Absolute Neutrophils 6.05 Thousands/µL      Absolute Immature Grans 0.01 Thousand/uL      Absolute Lymphocytes 0.88 Thousands/µL      Absolute Monocytes 0.35 Thousand/µL      Eosinophils Absolute 0.11 Thousand/µL      Basophils Absolute 0.01 Thousands/µL                    No orders to display              Procedures  Procedures         ED Course                                               Medical Decision Making  39-year-old gentleman presents with complaint of abdominal pain along with nausea and vomiting.  After receiving fluids and medications, the patient was able to rest comfortably with no recurrence of vomiting.  Discussed supportive care measures and need for follow-up along with reasons return to the ER.  Doubt significant intra-abdominal pathology based on the resolution of pain, normal labs, and exam.    Amount and/or Complexity of Data Reviewed  Labs: ordered.    Risk  OTC drugs.  Prescription drug  management.                 Disposition  Final diagnoses:   Abdominal pain   Nausea and vomiting     Time reflects when diagnosis was documented in both MDM as applicable and the Disposition within this note       Time User Action Codes Description Comment    8/9/2024  3:34 AM Jean Carlos Greenberg Add [R10.9] Abdominal pain     8/9/2024  3:34 AM Jean Carlos Greenberg Add [R11.2] Nausea and vomiting           ED Disposition       ED Disposition   Discharge    Condition   Stable    Date/Time   Fri Aug 9, 2024  3:34 AM    Comment   Mihir Sanchez discharge to home/self care.                   Follow-up Information       Follow up With Specialties Details Why Contact Info    Billy Miramontes MD Family Medicine   [address] [facility], [address]  [address]  [facility] [address] [phone]              Patient's Medications   Discharge Prescriptions    ONDANSETRON (ZOFRAN-ODT) 4 MG DISINTEGRATING TABLET    Take 1 tablet (4 mg total) by mouth every 8 (eight) hours as needed for nausea or vomiting       Start Date: 8/9/2024  End Date: --       Order Dose: 4 mg       Quantity: 20 tablet    Refills: 0       No discharge procedures on file.    PDMP Review         Value Time User    PDMP Reviewed  Yes 9/19/2023 11:00 AM Vinnie Boyd PA-C            ED Provider  Electronically Signed by             Jean Carlos Greenberg DO  08/09/24 0330

## 2024-08-10 NOTE — ED PROVIDER NOTES
History  Chief Complaint   Patient presents with    Abdominal Pain     Reports he has had mid abd pain since yesterday. Reports he had vomiting and diarrhea yesterday but today he is just nauseous. Denies fevers. rEports he was seen here yesterday for the same thing and was prescribed nausea meds but didn't pick it up. Pt also thinks he has cellulitis - thinks its his Rt leg but isnt sure.     The patient is a 39-year-old male with a past medical history of schizoaffective disorder, borderline personality disorder, substance use disorder, housing instability, and CKD, who presents for reevaluation of abdominal pain.  He was seen in the department yesterday for 1 day of nausea, vomiting, diarrhea, and epigastric abdominal pain.  Labs were unremarkable and patient reported relief with a GI cocktail.  Patient returns tonight for abdominal pain, nausea, and diarrhea.  He reports that his symptoms did initially improve, but returned this morning.  No medications taken PTA.    In addition to his GI complaints, the patient also reports a rash to his right lateral ankle.  Per patient he initially had several pruritic insect bites in the area that he scratched.  Now the area is discolored and his girlfriend is concerned that the patient developed cellulitis.        Prior to Admission Medications   Prescriptions Last Dose Informant Patient Reported? Taking?   QUEtiapine (SEROquel) 200 mg tablet   Yes No   Sig: Take 200 mg by mouth daily at bedtime   aluminum-magnesium hydroxide-simethicone (MAALOX MAX) 400-400-40 MG/5ML suspension   No No   Sig: Take 10 mL by mouth every 6 (six) hours as needed for indigestion or heartburn   azithromycin (ZITHROMAX) 200 mg/5 mL suspension   No No   Si mg p.o. daily x 4 days   dicyclomine (BENTYL) 20 mg tablet   No No   Sig: Take 0.5 tablets (10 mg total) by mouth every 6 (six) hours as needed (abdominal cramping)   ibuprofen (MOTRIN) 600 mg tablet   No No   Sig: Take 1 tablet (600 mg  total) by mouth every 6 (six) hours as needed for mild pain   lisdexamfetamine (VYVANSE) 40 MG capsule   No No   Sig: Take 1 capsule (40 mg total) by mouth every morning for 15 days Max Daily Amount: 40 mg   ondansetron (ZOFRAN-ODT) 4 mg disintegrating tablet   No No   Sig: Take 1 tablet (4 mg total) by mouth every 6 (six) hours as needed for nausea or vomiting   ondansetron (ZOFRAN-ODT) 4 mg disintegrating tablet   No No   Sig: Take 1 tablet (4 mg total) by mouth every 8 (eight) hours as needed for nausea or vomiting   venlafaxine (EFFEXOR-XR) 150 mg 24 hr capsule   No No   Sig: Take 1 capsule (150 mg total) by mouth daily      Facility-Administered Medications: None       Past Medical History:   Diagnosis Date    ADD (attention deficit disorder)     Addiction to drug ([facility])     Anxiety     Borderline personality disorder ([facility])     Chronic kidney disease     Depression     MDD (major depressive disorder)     Panic attack     Psychiatric illness     Self-injurious behavior     Social phobia     Substance abuse ([facility])     Suicide attempt ([facility])        Past Surgical History:   Procedure Laterality Date    NO PAST SURGERIES         History reviewed. No pertinent family history.  I have reviewed and agree with the history as documented.    E-Cigarette/Vaping    E-Cigarette Use Never User      E-Cigarette/Vaping Substances    Nicotine No     THC No     CBD No     Flavoring No     Other No     Unknown No      Social History     Tobacco Use    Smoking status: Every Day     Current packs/day: 0.50     Average packs/day: 0.5 packs/day for 20.0 years (10.0 ttl pk-yrs)     Types: Cigarettes    Smokeless tobacco: Never    Tobacco comments:     Not ready to quit.   Vaping Use    Vaping status: Never Used   Substance Use Topics    Alcohol use: Not Currently    Drug use: Yes     Frequency: 1.0 times per week     Types: Marijuana     Comment: once a week       Review of Systems   Constitutional:  Negative for chills and fever.   HENT:   Negative for congestion, ear pain, rhinorrhea and sore throat.    Eyes:  Negative for pain and visual disturbance.   Respiratory:  Negative for cough and shortness of breath.    Cardiovascular:  Negative for chest pain and palpitations.   Gastrointestinal:  Positive for abdominal pain, diarrhea and nausea. Negative for abdominal distention, constipation and vomiting.   Genitourinary:  Negative for dysuria, flank pain, frequency and hematuria.   Musculoskeletal:  Negative for arthralgias, back pain and myalgias.   Skin:  Positive for wound. Negative for color change and rash.   Neurological:  Negative for dizziness, seizures, syncope, light-headedness and headaches.   All other systems reviewed and are negative.      Physical Exam  Physical Exam  Vitals and nursing note reviewed.   Constitutional:       General: He is awake.      Appearance: Normal appearance. He is well-developed and overweight. He is not toxic-appearing or diaphoretic.   HENT:      Head: Normocephalic and atraumatic.      Right Ear: External ear normal.      Left Ear: External ear normal.      Nose: Nose normal.      Mouth/Throat:      Lips: Pink.      Mouth: Mucous membranes are moist.   Eyes:      General: Lids are normal. Vision grossly intact. Gaze aligned appropriately. No scleral icterus.     Conjunctiva/sclera: Conjunctivae normal.      Pupils: Pupils are equal, round, and reactive to light.   Cardiovascular:      Rate and Rhythm: Normal rate and regular rhythm.      Heart sounds: S1 normal and S2 normal. No murmur heard.     No friction rub. No gallop.   Pulmonary:      Effort: Pulmonary effort is normal. No respiratory distress.      Breath sounds: Normal breath sounds and air entry. No wheezing, rhonchi or rales.   Abdominal:      General: Abdomen is flat. Bowel sounds are normal. There is no distension.      Palpations: Abdomen is soft. There is no mass.      Tenderness: There is abdominal tenderness in the epigastric area. There is no  right CVA tenderness, left CVA tenderness, guarding or rebound.      Hernia: There is no hernia in the umbilical area or ventral area.   Musculoskeletal:      Cervical back: Normal, full passive range of motion without pain and neck supple. No rigidity or crepitus. No spinous process tenderness or muscular tenderness.      Thoracic back: Normal. No tenderness or bony tenderness.      Lumbar back: Normal. No tenderness or bony tenderness.   Lymphadenopathy:      Head:      Right side of head: No submental, submandibular, tonsillar, preauricular or posterior auricular adenopathy.      Left side of head: No submental, submandibular, tonsillar, preauricular or posterior auricular adenopathy.      Cervical: No cervical adenopathy.   Skin:     General: Skin is warm.      Capillary Refill: Capillary refill takes less than 2 seconds.      Coloration: Skin is not pale.      Findings: Wound present. No bruising, ecchymosis, erythema, petechiae or rash.      Comments: Several small abrasions on the right lateral ankle, with exposure of the dermis.  No crepitus, crusting, or weeping.  No surrounding erythema.  The skin is not warm to the touch.   Neurological:      Mental Status: He is alert and oriented to person, place, and time.   Psychiatric:         Attention and Perception: Attention normal.         Speech: Speech normal.         Behavior: Behavior normal. Behavior is cooperative.         Vital Signs  ED Triage Vitals [08/09/24 2120]   Temperature Pulse Respirations Blood Pressure SpO2   97.8 °F (36.6 °C) 95 18 122/85 95 %      Temp Source Heart Rate Source Patient Position - Orthostatic VS BP Location FiO2 (%)   Oral Monitor Lying Left arm --      Pain Score       --           Vitals:    08/09/24 2120   BP: 122/85   Pulse: 95   Patient Position - Orthostatic VS: Lying       ED Medications  Medications   aluminum-magnesium hydroxide-simethicone (MAALOX) oral suspension 30 mL (30 mL Oral Given 8/9/24 2139)   Lidocaine  Viscous HCl (XYLOCAINE) 2 % mucosal solution 15 mL (15 mL Swish & Spit Given 8/9/24 2139)   famotidine (PEPCID) tablet 20 mg (20 mg Oral Given 8/9/24 2139)       Diagnostic Studies  Results Reviewed       None                   No orders to display              Procedures  Procedures         ED Course           SBIRT 22yo+      Flowsheet Row Most Recent Value   Initial Alcohol Screen: US AUDIT-C     1. How often do you have a drink containing alcohol? 0 Filed at: 08/09/2024 2126   2. How many drinks containing alcohol do you have on a typical day you are drinking?  0 Filed at: 08/09/2024 2126   3a. Male UNDER 65: How often do you have five or more drinks on one occasion? 0 Filed at: 08/09/2024 2126   Audit-C Score 0 Filed at: 08/09/2024 2126   LEATHA: How many times in the past year have you...    Used an illegal drug or used a prescription medication for non-medical reasons? Never Filed at: 08/09/2024 2126                Medical Decision Making  Patient presents for reevaluation of epigastric pain and nausea as well as a right lower leg wound.  There is mild tenderness to palpation in the epigastric region without peritoneal signs.  Evaluation of the RLE revealed no evidence of cellulitis.  Differential diagnosis includes but is not limited to GERD, PUD, gastritis, gastroenteritis, pancreatitis, hepatitis, gallbladder etiology, food poisoning, or viral syndrome.  Reviewed labs done within the last 24 hours, which showed no acute pathology.  As there is no lab abnormalities or peritoneal signs, doubt acute intra-abdominal pathology such as appendicitis or acute cholecystitis.  Patient reported relief after a GI cocktail.  Reviewed supportive care measures and prescriptions were resent to the patient's pharmacy.  He is in agreement with the treatment plan and all questions were answered.  Return precautions discussed and he verbalized understanding.  Follow-up PCP, return to the ED in the interim with new or worsening  symptoms.    Problems Addressed:  Epigastric pain: acute illness or injury  Nausea: acute illness or injury    Risk  OTC drugs.  Prescription drug management.          Disposition  Final diagnoses:   Epigastric pain   Nausea     Time reflects when diagnosis was documented in both MDM as applicable and the Disposition within this note       Time User Action Codes Description Comment    8/9/2024  9:51 PM Amy Snyder Add [R10.13] Epigastric pain     8/9/2024  9:52 PM Amy Snyder Add [R11.0] Nausea           ED Disposition       ED Disposition   Discharge    Condition   Stable    Date/Time   Fri Aug 9, 2024 2152    Comment   Mihiroscar Sanchez discharge to home/self care.                   Follow-up Information       Follow up With Specialties Details Why Contact Info    Billy Miramontes MD Family Medicine   [address] [facility], [address]  [address]  [facility] [address] [phone]              Discharge Medication List as of 8/9/2024 10:13 PM        START taking these medications    Details   !! aluminum-magnesium hydroxide-simethicone (MAALOX MAX) 400-400-40 MG/5ML suspension Take 10 mL by mouth every 6 (six) hours as needed for indigestion or heartburn, Starting Fri 8/9/2024, Normal      !! ondansetron (ZOFRAN-ODT) 4 mg disintegrating tablet Take 1 tablet (4 mg total) by mouth every 8 (eight) hours as needed for nausea, Starting Fri 8/9/2024, Normal       !! - Potential duplicate medications found. Please discuss with provider.        CONTINUE these medications which have NOT CHANGED    Details   !! aluminum-magnesium hydroxide-simethicone (MAALOX MAX) 400-400-40 MG/5ML suspension Take 10 mL by mouth every 6 (six) hours as needed for indigestion or heartburn, Starting Mon 5/27/2024, Print      azithromycin (ZITHROMAX) 200 mg/5 mL suspension 250 mg p.o. daily x 4 days, Normal      dicyclomine (BENTYL) 20 mg tablet Take 0.5 tablets (10 mg total) by mouth every 6 (six) hours as needed (abdominal cramping),  Starting Mon 5/27/2024, Print      ibuprofen (MOTRIN) 600 mg tablet Take 1 tablet (600 mg total) by mouth every 6 (six) hours as needed for mild pain, Starting Fri 2/23/2024, Normal      lisdexamfetamine (VYVANSE) 40 MG capsule Take 1 capsule (40 mg total) by mouth every morning for 15 days Max Daily Amount: 40 mg, Starting Sun 9/3/2023, Until Tue 10/17/2023, Normal      !! ondansetron (ZOFRAN-ODT) 4 mg disintegrating tablet Take 1 tablet (4 mg total) by mouth every 6 (six) hours as needed for nausea or vomiting, Starting Mon 5/27/2024, Print      !! ondansetron (ZOFRAN-ODT) 4 mg disintegrating tablet Take 1 tablet (4 mg total) by mouth every 8 (eight) hours as needed for nausea or vomiting, Starting Fri 8/9/2024, Normal      QUEtiapine (SEROquel) 200 mg tablet Take 200 mg by mouth daily at bedtime, Starting Mon 8/14/2023, Historical Med      venlafaxine (EFFEXOR-XR) 150 mg 24 hr capsule Take 1 capsule (150 mg total) by mouth daily, Starting Tue 9/19/2023, Until Thu 10/19/2023, Normal       !! - Potential duplicate medications found. Please discuss with provider.          No discharge procedures on file.    PDMP Review         Value Time User    PDMP Reviewed  Yes 9/19/2023 11:00 AM Vinnie Boyd PA-C            ED Provider  Electronically Signed by             Amy Snyder PA-C  08/09/24 2692

## 2024-11-14 ENCOUNTER — HOSPITAL ENCOUNTER (EMERGENCY)
Facility: HOSPITAL | Age: 39
Discharge: HOME | End: 2024-11-14
Attending: EMERGENCY MEDICINE
Payer: MEDICARE

## 2024-11-14 VITALS
OXYGEN SATURATION: 100 % | DIASTOLIC BLOOD PRESSURE: 94 MMHG | RESPIRATION RATE: 18 BRPM | TEMPERATURE: 99.1 F | WEIGHT: 184.75 LBS | BODY MASS INDEX: 27.28 KG/M2 | HEART RATE: 97 BPM | SYSTOLIC BLOOD PRESSURE: 153 MMHG

## 2024-11-14 DIAGNOSIS — T69.029A TRENCH FOOT: Primary | ICD-10-CM

## 2024-11-14 PROCEDURE — 99283 EMERGENCY DEPT VISIT LOW MDM: CPT | Performed by: PHYSICIAN ASSISTANT

## 2024-11-14 PROCEDURE — 99283 EMERGENCY DEPT VISIT LOW MDM: CPT

## 2024-11-14 RX ORDER — ACETAMINOPHEN 500 MG
500 TABLET ORAL EVERY 6 HOURS PRN
Qty: 20 TABLET | Refills: 0 | Status: SHIPPED | OUTPATIENT
Start: 2024-11-14

## 2024-11-14 RX ORDER — MENTHOL 0.01 G/G
POWDER TOPICAL 3 TIMES DAILY PRN
Qty: 113 G | Refills: 0 | Status: SHIPPED | OUTPATIENT
Start: 2024-11-14

## 2024-11-14 RX ORDER — ACETAMINOPHEN 325 MG/1
650 TABLET ORAL ONCE
Status: COMPLETED | OUTPATIENT
Start: 2024-11-14 | End: 2024-11-14

## 2024-11-14 RX ADMIN — ACETAMINOPHEN 650 MG: 325 TABLET ORAL at 03:42

## 2024-11-14 NOTE — ED PROVIDER NOTES
"Time reflects when diagnosis was documented in both MDM as applicable and the Disposition within this note       Time User Action Codes Description Comment    11/14/2024  3:33 AM Amy Kan Add [T69.029A] Trench foot           ED Disposition       ED Disposition   Discharge    Condition   Stable    Date/Time   u Nov 14, 2024  3:33 AM    Comment   Mihir Sanchez discharge to home/self care.                   Assessment & Plan       Medical Decision Making  Patient had evidence of trench foot bilaterally without evidence of ulceration or open sore.  Patient is not diabetic.  No evidence of cellulitis or ascending infection.  Patient was given dry socks as well as foot powder and medications for supportive care.  Patient was educated on supportive care and return precautions.  Ambulated the department.    Risk  OTC drugs.             Medications   acetaminophen (TYLENOL) tablet 650 mg (has no administration in time range)       ED Risk Strat Scores                           SBIRT 22yo+      Flowsheet Row Most Recent Value   Initial Alcohol Screen: US AUDIT-C     1. How often do you have a drink containing alcohol? 0 Filed at: 11/14/2024 0320   2. How many drinks containing alcohol do you have on a typical day you are drinking?  0 Filed at: 11/14/2024 0320   3a. Male UNDER 65: How often do you have five or more drinks on one occasion? 0 Filed at: 11/14/2024 0320   Audit-C Score 0 Filed at: 11/14/2024 0320   LEATHA: How many times in the past year have you...    Used an illegal drug or used a prescription medication for non-medical reasons? Never Filed at: 11/14/2024 0320                            History of Present Illness       Chief Complaint   Patient presents with    Foot Pain     Pt states \"both my feet hurt, I think I have trench foot and have been using rubbing alcohol to clean it\"       Past Medical History:   Diagnosis Date    ADD (attention deficit disorder)     Addiction to drug ([facility])     Anxiety  "    Borderline personality disorder ([facility])     Chronic kidney disease     Depression     MDD (major depressive disorder)     Panic attack     Psychiatric illness     Self-injurious behavior     Social phobia     Substance abuse ([facility])     Suicide attempt ([facility])       Past Surgical History:   Procedure Laterality Date    NO PAST SURGERIES        History reviewed. No pertinent family history.   Social History     Tobacco Use    Smoking status: Every Day     Current packs/day: 0.50     Average packs/day: 0.5 packs/day for 20.0 years (10.0 ttl pk-yrs)     Types: Cigarettes    Smokeless tobacco: Never    Tobacco comments:     Not ready to quit.   Vaping Use    Vaping status: Never Used   Substance Use Topics    Alcohol use: Not Currently    Drug use: Yes     Frequency: 1.0 times per week     Types: Marijuana     Comment: once a week      E-Cigarette/Vaping    E-Cigarette Use Never User       E-Cigarette/Vaping Substances    Nicotine No     THC No     CBD No     Flavoring No     Other No     Unknown No       I have reviewed and agree with the history as documented.     39-year-old male experiencing homelessness without significant past medical history presents complaining of bilateral foot pain and irritation.  Patient states he believes he has trench foot and that he is occasionally stuck in wet shoes due to either the weather or sweating while walking.  Denies history of diabetes.  Denies history of similar problems. Denies any other complaints       History provided by:  Patient   used: No        Review of Systems   Constitutional: Negative.  Negative for chills and fatigue.   HENT:  Negative for ear pain and sore throat.    Eyes:  Negative for photophobia and redness.   Respiratory:  Negative for apnea, cough and shortness of breath.    Cardiovascular:  Negative for chest pain.   Gastrointestinal:  Negative for abdominal pain, nausea and vomiting.   Genitourinary:  Negative for dysuria.    Musculoskeletal:  Negative for arthralgias, neck pain and neck stiffness.        Foot pain   Skin:  Negative for rash.   Neurological:  Negative for dizziness, tremors, syncope and weakness.   Psychiatric/Behavioral:  Negative for suicidal ideas.            Objective       ED Triage Vitals [11/14/24 0322]   Temperature Pulse Blood Pressure Respirations SpO2 Patient Position - Orthostatic VS   99.1 °F (37.3 °C) 97 153/94 18 100 % Sitting      Temp Source Heart Rate Source BP Location FiO2 (%) Pain Score    Tympanic Monitor Left arm -- --      Vitals      Date and Time Temp Pulse SpO2 Resp BP Pain Score FACES Pain Rating User   11/14/24 0322 99.1 °F (37.3 °C) 97 100 % 18 153/94 -- -- AM            Physical Exam  Constitutional:       General: He is not in acute distress.     Appearance: He is well-developed. He is not diaphoretic.   Eyes:      Pupils: Pupils are equal, round, and reactive to light.   Cardiovascular:      Rate and Rhythm: Normal rate and regular rhythm.   Pulmonary:      Effort: Pulmonary effort is normal. No respiratory distress.      Breath sounds: Normal breath sounds.   Abdominal:      General: Bowel sounds are normal. There is no distension.      Palpations: Abdomen is soft.   Musculoskeletal:         General: Normal range of motion.      Cervical back: Normal range of motion and neck supple.      Comments: Bilateral feet with peeling excoriated skin worse between the toes. No obvious open lesions or ulcerations    Skin:     General: Skin is warm and dry.   Neurological:      Mental Status: He is alert and oriented to person, place, and time.         Results Reviewed       None            No orders to display       Procedures    ED Medication and Procedure Management   Prior to Admission Medications   Prescriptions Last Dose Informant Patient Reported? Taking?   QUEtiapine (SEROquel) 200 mg tablet   Yes No   Sig: Take 200 mg by mouth daily at bedtime   aluminum-magnesium hydroxide-simethicone  (MAALOX MAX) 400-400-40 MG/5ML suspension   No No   Sig: Take 10 mL by mouth every 6 (six) hours as needed for indigestion or heartburn   aluminum-magnesium hydroxide-simethicone (MAALOX MAX) 400-400-40 MG/5ML suspension   No No   Sig: Take 10 mL by mouth every 6 (six) hours as needed for indigestion or heartburn   azithromycin (ZITHROMAX) 200 mg/5 mL suspension   No No   Si mg p.o. daily x 4 days   dicyclomine (BENTYL) 20 mg tablet   No No   Sig: Take 0.5 tablets (10 mg total) by mouth every 6 (six) hours as needed (abdominal cramping)   ibuprofen (MOTRIN) 600 mg tablet   No No   Sig: Take 1 tablet (600 mg total) by mouth every 6 (six) hours as needed for mild pain   lisdexamfetamine (VYVANSE) 40 MG capsule   No No   Sig: Take 1 capsule (40 mg total) by mouth every morning for 15 days Max Daily Amount: 40 mg   ondansetron (ZOFRAN-ODT) 4 mg disintegrating tablet   No No   Sig: Take 1 tablet (4 mg total) by mouth every 6 (six) hours as needed for nausea or vomiting   ondansetron (ZOFRAN-ODT) 4 mg disintegrating tablet   No No   Sig: Take 1 tablet (4 mg total) by mouth every 8 (eight) hours as needed for nausea or vomiting   ondansetron (ZOFRAN-ODT) 4 mg disintegrating tablet   No No   Sig: Take 1 tablet (4 mg total) by mouth every 8 (eight) hours as needed for nausea   venlafaxine (EFFEXOR-XR) 150 mg 24 hr capsule   No No   Sig: Take 1 capsule (150 mg total) by mouth daily      Facility-Administered Medications: None     Patient's Medications   Discharge Prescriptions    ACETAMINOPHEN (TYLENOL) 500 MG TABLET    Take 1 tablet (500 mg total) by mouth every 6 (six) hours as needed for mild pain       Start Date: 2024End Date: --       Order Dose: 500 mg       Quantity: 20 tablet    Refills: 0    MENTHOL-ZINC OXIDE (GOLD BOND EXTRA STRENGTH) POWD    Apply topically 3 (three) times a day as needed (itchinesss)       Start Date: 2024End Date: --       Order Dose: --       Quantity: 113 g    Refills: 0      No discharge procedures on file.  ED SEPSIS DOCUMENTATION   Time reflects when diagnosis was documented in both MDM as applicable and the Disposition within this note       Time User Action Codes Description Comment    11/14/2024  3:33 AM Amy Kan Add [T69.029A] Trench foot                  Amy Kan PA-C  11/14/24 0339

## 2025-01-11 ENCOUNTER — APPOINTMENT (EMERGENCY)
Dept: RADIOLOGY | Facility: HOSPITAL | Age: 40
End: 2025-01-11
Payer: MEDICARE

## 2025-01-11 ENCOUNTER — HOSPITAL ENCOUNTER (EMERGENCY)
Facility: HOSPITAL | Age: 40
Discharge: HOME | End: 2025-01-11
Attending: EMERGENCY MEDICINE
Payer: MEDICARE

## 2025-01-11 VITALS
BODY MASS INDEX: 27.97 KG/M2 | HEART RATE: 109 BPM | OXYGEN SATURATION: 99 % | DIASTOLIC BLOOD PRESSURE: 79 MMHG | TEMPERATURE: 97.7 F | WEIGHT: 189.38 LBS | SYSTOLIC BLOOD PRESSURE: 148 MMHG | RESPIRATION RATE: 20 BRPM

## 2025-01-11 DIAGNOSIS — J40 BRONCHITIS: Primary | ICD-10-CM

## 2025-01-11 DIAGNOSIS — J10.1 INFLUENZA B: ICD-10-CM

## 2025-01-11 LAB
FLUAV AG UPPER RESP QL IA.RAPID: NEGATIVE
FLUBV AG UPPER RESP QL IA.RAPID: POSITIVE
SARS-COV+SARS-COV-2 AG RESP QL IA.RAPID: NEGATIVE

## 2025-01-11 PROCEDURE — 71046 X-RAY EXAM CHEST 2 VIEWS: CPT

## 2025-01-11 PROCEDURE — 94640 AIRWAY INHALATION TREATMENT: CPT

## 2025-01-11 PROCEDURE — 87804 INFLUENZA ASSAY W/OPTIC: CPT

## 2025-01-11 PROCEDURE — 93005 ELECTROCARDIOGRAM TRACING: CPT

## 2025-01-11 PROCEDURE — 87811 SARS-COV-2 COVID19 W/OPTIC: CPT

## 2025-01-11 PROCEDURE — 99285 EMERGENCY DEPT VISIT HI MDM: CPT

## 2025-01-11 RX ORDER — GUAIFENESIN 600 MG/1
1200 TABLET, EXTENDED RELEASE ORAL EVERY 12 HOURS SCHEDULED
Qty: 20 TABLET | Refills: 0 | Status: SHIPPED | OUTPATIENT
Start: 2025-01-11

## 2025-01-11 RX ORDER — IPRATROPIUM BROMIDE AND ALBUTEROL SULFATE 2.5; .5 MG/3ML; MG/3ML
3 SOLUTION RESPIRATORY (INHALATION) ONCE
Status: COMPLETED | OUTPATIENT
Start: 2025-01-11 | End: 2025-01-11

## 2025-01-11 RX ORDER — NICOTINE 21 MG/24HR
1 PATCH, TRANSDERMAL 24 HOURS TRANSDERMAL DAILY
COMMUNITY

## 2025-01-11 RX ORDER — ALBUTEROL SULFATE 90 UG/1
2 INHALANT RESPIRATORY (INHALATION) ONCE
Status: COMPLETED | OUTPATIENT
Start: 2025-01-11 | End: 2025-01-11

## 2025-01-11 RX ORDER — SENNOSIDES 8.6 MG
650 CAPSULE ORAL EVERY 8 HOURS PRN
Qty: 30 TABLET | Refills: 0 | Status: SHIPPED | OUTPATIENT
Start: 2025-01-11

## 2025-01-11 RX ORDER — ARIPIPRAZOLE 5 MG/1
TABLET ORAL
COMMUNITY
Start: 2024-12-20

## 2025-01-11 RX ORDER — CALCIUM CARBONATE/VITAMIN D3 600 MG-10
TABLET ORAL
COMMUNITY

## 2025-01-11 RX ORDER — TRAZODONE HYDROCHLORIDE 50 MG/1
TABLET, FILM COATED ORAL
COMMUNITY
Start: 2024-12-20

## 2025-01-11 RX ORDER — ALBUTEROL SULFATE 90 UG/1
2 INHALANT RESPIRATORY (INHALATION) EVERY 6 HOURS PRN
Qty: 8.5 G | Refills: 0 | Status: SHIPPED | OUTPATIENT
Start: 2025-01-11

## 2025-01-11 RX ORDER — PAROXETINE 30 MG/1
TABLET, FILM COATED ORAL
COMMUNITY
Start: 2024-12-10

## 2025-01-11 RX ORDER — IPRATROPIUM BROMIDE AND ALBUTEROL SULFATE .5; 3 MG/3ML; MG/3ML
1 SOLUTION RESPIRATORY (INHALATION) ONCE
Status: COMPLETED | OUTPATIENT
Start: 2025-01-11 | End: 2025-01-11

## 2025-01-11 RX ORDER — ERGOCALCIFEROL 1.25 MG/1
50000 CAPSULE, LIQUID FILLED ORAL WEEKLY
COMMUNITY
Start: 2024-12-10

## 2025-01-11 RX ORDER — LISDEXAMFETAMINE DIMESYLATE 60 MG/1
CAPSULE ORAL
COMMUNITY
Start: 2024-12-30

## 2025-01-11 RX ORDER — DEXAMETHASONE 4 MG/1
10 TABLET ORAL ONCE
Status: COMPLETED | OUTPATIENT
Start: 2025-01-11 | End: 2025-01-11

## 2025-01-11 RX ORDER — METHYLPHENIDATE HYDROCHLORIDE 36 MG/1
TABLET, EXTENDED RELEASE ORAL
COMMUNITY
Start: 2024-11-08

## 2025-01-11 RX ORDER — GABAPENTIN 400 MG/1
CAPSULE ORAL
COMMUNITY
Start: 2024-12-20

## 2025-01-11 RX ORDER — AZITHROMYCIN 250 MG/1
TABLET, FILM COATED ORAL
Qty: 6 TABLET | Refills: 0 | Status: SHIPPED | OUTPATIENT
Start: 2025-01-11 | End: 2025-01-15

## 2025-01-11 RX ORDER — ALBUTEROL SULFATE 2.5 MG/3ML
1 SOLUTION RESPIRATORY (INHALATION) ONCE
Status: COMPLETED | OUTPATIENT
Start: 2025-01-11 | End: 2025-01-11

## 2025-01-11 RX ORDER — PROPRANOLOL HYDROCHLORIDE 10 MG/1
TABLET ORAL
COMMUNITY
Start: 2024-12-20

## 2025-01-11 RX ADMIN — DEXAMETHASONE 10 MG: 4 TABLET ORAL at 11:26

## 2025-01-11 RX ADMIN — IPRATROPIUM BROMIDE AND ALBUTEROL SULFATE 3 ML: 2.5; .5 SOLUTION RESPIRATORY (INHALATION) at 11:27

## 2025-01-11 RX ADMIN — ALBUTEROL SULFATE 2 PUFF: 90 AEROSOL, METERED RESPIRATORY (INHALATION) at 12:26

## 2025-01-11 NOTE — Clinical Note
Mihir Sanchez was seen and treated in our emergency department on 1/11/2025.            must be fever free for 24 hours prior to return (without the use of fever reducing medications)    Diagnosis: Influenza    Mihir  .    He may return on this date: 01/18/2025         If you have any questions or concerns, please don't hesitate to call.      Aggie Perea PA-C    ______________________________           _______________          _______________  Hospital Representative                              Date                                Time

## 2025-01-11 NOTE — ED PROVIDER NOTES
"  ED Disposition       None          Assessment & Plan       Medical Decision Making  Reports history of trauma.  Wheezing noted on exam without significant respiratory distress.  Improvement with DuoNeb.  Prednisone given.  Chest x-ray without evidence of pneumonia or pneumothorax or effusion on wet read.  Smoking history.  Sputum production.  Viral testing performed. Respiratory exam much improved and patient feels well, will discharge home with continued steroids and beta-agonists. Instructed patient to return if any worsening in symptoms.    Influenza B positive.  Patient is afebrile without antipyretics.  Nontoxic-appearing. Symptoms started greater than 48 hours ago.  Not prescribed Tamiflu.  Discussed transmission, return precautions, follow-up.    All imaging and/or lab testing discussed with patient, strict return to ED precautions discussed. Patient recommended to follow up promptly with appropriate outpatient provider and risk of morbidity/mortality if patient does not follow up as recommended was discussed. Patient and/or family members verbalizes understanding and agrees with plan. Patient and/or family members were given opportunity to ask questions, all questions were answered at this time. Patient is stable for discharge.     Portions of the record may have been created with voice recognition software. Occasional wrong word or \"sound a like\" substitutions may have occurred due to the inherent limitations of voice recognition software. Read the chart carefully and recognize, using context, where substitutions have occurred.         Amount and/or Complexity of Data Reviewed  Labs: ordered.  Radiology: ordered.    Risk  OTC drugs.  Prescription drug management.        ED Course as of 01/12/25 1442   Sat Jan 11, 2025   1108 Pulse(!): 109  S/p duo neb given by EMS    1120 Procedure Note: EKG  Date/Time: 01/11/25 11:20 AM   Performed by: Aggie Perea   Authorized by: Aggie Perea  ECG interpreted by me, " "the ED Provider: yes   The EKG demonstrates:  Rate 85 bpm  Rhythm NSR   QTc 402 ms   No ST elevations/depressions     1124 Reports history of asthma \"as a kid\"    1226 Respiratory exam greatly improved. Patient in no respiratory distress, speaking in full sentences, managing oral secretions without difficulty, no accessory muscle use, retractions, or belly breathing noted, no adventitious lung sounds auscultated bilaterally.       Medications   ipratropium-albuterol (DUO-NEB) 0.5-2.5 mg/3 mL inhalation solution 3 mL (has no administration in time range)   dexamethasone (DECADRON) tablet 10 mg (has no administration in time range)   albuterol (FOR EMS ONLY) (2.5 mg/3 mL) 0.083 % inhalation solution 2.5 mg (0 mg Does not apply Given to EMS 1/11/25 1105)   ipratropium-albuterol (FOR EMS ONLY) (DUO-NEB) 0.5-2.5 mg/3 mL inhalation solution 3 mL (0 mL Does not apply Given to EMS 1/11/25 1105)       ED Risk Strat Scores                          SBIRT 22yo+      Flowsheet Row Most Recent Value   Initial Alcohol Screen: US AUDIT-C     1. How often do you have a drink containing alcohol? 0 Filed at: 01/11/2025 1103   2. How many drinks containing alcohol do you have on a typical day you are drinking?  0 Filed at: 01/11/2025 1103   3a. Male UNDER 65: How often do you have five or more drinks on one occasion? 0 Filed at: 01/11/2025 1103   3b. FEMALE Any Age, or MALE 65+: How often do you have 4 or more drinks on one occassion? 0 Filed at: 01/11/2025 1103   Audit-C Score 0 Filed at: 01/11/2025 1103   LEATHA: How many times in the past year have you...    Used an illegal drug or used a prescription medication for non-medical reasons? Never Filed at: 01/11/2025 1103                            History of Present Illness       Chief Complaint   Patient presents with    Shortness of Breath     At home, called EMS since he felt tight and SOB. Treatments given en route with improvement         Past Medical History:   Diagnosis Date    " ADD (attention deficit disorder)     Addiction to drug (HCC)     Anxiety     Asthma     Borderline personality disorder (HCC)     Chronic kidney disease     Depression     MDD (major depressive disorder)     Panic attack     Psychiatric illness     Self-injurious behavior     Social phobia     Substance abuse (HCC)     Suicide attempt (HCC)       Past Surgical History:   Procedure Laterality Date    NO PAST SURGERIES        History reviewed. No pertinent family history.   Social History     Tobacco Use    Smoking status: Every Day     Current packs/day: 0.50     Average packs/day: 0.5 packs/day for 20.0 years (10.0 ttl pk-yrs)     Types: Cigarettes    Smokeless tobacco: Never    Tobacco comments:     Not ready to quit.   Vaping Use    Vaping status: Never Used   Substance Use Topics    Alcohol use: Not Currently    Drug use: Yes     Frequency: 1.0 times per week     Types: Marijuana     Comment: once a week      E-Cigarette/Vaping    E-Cigarette Use Never User       E-Cigarette/Vaping Substances    Nicotine No     THC No     CBD No     Flavoring No     Other No     Unknown No       I have reviewed and agree with the history as documented.     39-year-old male presenting to emergency department via EMS for shortness of breath for a few days along with cough and congestion.  No medications at home.  Given DuoNeb by EMS prior to arrival with some improvement in symptoms.  Denies fevers.      History provided by:  Patient  Shortness of Breath  Associated symptoms: cough, sputum production and wheezing    Associated symptoms: no abdominal pain, no chest pain, no diaphoresis, no fever, no headaches, no neck pain, no rash, no sore throat and no vomiting        Review of Systems   Constitutional:  Negative for diaphoresis and fever.   HENT:  Positive for congestion. Negative for sore throat.    Respiratory:  Positive for cough, sputum production, shortness of breath and wheezing.    Cardiovascular:  Negative for chest pain.    Gastrointestinal:  Negative for abdominal pain and vomiting.   Musculoskeletal:  Negative for neck pain.   Skin:  Negative for rash.   Neurological:  Negative for headaches.           Objective       ED Triage Vitals   Temperature Pulse Blood Pressure Respirations SpO2 Patient Position - Orthostatic VS   01/11/25 1106 01/11/25 1106 01/11/25 1106 01/11/25 1106 01/11/25 1106 --   97.7 °F (36.5 °C) (!) 109 148/79 20 99 %       Temp src Heart Rate Source BP Location FiO2 (%) Pain Score    -- -- -- -- 01/11/25 1104        No Pain      Vitals      Date and Time Temp Pulse SpO2 Resp BP Pain Score FACES Pain Rating User   01/11/25 1106 97.7 °F (36.5 °C) 109 99 % 20 148/79 No Pain -- LB   01/11/25 1104 -- -- -- -- -- No Pain -- LB            Physical Exam  Vitals and nursing note reviewed.   Constitutional:       General: He is awake. He is not in acute distress.     Appearance: Normal appearance. He is not ill-appearing, toxic-appearing or diaphoretic.   HENT:      Head: Normocephalic.      Mouth/Throat:      Lips: Pink.      Mouth: Mucous membranes are moist.   Eyes:      General: Vision grossly intact.   Cardiovascular:      Rate and Rhythm: Normal rate and regular rhythm.      Heart sounds: Normal heart sounds.   Pulmonary:      Effort: Pulmonary effort is normal. No tachypnea, bradypnea, accessory muscle usage or respiratory distress.      Breath sounds: No stridor. Wheezing present.      Comments: .Patient in no respiratory distress, speaking in full sentences, managing oral secretions without difficulty, no accessory muscle use, retractions, or belly breathing noted.       Abdominal:      General: There is no distension.   Musculoskeletal:      Right lower leg: No edema.      Left lower leg: No edema.   Skin:     General: Skin is warm and dry.   Neurological:      Mental Status: He is alert.         Results Reviewed       Procedure Component Value Units Date/Time    FLU/COVID Rapid Antigen (30 min. TAT) - Preferred  screening test in ED [[id number]]     Lab Status: No result Specimen: Nares from Nose             XR chest 2 views    (Results Pending)       Procedures    ED Medication and Procedure Management   Prior to Admission Medications   Prescriptions Last Dose Informant Patient Reported? Taking?   Menthol-Zinc Oxide (Gold Bond Extra Strength) POWD   No No   Sig: Apply topically 3 (three) times a day as needed (itchinesss)   QUEtiapine (SEROquel) 200 mg tablet   Yes No   Sig: Take 200 mg by mouth daily at bedtime   acetaminophen (TYLENOL) 500 mg tablet   No No   Sig: Take 1 tablet (500 mg total) by mouth every 6 (six) hours as needed for mild pain   aluminum-magnesium hydroxide-simethicone (MAALOX MAX) 400-400-40 MG/5ML suspension   No No   Sig: Take 10 mL by mouth every 6 (six) hours as needed for indigestion or heartburn   aluminum-magnesium hydroxide-simethicone (MAALOX MAX) 400-400-40 MG/5ML suspension   No No   Sig: Take 10 mL by mouth every 6 (six) hours as needed for indigestion or heartburn   azithromycin (ZITHROMAX) 200 mg/5 mL suspension   No No   Si mg p.o. daily x 4 days   dicyclomine (BENTYL) 20 mg tablet   No No   Sig: Take 0.5 tablets (10 mg total) by mouth every 6 (six) hours as needed (abdominal cramping)   ibuprofen (MOTRIN) 600 mg tablet   No No   Sig: Take 1 tablet (600 mg total) by mouth every 6 (six) hours as needed for mild pain   lisdexamfetamine (VYVANSE) 40 MG capsule   No No   Sig: Take 1 capsule (40 mg total) by mouth every morning for 15 days Max Daily Amount: 40 mg   ondansetron (ZOFRAN-ODT) 4 mg disintegrating tablet   No No   Sig: Take 1 tablet (4 mg total) by mouth every 6 (six) hours as needed for nausea or vomiting   ondansetron (ZOFRAN-ODT) 4 mg disintegrating tablet   No No   Sig: Take 1 tablet (4 mg total) by mouth every 8 (eight) hours as needed for nausea or vomiting   ondansetron (ZOFRAN-ODT) 4 mg disintegrating tablet   No No   Sig: Take 1 tablet (4 mg total) by mouth every 8  (eight) hours as needed for nausea   venlafaxine (EFFEXOR-XR) 150 mg 24 hr capsule   No No   Sig: Take 1 capsule (150 mg total) by mouth daily      Facility-Administered Medications: None     Patient's Medications   Discharge Prescriptions    No medications on file     No discharge procedures on file.  ED SEPSIS DOCUMENTATION            Aggie Perea PA-C  01/12/25 [id number]

## 2025-01-11 NOTE — DISCHARGE INSTRUCTIONS
Tylenol as needed for fever. Stay hydrated. Follow up with PCP. Return to ED for new or worsening symptoms as discussed.

## 2025-01-12 LAB
ATRIAL RATE: 85 BPM
P AXIS: 66 DEGREES
PR INTERVAL: 148 MS
QRS AXIS: 63 DEGREES
QRSD INTERVAL: 86 MS
QT INTERVAL: 342 MS
QTC INTERVAL: 407 MS
T WAVE AXIS: 42 DEGREES
VENTRICULAR RATE: 85 BPM

## 2025-01-12 PROCEDURE — 93010 ELECTROCARDIOGRAM REPORT: CPT | Performed by: INTERNAL MEDICINE

## 2025-07-02 ENCOUNTER — HOSPITAL ENCOUNTER (EMERGENCY)
Facility: HOSPITAL | Age: 40
Discharge: HOME | End: 2025-07-02
Attending: EMERGENCY MEDICINE
Payer: MEDICARE

## 2025-07-02 ENCOUNTER — APPOINTMENT (EMERGENCY)
Dept: RADIOLOGY | Facility: HOSPITAL | Age: 40
End: 2025-07-02
Payer: MEDICARE

## 2025-07-02 VITALS
SYSTOLIC BLOOD PRESSURE: 163 MMHG | HEART RATE: 99 BPM | WEIGHT: 195.11 LBS | TEMPERATURE: 97.7 F | BODY MASS INDEX: 28.81 KG/M2 | OXYGEN SATURATION: 99 % | DIASTOLIC BLOOD PRESSURE: 99 MMHG | RESPIRATION RATE: 18 BRPM

## 2025-07-02 DIAGNOSIS — M65.4 DE QUERVAIN'S TENOSYNOVITIS, RIGHT: Primary | ICD-10-CM

## 2025-07-02 PROCEDURE — 99283 EMERGENCY DEPT VISIT LOW MDM: CPT

## 2025-07-02 PROCEDURE — 73110 X-RAY EXAM OF WRIST: CPT

## 2025-07-02 PROCEDURE — 99284 EMERGENCY DEPT VISIT MOD MDM: CPT

## 2025-07-02 RX ORDER — IBUPROFEN 600 MG/1
600 TABLET, FILM COATED ORAL ONCE
Status: COMPLETED | OUTPATIENT
Start: 2025-07-02 | End: 2025-07-02

## 2025-07-02 RX ADMIN — IBUPROFEN 600 MG: 600 TABLET ORAL at 16:49

## 2025-07-02 NOTE — DISCHARGE INSTRUCTIONS
Rotate Tylenol and Motrin every 3 hours for best relief. For example, take Motrin then in 3 hours take Tylenol then 3 hours Motrin, repeat.  Maximum Tylenol daily: 4,000 mg. Maximum ibuprofen daily: 3,600 mg.    Wear the brace at nighttime primarily.     Warm compresses for further relief.

## 2025-07-02 NOTE — ED PROVIDER NOTES
"Time reflects when diagnosis was documented in both MDM as applicable and the Disposition within this note       Time User Action Codes Description Comment    7/2/2025  4:33 PM Casandra Garnett Add [M65.4] De Kay's tenosynovitis, right           ED Disposition       ED Disposition   Discharge    Condition   Stable    Date/Time   Wed Jul 2, 2025  4:29 PM    Comment   Mihir Sanchez discharge to home/self care.                   Assessment & Plan       Medical Decision Making  Neurovascularly intact  Physical is consistent with DeQuervain's tenosynovitis.   Doubt fracture, wrist x rays ordered to evaluate for arthritis. No acute osseous abnormality per my interpretation.   Discussed supportive care. Placed in thumb spica brace.     Pt stable at time of discharge, vital signs reviewed, questions answered. Strict ER return precautions provided/discussed and were well understood by patient. Patient's vitals, labs and/or imaging results, diagnosis, and treatment plan were discussed with the patient. All new and/or changed medications were discussed - specifically to include route of administration, how often to take, when to take, and the pharmacy they were sent to. Strict return precautions as well as close follow up with PCP was discussed with the patient and the patient was agreeable to my recommendations.  Patient verbally acknowledged understanding. All labs, imaging were reviewed and used in the medical decision making process (if ordered).     Portions of this chart may have been written with voice recognition software.  Occasional grammatical errors, wrong word or \"sound a like\" substitutions may have occurred due to software limitations.  Please read carefully and use context to recognize where substitutions have occurred.    Problems Addressed:  De Quervain's tenosynovitis, right: acute illness or injury    Amount and/or Complexity of Data Reviewed  Radiology: ordered and independent interpretation " performed. Decision-making details documented in ED Course.     Details: Per my interpretation, no acute osseous abnormality or arthritis.     Risk  Prescription drug management.        ED Course as of 07/02/25 1709   Wed Jul 02, 2025   1628 XR wrist 3+ views RIGHT  Per my interpretation, no acute osseous abnormality.       Medications   ibuprofen (MOTRIN) tablet 600 mg (600 mg Oral Given 7/2/25 1649)       ED Risk Strat Scores                    No data recorded        SBIRT 22yo+      Flowsheet Row Most Recent Value   Initial Alcohol Screen: US AUDIT-C     1. How often do you have a drink containing alcohol? 0 Filed at: 07/02/2025 1604   2. How many drinks containing alcohol do you have on a typical day you are drinking?  0 Filed at: 07/02/2025 1604   3a. Male UNDER 65: How often do you have five or more drinks on one occasion? 0 Filed at: 07/02/2025 1604   3b. FEMALE Any Age, or MALE 65+: How often do you have 4 or more drinks on one occassion? 0 Filed at: 07/02/2025 1604   Audit-C Score 0 Filed at: 07/02/2025 1604   LEATHA: How many times in the past year have you...    Used an illegal drug or used a prescription medication for non-medical reasons? Never Filed at: 07/02/2025 1604                            History of Present Illness       Chief Complaint   Patient presents with    Wrist Pain     Right wrist pain x2 week. Denies injury. No meds taken       Past Medical History[1]   Past Surgical History[2]   Family History[3]   Social History[4]   E-Cigarette/Vaping    E-Cigarette Use Never User       E-Cigarette/Vaping Substances    Nicotine No     THC No     CBD No     Flavoring No     Other No     Unknown No       I have reviewed and agree with the history as documented.     Mihir is a 40 year old male presenting to the ED for right wrist pain x 2 weeks. No injury. Is right handed. Pain primarily to the entire wrist, hurts with movement. Is active with his hands.        Review of Systems   Constitutional:   Negative for chills and fever.   Respiratory:  Negative for cough and shortness of breath.    Cardiovascular:  Negative for chest pain and palpitations.   Musculoskeletal:  Positive for arthralgias. Negative for joint swelling.   Skin:  Negative for wound.   Neurological:  Negative for light-headedness, numbness and headaches.           Objective       ED Triage Vitals   Temperature Pulse Blood Pressure Respirations SpO2 Patient Position - Orthostatic VS   07/02/25 1605 07/02/25 1604 07/02/25 1603 07/02/25 1603 07/02/25 1603 07/02/25 1603   97.7 °F (36.5 °C) 99 163/99 18 99 % Sitting      Temp Source Heart Rate Source BP Location FiO2 (%) Pain Score    07/02/25 1605 07/02/25 1603 07/02/25 1603 -- 07/02/25 1603    Oral Monitor Left arm  9      Vitals      Date and Time Temp Pulse SpO2 Resp BP Pain Score FACES Pain Rating User   07/02/25 1649 -- -- -- -- -- 8 -- KR   07/02/25 1605 97.7 °F (36.5 °C) -- -- -- -- -- -- TW   07/02/25 1604 -- 99 -- -- -- -- -- TW   07/02/25 1603 -- -- 99 % 18 163/99 9 -- TW            Physical Exam  Vitals reviewed.   Constitutional:       General: He is not in acute distress.     Appearance: Normal appearance. He is not ill-appearing or toxic-appearing.     Cardiovascular:      Rate and Rhythm: Normal rate and regular rhythm.      Pulses: Normal pulses.           Radial pulses are 2+ on the right side and 2+ on the left side.   Pulmonary:      Effort: Pulmonary effort is normal. No respiratory distress.     Musculoskeletal:         General: Normal range of motion.      Cervical back: Normal range of motion and neck supple. No rigidity or tenderness.      Comments: (+) Finklestein's test on the right hand   Lymphadenopathy:      Cervical: No cervical adenopathy.     Skin:     General: Skin is warm and dry.      Capillary Refill: Capillary refill takes less than 2 seconds.     Neurological:      General: No focal deficit present.      Mental Status: He is alert.      Sensory: Sensation is  intact.      Motor: Motor function is intact. No weakness.     Psychiatric:         Mood and Affect: Mood normal.         Behavior: Behavior normal.         Results Reviewed       None            XR wrist 3+ views RIGHT    (Results Pending)       Procedures    ED Medication and Procedure Management   Prior to Admission Medications   Prescriptions Last Dose Informant Patient Reported? Taking?   ARIPiprazole (ABILIFY) 5 mg tablet   Yes No   Concerta 36 MG ER tablet   Yes No   PARoxetine (PAXIL) 30 mg tablet   Yes No   QUEtiapine (SEROquel) 200 mg tablet   Yes No   Sig: Take 200 mg by mouth daily at bedtime   Vyvanse 60 MG capsule   Yes No   acetaminophen (TYLENOL) 650 mg CR tablet   No No   Sig: Take 1 tablet (650 mg total) by mouth every 8 (eight) hours as needed for mild pain   albuterol (ProAir HFA) 90 mcg/act inhaler   No No   Sig: Inhale 2 puffs every 6 (six) hours as needed for wheezing   aluminum-magnesium hydroxide-simethicone (MAALOX MAX) 400-400-40 MG/5ML suspension   No No   Sig: Take 10 mL by mouth every 6 (six) hours as needed for indigestion or heartburn   ergocalciferol (VITAMIN D2) 50,000 units   Yes No   Sig: Take 50,000 Units by mouth once a week   gabapentin (NEURONTIN) 400 mg capsule   Yes No   guaiFENesin (MUCINEX) 600 mg 12 hr tablet   No No   Sig: Take 2 tablets (1,200 mg total) by mouth every 12 (twelve) hours   nicotine (NICODERM CQ) 14 mg/24hr TD 24 hr patch   Yes No   Sig: Place 1 patch on the skin daily   propranolol (INDERAL) 10 mg tablet   Yes No   traZODone (DESYREL) 50 mg tablet   Yes No   venlafaxine (EFFEXOR-XR) 150 mg 24 hr capsule   No No   Sig: Take 1 capsule (150 mg total) by mouth daily   vitamin B-12 (CYANOCOBALAMIN) 250 MCG tablet   Yes No   Sig: TAKE 1 TABLET (250 MCG TOTAL) BY MOUTH DAILY.      Facility-Administered Medications: None     Discharge Medication List as of 7/2/2025  4:42 PM        CONTINUE these medications which have NOT CHANGED    Details   acetaminophen  (TYLENOL) 650 mg CR tablet Take 1 tablet (650 mg total) by mouth every 8 (eight) hours as needed for mild pain, Starting Sat 1/11/2025, Normal      albuterol (ProAir HFA) 90 mcg/act inhaler Inhale 2 puffs every 6 (six) hours as needed for wheezing, Starting Sat 1/11/2025, Normal      aluminum-magnesium hydroxide-simethicone (MAALOX MAX) 400-400-40 MG/5ML suspension Take 10 mL by mouth every 6 (six) hours as needed for indigestion or heartburn, Starting Fri 8/9/2024, Normal      ARIPiprazole (ABILIFY) 5 mg tablet Historical Med      Concerta 36 MG ER tablet Historical Med      ergocalciferol (VITAMIN D2) 50,000 units Take 50,000 Units by mouth once a week, Starting Tue 12/10/2024, Historical Med      gabapentin (NEURONTIN) 400 mg capsule Historical Med      guaiFENesin (MUCINEX) 600 mg 12 hr tablet Take 2 tablets (1,200 mg total) by mouth every 12 (twelve) hours, Starting Sat 1/11/2025, Normal      nicotine (NICODERM CQ) 14 mg/24hr TD 24 hr patch Place 1 patch on the skin daily, Historical Med      PARoxetine (PAXIL) 30 mg tablet Historical Med      propranolol (INDERAL) 10 mg tablet Historical Med      QUEtiapine (SEROquel) 200 mg tablet Take 200 mg by mouth daily at bedtime, Starting Mon 8/14/2023, Historical Med      traZODone (DESYREL) 50 mg tablet Historical Med      venlafaxine (EFFEXOR-XR) 150 mg 24 hr capsule Take 1 capsule (150 mg total) by mouth daily, Starting Tue 9/19/2023, Until Thu 10/19/2023, Normal      vitamin B-12 (CYANOCOBALAMIN) 250 MCG tablet TAKE 1 TABLET (250 MCG TOTAL) BY MOUTH DAILY., Historical Med      Vyvanse 60 MG capsule Historical Med           No discharge procedures on file.  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  [4]   Social History  Tobacco Use    Smoking status: Every Day     Current packs/day: 0.50     Average packs/day: 0.5 packs/day for 20.0 years (10.0 ttl pk-yrs)     Types: Cigarettes    Smokeless tobacco: Never    Tobacco comments:     Not ready to quit.   Vaping Use    Vaping status: Never Used   Substance Use Topics    Alcohol use: Not Currently    Drug use: Yes     Frequency: 1.0 times per week     Types: Marijuana     Comment: once a week        Casandra Garnett PA-C  07/02/25 [id number]

## 2025-07-20 ENCOUNTER — HOSPITAL ENCOUNTER (EMERGENCY)
Facility: HOSPITAL | Age: 40
Discharge: HOME | End: 2025-07-20
Attending: EMERGENCY MEDICINE | Admitting: EMERGENCY MEDICINE
Payer: MEDICARE

## 2025-07-20 VITALS
BODY MASS INDEX: 29.2 KG/M2 | WEIGHT: 197.7 LBS | RESPIRATION RATE: 20 BRPM | SYSTOLIC BLOOD PRESSURE: 156 MMHG | TEMPERATURE: 98.8 F | OXYGEN SATURATION: 99 % | DIASTOLIC BLOOD PRESSURE: 95 MMHG | HEART RATE: 102 BPM

## 2025-07-20 DIAGNOSIS — M65.4 TENDINITIS, DE QUERVAIN'S: Primary | ICD-10-CM

## 2025-07-20 PROCEDURE — 99284 EMERGENCY DEPT VISIT MOD MDM: CPT | Performed by: EMERGENCY MEDICINE

## 2025-07-20 PROCEDURE — 99282 EMERGENCY DEPT VISIT SF MDM: CPT

## 2025-07-20 RX ORDER — IBUPROFEN 800 MG/1
800 TABLET, FILM COATED ORAL 3 TIMES DAILY
Qty: 21 TABLET | Refills: 0 | Status: SHIPPED | OUTPATIENT
Start: 2025-07-20

## 2025-07-20 RX ORDER — IBUPROFEN 400 MG/1
800 TABLET, FILM COATED ORAL ONCE
Status: COMPLETED | OUTPATIENT
Start: 2025-07-20 | End: 2025-07-20

## 2025-07-20 RX ADMIN — IBUPROFEN 800 MG: 400 TABLET ORAL at 22:25

## 2025-07-20 NOTE — Clinical Note
Mihir Sanchez was seen and treated in our emergency department on 7/20/2025.                Diagnosis:     Mihir  .    He may return on this date: 07/22/2025         If you have any questions or concerns, please don't hesitate to call.      Rashel Garcia MD    ______________________________           _______________          _______________  Hospital Representative                              Date                                Time

## 2025-07-21 NOTE — ED PROVIDER NOTES
Time reflects when diagnosis was documented in both MDM as applicable and the Disposition within this note       Time User Action Codes Description Comment    7/20/2025 10:26 PM Rashel Garcia Add [M65.4] Tendinitis, de Quervain's           ED Disposition       ED Disposition   Discharge    Condition   Stable    Date/Time   Sun Jul 20, 2025 10:25 PM    Comment   Mihir Tobar Sanchez discharge to home/self care.                   Assessment & Plan       Medical Decision Making  Patient de Quervain's tenosynovitis splint follow-up with Ortho patient was placed on NSAIDs neurovascular intact agree not a septic joint    Risk  Prescription drug management.             Medications   ibuprofen (MOTRIN) tablet 800 mg (800 mg Oral Given 7/20/25 2225)       ED Risk Strat Scores                    No data recorded        SBIRT 20yo+      Flowsheet Row Most Recent Value   Initial Alcohol Screen: US AUDIT-C     1. How often do you have a drink containing alcohol? 0 Filed at: 07/20/2025 2128   2. How many drinks containing alcohol do you have on a typical day you are drinking?  0 Filed at: 07/20/2025 2128   3a. Male UNDER 65: How often do you have five or more drinks on one occasion? 0 Filed at: 07/20/2025 2128   Audit-C Score 0 Filed at: 07/20/2025 2128   LEATHA: How many times in the past year have you...    Used an illegal drug or used a prescription medication for non-medical reasons? Never Filed at: 07/20/2025 2128                            History of Present Illness       Chief Complaint   Patient presents with    Wrist Pain     R wrist pain, was evaluated in ER and did not follow up after. No medications PTA.        Past Medical History[1]   Past Surgical History[2]   Family History[3]   Social History[4]   E-Cigarette/Vaping    E-Cigarette Use Never User       E-Cigarette/Vaping Substances    Nicotine No     THC No     CBD No     Flavoring No     Other No     Unknown No       I have reviewed and agree with the history as  documented.     Patient was seen July 2 for right wrist pain right-hand-dominant diagnosed with de Quervain's tenosynovitis is given a thumb spica splint did not follow-up with specialist he has an ongoing pain that is getting worse with with movement of his wrist denies any weakness numbness any trauma fever      Wrist Pain  Associated symptoms: no fever, no rash and no vomiting        Review of Systems   Constitutional:  Negative for chills and fever.   Gastrointestinal:  Negative for vomiting.   Skin:  Negative for color change and rash.   Neurological:  Negative for weakness and numbness.   All other systems reviewed and are negative.          Objective       ED Triage Vitals   Temperature Pulse Blood Pressure Respirations SpO2 Patient Position - Orthostatic VS   07/20/25 2128 07/20/25 2128 07/20/25 2128 07/20/25 2128 07/20/25 2128 07/20/25 2128   98.8 °F (37.1 °C) 102 156/95 20 99 % Sitting      Temp Source Heart Rate Source BP Location FiO2 (%) Pain Score    07/20/25 2128 07/20/25 2128 07/20/25 2128 -- 07/20/25 2225    Oral Monitor Left arm  7      Vitals      Date and Time Temp Pulse SpO2 Resp BP Pain Score FACES Pain Rating User   07/20/25 2225 -- -- -- -- -- 7 -- KB   07/20/25 2128 98.8 °F (37.1 °C) 102 99 % 20 156/95 -- -- ST            Physical Exam  Vitals and nursing note reviewed.   Constitutional:       General: He is not in acute distress.     Appearance: He is well-developed. He is not ill-appearing, toxic-appearing or diaphoretic.   HENT:      Head: Normocephalic and atraumatic.     Eyes:      Conjunctiva/sclera: Conjunctivae normal.       Cardiovascular:      Rate and Rhythm: Normal rate.   Pulmonary:      Effort: Pulmonary effort is normal. No respiratory distress.   Abdominal:      General: There is no distension.      Palpations: Abdomen is soft.     Musculoskeletal:         General: No swelling.      Cervical back: Neck supple.      Comments: Patient's right wrist is nonswollen no snuffbox  tenderness he has pain at the base of his right thumb he has a very positive Finkelstein's test full range of motion neurovascular intact     Skin:     General: Skin is warm and dry.      Capillary Refill: Capillary refill takes less than 2 seconds.     Neurological:      General: No focal deficit present.      Mental Status: He is alert.      Sensory: No sensory deficit.      Motor: No weakness.     Psychiatric:         Mood and Affect: Mood normal.         Results Reviewed       None            No orders to display       Procedures    ED Medication and Procedure Management   Prior to Admission Medications   Prescriptions Last Dose Informant Patient Reported? Taking?   ARIPiprazole (ABILIFY) 5 mg tablet   Yes No   Concerta 36 MG ER tablet   Yes No   Diclofenac Sodium (VOLTAREN) 1 %   No No   Sig: Apply 2 g topically 4 (four) times a day   PARoxetine (PAXIL) 30 mg tablet   Yes No   QUEtiapine (SEROquel) 200 mg tablet   Yes No   Sig: Take 200 mg by mouth daily at bedtime   Vyvanse 60 MG capsule   Yes No   acetaminophen (TYLENOL) 650 mg CR tablet   No No   Sig: Take 1 tablet (650 mg total) by mouth every 8 (eight) hours as needed for mild pain   albuterol (ProAir HFA) 90 mcg/act inhaler   No No   Sig: Inhale 2 puffs every 6 (six) hours as needed for wheezing   aluminum-magnesium hydroxide-simethicone (MAALOX MAX) 400-400-40 MG/5ML suspension   No No   Sig: Take 10 mL by mouth every 6 (six) hours as needed for indigestion or heartburn   ergocalciferol (VITAMIN D2) 50,000 units   Yes No   Sig: Take 50,000 Units by mouth once a week   gabapentin (NEURONTIN) 400 mg capsule   Yes No   guaiFENesin (MUCINEX) 600 mg 12 hr tablet   No No   Sig: Take 2 tablets (1,200 mg total) by mouth every 12 (twelve) hours   nicotine (NICODERM CQ) 14 mg/24hr TD 24 hr patch   Yes No   Sig: Place 1 patch on the skin daily   propranolol (INDERAL) 10 mg tablet   Yes No   traZODone (DESYREL) 50 mg tablet   Yes No   venlafaxine (EFFEXOR-XR) 150 mg  24 hr capsule   No No   Sig: Take 1 capsule (150 mg total) by mouth daily   vitamin B-12 (CYANOCOBALAMIN) 250 MCG tablet   Yes No   Sig: TAKE 1 TABLET (250 MCG TOTAL) BY MOUTH DAILY.      Facility-Administered Medications: None     Patient's Medications   Discharge Prescriptions    IBUPROFEN (MOTRIN) 800 MG TABLET    Take 1 tablet (800 mg total) by mouth 3 (three) times a day       Start Date: 7/20/2025 End Date: --       Order Dose: 800 mg       Quantity: 21 tablet    Refills: 0     No discharge procedures on file.  ED SEPSIS DOCUMENTATION   Time reflects when diagnosis was documented in both MDM as applicable and the Disposition within this note       Time User Action Codes Description Comment    7/20/2025 10:26 PM Rashel Garcia [M65.4] kristian El's                    [1]   Past Medical History:  Diagnosis Date    ADD (attention deficit disorder)     Addiction to drug (HCC)     Anxiety     Asthma     Borderline personality disorder (HCC)     Chronic kidney disease     Depression     MDD (major depressive disorder)     Panic attack     Psychiatric illness     Schizoaffective disorder, depressive type (HCC)     Self-injurious behavior     Social phobia     Substance abuse (HCC)     Suicide attempt (HCC)    [2]   Past Surgical History:  Procedure Laterality Date    NO PAST SURGERIES     [3] No family history on file.  [4]   Social History  Tobacco Use    Smoking status: Every Day     Current packs/day: 0.50     Average packs/day: 0.5 packs/day for 20.0 years (10.0 ttl pk-yrs)     Types: Cigarettes    Smokeless tobacco: Never    Tobacco comments:     Not ready to quit.   Vaping Use    Vaping status: Never Used   Substance Use Topics    Alcohol use: Not Currently    Drug use: Not Currently     Frequency: 1.0 times per week     Types: Marijuana     Comment: once a week        Rashel Garcia MD  07/20/25 7893

## 2025-07-23 ENCOUNTER — HOSPITAL ENCOUNTER (EMERGENCY)
Facility: HOSPITAL | Age: 40
Discharge: HOME | End: 2025-07-23
Attending: EMERGENCY MEDICINE | Admitting: EMERGENCY MEDICINE
Payer: MEDICARE

## 2025-07-23 VITALS
BODY MASS INDEX: 28.84 KG/M2 | TEMPERATURE: 98.1 F | HEART RATE: 105 BPM | SYSTOLIC BLOOD PRESSURE: 151 MMHG | OXYGEN SATURATION: 97 % | RESPIRATION RATE: 20 BRPM | WEIGHT: 195.33 LBS | DIASTOLIC BLOOD PRESSURE: 79 MMHG

## 2025-07-23 DIAGNOSIS — R68.89 FLU-LIKE SYMPTOMS: Primary | ICD-10-CM

## 2025-07-23 PROCEDURE — 99282 EMERGENCY DEPT VISIT SF MDM: CPT

## 2025-07-23 PROCEDURE — 99284 EMERGENCY DEPT VISIT MOD MDM: CPT

## 2025-07-23 RX ORDER — BENZONATATE 100 MG/1
100 CAPSULE ORAL EVERY 8 HOURS
Qty: 21 CAPSULE | Refills: 0 | Status: SHIPPED | OUTPATIENT
Start: 2025-07-23